# Patient Record
Sex: MALE | URBAN - METROPOLITAN AREA
[De-identification: names, ages, dates, MRNs, and addresses within clinical notes are randomized per-mention and may not be internally consistent; named-entity substitution may affect disease eponyms.]

---

## 2019-01-01 RX ADMIN — Medication 30 MILLIGRAM(S): at 22:30

## 2020-01-01 ENCOUNTER — INPATIENT (INPATIENT)
Facility: HOSPITAL | Age: 45
LOS: 47 days | DRG: 870 | End: 2020-05-18
Attending: INTERNAL MEDICINE | Admitting: HOSPITALIST
Payer: MEDICAID

## 2020-01-01 VITALS
DIASTOLIC BLOOD PRESSURE: 79 MMHG | TEMPERATURE: 102 F | HEART RATE: 129 BPM | WEIGHT: 115.08 LBS | HEIGHT: 62 IN | RESPIRATION RATE: 22 BRPM | OXYGEN SATURATION: 85 % | SYSTOLIC BLOOD PRESSURE: 123 MMHG

## 2020-01-01 VITALS — OXYGEN SATURATION: 73 % | TEMPERATURE: 100 F | HEART RATE: 6 BPM

## 2020-01-01 DIAGNOSIS — R68.89 OTHER GENERAL SYMPTOMS AND SIGNS: ICD-10-CM

## 2020-01-01 DIAGNOSIS — Z90.49 ACQUIRED ABSENCE OF OTHER SPECIFIED PARTS OF DIGESTIVE TRACT: Chronic | ICD-10-CM

## 2020-01-01 DIAGNOSIS — J96.00 ACUTE RESPIRATORY FAILURE, UNSPECIFIED WHETHER WITH HYPOXIA OR HYPERCAPNIA: ICD-10-CM

## 2020-01-01 DIAGNOSIS — Z29.9 ENCOUNTER FOR PROPHYLACTIC MEASURES, UNSPECIFIED: ICD-10-CM

## 2020-01-01 DIAGNOSIS — F43.20 ADJUSTMENT DISORDER, UNSPECIFIED: ICD-10-CM

## 2020-01-01 DIAGNOSIS — Z02.9 ENCOUNTER FOR ADMINISTRATIVE EXAMINATIONS, UNSPECIFIED: ICD-10-CM

## 2020-01-01 DIAGNOSIS — J96.01 ACUTE RESPIRATORY FAILURE WITH HYPOXIA: ICD-10-CM

## 2020-01-01 DIAGNOSIS — Z71.89 OTHER SPECIFIED COUNSELING: ICD-10-CM

## 2020-01-01 DIAGNOSIS — Z51.5 ENCOUNTER FOR PALLIATIVE CARE: ICD-10-CM

## 2020-01-01 DIAGNOSIS — F32.9 MAJOR DEPRESSIVE DISORDER, SINGLE EPISODE, UNSPECIFIED: ICD-10-CM

## 2020-01-01 DIAGNOSIS — A41.9 SEPSIS, UNSPECIFIED ORGANISM: ICD-10-CM

## 2020-01-01 LAB
-  AMPICILLIN/SULBACTAM: SIGNIFICANT CHANGE UP
-  AMPICILLIN/SULBACTAM: SIGNIFICANT CHANGE UP
-  CEFAZOLIN: SIGNIFICANT CHANGE UP
-  CEFAZOLIN: SIGNIFICANT CHANGE UP
-  CLINDAMYCIN: SIGNIFICANT CHANGE UP
-  CLINDAMYCIN: SIGNIFICANT CHANGE UP
-  COAGULASE NEGATIVE STAPHYLOCOCCUS: SIGNIFICANT CHANGE UP
-  ERYTHROMYCIN: SIGNIFICANT CHANGE UP
-  ERYTHROMYCIN: SIGNIFICANT CHANGE UP
-  GENTAMICIN: SIGNIFICANT CHANGE UP
-  GENTAMICIN: SIGNIFICANT CHANGE UP
-  OXACILLIN: SIGNIFICANT CHANGE UP
-  OXACILLIN: SIGNIFICANT CHANGE UP
-  PENICILLIN: SIGNIFICANT CHANGE UP
-  PENICILLIN: SIGNIFICANT CHANGE UP
-  RIFAMPIN: SIGNIFICANT CHANGE UP
-  RIFAMPIN: SIGNIFICANT CHANGE UP
-  TETRACYCLINE: SIGNIFICANT CHANGE UP
-  TETRACYCLINE: SIGNIFICANT CHANGE UP
-  TRIMETHOPRIM/SULFAMETHOXAZOLE: SIGNIFICANT CHANGE UP
-  TRIMETHOPRIM/SULFAMETHOXAZOLE: SIGNIFICANT CHANGE UP
-  VANCOMYCIN: SIGNIFICANT CHANGE UP
-  VANCOMYCIN: SIGNIFICANT CHANGE UP
A1C WITH ESTIMATED AVERAGE GLUCOSE RESULT: 6.5 % — HIGH (ref 4–5.6)
ALBUMIN SERPL ELPH-MCNC: 1.9 G/DL — LOW (ref 3.3–5)
ALBUMIN SERPL ELPH-MCNC: 2 G/DL — LOW (ref 3.3–5)
ALBUMIN SERPL ELPH-MCNC: 2 G/DL — LOW (ref 3.3–5)
ALBUMIN SERPL ELPH-MCNC: 2.1 G/DL — LOW (ref 3.3–5)
ALBUMIN SERPL ELPH-MCNC: 2.2 G/DL — LOW (ref 3.3–5)
ALBUMIN SERPL ELPH-MCNC: 2.3 G/DL — LOW (ref 3.3–5)
ALBUMIN SERPL ELPH-MCNC: 2.4 G/DL — LOW (ref 3.3–5)
ALBUMIN SERPL ELPH-MCNC: 2.5 G/DL — LOW (ref 3.3–5)
ALBUMIN SERPL ELPH-MCNC: 2.6 G/DL — LOW (ref 3.3–5)
ALBUMIN SERPL ELPH-MCNC: 2.7 G/DL — LOW (ref 3.3–5)
ALBUMIN SERPL ELPH-MCNC: 2.8 G/DL — LOW (ref 3.3–5)
ALBUMIN SERPL ELPH-MCNC: 2.8 G/DL — LOW (ref 3.3–5)
ALBUMIN SERPL ELPH-MCNC: 2.9 G/DL — LOW (ref 3.3–5)
ALBUMIN SERPL ELPH-MCNC: 3 G/DL — LOW (ref 3.3–5)
ALBUMIN SERPL ELPH-MCNC: 3.2 G/DL — LOW (ref 3.3–5)
ALBUMIN SERPL ELPH-MCNC: 3.8 G/DL — SIGNIFICANT CHANGE UP (ref 3.3–5)
ALBUMIN SERPL ELPH-MCNC: 4.1 G/DL — SIGNIFICANT CHANGE UP (ref 3.3–5)
ALP SERPL-CCNC: 100 U/L — SIGNIFICANT CHANGE UP (ref 40–120)
ALP SERPL-CCNC: 100 U/L — SIGNIFICANT CHANGE UP (ref 40–120)
ALP SERPL-CCNC: 102 U/L — SIGNIFICANT CHANGE UP (ref 40–120)
ALP SERPL-CCNC: 104 U/L — SIGNIFICANT CHANGE UP (ref 40–120)
ALP SERPL-CCNC: 106 U/L — SIGNIFICANT CHANGE UP (ref 40–120)
ALP SERPL-CCNC: 106 U/L — SIGNIFICANT CHANGE UP (ref 40–120)
ALP SERPL-CCNC: 108 U/L — SIGNIFICANT CHANGE UP (ref 40–120)
ALP SERPL-CCNC: 109 U/L — SIGNIFICANT CHANGE UP (ref 40–120)
ALP SERPL-CCNC: 109 U/L — SIGNIFICANT CHANGE UP (ref 40–120)
ALP SERPL-CCNC: 114 U/L — SIGNIFICANT CHANGE UP (ref 40–120)
ALP SERPL-CCNC: 115 U/L — SIGNIFICANT CHANGE UP (ref 40–120)
ALP SERPL-CCNC: 118 U/L — SIGNIFICANT CHANGE UP (ref 40–120)
ALP SERPL-CCNC: 118 U/L — SIGNIFICANT CHANGE UP (ref 40–120)
ALP SERPL-CCNC: 120 U/L — SIGNIFICANT CHANGE UP (ref 40–120)
ALP SERPL-CCNC: 122 U/L — HIGH (ref 40–120)
ALP SERPL-CCNC: 126 U/L — HIGH (ref 40–120)
ALP SERPL-CCNC: 127 U/L — HIGH (ref 40–120)
ALP SERPL-CCNC: 128 U/L — HIGH (ref 40–120)
ALP SERPL-CCNC: 129 U/L — HIGH (ref 40–120)
ALP SERPL-CCNC: 130 U/L — HIGH (ref 40–120)
ALP SERPL-CCNC: 132 U/L — HIGH (ref 40–120)
ALP SERPL-CCNC: 133 U/L — HIGH (ref 40–120)
ALP SERPL-CCNC: 135 U/L — HIGH (ref 40–120)
ALP SERPL-CCNC: 135 U/L — HIGH (ref 40–120)
ALP SERPL-CCNC: 136 U/L — HIGH (ref 40–120)
ALP SERPL-CCNC: 139 U/L — HIGH (ref 40–120)
ALP SERPL-CCNC: 141 U/L — HIGH (ref 40–120)
ALP SERPL-CCNC: 142 U/L — HIGH (ref 40–120)
ALP SERPL-CCNC: 142 U/L — HIGH (ref 40–120)
ALP SERPL-CCNC: 143 U/L — HIGH (ref 40–120)
ALP SERPL-CCNC: 144 U/L — HIGH (ref 40–120)
ALP SERPL-CCNC: 145 U/L — HIGH (ref 40–120)
ALP SERPL-CCNC: 145 U/L — HIGH (ref 40–120)
ALP SERPL-CCNC: 151 U/L — HIGH (ref 40–120)
ALP SERPL-CCNC: 157 U/L — HIGH (ref 40–120)
ALP SERPL-CCNC: 167 U/L — HIGH (ref 40–120)
ALP SERPL-CCNC: 169 U/L — HIGH (ref 40–120)
ALP SERPL-CCNC: 173 U/L — HIGH (ref 40–120)
ALP SERPL-CCNC: 176 U/L — HIGH (ref 40–120)
ALP SERPL-CCNC: 345 U/L — HIGH (ref 40–120)
ALP SERPL-CCNC: 374 U/L — HIGH (ref 40–120)
ALP SERPL-CCNC: 413 U/L — HIGH (ref 40–120)
ALP SERPL-CCNC: 457 U/L — HIGH (ref 40–120)
ALP SERPL-CCNC: 457 U/L — HIGH (ref 40–120)
ALP SERPL-CCNC: 477 U/L — HIGH (ref 40–120)
ALP SERPL-CCNC: 483 U/L — HIGH (ref 40–120)
ALP SERPL-CCNC: 72 U/L — SIGNIFICANT CHANGE UP (ref 40–120)
ALP SERPL-CCNC: 76 U/L — SIGNIFICANT CHANGE UP (ref 40–120)
ALP SERPL-CCNC: 79 U/L — SIGNIFICANT CHANGE UP (ref 40–120)
ALP SERPL-CCNC: 82 U/L — SIGNIFICANT CHANGE UP (ref 40–120)
ALP SERPL-CCNC: 86 U/L — SIGNIFICANT CHANGE UP (ref 40–120)
ALP SERPL-CCNC: 89 U/L — SIGNIFICANT CHANGE UP (ref 40–120)
ALP SERPL-CCNC: 92 U/L — SIGNIFICANT CHANGE UP (ref 40–120)
ALP SERPL-CCNC: 93 U/L — SIGNIFICANT CHANGE UP (ref 40–120)
ALP SERPL-CCNC: 99 U/L — SIGNIFICANT CHANGE UP (ref 40–120)
ALT FLD-CCNC: 10 U/L — SIGNIFICANT CHANGE UP (ref 10–45)
ALT FLD-CCNC: 10 U/L — SIGNIFICANT CHANGE UP (ref 10–45)
ALT FLD-CCNC: 11 U/L — SIGNIFICANT CHANGE UP (ref 10–45)
ALT FLD-CCNC: 11 U/L — SIGNIFICANT CHANGE UP (ref 10–45)
ALT FLD-CCNC: 12 U/L — SIGNIFICANT CHANGE UP (ref 10–45)
ALT FLD-CCNC: 13 U/L — SIGNIFICANT CHANGE UP (ref 10–45)
ALT FLD-CCNC: 14 U/L — SIGNIFICANT CHANGE UP (ref 10–45)
ALT FLD-CCNC: 16 U/L — SIGNIFICANT CHANGE UP (ref 10–45)
ALT FLD-CCNC: 168 U/L — HIGH (ref 10–45)
ALT FLD-CCNC: 171 U/L — HIGH (ref 10–45)
ALT FLD-CCNC: 18 U/L — SIGNIFICANT CHANGE UP (ref 10–45)
ALT FLD-CCNC: 20 U/L — SIGNIFICANT CHANGE UP (ref 10–45)
ALT FLD-CCNC: 200 U/L — HIGH (ref 10–45)
ALT FLD-CCNC: 21 U/L — SIGNIFICANT CHANGE UP (ref 10–45)
ALT FLD-CCNC: 21 U/L — SIGNIFICANT CHANGE UP (ref 10–45)
ALT FLD-CCNC: 23 U/L — SIGNIFICANT CHANGE UP (ref 10–45)
ALT FLD-CCNC: 24 U/L — SIGNIFICANT CHANGE UP (ref 10–45)
ALT FLD-CCNC: 25 U/L — SIGNIFICANT CHANGE UP (ref 10–45)
ALT FLD-CCNC: 25 U/L — SIGNIFICANT CHANGE UP (ref 10–45)
ALT FLD-CCNC: 26 U/L — SIGNIFICANT CHANGE UP (ref 10–45)
ALT FLD-CCNC: 27 U/L — SIGNIFICANT CHANGE UP (ref 10–45)
ALT FLD-CCNC: 27 U/L — SIGNIFICANT CHANGE UP (ref 10–45)
ALT FLD-CCNC: 28 U/L — SIGNIFICANT CHANGE UP (ref 10–45)
ALT FLD-CCNC: 29 U/L — SIGNIFICANT CHANGE UP (ref 10–45)
ALT FLD-CCNC: 30 U/L — SIGNIFICANT CHANGE UP (ref 10–45)
ALT FLD-CCNC: 31 U/L — SIGNIFICANT CHANGE UP (ref 10–45)
ALT FLD-CCNC: 321 U/L — HIGH (ref 10–45)
ALT FLD-CCNC: 34 U/L — SIGNIFICANT CHANGE UP (ref 10–45)
ALT FLD-CCNC: 34 U/L — SIGNIFICANT CHANGE UP (ref 10–45)
ALT FLD-CCNC: 35 U/L — SIGNIFICANT CHANGE UP (ref 10–45)
ALT FLD-CCNC: 35 U/L — SIGNIFICANT CHANGE UP (ref 10–45)
ALT FLD-CCNC: 37 U/L — SIGNIFICANT CHANGE UP (ref 10–45)
ALT FLD-CCNC: 374 U/L — HIGH (ref 10–45)
ALT FLD-CCNC: 38 U/L — SIGNIFICANT CHANGE UP (ref 10–45)
ALT FLD-CCNC: 39 U/L — SIGNIFICANT CHANGE UP (ref 10–45)
ALT FLD-CCNC: 39 U/L — SIGNIFICANT CHANGE UP (ref 10–45)
ALT FLD-CCNC: 40 U/L — SIGNIFICANT CHANGE UP (ref 10–45)
ALT FLD-CCNC: 40 U/L — SIGNIFICANT CHANGE UP (ref 10–45)
ALT FLD-CCNC: 41 U/L — SIGNIFICANT CHANGE UP (ref 10–45)
ALT FLD-CCNC: 414 U/L — HIGH (ref 10–45)
ALT FLD-CCNC: 42 U/L — SIGNIFICANT CHANGE UP (ref 10–45)
ALT FLD-CCNC: 43 U/L — SIGNIFICANT CHANGE UP (ref 10–45)
ALT FLD-CCNC: 52 U/L — HIGH (ref 10–45)
ALT FLD-CCNC: 58 U/L — HIGH (ref 10–45)
ALT FLD-CCNC: 602 U/L — HIGH (ref 10–45)
ALT FLD-CCNC: 772 U/L — HIGH (ref 10–45)
ALT FLD-CCNC: 8 U/L — LOW (ref 10–45)
ALT FLD-CCNC: 9 U/L — LOW (ref 10–45)
ALT FLD-CCNC: 9 U/L — LOW (ref 10–45)
ANION GAP SERPL CALC-SCNC: 10 MMOL/L — SIGNIFICANT CHANGE UP (ref 5–17)
ANION GAP SERPL CALC-SCNC: 11 MMOL/L — SIGNIFICANT CHANGE UP (ref 5–17)
ANION GAP SERPL CALC-SCNC: 12 MMOL/L — SIGNIFICANT CHANGE UP (ref 5–17)
ANION GAP SERPL CALC-SCNC: 13 MMOL/L — SIGNIFICANT CHANGE UP (ref 5–17)
ANION GAP SERPL CALC-SCNC: 14 MMOL/L — SIGNIFICANT CHANGE UP (ref 5–17)
ANION GAP SERPL CALC-SCNC: 15 MMOL/L — SIGNIFICANT CHANGE UP (ref 5–17)
ANION GAP SERPL CALC-SCNC: 16 MMOL/L — SIGNIFICANT CHANGE UP (ref 5–17)
ANION GAP SERPL CALC-SCNC: 18 MMOL/L — HIGH (ref 5–17)
ANION GAP SERPL CALC-SCNC: 19 MMOL/L — HIGH (ref 5–17)
ANION GAP SERPL CALC-SCNC: 20 MMOL/L — HIGH (ref 5–17)
ANION GAP SERPL CALC-SCNC: 23 MMOL/L — HIGH (ref 5–17)
ANION GAP SERPL CALC-SCNC: 25 MMOL/L — HIGH (ref 5–17)
ANION GAP SERPL CALC-SCNC: 8 MMOL/L — SIGNIFICANT CHANGE UP (ref 5–17)
ANION GAP SERPL CALC-SCNC: 9 MMOL/L — SIGNIFICANT CHANGE UP (ref 5–17)
APPEARANCE UR: ABNORMAL
APPEARANCE UR: CLEAR — SIGNIFICANT CHANGE UP
APTT BLD: 104 SEC — HIGH (ref 27.5–36.3)
APTT BLD: 111.3 SEC — HIGH (ref 27.5–36.3)
APTT BLD: 139.3 SEC — CRITICAL HIGH (ref 27.5–36.3)
APTT BLD: 28.8 SEC — SIGNIFICANT CHANGE UP (ref 27.5–36.3)
APTT BLD: 29 SEC — SIGNIFICANT CHANGE UP (ref 27.5–36.3)
APTT BLD: 30.3 SEC — SIGNIFICANT CHANGE UP (ref 27.5–36.3)
APTT BLD: 31 SEC — SIGNIFICANT CHANGE UP (ref 27.5–36.3)
APTT BLD: 31.5 SEC — SIGNIFICANT CHANGE UP (ref 27.5–36.3)
APTT BLD: 32 SEC — SIGNIFICANT CHANGE UP (ref 27.5–36.3)
APTT BLD: 32.3 SEC — SIGNIFICANT CHANGE UP (ref 27.5–36.3)
APTT BLD: 32.8 SEC — SIGNIFICANT CHANGE UP (ref 27.5–36.3)
APTT BLD: 33.1 SEC — SIGNIFICANT CHANGE UP (ref 27.5–36.3)
APTT BLD: 33.4 SEC — SIGNIFICANT CHANGE UP (ref 27.5–36.3)
APTT BLD: 33.9 SEC — SIGNIFICANT CHANGE UP (ref 27.5–36.3)
APTT BLD: 34.1 SEC — SIGNIFICANT CHANGE UP (ref 27.5–36.3)
APTT BLD: 34.7 SEC — SIGNIFICANT CHANGE UP (ref 27.5–36.3)
APTT BLD: 34.8 SEC — SIGNIFICANT CHANGE UP (ref 27.5–36.3)
APTT BLD: 35.1 SEC — SIGNIFICANT CHANGE UP (ref 27.5–36.3)
APTT BLD: 35.7 SEC — SIGNIFICANT CHANGE UP (ref 27.5–36.3)
APTT BLD: 36.6 SEC — HIGH (ref 27.5–36.3)
APTT BLD: 38.8 SEC — HIGH (ref 27.5–36.3)
APTT BLD: 44.9 SEC — HIGH (ref 27.5–36.3)
APTT BLD: 50.9 SEC — HIGH (ref 27.5–36.3)
APTT BLD: 52.6 SEC — HIGH (ref 27.5–36.3)
APTT BLD: 53.6 SEC — HIGH (ref 27.5–36.3)
APTT BLD: 67.4 SEC — HIGH (ref 27.5–36.3)
APTT BLD: 71.2 SEC — HIGH (ref 27.5–36.3)
APTT BLD: 75.9 SEC — HIGH (ref 27.5–36.3)
APTT BLD: 91.1 SEC — HIGH (ref 27.5–36.3)
AST SERPL-CCNC: 1300 U/L — HIGH (ref 10–40)
AST SERPL-CCNC: 15 U/L — SIGNIFICANT CHANGE UP (ref 10–40)
AST SERPL-CCNC: 16 U/L — SIGNIFICANT CHANGE UP (ref 10–40)
AST SERPL-CCNC: 16 U/L — SIGNIFICANT CHANGE UP (ref 10–40)
AST SERPL-CCNC: 17 U/L — SIGNIFICANT CHANGE UP (ref 10–40)
AST SERPL-CCNC: 1781 U/L — HIGH (ref 10–40)
AST SERPL-CCNC: 18 U/L — SIGNIFICANT CHANGE UP (ref 10–40)
AST SERPL-CCNC: 19 U/L — SIGNIFICANT CHANGE UP (ref 10–40)
AST SERPL-CCNC: 20 U/L — SIGNIFICANT CHANGE UP (ref 10–40)
AST SERPL-CCNC: 20 U/L — SIGNIFICANT CHANGE UP (ref 10–40)
AST SERPL-CCNC: 2072 U/L — HIGH (ref 10–40)
AST SERPL-CCNC: 21 U/L — SIGNIFICANT CHANGE UP (ref 10–40)
AST SERPL-CCNC: 22 U/L — SIGNIFICANT CHANGE UP (ref 10–40)
AST SERPL-CCNC: 23 U/L — SIGNIFICANT CHANGE UP (ref 10–40)
AST SERPL-CCNC: 24 U/L — SIGNIFICANT CHANGE UP (ref 10–40)
AST SERPL-CCNC: 24 U/L — SIGNIFICANT CHANGE UP (ref 10–40)
AST SERPL-CCNC: 25 U/L — SIGNIFICANT CHANGE UP (ref 10–40)
AST SERPL-CCNC: 25 U/L — SIGNIFICANT CHANGE UP (ref 10–40)
AST SERPL-CCNC: 26 U/L — SIGNIFICANT CHANGE UP (ref 10–40)
AST SERPL-CCNC: 26 U/L — SIGNIFICANT CHANGE UP (ref 10–40)
AST SERPL-CCNC: 27 U/L — SIGNIFICANT CHANGE UP (ref 10–40)
AST SERPL-CCNC: 27 U/L — SIGNIFICANT CHANGE UP (ref 10–40)
AST SERPL-CCNC: 28 U/L — SIGNIFICANT CHANGE UP (ref 10–40)
AST SERPL-CCNC: 29 U/L — SIGNIFICANT CHANGE UP (ref 10–40)
AST SERPL-CCNC: 30 U/L — SIGNIFICANT CHANGE UP (ref 10–40)
AST SERPL-CCNC: 30 U/L — SIGNIFICANT CHANGE UP (ref 10–40)
AST SERPL-CCNC: 31 U/L — SIGNIFICANT CHANGE UP (ref 10–40)
AST SERPL-CCNC: 3128 U/L — HIGH (ref 10–40)
AST SERPL-CCNC: 32 U/L — SIGNIFICANT CHANGE UP (ref 10–40)
AST SERPL-CCNC: 32 U/L — SIGNIFICANT CHANGE UP (ref 10–40)
AST SERPL-CCNC: 33 U/L — SIGNIFICANT CHANGE UP (ref 10–40)
AST SERPL-CCNC: 34 U/L — SIGNIFICANT CHANGE UP (ref 10–40)
AST SERPL-CCNC: 34 U/L — SIGNIFICANT CHANGE UP (ref 10–40)
AST SERPL-CCNC: 35 U/L — SIGNIFICANT CHANGE UP (ref 10–40)
AST SERPL-CCNC: 38 U/L — SIGNIFICANT CHANGE UP (ref 10–40)
AST SERPL-CCNC: 40 U/L — SIGNIFICANT CHANGE UP (ref 10–40)
AST SERPL-CCNC: 4461 U/L — HIGH (ref 10–40)
AST SERPL-CCNC: 46 U/L — HIGH (ref 10–40)
AST SERPL-CCNC: 466 U/L — HIGH (ref 10–40)
AST SERPL-CCNC: 6532 U/L — HIGH (ref 10–40)
AST SERPL-CCNC: HIGH U/L (ref 10–40)
BACTERIA # UR AUTO: ABNORMAL
BACTERIA # UR AUTO: NEGATIVE — SIGNIFICANT CHANGE UP
BASE EXCESS BLDA CALC-SCNC: -1.6 MMOL/L — SIGNIFICANT CHANGE UP (ref -2–2)
BASE EXCESS BLDA CALC-SCNC: 0.7 MMOL/L — SIGNIFICANT CHANGE UP (ref -2–2)
BASE EXCESS BLDA CALC-SCNC: 10.6 MMOL/L — HIGH (ref -2–2)
BASE EXCESS BLDA CALC-SCNC: 11.1 MMOL/L — HIGH (ref -2–2)
BASE EXCESS BLDA CALC-SCNC: 2 MMOL/L — SIGNIFICANT CHANGE UP (ref -2–2)
BASE EXCESS BLDA CALC-SCNC: 2.6 MMOL/L — HIGH (ref -2–2)
BASE EXCESS BLDA CALC-SCNC: 3.2 MMOL/L — HIGH (ref -2–2)
BASE EXCESS BLDA CALC-SCNC: 6.8 MMOL/L — HIGH (ref -2–2)
BASE EXCESS BLDA CALC-SCNC: 7.9 MMOL/L — HIGH (ref -2–2)
BASE EXCESS BLDA CALC-SCNC: 8 MMOL/L — HIGH (ref -2–2)
BASE EXCESS BLDA CALC-SCNC: 9.6 MMOL/L — HIGH (ref -2–2)
BASOPHILS # BLD AUTO: 0 K/UL — SIGNIFICANT CHANGE UP (ref 0–0.2)
BASOPHILS # BLD AUTO: 0.02 K/UL — SIGNIFICANT CHANGE UP (ref 0–0.2)
BASOPHILS # BLD AUTO: 0.03 K/UL — SIGNIFICANT CHANGE UP (ref 0–0.2)
BASOPHILS # BLD AUTO: 0.04 K/UL — SIGNIFICANT CHANGE UP (ref 0–0.2)
BASOPHILS # BLD AUTO: 0.05 K/UL — SIGNIFICANT CHANGE UP (ref 0–0.2)
BASOPHILS # BLD AUTO: 0.06 K/UL — SIGNIFICANT CHANGE UP (ref 0–0.2)
BASOPHILS # BLD AUTO: 0.07 K/UL — SIGNIFICANT CHANGE UP (ref 0–0.2)
BASOPHILS # BLD AUTO: 0.09 K/UL — SIGNIFICANT CHANGE UP (ref 0–0.2)
BASOPHILS # BLD AUTO: 0.1 K/UL — SIGNIFICANT CHANGE UP (ref 0–0.2)
BASOPHILS # BLD AUTO: 0.1 K/UL — SIGNIFICANT CHANGE UP (ref 0–0.2)
BASOPHILS # BLD AUTO: 0.11 K/UL — SIGNIFICANT CHANGE UP (ref 0–0.2)
BASOPHILS # BLD AUTO: 0.14 K/UL — SIGNIFICANT CHANGE UP (ref 0–0.2)
BASOPHILS NFR BLD AUTO: 0 % — SIGNIFICANT CHANGE UP (ref 0–2)
BASOPHILS NFR BLD AUTO: 0.1 % — SIGNIFICANT CHANGE UP (ref 0–2)
BASOPHILS NFR BLD AUTO: 0.2 % — SIGNIFICANT CHANGE UP (ref 0–2)
BASOPHILS NFR BLD AUTO: 0.3 % — SIGNIFICANT CHANGE UP (ref 0–2)
BASOPHILS NFR BLD AUTO: 0.4 % — SIGNIFICANT CHANGE UP (ref 0–2)
BASOPHILS NFR BLD AUTO: 0.5 % — SIGNIFICANT CHANGE UP (ref 0–2)
BASOPHILS NFR BLD AUTO: 0.6 % — SIGNIFICANT CHANGE UP (ref 0–2)
BASOPHILS NFR BLD AUTO: 0.7 % — SIGNIFICANT CHANGE UP (ref 0–2)
BASOPHILS NFR BLD AUTO: 0.7 % — SIGNIFICANT CHANGE UP (ref 0–2)
BASOPHILS NFR BLD AUTO: 0.9 % — SIGNIFICANT CHANGE UP (ref 0–2)
BILIRUB SERPL-MCNC: 0.2 MG/DL — SIGNIFICANT CHANGE UP (ref 0.2–1.2)
BILIRUB SERPL-MCNC: 0.3 MG/DL — SIGNIFICANT CHANGE UP (ref 0.2–1.2)
BILIRUB SERPL-MCNC: 0.4 MG/DL — SIGNIFICANT CHANGE UP (ref 0.2–1.2)
BILIRUB SERPL-MCNC: 0.5 MG/DL — SIGNIFICANT CHANGE UP (ref 0.2–1.2)
BILIRUB SERPL-MCNC: 0.6 MG/DL — SIGNIFICANT CHANGE UP (ref 0.2–1.2)
BILIRUB SERPL-MCNC: 0.7 MG/DL — SIGNIFICANT CHANGE UP (ref 0.2–1.2)
BILIRUB SERPL-MCNC: 0.8 MG/DL — SIGNIFICANT CHANGE UP (ref 0.2–1.2)
BILIRUB SERPL-MCNC: 0.9 MG/DL — SIGNIFICANT CHANGE UP (ref 0.2–1.2)
BILIRUB SERPL-MCNC: 0.9 MG/DL — SIGNIFICANT CHANGE UP (ref 0.2–1.2)
BILIRUB SERPL-MCNC: 1.1 MG/DL — SIGNIFICANT CHANGE UP (ref 0.2–1.2)
BILIRUB SERPL-MCNC: 1.2 MG/DL — SIGNIFICANT CHANGE UP (ref 0.2–1.2)
BILIRUB SERPL-MCNC: 1.3 MG/DL — HIGH (ref 0.2–1.2)
BILIRUB SERPL-MCNC: 1.4 MG/DL — HIGH (ref 0.2–1.2)
BILIRUB SERPL-MCNC: 1.5 MG/DL — HIGH (ref 0.2–1.2)
BILIRUB SERPL-MCNC: 1.8 MG/DL — HIGH (ref 0.2–1.2)
BILIRUB UR-MCNC: NEGATIVE — SIGNIFICANT CHANGE UP
BLD GP AB SCN SERPL QL: NEGATIVE — SIGNIFICANT CHANGE UP
BUN SERPL-MCNC: 10 MG/DL — SIGNIFICANT CHANGE UP (ref 7–23)
BUN SERPL-MCNC: 10 MG/DL — SIGNIFICANT CHANGE UP (ref 7–23)
BUN SERPL-MCNC: 11 MG/DL — SIGNIFICANT CHANGE UP (ref 7–23)
BUN SERPL-MCNC: 11 MG/DL — SIGNIFICANT CHANGE UP (ref 7–23)
BUN SERPL-MCNC: 12 MG/DL — SIGNIFICANT CHANGE UP (ref 7–23)
BUN SERPL-MCNC: 13 MG/DL — SIGNIFICANT CHANGE UP (ref 7–23)
BUN SERPL-MCNC: 14 MG/DL — SIGNIFICANT CHANGE UP (ref 7–23)
BUN SERPL-MCNC: 15 MG/DL — SIGNIFICANT CHANGE UP (ref 7–23)
BUN SERPL-MCNC: 16 MG/DL — SIGNIFICANT CHANGE UP (ref 7–23)
BUN SERPL-MCNC: 17 MG/DL — SIGNIFICANT CHANGE UP (ref 7–23)
BUN SERPL-MCNC: 18 MG/DL — SIGNIFICANT CHANGE UP (ref 7–23)
BUN SERPL-MCNC: 19 MG/DL — SIGNIFICANT CHANGE UP (ref 7–23)
BUN SERPL-MCNC: 20 MG/DL — SIGNIFICANT CHANGE UP (ref 7–23)
BUN SERPL-MCNC: 21 MG/DL — SIGNIFICANT CHANGE UP (ref 7–23)
BUN SERPL-MCNC: 24 MG/DL — HIGH (ref 7–23)
BUN SERPL-MCNC: 24 MG/DL — HIGH (ref 7–23)
BUN SERPL-MCNC: 25 MG/DL — HIGH (ref 7–23)
BUN SERPL-MCNC: 26 MG/DL — HIGH (ref 7–23)
BUN SERPL-MCNC: 26 MG/DL — HIGH (ref 7–23)
BUN SERPL-MCNC: 27 MG/DL — HIGH (ref 7–23)
BUN SERPL-MCNC: 27 MG/DL — HIGH (ref 7–23)
BUN SERPL-MCNC: 28 MG/DL — HIGH (ref 7–23)
BUN SERPL-MCNC: 28 MG/DL — HIGH (ref 7–23)
BUN SERPL-MCNC: 31 MG/DL — HIGH (ref 7–23)
BUN SERPL-MCNC: 33 MG/DL — HIGH (ref 7–23)
BUN SERPL-MCNC: 33 MG/DL — HIGH (ref 7–23)
BUN SERPL-MCNC: 4 MG/DL — LOW (ref 7–23)
BUN SERPL-MCNC: 5 MG/DL — LOW (ref 7–23)
BUN SERPL-MCNC: 6 MG/DL — LOW (ref 7–23)
BUN SERPL-MCNC: 7 MG/DL — SIGNIFICANT CHANGE UP (ref 7–23)
BUN SERPL-MCNC: 8 MG/DL — SIGNIFICANT CHANGE UP (ref 7–23)
BUN SERPL-MCNC: 9 MG/DL — SIGNIFICANT CHANGE UP (ref 7–23)
CALCIUM SERPL-MCNC: 6.8 MG/DL — LOW (ref 8.4–10.5)
CALCIUM SERPL-MCNC: 7.1 MG/DL — LOW (ref 8.4–10.5)
CALCIUM SERPL-MCNC: 7.2 MG/DL — LOW (ref 8.4–10.5)
CALCIUM SERPL-MCNC: 7.3 MG/DL — LOW (ref 8.4–10.5)
CALCIUM SERPL-MCNC: 7.7 MG/DL — LOW (ref 8.4–10.5)
CALCIUM SERPL-MCNC: 7.8 MG/DL — LOW (ref 8.4–10.5)
CALCIUM SERPL-MCNC: 7.9 MG/DL — LOW (ref 8.4–10.5)
CALCIUM SERPL-MCNC: 8 MG/DL — LOW (ref 8.4–10.5)
CALCIUM SERPL-MCNC: 8.1 MG/DL — LOW (ref 8.4–10.5)
CALCIUM SERPL-MCNC: 8.2 MG/DL — LOW (ref 8.4–10.5)
CALCIUM SERPL-MCNC: 8.3 MG/DL — LOW (ref 8.4–10.5)
CALCIUM SERPL-MCNC: 8.4 MG/DL — SIGNIFICANT CHANGE UP (ref 8.4–10.5)
CALCIUM SERPL-MCNC: 8.5 MG/DL — SIGNIFICANT CHANGE UP (ref 8.4–10.5)
CALCIUM SERPL-MCNC: 8.6 MG/DL — SIGNIFICANT CHANGE UP (ref 8.4–10.5)
CALCIUM SERPL-MCNC: 8.7 MG/DL — SIGNIFICANT CHANGE UP (ref 8.4–10.5)
CALCIUM SERPL-MCNC: 8.8 MG/DL — SIGNIFICANT CHANGE UP (ref 8.4–10.5)
CALCIUM SERPL-MCNC: 8.9 MG/DL — SIGNIFICANT CHANGE UP (ref 8.4–10.5)
CALCIUM SERPL-MCNC: 9 MG/DL — SIGNIFICANT CHANGE UP (ref 8.4–10.5)
CALCIUM SERPL-MCNC: 9.1 MG/DL — SIGNIFICANT CHANGE UP (ref 8.4–10.5)
CALCIUM SERPL-MCNC: 9.1 MG/DL — SIGNIFICANT CHANGE UP (ref 8.4–10.5)
CALCIUM SERPL-MCNC: 9.2 MG/DL — SIGNIFICANT CHANGE UP (ref 8.4–10.5)
CALCIUM SERPL-MCNC: 9.3 MG/DL — SIGNIFICANT CHANGE UP (ref 8.4–10.5)
CHLORIDE SERPL-SCNC: 100 MMOL/L — SIGNIFICANT CHANGE UP (ref 96–108)
CHLORIDE SERPL-SCNC: 101 MMOL/L — SIGNIFICANT CHANGE UP (ref 96–108)
CHLORIDE SERPL-SCNC: 102 MMOL/L — SIGNIFICANT CHANGE UP (ref 96–108)
CHLORIDE SERPL-SCNC: 103 MMOL/L — SIGNIFICANT CHANGE UP (ref 96–108)
CHLORIDE SERPL-SCNC: 104 MMOL/L — SIGNIFICANT CHANGE UP (ref 96–108)
CHLORIDE SERPL-SCNC: 105 MMOL/L — SIGNIFICANT CHANGE UP (ref 96–108)
CHLORIDE SERPL-SCNC: 105 MMOL/L — SIGNIFICANT CHANGE UP (ref 96–108)
CHLORIDE SERPL-SCNC: 106 MMOL/L — SIGNIFICANT CHANGE UP (ref 96–108)
CHLORIDE SERPL-SCNC: 106 MMOL/L — SIGNIFICANT CHANGE UP (ref 96–108)
CHLORIDE SERPL-SCNC: 107 MMOL/L — SIGNIFICANT CHANGE UP (ref 96–108)
CHLORIDE SERPL-SCNC: 107 MMOL/L — SIGNIFICANT CHANGE UP (ref 96–108)
CHLORIDE SERPL-SCNC: 108 MMOL/L — SIGNIFICANT CHANGE UP (ref 96–108)
CHLORIDE SERPL-SCNC: 109 MMOL/L — HIGH (ref 96–108)
CHLORIDE SERPL-SCNC: 109 MMOL/L — HIGH (ref 96–108)
CHLORIDE SERPL-SCNC: 110 MMOL/L — HIGH (ref 96–108)
CHLORIDE SERPL-SCNC: 112 MMOL/L — HIGH (ref 96–108)
CHLORIDE SERPL-SCNC: 114 MMOL/L — HIGH (ref 96–108)
CHLORIDE SERPL-SCNC: 115 MMOL/L — HIGH (ref 96–108)
CHLORIDE SERPL-SCNC: 116 MMOL/L — HIGH (ref 96–108)
CHLORIDE SERPL-SCNC: 91 MMOL/L — LOW (ref 96–108)
CHLORIDE SERPL-SCNC: 94 MMOL/L — LOW (ref 96–108)
CHLORIDE SERPL-SCNC: 94 MMOL/L — LOW (ref 96–108)
CHLORIDE SERPL-SCNC: 95 MMOL/L — LOW (ref 96–108)
CHLORIDE SERPL-SCNC: 96 MMOL/L — SIGNIFICANT CHANGE UP (ref 96–108)
CHLORIDE SERPL-SCNC: 97 MMOL/L — SIGNIFICANT CHANGE UP (ref 96–108)
CHLORIDE SERPL-SCNC: 98 MMOL/L — SIGNIFICANT CHANGE UP (ref 96–108)
CHLORIDE SERPL-SCNC: 99 MMOL/L — SIGNIFICANT CHANGE UP (ref 96–108)
CK SERPL-CCNC: 112 U/L — SIGNIFICANT CHANGE UP (ref 30–200)
CK SERPL-CCNC: 134 U/L — SIGNIFICANT CHANGE UP (ref 30–200)
CK SERPL-CCNC: 150 U/L — SIGNIFICANT CHANGE UP (ref 30–200)
CK SERPL-CCNC: 272 U/L — HIGH (ref 30–200)
CK SERPL-CCNC: 30 U/L — SIGNIFICANT CHANGE UP (ref 30–200)
CK SERPL-CCNC: 38 U/L — SIGNIFICANT CHANGE UP (ref 30–200)
CK SERPL-CCNC: 406 U/L — HIGH (ref 30–200)
CK SERPL-CCNC: 41 U/L — SIGNIFICANT CHANGE UP (ref 30–200)
CK SERPL-CCNC: 42 U/L — SIGNIFICANT CHANGE UP (ref 30–200)
CK SERPL-CCNC: 53 U/L — SIGNIFICANT CHANGE UP (ref 30–200)
CK SERPL-CCNC: 58 U/L — SIGNIFICANT CHANGE UP (ref 30–200)
CK SERPL-CCNC: 64 U/L — SIGNIFICANT CHANGE UP (ref 30–200)
CK SERPL-CCNC: 67 U/L — SIGNIFICANT CHANGE UP (ref 30–200)
CK SERPL-CCNC: 69 U/L — SIGNIFICANT CHANGE UP (ref 30–200)
CK SERPL-CCNC: 89 U/L — SIGNIFICANT CHANGE UP (ref 30–200)
CK SERPL-CCNC: 97 U/L — SIGNIFICANT CHANGE UP (ref 30–200)
CK SERPL-CCNC: 99 U/L — SIGNIFICANT CHANGE UP (ref 30–200)
CO2 BLDA-SCNC: 27 MMOL/L — SIGNIFICANT CHANGE UP (ref 22–30)
CO2 BLDA-SCNC: 29 MMOL/L — SIGNIFICANT CHANGE UP (ref 22–30)
CO2 BLDA-SCNC: 30 MMOL/L — SIGNIFICANT CHANGE UP (ref 22–30)
CO2 BLDA-SCNC: 30 MMOL/L — SIGNIFICANT CHANGE UP (ref 22–30)
CO2 BLDA-SCNC: 31 MMOL/L — HIGH (ref 22–30)
CO2 BLDA-SCNC: 35 MMOL/L — HIGH (ref 22–30)
CO2 BLDA-SCNC: 37 MMOL/L — HIGH (ref 22–30)
CO2 BLDA-SCNC: 37 MMOL/L — HIGH (ref 22–30)
CO2 BLDA-SCNC: 39 MMOL/L — HIGH (ref 22–30)
CO2 BLDA-SCNC: 40 MMOL/L — HIGH (ref 22–30)
CO2 BLDA-SCNC: 40 MMOL/L — HIGH (ref 22–30)
CO2 SERPL-SCNC: 13 MMOL/L — LOW (ref 22–31)
CO2 SERPL-SCNC: 14 MMOL/L — LOW (ref 22–31)
CO2 SERPL-SCNC: 16 MMOL/L — LOW (ref 22–31)
CO2 SERPL-SCNC: 17 MMOL/L — LOW (ref 22–31)
CO2 SERPL-SCNC: 18 MMOL/L — LOW (ref 22–31)
CO2 SERPL-SCNC: 19 MMOL/L — LOW (ref 22–31)
CO2 SERPL-SCNC: 20 MMOL/L — LOW (ref 22–31)
CO2 SERPL-SCNC: 22 MMOL/L — SIGNIFICANT CHANGE UP (ref 22–31)
CO2 SERPL-SCNC: 23 MMOL/L — SIGNIFICANT CHANGE UP (ref 22–31)
CO2 SERPL-SCNC: 24 MMOL/L — SIGNIFICANT CHANGE UP (ref 22–31)
CO2 SERPL-SCNC: 25 MMOL/L — SIGNIFICANT CHANGE UP (ref 22–31)
CO2 SERPL-SCNC: 26 MMOL/L — SIGNIFICANT CHANGE UP (ref 22–31)
CO2 SERPL-SCNC: 27 MMOL/L — SIGNIFICANT CHANGE UP (ref 22–31)
CO2 SERPL-SCNC: 28 MMOL/L — SIGNIFICANT CHANGE UP (ref 22–31)
CO2 SERPL-SCNC: 29 MMOL/L — SIGNIFICANT CHANGE UP (ref 22–31)
CO2 SERPL-SCNC: 30 MMOL/L — SIGNIFICANT CHANGE UP (ref 22–31)
CO2 SERPL-SCNC: 30 MMOL/L — SIGNIFICANT CHANGE UP (ref 22–31)
CO2 SERPL-SCNC: 31 MMOL/L — SIGNIFICANT CHANGE UP (ref 22–31)
CO2 SERPL-SCNC: 32 MMOL/L — HIGH (ref 22–31)
CO2 SERPL-SCNC: 33 MMOL/L — HIGH (ref 22–31)
CO2 SERPL-SCNC: 35 MMOL/L — HIGH (ref 22–31)
CO2 SERPL-SCNC: 36 MMOL/L — HIGH (ref 22–31)
COLOR SPEC: SIGNIFICANT CHANGE UP
COLOR SPEC: SIGNIFICANT CHANGE UP
COLOR SPEC: YELLOW — SIGNIFICANT CHANGE UP
COMMENT - URINE: SIGNIFICANT CHANGE UP
CREAT SERPL-MCNC: 0.3 MG/DL — LOW (ref 0.5–1.3)
CREAT SERPL-MCNC: 0.31 MG/DL — LOW (ref 0.5–1.3)
CREAT SERPL-MCNC: 0.32 MG/DL — LOW (ref 0.5–1.3)
CREAT SERPL-MCNC: 0.33 MG/DL — LOW (ref 0.5–1.3)
CREAT SERPL-MCNC: 0.37 MG/DL — LOW (ref 0.5–1.3)
CREAT SERPL-MCNC: 0.37 MG/DL — LOW (ref 0.5–1.3)
CREAT SERPL-MCNC: 0.38 MG/DL — LOW (ref 0.5–1.3)
CREAT SERPL-MCNC: 0.39 MG/DL — LOW (ref 0.5–1.3)
CREAT SERPL-MCNC: 0.39 MG/DL — LOW (ref 0.5–1.3)
CREAT SERPL-MCNC: 0.45 MG/DL — LOW (ref 0.5–1.3)
CREAT SERPL-MCNC: 0.47 MG/DL — LOW (ref 0.5–1.3)
CREAT SERPL-MCNC: 0.5 MG/DL — SIGNIFICANT CHANGE UP (ref 0.5–1.3)
CREAT SERPL-MCNC: 0.5 MG/DL — SIGNIFICANT CHANGE UP (ref 0.5–1.3)
CREAT SERPL-MCNC: 0.51 MG/DL — SIGNIFICANT CHANGE UP (ref 0.5–1.3)
CREAT SERPL-MCNC: 0.52 MG/DL — SIGNIFICANT CHANGE UP (ref 0.5–1.3)
CREAT SERPL-MCNC: 0.54 MG/DL — SIGNIFICANT CHANGE UP (ref 0.5–1.3)
CREAT SERPL-MCNC: 0.55 MG/DL — SIGNIFICANT CHANGE UP (ref 0.5–1.3)
CREAT SERPL-MCNC: 0.56 MG/DL — SIGNIFICANT CHANGE UP (ref 0.5–1.3)
CREAT SERPL-MCNC: 0.57 MG/DL — SIGNIFICANT CHANGE UP (ref 0.5–1.3)
CREAT SERPL-MCNC: 0.57 MG/DL — SIGNIFICANT CHANGE UP (ref 0.5–1.3)
CREAT SERPL-MCNC: 0.59 MG/DL — SIGNIFICANT CHANGE UP (ref 0.5–1.3)
CREAT SERPL-MCNC: 0.59 MG/DL — SIGNIFICANT CHANGE UP (ref 0.5–1.3)
CREAT SERPL-MCNC: 0.6 MG/DL — SIGNIFICANT CHANGE UP (ref 0.5–1.3)
CREAT SERPL-MCNC: 0.61 MG/DL — SIGNIFICANT CHANGE UP (ref 0.5–1.3)
CREAT SERPL-MCNC: 0.62 MG/DL — SIGNIFICANT CHANGE UP (ref 0.5–1.3)
CREAT SERPL-MCNC: 0.63 MG/DL — SIGNIFICANT CHANGE UP (ref 0.5–1.3)
CREAT SERPL-MCNC: 0.65 MG/DL — SIGNIFICANT CHANGE UP (ref 0.5–1.3)
CREAT SERPL-MCNC: 0.65 MG/DL — SIGNIFICANT CHANGE UP (ref 0.5–1.3)
CREAT SERPL-MCNC: 0.7 MG/DL — SIGNIFICANT CHANGE UP (ref 0.5–1.3)
CREAT SERPL-MCNC: 0.72 MG/DL — SIGNIFICANT CHANGE UP (ref 0.5–1.3)
CREAT SERPL-MCNC: 0.72 MG/DL — SIGNIFICANT CHANGE UP (ref 0.5–1.3)
CREAT SERPL-MCNC: 0.73 MG/DL — SIGNIFICANT CHANGE UP (ref 0.5–1.3)
CREAT SERPL-MCNC: 0.74 MG/DL — SIGNIFICANT CHANGE UP (ref 0.5–1.3)
CREAT SERPL-MCNC: 0.8 MG/DL — SIGNIFICANT CHANGE UP (ref 0.5–1.3)
CREAT SERPL-MCNC: 0.82 MG/DL — SIGNIFICANT CHANGE UP (ref 0.5–1.3)
CREAT SERPL-MCNC: 0.83 MG/DL — SIGNIFICANT CHANGE UP (ref 0.5–1.3)
CREAT SERPL-MCNC: 0.9 MG/DL — SIGNIFICANT CHANGE UP (ref 0.5–1.3)
CREAT SERPL-MCNC: 0.93 MG/DL — SIGNIFICANT CHANGE UP (ref 0.5–1.3)
CREAT SERPL-MCNC: 0.94 MG/DL — SIGNIFICANT CHANGE UP (ref 0.5–1.3)
CREAT SERPL-MCNC: 0.95 MG/DL — SIGNIFICANT CHANGE UP (ref 0.5–1.3)
CREAT SERPL-MCNC: 0.96 MG/DL — SIGNIFICANT CHANGE UP (ref 0.5–1.3)
CREAT SERPL-MCNC: 1.06 MG/DL — SIGNIFICANT CHANGE UP (ref 0.5–1.3)
CREAT SERPL-MCNC: 1.12 MG/DL — SIGNIFICANT CHANGE UP (ref 0.5–1.3)
CREAT SERPL-MCNC: 1.22 MG/DL — SIGNIFICANT CHANGE UP (ref 0.5–1.3)
CREAT SERPL-MCNC: 1.47 MG/DL — HIGH (ref 0.5–1.3)
CREAT SERPL-MCNC: 1.75 MG/DL — HIGH (ref 0.5–1.3)
CREAT SERPL-MCNC: 1.88 MG/DL — HIGH (ref 0.5–1.3)
CRP SERPL-MCNC: 1.28 MG/DL — HIGH (ref 0–0.4)
CRP SERPL-MCNC: 10.17 MG/DL — HIGH (ref 0–0.4)
CRP SERPL-MCNC: 10.27 MG/DL — HIGH (ref 0–0.4)
CRP SERPL-MCNC: 10.44 UG/ML — SIGNIFICANT CHANGE UP (ref 0–40)
CRP SERPL-MCNC: 10.52 MG/DL — HIGH (ref 0–0.4)
CRP SERPL-MCNC: 12.36 MG/DL — HIGH (ref 0–0.4)
CRP SERPL-MCNC: 12.66 MG/DL — HIGH (ref 0–0.4)
CRP SERPL-MCNC: 12.85 MG/DL — HIGH (ref 0–0.4)
CRP SERPL-MCNC: 12.97 MG/DL — HIGH (ref 0–0.4)
CRP SERPL-MCNC: 13.02 MG/DL — HIGH (ref 0–0.4)
CRP SERPL-MCNC: 13.35 MG/DL — HIGH (ref 0–0.4)
CRP SERPL-MCNC: 13.37 MG/DL — HIGH (ref 0–0.4)
CRP SERPL-MCNC: 15.39 UG/ML — SIGNIFICANT CHANGE UP (ref 0–40)
CRP SERPL-MCNC: 16.66 MG/DL — HIGH (ref 0–0.4)
CRP SERPL-MCNC: 18.74 MG/DL — HIGH (ref 0–0.4)
CRP SERPL-MCNC: 20.94 MG/DL — HIGH (ref 0–0.4)
CRP SERPL-MCNC: 21.44 MG/DL — HIGH (ref 0–0.4)
CRP SERPL-MCNC: 23.99 MG/DL — HIGH (ref 0–0.4)
CRP SERPL-MCNC: 3.05 MG/DL — HIGH (ref 0–0.4)
CRP SERPL-MCNC: 3.35 MG/DL — HIGH (ref 0–0.4)
CRP SERPL-MCNC: 4.46 MG/DL — HIGH (ref 0–0.4)
CRP SERPL-MCNC: 5.08 MG/DL — HIGH (ref 0–0.4)
CRP SERPL-MCNC: 5.5 MG/DL — HIGH (ref 0–0.4)
CRP SERPL-MCNC: 6.19 MG/DL — HIGH (ref 0–0.4)
CRP SERPL-MCNC: 6.39 MG/DL — HIGH (ref 0–0.4)
CRP SERPL-MCNC: 6.66 MG/DL — HIGH (ref 0–0.4)
CRP SERPL-MCNC: 7.57 MG/DL — HIGH (ref 0–0.4)
CRP SERPL-MCNC: 8.51 MG/DL — HIGH (ref 0–0.4)
CRP SERPL-MCNC: 8.65 MG/DL — HIGH (ref 0–0.4)
CRP SERPL-MCNC: 8.99 MG/DL — HIGH (ref 0–0.4)
CRP SERPL-MCNC: 9.12 MG/DL — HIGH (ref 0–0.4)
CRP SERPL-MCNC: 9.24 MG/DL — HIGH (ref 0–0.4)
CULTURE RESULTS: NO GROWTH — SIGNIFICANT CHANGE UP
CULTURE RESULTS: SIGNIFICANT CHANGE UP
D DIMER BLD IA.RAPID-MCNC: 1058 NG/ML DDU — HIGH
D DIMER BLD IA.RAPID-MCNC: 1071 NG/ML DDU — HIGH
D DIMER BLD IA.RAPID-MCNC: 1108 NG/ML DDU — HIGH
D DIMER BLD IA.RAPID-MCNC: 2462 NG/ML DDU — HIGH
D DIMER BLD IA.RAPID-MCNC: 311 NG/ML DDU — HIGH
D DIMER BLD IA.RAPID-MCNC: 376 NG/ML DDU — HIGH
D DIMER BLD IA.RAPID-MCNC: 391 NG/ML DDU — HIGH
D DIMER BLD IA.RAPID-MCNC: 395 NG/ML DDU — HIGH
D DIMER BLD IA.RAPID-MCNC: 402 NG/ML DDU — HIGH
D DIMER BLD IA.RAPID-MCNC: 470 NG/ML DDU — HIGH
D DIMER BLD IA.RAPID-MCNC: 540 NG/ML DDU — HIGH
D DIMER BLD IA.RAPID-MCNC: 581 NG/ML DDU — HIGH
D DIMER BLD IA.RAPID-MCNC: 618 NG/ML DDU — HIGH
D DIMER BLD IA.RAPID-MCNC: 648 NG/ML DDU — HIGH
D DIMER BLD IA.RAPID-MCNC: 653 NG/ML DDU — HIGH
D DIMER BLD IA.RAPID-MCNC: 678 NG/ML DDU — HIGH
D DIMER BLD IA.RAPID-MCNC: 6943 NG/ML DDU — HIGH
D DIMER BLD IA.RAPID-MCNC: 696 NG/ML DDU — HIGH
D DIMER BLD IA.RAPID-MCNC: 717 NG/ML DDU — HIGH
D DIMER BLD IA.RAPID-MCNC: 734 NG/ML DDU — HIGH
D DIMER BLD IA.RAPID-MCNC: 815 NG/ML DDU — HIGH
D DIMER BLD IA.RAPID-MCNC: 844 NG/ML DDU — HIGH
D DIMER BLD IA.RAPID-MCNC: 9083 NG/ML DDU — HIGH
D DIMER BLD IA.RAPID-MCNC: 910 NG/ML DDU — HIGH
D DIMER BLD IA.RAPID-MCNC: 935 NG/ML DDU — HIGH
DIFF PNL FLD: ABNORMAL
DIFF PNL FLD: NEGATIVE — SIGNIFICANT CHANGE UP
EOSINOPHIL # BLD AUTO: 0 K/UL — SIGNIFICANT CHANGE UP (ref 0–0.5)
EOSINOPHIL # BLD AUTO: 0.01 K/UL — SIGNIFICANT CHANGE UP (ref 0–0.5)
EOSINOPHIL # BLD AUTO: 0.04 K/UL — SIGNIFICANT CHANGE UP (ref 0–0.5)
EOSINOPHIL # BLD AUTO: 0.08 K/UL — SIGNIFICANT CHANGE UP (ref 0–0.5)
EOSINOPHIL # BLD AUTO: 0.15 K/UL — SIGNIFICANT CHANGE UP (ref 0–0.5)
EOSINOPHIL # BLD AUTO: 0.17 K/UL — SIGNIFICANT CHANGE UP (ref 0–0.5)
EOSINOPHIL # BLD AUTO: 0.2 K/UL — SIGNIFICANT CHANGE UP (ref 0–0.5)
EOSINOPHIL # BLD AUTO: 0.32 K/UL — SIGNIFICANT CHANGE UP (ref 0–0.5)
EOSINOPHIL # BLD AUTO: 0.33 K/UL — SIGNIFICANT CHANGE UP (ref 0–0.5)
EOSINOPHIL # BLD AUTO: 0.33 K/UL — SIGNIFICANT CHANGE UP (ref 0–0.5)
EOSINOPHIL # BLD AUTO: 0.36 K/UL — SIGNIFICANT CHANGE UP (ref 0–0.5)
EOSINOPHIL # BLD AUTO: 0.38 K/UL — SIGNIFICANT CHANGE UP (ref 0–0.5)
EOSINOPHIL # BLD AUTO: 0.4 K/UL — SIGNIFICANT CHANGE UP (ref 0–0.5)
EOSINOPHIL # BLD AUTO: 0.42 K/UL — SIGNIFICANT CHANGE UP (ref 0–0.5)
EOSINOPHIL # BLD AUTO: 0.43 K/UL — SIGNIFICANT CHANGE UP (ref 0–0.5)
EOSINOPHIL # BLD AUTO: 0.5 K/UL — SIGNIFICANT CHANGE UP (ref 0–0.5)
EOSINOPHIL # BLD AUTO: 0.51 K/UL — HIGH (ref 0–0.5)
EOSINOPHIL # BLD AUTO: 0.52 K/UL — HIGH (ref 0–0.5)
EOSINOPHIL # BLD AUTO: 0.53 K/UL — HIGH (ref 0–0.5)
EOSINOPHIL # BLD AUTO: 0.59 K/UL — HIGH (ref 0–0.5)
EOSINOPHIL # BLD AUTO: 0.61 K/UL — HIGH (ref 0–0.5)
EOSINOPHIL # BLD AUTO: 0.61 K/UL — HIGH (ref 0–0.5)
EOSINOPHIL # BLD AUTO: 0.62 K/UL — HIGH (ref 0–0.5)
EOSINOPHIL # BLD AUTO: 0.72 K/UL — HIGH (ref 0–0.5)
EOSINOPHIL # BLD AUTO: 0.75 K/UL — HIGH (ref 0–0.5)
EOSINOPHIL NFR BLD AUTO: 0 % — SIGNIFICANT CHANGE UP (ref 0–6)
EOSINOPHIL NFR BLD AUTO: 0.1 % — SIGNIFICANT CHANGE UP (ref 0–6)
EOSINOPHIL NFR BLD AUTO: 0.2 % — SIGNIFICANT CHANGE UP (ref 0–6)
EOSINOPHIL NFR BLD AUTO: 0.7 % — SIGNIFICANT CHANGE UP (ref 0–6)
EOSINOPHIL NFR BLD AUTO: 1.1 % — SIGNIFICANT CHANGE UP (ref 0–6)
EOSINOPHIL NFR BLD AUTO: 1.2 % — SIGNIFICANT CHANGE UP (ref 0–6)
EOSINOPHIL NFR BLD AUTO: 1.2 % — SIGNIFICANT CHANGE UP (ref 0–6)
EOSINOPHIL NFR BLD AUTO: 1.7 % — SIGNIFICANT CHANGE UP (ref 0–6)
EOSINOPHIL NFR BLD AUTO: 2.3 % — SIGNIFICANT CHANGE UP (ref 0–6)
EOSINOPHIL NFR BLD AUTO: 2.4 % — SIGNIFICANT CHANGE UP (ref 0–6)
EOSINOPHIL NFR BLD AUTO: 2.5 % — SIGNIFICANT CHANGE UP (ref 0–6)
EOSINOPHIL NFR BLD AUTO: 2.7 % — SIGNIFICANT CHANGE UP (ref 0–6)
EOSINOPHIL NFR BLD AUTO: 3 % — SIGNIFICANT CHANGE UP (ref 0–6)
EOSINOPHIL NFR BLD AUTO: 3.4 % — SIGNIFICANT CHANGE UP (ref 0–6)
EOSINOPHIL NFR BLD AUTO: 3.4 % — SIGNIFICANT CHANGE UP (ref 0–6)
EOSINOPHIL NFR BLD AUTO: 3.5 % — SIGNIFICANT CHANGE UP (ref 0–6)
EOSINOPHIL NFR BLD AUTO: 3.5 % — SIGNIFICANT CHANGE UP (ref 0–6)
EOSINOPHIL NFR BLD AUTO: 3.6 % — SIGNIFICANT CHANGE UP (ref 0–6)
EOSINOPHIL NFR BLD AUTO: 3.7 % — SIGNIFICANT CHANGE UP (ref 0–6)
EOSINOPHIL NFR BLD AUTO: 4.2 % — SIGNIFICANT CHANGE UP (ref 0–6)
EOSINOPHIL NFR BLD AUTO: 4.4 % — SIGNIFICANT CHANGE UP (ref 0–6)
EOSINOPHIL NFR BLD AUTO: 4.6 % — SIGNIFICANT CHANGE UP (ref 0–6)
EOSINOPHIL NFR BLD AUTO: 4.7 % — SIGNIFICANT CHANGE UP (ref 0–6)
EPI CELLS # UR: 0 /HPF — SIGNIFICANT CHANGE UP
EPI CELLS # UR: 1 /HPF — SIGNIFICANT CHANGE UP
EPI CELLS # UR: 1 /HPF — SIGNIFICANT CHANGE UP
EPI CELLS # UR: 2 — SIGNIFICANT CHANGE UP
EPI CELLS # UR: 2 — SIGNIFICANT CHANGE UP
EPI CELLS # UR: 5 — SIGNIFICANT CHANGE UP
ERYTHROCYTE [SEDIMENTATION RATE] IN BLOOD: 45 MM/HR — HIGH (ref 0–15)
ESTIMATED AVERAGE GLUCOSE: 140 MG/DL — HIGH (ref 68–114)
FERRITIN SERPL-MCNC: 1075 NG/ML — HIGH (ref 30–400)
FERRITIN SERPL-MCNC: 1115 NG/ML — HIGH (ref 30–400)
FERRITIN SERPL-MCNC: 1124 NG/ML — HIGH (ref 30–400)
FERRITIN SERPL-MCNC: 1129 NG/ML — HIGH (ref 30–400)
FERRITIN SERPL-MCNC: 1137 NG/ML — HIGH (ref 30–400)
FERRITIN SERPL-MCNC: 1190 NG/ML — HIGH (ref 30–400)
FERRITIN SERPL-MCNC: 1199 NG/ML — HIGH (ref 30–400)
FERRITIN SERPL-MCNC: 1287 NG/ML — HIGH (ref 30–400)
FERRITIN SERPL-MCNC: 1408 NG/ML — HIGH (ref 30–400)
FERRITIN SERPL-MCNC: 1481 NG/ML — HIGH (ref 30–400)
FERRITIN SERPL-MCNC: 1588 NG/ML — HIGH (ref 30–400)
FERRITIN SERPL-MCNC: 562 NG/ML — HIGH (ref 30–400)
FERRITIN SERPL-MCNC: 761 NG/ML — HIGH (ref 30–400)
FERRITIN SERPL-MCNC: 770 NG/ML — HIGH (ref 30–400)
FERRITIN SERPL-MCNC: 770 NG/ML — HIGH (ref 30–400)
FERRITIN SERPL-MCNC: 827 NG/ML — HIGH (ref 30–400)
FERRITIN SERPL-MCNC: 846 NG/ML — HIGH (ref 30–400)
FERRITIN SERPL-MCNC: 846 NG/ML — HIGH (ref 30–400)
FERRITIN SERPL-MCNC: 847 NG/ML — HIGH (ref 30–400)
FERRITIN SERPL-MCNC: 860 NG/ML — HIGH (ref 30–400)
FERRITIN SERPL-MCNC: 881 NG/ML — HIGH (ref 30–400)
FERRITIN SERPL-MCNC: 883 NG/ML — HIGH (ref 30–400)
FERRITIN SERPL-MCNC: 888 NG/ML — HIGH (ref 30–400)
FERRITIN SERPL-MCNC: 901 NG/ML — HIGH (ref 30–400)
FERRITIN SERPL-MCNC: 913 NG/ML — HIGH (ref 30–400)
FERRITIN SERPL-MCNC: 964 NG/ML — HIGH (ref 30–400)
FERRITIN SERPL-MCNC: 973 NG/ML — HIGH (ref 30–400)
FERRITIN SERPL-MCNC: 981 NG/ML — HIGH (ref 30–400)
FERRITIN SERPL-MCNC: HIGH NG/ML (ref 30–400)
FIBRINOGEN AG PPP IA-MCNC: 880 MG/DL — HIGH
FUNGITELL: <31 PG/ML — SIGNIFICANT CHANGE UP
GAMMA INTERFERON BACKGROUND BLD IA-ACNC: 0.01 IU/ML — SIGNIFICANT CHANGE UP
GAS PNL BLDA: SIGNIFICANT CHANGE UP
GAS PNL BLDV: SIGNIFICANT CHANGE UP
GLUCOSE BLDC GLUCOMTR-MCNC: 194 MG/DL — HIGH (ref 70–99)
GLUCOSE SERPL-MCNC: 100 MG/DL — HIGH (ref 70–99)
GLUCOSE SERPL-MCNC: 100 MG/DL — HIGH (ref 70–99)
GLUCOSE SERPL-MCNC: 104 MG/DL — HIGH (ref 70–99)
GLUCOSE SERPL-MCNC: 106 MG/DL — HIGH (ref 70–99)
GLUCOSE SERPL-MCNC: 108 MG/DL — HIGH (ref 70–99)
GLUCOSE SERPL-MCNC: 114 MG/DL — HIGH (ref 70–99)
GLUCOSE SERPL-MCNC: 115 MG/DL — HIGH (ref 70–99)
GLUCOSE SERPL-MCNC: 118 MG/DL — HIGH (ref 70–99)
GLUCOSE SERPL-MCNC: 119 MG/DL — HIGH (ref 70–99)
GLUCOSE SERPL-MCNC: 120 MG/DL — HIGH (ref 70–99)
GLUCOSE SERPL-MCNC: 121 MG/DL — HIGH (ref 70–99)
GLUCOSE SERPL-MCNC: 121 MG/DL — HIGH (ref 70–99)
GLUCOSE SERPL-MCNC: 122 MG/DL — HIGH (ref 70–99)
GLUCOSE SERPL-MCNC: 125 MG/DL — HIGH (ref 70–99)
GLUCOSE SERPL-MCNC: 125 MG/DL — HIGH (ref 70–99)
GLUCOSE SERPL-MCNC: 128 MG/DL — HIGH (ref 70–99)
GLUCOSE SERPL-MCNC: 129 MG/DL — HIGH (ref 70–99)
GLUCOSE SERPL-MCNC: 130 MG/DL — HIGH (ref 70–99)
GLUCOSE SERPL-MCNC: 131 MG/DL — HIGH (ref 70–99)
GLUCOSE SERPL-MCNC: 132 MG/DL — HIGH (ref 70–99)
GLUCOSE SERPL-MCNC: 132 MG/DL — HIGH (ref 70–99)
GLUCOSE SERPL-MCNC: 137 MG/DL — HIGH (ref 70–99)
GLUCOSE SERPL-MCNC: 140 MG/DL — HIGH (ref 70–99)
GLUCOSE SERPL-MCNC: 144 MG/DL — HIGH (ref 70–99)
GLUCOSE SERPL-MCNC: 144 MG/DL — HIGH (ref 70–99)
GLUCOSE SERPL-MCNC: 146 MG/DL — HIGH (ref 70–99)
GLUCOSE SERPL-MCNC: 147 MG/DL — HIGH (ref 70–99)
GLUCOSE SERPL-MCNC: 148 MG/DL — HIGH (ref 70–99)
GLUCOSE SERPL-MCNC: 148 MG/DL — HIGH (ref 70–99)
GLUCOSE SERPL-MCNC: 149 MG/DL — HIGH (ref 70–99)
GLUCOSE SERPL-MCNC: 150 MG/DL — HIGH (ref 70–99)
GLUCOSE SERPL-MCNC: 153 MG/DL — HIGH (ref 70–99)
GLUCOSE SERPL-MCNC: 153 MG/DL — HIGH (ref 70–99)
GLUCOSE SERPL-MCNC: 155 MG/DL — HIGH (ref 70–99)
GLUCOSE SERPL-MCNC: 159 MG/DL — HIGH (ref 70–99)
GLUCOSE SERPL-MCNC: 159 MG/DL — HIGH (ref 70–99)
GLUCOSE SERPL-MCNC: 160 MG/DL — HIGH (ref 70–99)
GLUCOSE SERPL-MCNC: 161 MG/DL — HIGH (ref 70–99)
GLUCOSE SERPL-MCNC: 163 MG/DL — HIGH (ref 70–99)
GLUCOSE SERPL-MCNC: 164 MG/DL — HIGH (ref 70–99)
GLUCOSE SERPL-MCNC: 166 MG/DL — HIGH (ref 70–99)
GLUCOSE SERPL-MCNC: 173 MG/DL — HIGH (ref 70–99)
GLUCOSE SERPL-MCNC: 175 MG/DL — HIGH (ref 70–99)
GLUCOSE SERPL-MCNC: 178 MG/DL — HIGH (ref 70–99)
GLUCOSE SERPL-MCNC: 179 MG/DL — HIGH (ref 70–99)
GLUCOSE SERPL-MCNC: 183 MG/DL — HIGH (ref 70–99)
GLUCOSE SERPL-MCNC: 190 MG/DL — HIGH (ref 70–99)
GLUCOSE SERPL-MCNC: 194 MG/DL — HIGH (ref 70–99)
GLUCOSE SERPL-MCNC: 197 MG/DL — HIGH (ref 70–99)
GLUCOSE SERPL-MCNC: 208 MG/DL — HIGH (ref 70–99)
GLUCOSE SERPL-MCNC: 225 MG/DL — HIGH (ref 70–99)
GLUCOSE SERPL-MCNC: 56 MG/DL — LOW (ref 70–99)
GLUCOSE SERPL-MCNC: 72 MG/DL — SIGNIFICANT CHANGE UP (ref 70–99)
GLUCOSE SERPL-MCNC: 89 MG/DL — SIGNIFICANT CHANGE UP (ref 70–99)
GLUCOSE SERPL-MCNC: 89 MG/DL — SIGNIFICANT CHANGE UP (ref 70–99)
GLUCOSE SERPL-MCNC: 92 MG/DL — SIGNIFICANT CHANGE UP (ref 70–99)
GLUCOSE SERPL-MCNC: 93 MG/DL — SIGNIFICANT CHANGE UP (ref 70–99)
GLUCOSE SERPL-MCNC: 94 MG/DL — SIGNIFICANT CHANGE UP (ref 70–99)
GLUCOSE SERPL-MCNC: 98 MG/DL — SIGNIFICANT CHANGE UP (ref 70–99)
GLUCOSE UR QL: NEGATIVE — SIGNIFICANT CHANGE UP
GRAM STN FLD: SIGNIFICANT CHANGE UP
GRAN CASTS # UR COMP ASSIST: 3 /LPF — SIGNIFICANT CHANGE UP
HCO3 BLDA-SCNC: 26 MMOL/L — SIGNIFICANT CHANGE UP (ref 21–29)
HCO3 BLDA-SCNC: 27 MMOL/L — SIGNIFICANT CHANGE UP (ref 21–29)
HCO3 BLDA-SCNC: 29 MMOL/L — SIGNIFICANT CHANGE UP (ref 21–29)
HCO3 BLDA-SCNC: 34 MMOL/L — HIGH (ref 21–29)
HCO3 BLDA-SCNC: 35 MMOL/L — HIGH (ref 21–29)
HCO3 BLDA-SCNC: 35 MMOL/L — HIGH (ref 21–29)
HCO3 BLDA-SCNC: 37 MMOL/L — HIGH (ref 21–29)
HCO3 BLDA-SCNC: 38 MMOL/L — HIGH (ref 21–29)
HCO3 BLDA-SCNC: 38 MMOL/L — HIGH (ref 21–29)
HCT VFR BLD CALC: 22.9 % — LOW (ref 39–50)
HCT VFR BLD CALC: 23.3 % — LOW (ref 39–50)
HCT VFR BLD CALC: 23.7 % — LOW (ref 39–50)
HCT VFR BLD CALC: 23.9 % — LOW (ref 39–50)
HCT VFR BLD CALC: 24.1 % — LOW (ref 39–50)
HCT VFR BLD CALC: 24.6 % — LOW (ref 39–50)
HCT VFR BLD CALC: 25.2 % — LOW (ref 39–50)
HCT VFR BLD CALC: 25.3 % — LOW (ref 39–50)
HCT VFR BLD CALC: 25.6 % — LOW (ref 39–50)
HCT VFR BLD CALC: 26.9 % — LOW (ref 39–50)
HCT VFR BLD CALC: 27 % — LOW (ref 39–50)
HCT VFR BLD CALC: 27 % — LOW (ref 39–50)
HCT VFR BLD CALC: 27.3 % — LOW (ref 39–50)
HCT VFR BLD CALC: 27.5 % — LOW (ref 39–50)
HCT VFR BLD CALC: 27.5 % — LOW (ref 39–50)
HCT VFR BLD CALC: 27.7 % — LOW (ref 39–50)
HCT VFR BLD CALC: 27.7 % — LOW (ref 39–50)
HCT VFR BLD CALC: 28.1 % — LOW (ref 39–50)
HCT VFR BLD CALC: 28.6 % — LOW (ref 39–50)
HCT VFR BLD CALC: 29.6 % — LOW (ref 39–50)
HCT VFR BLD CALC: 29.8 % — LOW (ref 39–50)
HCT VFR BLD CALC: 30.6 % — LOW (ref 39–50)
HCT VFR BLD CALC: 31.9 % — LOW (ref 39–50)
HCT VFR BLD CALC: 32.3 % — LOW (ref 39–50)
HCT VFR BLD CALC: 36.6 % — LOW (ref 39–50)
HCT VFR BLD CALC: 36.9 % — LOW (ref 39–50)
HCT VFR BLD CALC: 37 % — LOW (ref 39–50)
HCT VFR BLD CALC: 37.2 % — LOW (ref 39–50)
HCT VFR BLD CALC: 37.3 % — LOW (ref 39–50)
HCT VFR BLD CALC: 38.1 % — LOW (ref 39–50)
HCT VFR BLD CALC: 38.5 % — LOW (ref 39–50)
HCT VFR BLD CALC: 38.6 % — LOW (ref 39–50)
HCT VFR BLD CALC: 38.7 % — LOW (ref 39–50)
HCT VFR BLD CALC: 38.9 % — LOW (ref 39–50)
HCT VFR BLD CALC: 40.5 % — SIGNIFICANT CHANGE UP (ref 39–50)
HCT VFR BLD CALC: 40.8 % — SIGNIFICANT CHANGE UP (ref 39–50)
HCT VFR BLD CALC: 41.5 % — SIGNIFICANT CHANGE UP (ref 39–50)
HCT VFR BLD CALC: 42 % — SIGNIFICANT CHANGE UP (ref 39–50)
HCT VFR BLD CALC: 42.1 % — SIGNIFICANT CHANGE UP (ref 39–50)
HCT VFR BLD CALC: 42.3 % — SIGNIFICANT CHANGE UP (ref 39–50)
HCT VFR BLD CALC: 42.5 % — SIGNIFICANT CHANGE UP (ref 39–50)
HCT VFR BLD CALC: 43.2 % — SIGNIFICANT CHANGE UP (ref 39–50)
HCT VFR BLD CALC: 43.4 % — SIGNIFICANT CHANGE UP (ref 39–50)
HCT VFR BLD CALC: 43.4 % — SIGNIFICANT CHANGE UP (ref 39–50)
HCT VFR BLD CALC: 44.5 % — SIGNIFICANT CHANGE UP (ref 39–50)
HCT VFR BLD CALC: 44.6 % — SIGNIFICANT CHANGE UP (ref 39–50)
HCT VFR BLD CALC: 44.9 % — SIGNIFICANT CHANGE UP (ref 39–50)
HCT VFR BLD CALC: 45.1 % — SIGNIFICANT CHANGE UP (ref 39–50)
HCT VFR BLD CALC: 45.1 % — SIGNIFICANT CHANGE UP (ref 39–50)
HCT VFR BLD CALC: 45.3 % — SIGNIFICANT CHANGE UP (ref 39–50)
HCT VFR BLD CALC: 45.7 % — SIGNIFICANT CHANGE UP (ref 39–50)
HCT VFR BLD CALC: 45.8 % — SIGNIFICANT CHANGE UP (ref 39–50)
HCT VFR BLD CALC: 46.3 % — SIGNIFICANT CHANGE UP (ref 39–50)
HCT VFR BLD CALC: 46.5 % — SIGNIFICANT CHANGE UP (ref 39–50)
HCT VFR BLD CALC: 47.4 % — SIGNIFICANT CHANGE UP (ref 39–50)
HCT VFR BLD CALC: 48.7 % — SIGNIFICANT CHANGE UP (ref 39–50)
HGB BLD-MCNC: 10.2 G/DL — LOW (ref 13–17)
HGB BLD-MCNC: 10.3 G/DL — LOW (ref 13–17)
HGB BLD-MCNC: 10.9 G/DL — LOW (ref 13–17)
HGB BLD-MCNC: 10.9 G/DL — LOW (ref 13–17)
HGB BLD-MCNC: 11.1 G/DL — LOW (ref 13–17)
HGB BLD-MCNC: 11.5 G/DL — LOW (ref 13–17)
HGB BLD-MCNC: 11.7 G/DL — LOW (ref 13–17)
HGB BLD-MCNC: 11.8 G/DL — LOW (ref 13–17)
HGB BLD-MCNC: 11.9 G/DL — LOW (ref 13–17)
HGB BLD-MCNC: 12.1 G/DL — LOW (ref 13–17)
HGB BLD-MCNC: 12.5 G/DL — LOW (ref 13–17)
HGB BLD-MCNC: 12.6 G/DL — LOW (ref 13–17)
HGB BLD-MCNC: 12.8 G/DL — LOW (ref 13–17)
HGB BLD-MCNC: 12.8 G/DL — LOW (ref 13–17)
HGB BLD-MCNC: 13 G/DL — SIGNIFICANT CHANGE UP (ref 13–17)
HGB BLD-MCNC: 13.3 G/DL — SIGNIFICANT CHANGE UP (ref 13–17)
HGB BLD-MCNC: 13.3 G/DL — SIGNIFICANT CHANGE UP (ref 13–17)
HGB BLD-MCNC: 13.5 G/DL — SIGNIFICANT CHANGE UP (ref 13–17)
HGB BLD-MCNC: 13.7 G/DL — SIGNIFICANT CHANGE UP (ref 13–17)
HGB BLD-MCNC: 14.1 G/DL — SIGNIFICANT CHANGE UP (ref 13–17)
HGB BLD-MCNC: 14.1 G/DL — SIGNIFICANT CHANGE UP (ref 13–17)
HGB BLD-MCNC: 14.2 G/DL — SIGNIFICANT CHANGE UP (ref 13–17)
HGB BLD-MCNC: 14.4 G/DL — SIGNIFICANT CHANGE UP (ref 13–17)
HGB BLD-MCNC: 14.6 G/DL — SIGNIFICANT CHANGE UP (ref 13–17)
HGB BLD-MCNC: 14.9 G/DL — SIGNIFICANT CHANGE UP (ref 13–17)
HGB BLD-MCNC: 14.9 G/DL — SIGNIFICANT CHANGE UP (ref 13–17)
HGB BLD-MCNC: 15.3 G/DL — SIGNIFICANT CHANGE UP (ref 13–17)
HGB BLD-MCNC: 15.3 G/DL — SIGNIFICANT CHANGE UP (ref 13–17)
HGB BLD-MCNC: 6.8 G/DL — CRITICAL LOW (ref 13–17)
HGB BLD-MCNC: 6.9 G/DL — CRITICAL LOW (ref 13–17)
HGB BLD-MCNC: 7.3 G/DL — LOW (ref 13–17)
HGB BLD-MCNC: 7.3 G/DL — LOW (ref 13–17)
HGB BLD-MCNC: 7.4 G/DL — LOW (ref 13–17)
HGB BLD-MCNC: 7.4 G/DL — LOW (ref 13–17)
HGB BLD-MCNC: 7.5 G/DL — LOW (ref 13–17)
HGB BLD-MCNC: 7.6 G/DL — LOW (ref 13–17)
HGB BLD-MCNC: 7.6 G/DL — LOW (ref 13–17)
HGB BLD-MCNC: 7.7 G/DL — LOW (ref 13–17)
HGB BLD-MCNC: 7.7 G/DL — LOW (ref 13–17)
HGB BLD-MCNC: 8 G/DL — LOW (ref 13–17)
HGB BLD-MCNC: 8.3 G/DL — LOW (ref 13–17)
HGB BLD-MCNC: 8.4 G/DL — LOW (ref 13–17)
HGB BLD-MCNC: 8.4 G/DL — LOW (ref 13–17)
HGB BLD-MCNC: 8.5 G/DL — LOW (ref 13–17)
HGB BLD-MCNC: 8.8 G/DL — LOW (ref 13–17)
HGB BLD-MCNC: 9 G/DL — LOW (ref 13–17)
HGB BLD-MCNC: 9.3 G/DL — LOW (ref 13–17)
HGB BLD-MCNC: 9.7 G/DL — LOW (ref 13–17)
HOROWITZ INDEX BLDA+IHG-RTO: 40 — SIGNIFICANT CHANGE UP
HOROWITZ INDEX BLDA+IHG-RTO: 50 — SIGNIFICANT CHANGE UP
HOROWITZ INDEX BLDA+IHG-RTO: 60 — SIGNIFICANT CHANGE UP
HOROWITZ INDEX BLDA+IHG-RTO: 60 — SIGNIFICANT CHANGE UP
HOROWITZ INDEX BLDA+IHG-RTO: 70 — SIGNIFICANT CHANGE UP
HYALINE CASTS # UR AUTO: 0 /LPF — SIGNIFICANT CHANGE UP (ref 0–2)
HYALINE CASTS # UR AUTO: 1 /LPF — SIGNIFICANT CHANGE UP (ref 0–2)
HYALINE CASTS # UR AUTO: 17 /LPF — HIGH (ref 0–7)
HYALINE CASTS # UR AUTO: 2 /LPF — SIGNIFICANT CHANGE UP (ref 0–2)
HYALINE CASTS # UR AUTO: 4 /LPF — SIGNIFICANT CHANGE UP (ref 0–7)
HYALINE CASTS # UR AUTO: 5 /LPF — HIGH (ref 0–7)
IMM GRANULOCYTES NFR BLD AUTO: 0.4 % — SIGNIFICANT CHANGE UP (ref 0–1.5)
IMM GRANULOCYTES NFR BLD AUTO: 0.4 % — SIGNIFICANT CHANGE UP (ref 0–1.5)
IMM GRANULOCYTES NFR BLD AUTO: 0.8 % — SIGNIFICANT CHANGE UP (ref 0–1.5)
IMM GRANULOCYTES NFR BLD AUTO: 0.8 % — SIGNIFICANT CHANGE UP (ref 0–1.5)
IMM GRANULOCYTES NFR BLD AUTO: 0.9 % — SIGNIFICANT CHANGE UP (ref 0–1.5)
IMM GRANULOCYTES NFR BLD AUTO: 1.1 % — SIGNIFICANT CHANGE UP (ref 0–1.5)
IMM GRANULOCYTES NFR BLD AUTO: 1.5 % — SIGNIFICANT CHANGE UP (ref 0–1.5)
IMM GRANULOCYTES NFR BLD AUTO: 1.6 % — HIGH (ref 0–1.5)
IMM GRANULOCYTES NFR BLD AUTO: 1.8 % — HIGH (ref 0–1.5)
IMM GRANULOCYTES NFR BLD AUTO: 3 % — HIGH (ref 0–1.5)
IMM GRANULOCYTES NFR BLD AUTO: 3.2 % — HIGH (ref 0–1.5)
IMM GRANULOCYTES NFR BLD AUTO: 4 % — HIGH (ref 0–1.5)
IMM GRANULOCYTES NFR BLD AUTO: 4.4 % — HIGH (ref 0–1.5)
IMM GRANULOCYTES NFR BLD AUTO: 4.7 % — HIGH (ref 0–1.5)
IMM GRANULOCYTES NFR BLD AUTO: 4.9 % — HIGH (ref 0–1.5)
IMM GRANULOCYTES NFR BLD AUTO: 5 % — HIGH (ref 0–1.5)
IMM GRANULOCYTES NFR BLD AUTO: 5.4 % — HIGH (ref 0–1.5)
IMM GRANULOCYTES NFR BLD AUTO: 6 % — HIGH (ref 0–1.5)
IMM GRANULOCYTES NFR BLD AUTO: 6.1 % — HIGH (ref 0–1.5)
IMM GRANULOCYTES NFR BLD AUTO: 6.3 % — HIGH (ref 0–1.5)
IMM GRANULOCYTES NFR BLD AUTO: 6.3 % — HIGH (ref 0–1.5)
IMM GRANULOCYTES NFR BLD AUTO: 6.5 % — HIGH (ref 0–1.5)
IMM GRANULOCYTES NFR BLD AUTO: 6.9 % — HIGH (ref 0–1.5)
IMM GRANULOCYTES NFR BLD AUTO: 8.1 % — HIGH (ref 0–1.5)
IMM GRANULOCYTES NFR BLD AUTO: 9.9 % — HIGH (ref 0–1.5)
INR BLD: 0.97 RATIO — SIGNIFICANT CHANGE UP (ref 0.88–1.16)
INR BLD: 0.98 RATIO — SIGNIFICANT CHANGE UP (ref 0.88–1.16)
INR BLD: 0.98 RATIO — SIGNIFICANT CHANGE UP (ref 0.88–1.16)
INR BLD: 1.01 RATIO — SIGNIFICANT CHANGE UP (ref 0.88–1.16)
INR BLD: 1.01 RATIO — SIGNIFICANT CHANGE UP (ref 0.88–1.16)
INR BLD: 1.03 RATIO — SIGNIFICANT CHANGE UP (ref 0.88–1.16)
INR BLD: 1.05 RATIO — SIGNIFICANT CHANGE UP (ref 0.88–1.16)
INR BLD: 1.06 RATIO — SIGNIFICANT CHANGE UP (ref 0.88–1.16)
INR BLD: 1.08 RATIO — SIGNIFICANT CHANGE UP (ref 0.88–1.16)
INR BLD: 1.09 RATIO — SIGNIFICANT CHANGE UP (ref 0.88–1.16)
INR BLD: 1.1 RATIO — SIGNIFICANT CHANGE UP (ref 0.88–1.16)
INR BLD: 1.14 RATIO — SIGNIFICANT CHANGE UP (ref 0.88–1.16)
INR BLD: 1.29 RATIO — HIGH (ref 0.88–1.16)
INR BLD: 1.41 RATIO — HIGH (ref 0.88–1.16)
INR BLD: 2.48 RATIO — HIGH (ref 0.88–1.16)
INR BLD: 3.13 RATIO — HIGH (ref 0.88–1.16)
INR BLD: 3.98 RATIO — HIGH (ref 0.88–1.16)
KETONES UR-MCNC: ABNORMAL
KETONES UR-MCNC: NEGATIVE — SIGNIFICANT CHANGE UP
LACTATE SERPL-SCNC: 0.8 MMOL/L — SIGNIFICANT CHANGE UP (ref 0.7–2)
LACTATE SERPL-SCNC: 0.9 MMOL/L — SIGNIFICANT CHANGE UP (ref 0.7–2)
LACTATE SERPL-SCNC: 1 MMOL/L — SIGNIFICANT CHANGE UP (ref 0.7–2)
LACTATE SERPL-SCNC: 1.4 MMOL/L — SIGNIFICANT CHANGE UP (ref 0.7–2)
LACTATE SERPL-SCNC: 1.6 MMOL/L — SIGNIFICANT CHANGE UP (ref 0.7–2)
LACTATE SERPL-SCNC: 1.7 MMOL/L — SIGNIFICANT CHANGE UP (ref 0.7–2)
LDH SERPL L TO P-CCNC: 244 U/L — HIGH (ref 50–242)
LDH SERPL L TO P-CCNC: 294 U/L — HIGH (ref 50–242)
LEGIONELLA AG UR QL: NEGATIVE — SIGNIFICANT CHANGE UP
LEUKOCYTE ESTERASE UR-ACNC: ABNORMAL
LEUKOCYTE ESTERASE UR-ACNC: NEGATIVE — SIGNIFICANT CHANGE UP
LYMPHOCYTES # BLD AUTO: 0.47 K/UL — LOW (ref 1–3.3)
LYMPHOCYTES # BLD AUTO: 0.66 K/UL — LOW (ref 1–3.3)
LYMPHOCYTES # BLD AUTO: 0.99 K/UL — LOW (ref 1–3.3)
LYMPHOCYTES # BLD AUTO: 1.01 K/UL — SIGNIFICANT CHANGE UP (ref 1–3.3)
LYMPHOCYTES # BLD AUTO: 1.01 K/UL — SIGNIFICANT CHANGE UP (ref 1–3.3)
LYMPHOCYTES # BLD AUTO: 1.14 K/UL — SIGNIFICANT CHANGE UP (ref 1–3.3)
LYMPHOCYTES # BLD AUTO: 1.16 K/UL — SIGNIFICANT CHANGE UP (ref 1–3.3)
LYMPHOCYTES # BLD AUTO: 1.27 K/UL — SIGNIFICANT CHANGE UP (ref 1–3.3)
LYMPHOCYTES # BLD AUTO: 1.41 K/UL — SIGNIFICANT CHANGE UP (ref 1–3.3)
LYMPHOCYTES # BLD AUTO: 1.48 K/UL — SIGNIFICANT CHANGE UP (ref 1–3.3)
LYMPHOCYTES # BLD AUTO: 1.49 K/UL — SIGNIFICANT CHANGE UP (ref 1–3.3)
LYMPHOCYTES # BLD AUTO: 1.51 K/UL — SIGNIFICANT CHANGE UP (ref 1–3.3)
LYMPHOCYTES # BLD AUTO: 1.57 K/UL — SIGNIFICANT CHANGE UP (ref 1–3.3)
LYMPHOCYTES # BLD AUTO: 1.62 K/UL — SIGNIFICANT CHANGE UP (ref 1–3.3)
LYMPHOCYTES # BLD AUTO: 1.68 K/UL — SIGNIFICANT CHANGE UP (ref 1–3.3)
LYMPHOCYTES # BLD AUTO: 1.69 K/UL — SIGNIFICANT CHANGE UP (ref 1–3.3)
LYMPHOCYTES # BLD AUTO: 1.72 K/UL — SIGNIFICANT CHANGE UP (ref 1–3.3)
LYMPHOCYTES # BLD AUTO: 1.74 K/UL — SIGNIFICANT CHANGE UP (ref 1–3.3)
LYMPHOCYTES # BLD AUTO: 1.81 K/UL — SIGNIFICANT CHANGE UP (ref 1–3.3)
LYMPHOCYTES # BLD AUTO: 1.93 K/UL — SIGNIFICANT CHANGE UP (ref 1–3.3)
LYMPHOCYTES # BLD AUTO: 1.97 K/UL — SIGNIFICANT CHANGE UP (ref 1–3.3)
LYMPHOCYTES # BLD AUTO: 10.1 % — LOW (ref 13–44)
LYMPHOCYTES # BLD AUTO: 10.3 % — LOW (ref 13–44)
LYMPHOCYTES # BLD AUTO: 11 % — LOW (ref 13–44)
LYMPHOCYTES # BLD AUTO: 12.4 % — LOW (ref 13–44)
LYMPHOCYTES # BLD AUTO: 12.8 % — LOW (ref 13–44)
LYMPHOCYTES # BLD AUTO: 12.8 % — LOW (ref 13–44)
LYMPHOCYTES # BLD AUTO: 13.2 % — SIGNIFICANT CHANGE UP (ref 13–44)
LYMPHOCYTES # BLD AUTO: 13.6 % — SIGNIFICANT CHANGE UP (ref 13–44)
LYMPHOCYTES # BLD AUTO: 13.8 % — SIGNIFICANT CHANGE UP (ref 13–44)
LYMPHOCYTES # BLD AUTO: 13.9 % — SIGNIFICANT CHANGE UP (ref 13–44)
LYMPHOCYTES # BLD AUTO: 14.2 % — SIGNIFICANT CHANGE UP (ref 13–44)
LYMPHOCYTES # BLD AUTO: 14.9 % — SIGNIFICANT CHANGE UP (ref 13–44)
LYMPHOCYTES # BLD AUTO: 15 % — SIGNIFICANT CHANGE UP (ref 13–44)
LYMPHOCYTES # BLD AUTO: 15.5 % — SIGNIFICANT CHANGE UP (ref 13–44)
LYMPHOCYTES # BLD AUTO: 15.7 % — SIGNIFICANT CHANGE UP (ref 13–44)
LYMPHOCYTES # BLD AUTO: 17.7 % — SIGNIFICANT CHANGE UP (ref 13–44)
LYMPHOCYTES # BLD AUTO: 17.7 % — SIGNIFICANT CHANGE UP (ref 13–44)
LYMPHOCYTES # BLD AUTO: 18.3 % — SIGNIFICANT CHANGE UP (ref 13–44)
LYMPHOCYTES # BLD AUTO: 2.13 K/UL — SIGNIFICANT CHANGE UP (ref 1–3.3)
LYMPHOCYTES # BLD AUTO: 2.21 K/UL — SIGNIFICANT CHANGE UP (ref 1–3.3)
LYMPHOCYTES # BLD AUTO: 2.34 K/UL — SIGNIFICANT CHANGE UP (ref 1–3.3)
LYMPHOCYTES # BLD AUTO: 2.37 K/UL — SIGNIFICANT CHANGE UP (ref 1–3.3)
LYMPHOCYTES # BLD AUTO: 2.6 % — LOW (ref 13–44)
LYMPHOCYTES # BLD AUTO: 2.7 K/UL — SIGNIFICANT CHANGE UP (ref 1–3.3)
LYMPHOCYTES # BLD AUTO: 2.84 K/UL — SIGNIFICANT CHANGE UP (ref 1–3.3)
LYMPHOCYTES # BLD AUTO: 2.87 K/UL — SIGNIFICANT CHANGE UP (ref 1–3.3)
LYMPHOCYTES # BLD AUTO: 3.06 K/UL — SIGNIFICANT CHANGE UP (ref 1–3.3)
LYMPHOCYTES # BLD AUTO: 3.2 % — LOW (ref 13–44)
LYMPHOCYTES # BLD AUTO: 5.3 % — LOW (ref 13–44)
LYMPHOCYTES # BLD AUTO: 5.7 % — LOW (ref 13–44)
LYMPHOCYTES # BLD AUTO: 6.1 % — LOW (ref 13–44)
LYMPHOCYTES # BLD AUTO: 6.6 % — LOW (ref 13–44)
LYMPHOCYTES # BLD AUTO: 7 % — LOW (ref 13–44)
LYMPHOCYTES # BLD AUTO: 7.9 % — LOW (ref 13–44)
LYMPHOCYTES # BLD AUTO: 8.9 % — LOW (ref 13–44)
LYMPHOCYTES # BLD AUTO: 9.2 % — LOW (ref 13–44)
LYMPHOCYTES # BLD AUTO: 9.6 % — LOW (ref 13–44)
M TB IFN-G BLD-IMP: ABNORMAL
M TB IFN-G CD4+ BCKGRND COR BLD-ACNC: 0 IU/ML — SIGNIFICANT CHANGE UP
M TB IFN-G CD4+CD8+ BCKGRND COR BLD-ACNC: 0 IU/ML — SIGNIFICANT CHANGE UP
MAGNESIUM SERPL-MCNC: 1.6 MG/DL — SIGNIFICANT CHANGE UP (ref 1.6–2.6)
MAGNESIUM SERPL-MCNC: 1.7 MG/DL — SIGNIFICANT CHANGE UP (ref 1.6–2.6)
MAGNESIUM SERPL-MCNC: 1.8 MG/DL — SIGNIFICANT CHANGE UP (ref 1.6–2.6)
MAGNESIUM SERPL-MCNC: 1.9 MG/DL — SIGNIFICANT CHANGE UP (ref 1.6–2.6)
MAGNESIUM SERPL-MCNC: 2 MG/DL — SIGNIFICANT CHANGE UP (ref 1.6–2.6)
MAGNESIUM SERPL-MCNC: 2.1 MG/DL — SIGNIFICANT CHANGE UP (ref 1.6–2.6)
MAGNESIUM SERPL-MCNC: 2.2 MG/DL — SIGNIFICANT CHANGE UP (ref 1.6–2.6)
MAGNESIUM SERPL-MCNC: 2.3 MG/DL — SIGNIFICANT CHANGE UP (ref 1.6–2.6)
MAGNESIUM SERPL-MCNC: 2.4 MG/DL — SIGNIFICANT CHANGE UP (ref 1.6–2.6)
MAGNESIUM SERPL-MCNC: 2.4 MG/DL — SIGNIFICANT CHANGE UP (ref 1.6–2.6)
MAGNESIUM SERPL-MCNC: 2.5 MG/DL — SIGNIFICANT CHANGE UP (ref 1.6–2.6)
MAGNESIUM SERPL-MCNC: 2.5 MG/DL — SIGNIFICANT CHANGE UP (ref 1.6–2.6)
MAGNESIUM SERPL-MCNC: 2.6 MG/DL — SIGNIFICANT CHANGE UP (ref 1.6–2.6)
MAGNESIUM SERPL-MCNC: 2.7 MG/DL — HIGH (ref 1.6–2.6)
MAGNESIUM SERPL-MCNC: 2.8 MG/DL — HIGH (ref 1.6–2.6)
MAGNESIUM SERPL-MCNC: 3 MG/DL — HIGH (ref 1.6–2.6)
MAGNESIUM SERPL-MCNC: 3.7 MG/DL — HIGH (ref 1.6–2.6)
MANUAL SMEAR VERIFICATION: SIGNIFICANT CHANGE UP
MANUAL SMEAR VERIFICATION: SIGNIFICANT CHANGE UP
MCHC RBC-ENTMCNC: 24.7 PG — LOW (ref 27–34)
MCHC RBC-ENTMCNC: 24.8 PG — LOW (ref 27–34)
MCHC RBC-ENTMCNC: 24.9 PG — LOW (ref 27–34)
MCHC RBC-ENTMCNC: 25 PG — LOW (ref 27–34)
MCHC RBC-ENTMCNC: 25.1 PG — LOW (ref 27–34)
MCHC RBC-ENTMCNC: 25.1 PG — LOW (ref 27–34)
MCHC RBC-ENTMCNC: 25.2 PG — LOW (ref 27–34)
MCHC RBC-ENTMCNC: 25.3 PG — LOW (ref 27–34)
MCHC RBC-ENTMCNC: 25.4 PG — LOW (ref 27–34)
MCHC RBC-ENTMCNC: 25.5 PG — LOW (ref 27–34)
MCHC RBC-ENTMCNC: 25.6 PG — LOW (ref 27–34)
MCHC RBC-ENTMCNC: 25.7 PG — LOW (ref 27–34)
MCHC RBC-ENTMCNC: 25.7 PG — LOW (ref 27–34)
MCHC RBC-ENTMCNC: 25.8 PG — LOW (ref 27–34)
MCHC RBC-ENTMCNC: 25.8 PG — LOW (ref 27–34)
MCHC RBC-ENTMCNC: 25.9 PG — LOW (ref 27–34)
MCHC RBC-ENTMCNC: 26.1 PG — LOW (ref 27–34)
MCHC RBC-ENTMCNC: 26.2 PG — LOW (ref 27–34)
MCHC RBC-ENTMCNC: 26.3 PG — LOW (ref 27–34)
MCHC RBC-ENTMCNC: 26.5 PG — LOW (ref 27–34)
MCHC RBC-ENTMCNC: 26.5 PG — LOW (ref 27–34)
MCHC RBC-ENTMCNC: 26.6 PG — LOW (ref 27–34)
MCHC RBC-ENTMCNC: 26.8 PG — LOW (ref 27–34)
MCHC RBC-ENTMCNC: 26.8 PG — LOW (ref 27–34)
MCHC RBC-ENTMCNC: 27.1 PG — SIGNIFICANT CHANGE UP (ref 27–34)
MCHC RBC-ENTMCNC: 27.6 GM/DL — LOW (ref 32–36)
MCHC RBC-ENTMCNC: 28 GM/DL — LOW (ref 32–36)
MCHC RBC-ENTMCNC: 29.2 GM/DL — LOW (ref 32–36)
MCHC RBC-ENTMCNC: 29.4 GM/DL — LOW (ref 32–36)
MCHC RBC-ENTMCNC: 29.5 GM/DL — LOW (ref 32–36)
MCHC RBC-ENTMCNC: 29.6 GM/DL — LOW (ref 32–36)
MCHC RBC-ENTMCNC: 29.6 GM/DL — LOW (ref 32–36)
MCHC RBC-ENTMCNC: 29.7 GM/DL — LOW (ref 32–36)
MCHC RBC-ENTMCNC: 29.7 GM/DL — LOW (ref 32–36)
MCHC RBC-ENTMCNC: 29.8 GM/DL — LOW (ref 32–36)
MCHC RBC-ENTMCNC: 29.9 GM/DL — LOW (ref 32–36)
MCHC RBC-ENTMCNC: 29.9 GM/DL — LOW (ref 32–36)
MCHC RBC-ENTMCNC: 30 GM/DL — LOW (ref 32–36)
MCHC RBC-ENTMCNC: 30 GM/DL — LOW (ref 32–36)
MCHC RBC-ENTMCNC: 30.1 GM/DL — LOW (ref 32–36)
MCHC RBC-ENTMCNC: 30.1 GM/DL — LOW (ref 32–36)
MCHC RBC-ENTMCNC: 30.3 GM/DL — LOW (ref 32–36)
MCHC RBC-ENTMCNC: 30.4 GM/DL — LOW (ref 32–36)
MCHC RBC-ENTMCNC: 30.4 GM/DL — LOW (ref 32–36)
MCHC RBC-ENTMCNC: 30.6 GM/DL — LOW (ref 32–36)
MCHC RBC-ENTMCNC: 30.6 GM/DL — LOW (ref 32–36)
MCHC RBC-ENTMCNC: 30.7 GM/DL — LOW (ref 32–36)
MCHC RBC-ENTMCNC: 30.8 GM/DL — LOW (ref 32–36)
MCHC RBC-ENTMCNC: 30.8 GM/DL — LOW (ref 32–36)
MCHC RBC-ENTMCNC: 30.9 GM/DL — LOW (ref 32–36)
MCHC RBC-ENTMCNC: 31 GM/DL — LOW (ref 32–36)
MCHC RBC-ENTMCNC: 31.1 GM/DL — LOW (ref 32–36)
MCHC RBC-ENTMCNC: 31.1 GM/DL — LOW (ref 32–36)
MCHC RBC-ENTMCNC: 31.2 GM/DL — LOW (ref 32–36)
MCHC RBC-ENTMCNC: 31.3 GM/DL — LOW (ref 32–36)
MCHC RBC-ENTMCNC: 31.4 GM/DL — LOW (ref 32–36)
MCHC RBC-ENTMCNC: 31.5 GM/DL — LOW (ref 32–36)
MCHC RBC-ENTMCNC: 31.6 GM/DL — LOW (ref 32–36)
MCHC RBC-ENTMCNC: 31.6 GM/DL — LOW (ref 32–36)
MCHC RBC-ENTMCNC: 31.7 GM/DL — LOW (ref 32–36)
MCHC RBC-ENTMCNC: 31.8 GM/DL — LOW (ref 32–36)
MCHC RBC-ENTMCNC: 32 GM/DL — SIGNIFICANT CHANGE UP (ref 32–36)
MCHC RBC-ENTMCNC: 32.3 GM/DL — SIGNIFICANT CHANGE UP (ref 32–36)
MCHC RBC-ENTMCNC: 32.7 GM/DL — SIGNIFICANT CHANGE UP (ref 32–36)
MCHC RBC-ENTMCNC: 32.9 GM/DL — SIGNIFICANT CHANGE UP (ref 32–36)
MCHC RBC-ENTMCNC: 33 GM/DL — SIGNIFICANT CHANGE UP (ref 32–36)
MCV RBC AUTO: 79.1 FL — LOW (ref 80–100)
MCV RBC AUTO: 79.2 FL — LOW (ref 80–100)
MCV RBC AUTO: 79.6 FL — LOW (ref 80–100)
MCV RBC AUTO: 79.9 FL — LOW (ref 80–100)
MCV RBC AUTO: 80.1 FL — SIGNIFICANT CHANGE UP (ref 80–100)
MCV RBC AUTO: 80.1 FL — SIGNIFICANT CHANGE UP (ref 80–100)
MCV RBC AUTO: 80.2 FL — SIGNIFICANT CHANGE UP (ref 80–100)
MCV RBC AUTO: 80.5 FL — SIGNIFICANT CHANGE UP (ref 80–100)
MCV RBC AUTO: 80.5 FL — SIGNIFICANT CHANGE UP (ref 80–100)
MCV RBC AUTO: 80.8 FL — SIGNIFICANT CHANGE UP (ref 80–100)
MCV RBC AUTO: 80.8 FL — SIGNIFICANT CHANGE UP (ref 80–100)
MCV RBC AUTO: 80.9 FL — SIGNIFICANT CHANGE UP (ref 80–100)
MCV RBC AUTO: 80.9 FL — SIGNIFICANT CHANGE UP (ref 80–100)
MCV RBC AUTO: 81 FL — SIGNIFICANT CHANGE UP (ref 80–100)
MCV RBC AUTO: 81 FL — SIGNIFICANT CHANGE UP (ref 80–100)
MCV RBC AUTO: 81.3 FL — SIGNIFICANT CHANGE UP (ref 80–100)
MCV RBC AUTO: 81.4 FL — SIGNIFICANT CHANGE UP (ref 80–100)
MCV RBC AUTO: 81.6 FL — SIGNIFICANT CHANGE UP (ref 80–100)
MCV RBC AUTO: 81.7 FL — SIGNIFICANT CHANGE UP (ref 80–100)
MCV RBC AUTO: 81.8 FL — SIGNIFICANT CHANGE UP (ref 80–100)
MCV RBC AUTO: 81.9 FL — SIGNIFICANT CHANGE UP (ref 80–100)
MCV RBC AUTO: 82.1 FL — SIGNIFICANT CHANGE UP (ref 80–100)
MCV RBC AUTO: 82.3 FL — SIGNIFICANT CHANGE UP (ref 80–100)
MCV RBC AUTO: 82.4 FL — SIGNIFICANT CHANGE UP (ref 80–100)
MCV RBC AUTO: 82.5 FL — SIGNIFICANT CHANGE UP (ref 80–100)
MCV RBC AUTO: 82.6 FL — SIGNIFICANT CHANGE UP (ref 80–100)
MCV RBC AUTO: 82.8 FL — SIGNIFICANT CHANGE UP (ref 80–100)
MCV RBC AUTO: 82.9 FL — SIGNIFICANT CHANGE UP (ref 80–100)
MCV RBC AUTO: 82.9 FL — SIGNIFICANT CHANGE UP (ref 80–100)
MCV RBC AUTO: 83 FL — SIGNIFICANT CHANGE UP (ref 80–100)
MCV RBC AUTO: 83.5 FL — SIGNIFICANT CHANGE UP (ref 80–100)
MCV RBC AUTO: 84.4 FL — SIGNIFICANT CHANGE UP (ref 80–100)
MCV RBC AUTO: 84.5 FL — SIGNIFICANT CHANGE UP (ref 80–100)
MCV RBC AUTO: 84.6 FL — SIGNIFICANT CHANGE UP (ref 80–100)
MCV RBC AUTO: 84.6 FL — SIGNIFICANT CHANGE UP (ref 80–100)
MCV RBC AUTO: 84.8 FL — SIGNIFICANT CHANGE UP (ref 80–100)
MCV RBC AUTO: 85.2 FL — SIGNIFICANT CHANGE UP (ref 80–100)
MCV RBC AUTO: 85.2 FL — SIGNIFICANT CHANGE UP (ref 80–100)
MCV RBC AUTO: 85.8 FL — SIGNIFICANT CHANGE UP (ref 80–100)
MCV RBC AUTO: 86 FL — SIGNIFICANT CHANGE UP (ref 80–100)
MCV RBC AUTO: 86.1 FL — SIGNIFICANT CHANGE UP (ref 80–100)
MCV RBC AUTO: 86.2 FL — SIGNIFICANT CHANGE UP (ref 80–100)
MCV RBC AUTO: 86.6 FL — SIGNIFICANT CHANGE UP (ref 80–100)
MCV RBC AUTO: 86.7 FL — SIGNIFICANT CHANGE UP (ref 80–100)
MCV RBC AUTO: 88.4 FL — SIGNIFICANT CHANGE UP (ref 80–100)
MCV RBC AUTO: 88.5 FL — SIGNIFICANT CHANGE UP (ref 80–100)
MCV RBC AUTO: 89.2 FL — SIGNIFICANT CHANGE UP (ref 80–100)
MCV RBC AUTO: 89.4 FL — SIGNIFICANT CHANGE UP (ref 80–100)
MCV RBC AUTO: 89.9 FL — SIGNIFICANT CHANGE UP (ref 80–100)
MCV RBC AUTO: 90.4 FL — SIGNIFICANT CHANGE UP (ref 80–100)
MCV RBC AUTO: 90.7 FL — SIGNIFICANT CHANGE UP (ref 80–100)
MCV RBC AUTO: 92.6 FL — SIGNIFICANT CHANGE UP (ref 80–100)
METHOD TYPE: SIGNIFICANT CHANGE UP
MONOCYTES # BLD AUTO: 0.31 K/UL — SIGNIFICANT CHANGE UP (ref 0–0.9)
MONOCYTES # BLD AUTO: 1.01 K/UL — HIGH (ref 0–0.9)
MONOCYTES # BLD AUTO: 1.17 K/UL — HIGH (ref 0–0.9)
MONOCYTES # BLD AUTO: 1.21 K/UL — HIGH (ref 0–0.9)
MONOCYTES # BLD AUTO: 1.24 K/UL — HIGH (ref 0–0.9)
MONOCYTES # BLD AUTO: 1.32 K/UL — HIGH (ref 0–0.9)
MONOCYTES # BLD AUTO: 1.34 K/UL — HIGH (ref 0–0.9)
MONOCYTES # BLD AUTO: 1.36 K/UL — HIGH (ref 0–0.9)
MONOCYTES # BLD AUTO: 1.39 K/UL — HIGH (ref 0–0.9)
MONOCYTES # BLD AUTO: 1.39 K/UL — HIGH (ref 0–0.9)
MONOCYTES # BLD AUTO: 1.4 K/UL — HIGH (ref 0–0.9)
MONOCYTES # BLD AUTO: 1.41 K/UL — HIGH (ref 0–0.9)
MONOCYTES # BLD AUTO: 1.41 K/UL — HIGH (ref 0–0.9)
MONOCYTES # BLD AUTO: 1.44 K/UL — HIGH (ref 0–0.9)
MONOCYTES # BLD AUTO: 1.48 K/UL — HIGH (ref 0–0.9)
MONOCYTES # BLD AUTO: 1.59 K/UL — HIGH (ref 0–0.9)
MONOCYTES # BLD AUTO: 1.62 K/UL — HIGH (ref 0–0.9)
MONOCYTES # BLD AUTO: 1.65 K/UL — HIGH (ref 0–0.9)
MONOCYTES # BLD AUTO: 1.67 K/UL — HIGH (ref 0–0.9)
MONOCYTES # BLD AUTO: 1.69 K/UL — HIGH (ref 0–0.9)
MONOCYTES # BLD AUTO: 1.69 K/UL — HIGH (ref 0–0.9)
MONOCYTES # BLD AUTO: 1.7 K/UL — HIGH (ref 0–0.9)
MONOCYTES # BLD AUTO: 1.71 K/UL — HIGH (ref 0–0.9)
MONOCYTES # BLD AUTO: 1.73 K/UL — HIGH (ref 0–0.9)
MONOCYTES # BLD AUTO: 1.75 K/UL — HIGH (ref 0–0.9)
MONOCYTES # BLD AUTO: 1.87 K/UL — HIGH (ref 0–0.9)
MONOCYTES # BLD AUTO: 1.93 K/UL — HIGH (ref 0–0.9)
MONOCYTES # BLD AUTO: 2.13 K/UL — HIGH (ref 0–0.9)
MONOCYTES # BLD AUTO: 2.2 K/UL — HIGH (ref 0–0.9)
MONOCYTES NFR BLD AUTO: 1.7 % — LOW (ref 2–14)
MONOCYTES NFR BLD AUTO: 10.1 % — SIGNIFICANT CHANGE UP (ref 2–14)
MONOCYTES NFR BLD AUTO: 10.4 % — SIGNIFICANT CHANGE UP (ref 2–14)
MONOCYTES NFR BLD AUTO: 10.5 % — SIGNIFICANT CHANGE UP (ref 2–14)
MONOCYTES NFR BLD AUTO: 10.8 % — SIGNIFICANT CHANGE UP (ref 2–14)
MONOCYTES NFR BLD AUTO: 11.1 % — SIGNIFICANT CHANGE UP (ref 2–14)
MONOCYTES NFR BLD AUTO: 11.4 % — SIGNIFICANT CHANGE UP (ref 2–14)
MONOCYTES NFR BLD AUTO: 12.8 % — SIGNIFICANT CHANGE UP (ref 2–14)
MONOCYTES NFR BLD AUTO: 12.9 % — SIGNIFICANT CHANGE UP (ref 2–14)
MONOCYTES NFR BLD AUTO: 13.2 % — SIGNIFICANT CHANGE UP (ref 2–14)
MONOCYTES NFR BLD AUTO: 13.3 % — SIGNIFICANT CHANGE UP (ref 2–14)
MONOCYTES NFR BLD AUTO: 13.8 % — SIGNIFICANT CHANGE UP (ref 2–14)
MONOCYTES NFR BLD AUTO: 14.3 % — HIGH (ref 2–14)
MONOCYTES NFR BLD AUTO: 14.8 % — HIGH (ref 2–14)
MONOCYTES NFR BLD AUTO: 4.6 % — SIGNIFICANT CHANGE UP (ref 2–14)
MONOCYTES NFR BLD AUTO: 7 % — SIGNIFICANT CHANGE UP (ref 2–14)
MONOCYTES NFR BLD AUTO: 7.5 % — SIGNIFICANT CHANGE UP (ref 2–14)
MONOCYTES NFR BLD AUTO: 7.9 % — SIGNIFICANT CHANGE UP (ref 2–14)
MONOCYTES NFR BLD AUTO: 8.1 % — SIGNIFICANT CHANGE UP (ref 2–14)
MONOCYTES NFR BLD AUTO: 8.4 % — SIGNIFICANT CHANGE UP (ref 2–14)
MONOCYTES NFR BLD AUTO: 8.7 % — SIGNIFICANT CHANGE UP (ref 2–14)
MONOCYTES NFR BLD AUTO: 8.7 % — SIGNIFICANT CHANGE UP (ref 2–14)
MONOCYTES NFR BLD AUTO: 8.8 % — SIGNIFICANT CHANGE UP (ref 2–14)
MONOCYTES NFR BLD AUTO: 8.9 % — SIGNIFICANT CHANGE UP (ref 2–14)
MONOCYTES NFR BLD AUTO: 9.1 % — SIGNIFICANT CHANGE UP (ref 2–14)
MONOCYTES NFR BLD AUTO: 9.5 % — SIGNIFICANT CHANGE UP (ref 2–14)
MONOCYTES NFR BLD AUTO: 9.6 % — SIGNIFICANT CHANGE UP (ref 2–14)
MRSA PCR RESULT.: SIGNIFICANT CHANGE UP
NEUTROPHILS # BLD AUTO: 10.11 K/UL — HIGH (ref 1.8–7.4)
NEUTROPHILS # BLD AUTO: 11.16 K/UL — HIGH (ref 1.8–7.4)
NEUTROPHILS # BLD AUTO: 11.5 K/UL — HIGH (ref 1.8–7.4)
NEUTROPHILS # BLD AUTO: 11.61 K/UL — HIGH (ref 1.8–7.4)
NEUTROPHILS # BLD AUTO: 12.58 K/UL — HIGH (ref 1.8–7.4)
NEUTROPHILS # BLD AUTO: 12.64 K/UL — HIGH (ref 1.8–7.4)
NEUTROPHILS # BLD AUTO: 13.19 K/UL — HIGH (ref 1.8–7.4)
NEUTROPHILS # BLD AUTO: 13.82 K/UL — HIGH (ref 1.8–7.4)
NEUTROPHILS # BLD AUTO: 15.11 K/UL — HIGH (ref 1.8–7.4)
NEUTROPHILS # BLD AUTO: 15.63 K/UL — HIGH (ref 1.8–7.4)
NEUTROPHILS # BLD AUTO: 16.31 K/UL — HIGH (ref 1.8–7.4)
NEUTROPHILS # BLD AUTO: 17.47 K/UL — HIGH (ref 1.8–7.4)
NEUTROPHILS # BLD AUTO: 22.93 K/UL — HIGH (ref 1.8–7.4)
NEUTROPHILS # BLD AUTO: 24.2 K/UL — HIGH (ref 1.8–7.4)
NEUTROPHILS # BLD AUTO: 28.66 K/UL — HIGH (ref 1.8–7.4)
NEUTROPHILS # BLD AUTO: 5.48 K/UL — SIGNIFICANT CHANGE UP (ref 1.8–7.4)
NEUTROPHILS # BLD AUTO: 6.36 K/UL — SIGNIFICANT CHANGE UP (ref 1.8–7.4)
NEUTROPHILS # BLD AUTO: 7.67 K/UL — HIGH (ref 1.8–7.4)
NEUTROPHILS # BLD AUTO: 7.73 K/UL — HIGH (ref 1.8–7.4)
NEUTROPHILS # BLD AUTO: 8.04 K/UL — HIGH (ref 1.8–7.4)
NEUTROPHILS # BLD AUTO: 8.25 K/UL — HIGH (ref 1.8–7.4)
NEUTROPHILS # BLD AUTO: 8.31 K/UL — HIGH (ref 1.8–7.4)
NEUTROPHILS # BLD AUTO: 8.31 K/UL — HIGH (ref 1.8–7.4)
NEUTROPHILS # BLD AUTO: 8.8 K/UL — HIGH (ref 1.8–7.4)
NEUTROPHILS # BLD AUTO: 9.03 K/UL — HIGH (ref 1.8–7.4)
NEUTROPHILS # BLD AUTO: 9.68 K/UL — HIGH (ref 1.8–7.4)
NEUTROPHILS # BLD AUTO: 9.71 K/UL — HIGH (ref 1.8–7.4)
NEUTROPHILS # BLD AUTO: 9.89 K/UL — HIGH (ref 1.8–7.4)
NEUTROPHILS # BLD AUTO: 9.94 K/UL — HIGH (ref 1.8–7.4)
NEUTROPHILS NFR BLD AUTO: 55.8 % — SIGNIFICANT CHANGE UP (ref 43–77)
NEUTROPHILS NFR BLD AUTO: 57.3 % — SIGNIFICANT CHANGE UP (ref 43–77)
NEUTROPHILS NFR BLD AUTO: 60.7 % — SIGNIFICANT CHANGE UP (ref 43–77)
NEUTROPHILS NFR BLD AUTO: 61.4 % — SIGNIFICANT CHANGE UP (ref 43–77)
NEUTROPHILS NFR BLD AUTO: 62.8 % — SIGNIFICANT CHANGE UP (ref 43–77)
NEUTROPHILS NFR BLD AUTO: 64 % — SIGNIFICANT CHANGE UP (ref 43–77)
NEUTROPHILS NFR BLD AUTO: 66.6 % — SIGNIFICANT CHANGE UP (ref 43–77)
NEUTROPHILS NFR BLD AUTO: 66.8 % — SIGNIFICANT CHANGE UP (ref 43–77)
NEUTROPHILS NFR BLD AUTO: 67.1 % — SIGNIFICANT CHANGE UP (ref 43–77)
NEUTROPHILS NFR BLD AUTO: 67.6 % — SIGNIFICANT CHANGE UP (ref 43–77)
NEUTROPHILS NFR BLD AUTO: 67.6 % — SIGNIFICANT CHANGE UP (ref 43–77)
NEUTROPHILS NFR BLD AUTO: 68.1 % — SIGNIFICANT CHANGE UP (ref 43–77)
NEUTROPHILS NFR BLD AUTO: 68.5 % — SIGNIFICANT CHANGE UP (ref 43–77)
NEUTROPHILS NFR BLD AUTO: 69.1 % — SIGNIFICANT CHANGE UP (ref 43–77)
NEUTROPHILS NFR BLD AUTO: 73.1 % — SIGNIFICANT CHANGE UP (ref 43–77)
NEUTROPHILS NFR BLD AUTO: 73.4 % — SIGNIFICANT CHANGE UP (ref 43–77)
NEUTROPHILS NFR BLD AUTO: 73.6 % — SIGNIFICANT CHANGE UP (ref 43–77)
NEUTROPHILS NFR BLD AUTO: 73.7 % — SIGNIFICANT CHANGE UP (ref 43–77)
NEUTROPHILS NFR BLD AUTO: 75.2 % — SIGNIFICANT CHANGE UP (ref 43–77)
NEUTROPHILS NFR BLD AUTO: 77.2 % — HIGH (ref 43–77)
NEUTROPHILS NFR BLD AUTO: 77.7 % — HIGH (ref 43–77)
NEUTROPHILS NFR BLD AUTO: 78.9 % — HIGH (ref 43–77)
NEUTROPHILS NFR BLD AUTO: 80 % — HIGH (ref 43–77)
NEUTROPHILS NFR BLD AUTO: 81.6 % — HIGH (ref 43–77)
NEUTROPHILS NFR BLD AUTO: 82.2 % — HIGH (ref 43–77)
NEUTROPHILS NFR BLD AUTO: 82.8 % — HIGH (ref 43–77)
NEUTROPHILS NFR BLD AUTO: 84.5 % — HIGH (ref 43–77)
NEUTROPHILS NFR BLD AUTO: 90.5 % — HIGH (ref 43–77)
NEUTROPHILS NFR BLD AUTO: 95.7 % — HIGH (ref 43–77)
NEUTS BAND # BLD: 4.4 % — SIGNIFICANT CHANGE UP (ref 0–8)
NITRITE UR-MCNC: NEGATIVE — SIGNIFICANT CHANGE UP
NRBC # BLD: 0 /100 WBCS — SIGNIFICANT CHANGE UP (ref 0–0)
NRBC # BLD: 1 /100 WBCS — HIGH (ref 0–0)
NRBC # BLD: 11 /100 WBCS — HIGH (ref 0–0)
NRBC # BLD: 14 /100 WBCS — HIGH (ref 0–0)
NRBC # BLD: 18 /100 WBCS — HIGH (ref 0–0)
NRBC # BLD: 29 /100 WBCS — HIGH (ref 0–0)
NRBC # BLD: 36 /100 WBCS — HIGH (ref 0–0)
NRBC # BLD: 8 /100 WBCS — HIGH (ref 0–0)
NT-PROBNP SERPL-SCNC: 10 PG/ML — SIGNIFICANT CHANGE UP (ref 0–300)
ORGANISM # SPEC MICROSCOPIC CNT: SIGNIFICANT CHANGE UP
PCO2 BLDA: 37 MMHG — SIGNIFICANT CHANGE UP (ref 32–46)
PCO2 BLDA: 55 MMHG — HIGH (ref 32–46)
PCO2 BLDA: 56 MMHG — HIGH (ref 32–46)
PCO2 BLDA: 60 MMHG — HIGH (ref 32–46)
PCO2 BLDA: 62 MMHG — HIGH (ref 32–46)
PCO2 BLDA: 63 MMHG — HIGH (ref 32–46)
PCO2 BLDA: 65 MMHG — HIGH (ref 32–46)
PCO2 BLDA: 70 MMHG — CRITICAL HIGH (ref 32–46)
PCO2 BLDA: 73 MMHG — CRITICAL HIGH (ref 32–46)
PH BLDA: 7.22 — LOW (ref 7.35–7.45)
PH BLDA: 7.28 — LOW (ref 7.35–7.45)
PH BLDA: 7.33 — LOW (ref 7.35–7.45)
PH BLDA: 7.33 — LOW (ref 7.35–7.45)
PH BLDA: 7.34 — LOW (ref 7.35–7.45)
PH BLDA: 7.35 — SIGNIFICANT CHANGE UP (ref 7.35–7.45)
PH BLDA: 7.36 — SIGNIFICANT CHANGE UP (ref 7.35–7.45)
PH BLDA: 7.36 — SIGNIFICANT CHANGE UP (ref 7.35–7.45)
PH BLDA: 7.38 — SIGNIFICANT CHANGE UP (ref 7.35–7.45)
PH BLDA: 7.4 — SIGNIFICANT CHANGE UP (ref 7.35–7.45)
PH BLDA: 7.47 — HIGH (ref 7.35–7.45)
PH UR: 6 — SIGNIFICANT CHANGE UP (ref 5–8)
PH UR: 6.5 — SIGNIFICANT CHANGE UP (ref 5–8)
PH UR: 6.5 — SIGNIFICANT CHANGE UP (ref 5–8)
PH UR: 7 — SIGNIFICANT CHANGE UP (ref 5–8)
PH UR: 8.5 — HIGH (ref 5–8)
PHENOBARB SERPL-MCNC: 12.3 UG/ML — LOW (ref 15–40)
PHENOBARB SERPL-MCNC: 24.3 UG/ML — SIGNIFICANT CHANGE UP (ref 15–40)
PHENOBARB SERPL-MCNC: 26.3 UG/ML — SIGNIFICANT CHANGE UP (ref 15–40)
PHOSPHATE SERPL-MCNC: 1.8 MG/DL — LOW (ref 2.5–4.5)
PHOSPHATE SERPL-MCNC: 2.1 MG/DL — LOW (ref 2.5–4.5)
PHOSPHATE SERPL-MCNC: 2.2 MG/DL — LOW (ref 2.5–4.5)
PHOSPHATE SERPL-MCNC: 2.3 MG/DL — LOW (ref 2.5–4.5)
PHOSPHATE SERPL-MCNC: 2.3 MG/DL — LOW (ref 2.5–4.5)
PHOSPHATE SERPL-MCNC: 2.4 MG/DL — LOW (ref 2.5–4.5)
PHOSPHATE SERPL-MCNC: 2.5 MG/DL — SIGNIFICANT CHANGE UP (ref 2.5–4.5)
PHOSPHATE SERPL-MCNC: 2.8 MG/DL — SIGNIFICANT CHANGE UP (ref 2.5–4.5)
PHOSPHATE SERPL-MCNC: 2.8 MG/DL — SIGNIFICANT CHANGE UP (ref 2.5–4.5)
PHOSPHATE SERPL-MCNC: 2.9 MG/DL — SIGNIFICANT CHANGE UP (ref 2.5–4.5)
PHOSPHATE SERPL-MCNC: 3 MG/DL — SIGNIFICANT CHANGE UP (ref 2.5–4.5)
PHOSPHATE SERPL-MCNC: 3 MG/DL — SIGNIFICANT CHANGE UP (ref 2.5–4.5)
PHOSPHATE SERPL-MCNC: 3.1 MG/DL — SIGNIFICANT CHANGE UP (ref 2.5–4.5)
PHOSPHATE SERPL-MCNC: 3.2 MG/DL — SIGNIFICANT CHANGE UP (ref 2.5–4.5)
PHOSPHATE SERPL-MCNC: 3.4 MG/DL — SIGNIFICANT CHANGE UP (ref 2.5–4.5)
PHOSPHATE SERPL-MCNC: 3.5 MG/DL — SIGNIFICANT CHANGE UP (ref 2.5–4.5)
PHOSPHATE SERPL-MCNC: 3.5 MG/DL — SIGNIFICANT CHANGE UP (ref 2.5–4.5)
PHOSPHATE SERPL-MCNC: 3.7 MG/DL — SIGNIFICANT CHANGE UP (ref 2.5–4.5)
PHOSPHATE SERPL-MCNC: 4 MG/DL — SIGNIFICANT CHANGE UP (ref 2.5–4.5)
PHOSPHATE SERPL-MCNC: 4.4 MG/DL — SIGNIFICANT CHANGE UP (ref 2.5–4.5)
PHOSPHATE SERPL-MCNC: 4.4 MG/DL — SIGNIFICANT CHANGE UP (ref 2.5–4.5)
PHOSPHATE SERPL-MCNC: 4.5 MG/DL — SIGNIFICANT CHANGE UP (ref 2.5–4.5)
PHOSPHATE SERPL-MCNC: 4.7 MG/DL — HIGH (ref 2.5–4.5)
PHOSPHATE SERPL-MCNC: 4.8 MG/DL — HIGH (ref 2.5–4.5)
PHOSPHATE SERPL-MCNC: 5.1 MG/DL — HIGH (ref 2.5–4.5)
PHOSPHATE SERPL-MCNC: 5.4 MG/DL — HIGH (ref 2.5–4.5)
PHOSPHATE SERPL-MCNC: 6.5 MG/DL — HIGH (ref 2.5–4.5)
PHOSPHATE SERPL-MCNC: 6.6 MG/DL — HIGH (ref 2.5–4.5)
PHOSPHATE SERPL-MCNC: 7.4 MG/DL — HIGH (ref 2.5–4.5)
PHOSPHATE SERPL-MCNC: 8.1 MG/DL — HIGH (ref 2.5–4.5)
PLAT MORPH BLD: NORMAL — SIGNIFICANT CHANGE UP
PLAT MORPH BLD: NORMAL — SIGNIFICANT CHANGE UP
PLATELET # BLD AUTO: 223 K/UL — SIGNIFICANT CHANGE UP (ref 150–400)
PLATELET # BLD AUTO: 242 K/UL — SIGNIFICANT CHANGE UP (ref 150–400)
PLATELET # BLD AUTO: 258 K/UL — SIGNIFICANT CHANGE UP (ref 150–400)
PLATELET # BLD AUTO: 265 K/UL — SIGNIFICANT CHANGE UP (ref 150–400)
PLATELET # BLD AUTO: 268 K/UL — SIGNIFICANT CHANGE UP (ref 150–400)
PLATELET # BLD AUTO: 278 K/UL — SIGNIFICANT CHANGE UP (ref 150–400)
PLATELET # BLD AUTO: 290 K/UL — SIGNIFICANT CHANGE UP (ref 150–400)
PLATELET # BLD AUTO: 299 K/UL — SIGNIFICANT CHANGE UP (ref 150–400)
PLATELET # BLD AUTO: 313 K/UL — SIGNIFICANT CHANGE UP (ref 150–400)
PLATELET # BLD AUTO: 316 K/UL — SIGNIFICANT CHANGE UP (ref 150–400)
PLATELET # BLD AUTO: 336 K/UL — SIGNIFICANT CHANGE UP (ref 150–400)
PLATELET # BLD AUTO: 341 K/UL — SIGNIFICANT CHANGE UP (ref 150–400)
PLATELET # BLD AUTO: 358 K/UL — SIGNIFICANT CHANGE UP (ref 150–400)
PLATELET # BLD AUTO: 361 K/UL — SIGNIFICANT CHANGE UP (ref 150–400)
PLATELET # BLD AUTO: 374 K/UL — SIGNIFICANT CHANGE UP (ref 150–400)
PLATELET # BLD AUTO: 379 K/UL — SIGNIFICANT CHANGE UP (ref 150–400)
PLATELET # BLD AUTO: 394 K/UL — SIGNIFICANT CHANGE UP (ref 150–400)
PLATELET # BLD AUTO: 410 K/UL — HIGH (ref 150–400)
PLATELET # BLD AUTO: 416 K/UL — HIGH (ref 150–400)
PLATELET # BLD AUTO: 423 K/UL — HIGH (ref 150–400)
PLATELET # BLD AUTO: 437 K/UL — HIGH (ref 150–400)
PLATELET # BLD AUTO: 446 K/UL — HIGH (ref 150–400)
PLATELET # BLD AUTO: 450 K/UL — HIGH (ref 150–400)
PLATELET # BLD AUTO: 457 K/UL — HIGH (ref 150–400)
PLATELET # BLD AUTO: 466 K/UL — HIGH (ref 150–400)
PLATELET # BLD AUTO: 471 K/UL — HIGH (ref 150–400)
PLATELET # BLD AUTO: 483 K/UL — HIGH (ref 150–400)
PLATELET # BLD AUTO: 488 K/UL — HIGH (ref 150–400)
PLATELET # BLD AUTO: 493 K/UL — HIGH (ref 150–400)
PLATELET # BLD AUTO: 501 K/UL — HIGH (ref 150–400)
PLATELET # BLD AUTO: 507 K/UL — HIGH (ref 150–400)
PLATELET # BLD AUTO: 510 K/UL — HIGH (ref 150–400)
PLATELET # BLD AUTO: 511 K/UL — HIGH (ref 150–400)
PLATELET # BLD AUTO: 527 K/UL — HIGH (ref 150–400)
PLATELET # BLD AUTO: 528 K/UL — HIGH (ref 150–400)
PLATELET # BLD AUTO: 530 K/UL — HIGH (ref 150–400)
PLATELET # BLD AUTO: 532 K/UL — HIGH (ref 150–400)
PLATELET # BLD AUTO: 534 K/UL — HIGH (ref 150–400)
PLATELET # BLD AUTO: 542 K/UL — HIGH (ref 150–400)
PLATELET # BLD AUTO: 544 K/UL — HIGH (ref 150–400)
PLATELET # BLD AUTO: 544 K/UL — HIGH (ref 150–400)
PLATELET # BLD AUTO: 562 K/UL — HIGH (ref 150–400)
PLATELET # BLD AUTO: 570 K/UL — HIGH (ref 150–400)
PLATELET # BLD AUTO: 576 K/UL — HIGH (ref 150–400)
PLATELET # BLD AUTO: 590 K/UL — HIGH (ref 150–400)
PLATELET # BLD AUTO: 590 K/UL — HIGH (ref 150–400)
PLATELET # BLD AUTO: 592 K/UL — HIGH (ref 150–400)
PLATELET # BLD AUTO: 594 K/UL — HIGH (ref 150–400)
PLATELET # BLD AUTO: 613 K/UL — HIGH (ref 150–400)
PLATELET # BLD AUTO: 623 K/UL — HIGH (ref 150–400)
PLATELET # BLD AUTO: 639 K/UL — HIGH (ref 150–400)
PLATELET # BLD AUTO: 651 K/UL — HIGH (ref 150–400)
PLATELET # BLD AUTO: 666 K/UL — HIGH (ref 150–400)
PLATELET # BLD AUTO: 671 K/UL — HIGH (ref 150–400)
PLATELET # BLD AUTO: 689 K/UL — HIGH (ref 150–400)
PLATELET # BLD AUTO: 698 K/UL — HIGH (ref 150–400)
PO2 BLDA: 61 MMHG — LOW (ref 74–108)
PO2 BLDA: 63 MMHG — LOW (ref 74–108)
PO2 BLDA: 63 MMHG — LOW (ref 74–108)
PO2 BLDA: 64 MMHG — LOW (ref 74–108)
PO2 BLDA: 70 MMHG — LOW (ref 74–108)
PO2 BLDA: 70 MMHG — LOW (ref 74–108)
PO2 BLDA: 75 MMHG — SIGNIFICANT CHANGE UP (ref 74–108)
PO2 BLDA: 76 MMHG — SIGNIFICANT CHANGE UP (ref 74–108)
PO2 BLDA: 78 MMHG — SIGNIFICANT CHANGE UP (ref 74–108)
PO2 BLDA: 80 MMHG — SIGNIFICANT CHANGE UP (ref 74–108)
PO2 BLDA: 93 MMHG — SIGNIFICANT CHANGE UP (ref 74–108)
POTASSIUM SERPL-MCNC: 3 MMOL/L — LOW (ref 3.5–5.3)
POTASSIUM SERPL-MCNC: 3.1 MMOL/L — LOW (ref 3.5–5.3)
POTASSIUM SERPL-MCNC: 3.4 MMOL/L — LOW (ref 3.5–5.3)
POTASSIUM SERPL-MCNC: 3.5 MMOL/L — SIGNIFICANT CHANGE UP (ref 3.5–5.3)
POTASSIUM SERPL-MCNC: 3.7 MMOL/L — SIGNIFICANT CHANGE UP (ref 3.5–5.3)
POTASSIUM SERPL-MCNC: 3.8 MMOL/L — SIGNIFICANT CHANGE UP (ref 3.5–5.3)
POTASSIUM SERPL-MCNC: 3.9 MMOL/L — SIGNIFICANT CHANGE UP (ref 3.5–5.3)
POTASSIUM SERPL-MCNC: 4 MMOL/L — SIGNIFICANT CHANGE UP (ref 3.5–5.3)
POTASSIUM SERPL-MCNC: 4 MMOL/L — SIGNIFICANT CHANGE UP (ref 3.5–5.3)
POTASSIUM SERPL-MCNC: 4.1 MMOL/L — SIGNIFICANT CHANGE UP (ref 3.5–5.3)
POTASSIUM SERPL-MCNC: 4.1 MMOL/L — SIGNIFICANT CHANGE UP (ref 3.5–5.3)
POTASSIUM SERPL-MCNC: 4.2 MMOL/L — SIGNIFICANT CHANGE UP (ref 3.5–5.3)
POTASSIUM SERPL-MCNC: 4.3 MMOL/L — SIGNIFICANT CHANGE UP (ref 3.5–5.3)
POTASSIUM SERPL-MCNC: 4.3 MMOL/L — SIGNIFICANT CHANGE UP (ref 3.5–5.3)
POTASSIUM SERPL-MCNC: 4.4 MMOL/L — SIGNIFICANT CHANGE UP (ref 3.5–5.3)
POTASSIUM SERPL-MCNC: 4.4 MMOL/L — SIGNIFICANT CHANGE UP (ref 3.5–5.3)
POTASSIUM SERPL-MCNC: 4.5 MMOL/L — SIGNIFICANT CHANGE UP (ref 3.5–5.3)
POTASSIUM SERPL-MCNC: 4.6 MMOL/L — SIGNIFICANT CHANGE UP (ref 3.5–5.3)
POTASSIUM SERPL-MCNC: 4.7 MMOL/L — SIGNIFICANT CHANGE UP (ref 3.5–5.3)
POTASSIUM SERPL-MCNC: 5 MMOL/L — SIGNIFICANT CHANGE UP (ref 3.5–5.3)
POTASSIUM SERPL-MCNC: 5.3 MMOL/L — SIGNIFICANT CHANGE UP (ref 3.5–5.3)
POTASSIUM SERPL-MCNC: 5.4 MMOL/L — HIGH (ref 3.5–5.3)
POTASSIUM SERPL-MCNC: 5.6 MMOL/L — HIGH (ref 3.5–5.3)
POTASSIUM SERPL-MCNC: 6 MMOL/L — HIGH (ref 3.5–5.3)
POTASSIUM SERPL-MCNC: 6.6 MMOL/L — CRITICAL HIGH (ref 3.5–5.3)
POTASSIUM SERPL-SCNC: 3 MMOL/L — LOW (ref 3.5–5.3)
POTASSIUM SERPL-SCNC: 3.1 MMOL/L — LOW (ref 3.5–5.3)
POTASSIUM SERPL-SCNC: 3.4 MMOL/L — LOW (ref 3.5–5.3)
POTASSIUM SERPL-SCNC: 3.5 MMOL/L — SIGNIFICANT CHANGE UP (ref 3.5–5.3)
POTASSIUM SERPL-SCNC: 3.7 MMOL/L — SIGNIFICANT CHANGE UP (ref 3.5–5.3)
POTASSIUM SERPL-SCNC: 3.8 MMOL/L — SIGNIFICANT CHANGE UP (ref 3.5–5.3)
POTASSIUM SERPL-SCNC: 3.9 MMOL/L — SIGNIFICANT CHANGE UP (ref 3.5–5.3)
POTASSIUM SERPL-SCNC: 4 MMOL/L — SIGNIFICANT CHANGE UP (ref 3.5–5.3)
POTASSIUM SERPL-SCNC: 4 MMOL/L — SIGNIFICANT CHANGE UP (ref 3.5–5.3)
POTASSIUM SERPL-SCNC: 4.1 MMOL/L — SIGNIFICANT CHANGE UP (ref 3.5–5.3)
POTASSIUM SERPL-SCNC: 4.1 MMOL/L — SIGNIFICANT CHANGE UP (ref 3.5–5.3)
POTASSIUM SERPL-SCNC: 4.2 MMOL/L — SIGNIFICANT CHANGE UP (ref 3.5–5.3)
POTASSIUM SERPL-SCNC: 4.3 MMOL/L — SIGNIFICANT CHANGE UP (ref 3.5–5.3)
POTASSIUM SERPL-SCNC: 4.3 MMOL/L — SIGNIFICANT CHANGE UP (ref 3.5–5.3)
POTASSIUM SERPL-SCNC: 4.4 MMOL/L — SIGNIFICANT CHANGE UP (ref 3.5–5.3)
POTASSIUM SERPL-SCNC: 4.4 MMOL/L — SIGNIFICANT CHANGE UP (ref 3.5–5.3)
POTASSIUM SERPL-SCNC: 4.5 MMOL/L — SIGNIFICANT CHANGE UP (ref 3.5–5.3)
POTASSIUM SERPL-SCNC: 4.6 MMOL/L — SIGNIFICANT CHANGE UP (ref 3.5–5.3)
POTASSIUM SERPL-SCNC: 4.7 MMOL/L — SIGNIFICANT CHANGE UP (ref 3.5–5.3)
POTASSIUM SERPL-SCNC: 5 MMOL/L — SIGNIFICANT CHANGE UP (ref 3.5–5.3)
POTASSIUM SERPL-SCNC: 5.3 MMOL/L — SIGNIFICANT CHANGE UP (ref 3.5–5.3)
POTASSIUM SERPL-SCNC: 5.4 MMOL/L — HIGH (ref 3.5–5.3)
POTASSIUM SERPL-SCNC: 5.6 MMOL/L — HIGH (ref 3.5–5.3)
POTASSIUM SERPL-SCNC: 6 MMOL/L — HIGH (ref 3.5–5.3)
POTASSIUM SERPL-SCNC: 6.6 MMOL/L — CRITICAL HIGH (ref 3.5–5.3)
PROCALCITONIN SERPL-MCNC: 0.07 NG/ML — SIGNIFICANT CHANGE UP (ref 0.02–0.1)
PROCALCITONIN SERPL-MCNC: 0.1 NG/ML — SIGNIFICANT CHANGE UP (ref 0.02–0.1)
PROCALCITONIN SERPL-MCNC: 0.11 NG/ML — HIGH (ref 0.02–0.1)
PROCALCITONIN SERPL-MCNC: 0.13 NG/ML — HIGH (ref 0.02–0.1)
PROCALCITONIN SERPL-MCNC: 0.15 NG/ML — HIGH (ref 0.02–0.1)
PROCALCITONIN SERPL-MCNC: 0.17 NG/ML — HIGH (ref 0.02–0.1)
PROCALCITONIN SERPL-MCNC: 0.22 NG/ML — HIGH (ref 0.02–0.1)
PROCALCITONIN SERPL-MCNC: 0.25 NG/ML — HIGH (ref 0.02–0.1)
PROCALCITONIN SERPL-MCNC: 0.26 NG/ML — HIGH (ref 0.02–0.1)
PROCALCITONIN SERPL-MCNC: 0.29 NG/ML — HIGH (ref 0.02–0.1)
PROCALCITONIN SERPL-MCNC: 0.31 NG/ML — HIGH (ref 0.02–0.1)
PROCALCITONIN SERPL-MCNC: 0.31 NG/ML — HIGH (ref 0.02–0.1)
PROCALCITONIN SERPL-MCNC: 0.35 NG/ML — HIGH (ref 0.02–0.1)
PROCALCITONIN SERPL-MCNC: 0.71 NG/ML — HIGH (ref 0.02–0.1)
PROCALCITONIN SERPL-MCNC: 0.78 NG/ML — HIGH (ref 0.02–0.1)
PROCALCITONIN SERPL-MCNC: 0.94 NG/ML — HIGH (ref 0.02–0.1)
PROCALCITONIN SERPL-MCNC: 1 NG/ML — HIGH (ref 0.02–0.1)
PROCALCITONIN SERPL-MCNC: 1.07 NG/ML — HIGH (ref 0.02–0.1)
PROCALCITONIN SERPL-MCNC: 1.23 NG/ML — HIGH (ref 0.02–0.1)
PROCALCITONIN SERPL-MCNC: 1.23 NG/ML — HIGH (ref 0.02–0.1)
PROCALCITONIN SERPL-MCNC: 1.4 NG/ML — HIGH (ref 0.02–0.1)
PROCALCITONIN SERPL-MCNC: 2.62 NG/ML — HIGH (ref 0.02–0.1)
PROCALCITONIN SERPL-MCNC: 3.04 NG/ML — HIGH (ref 0.02–0.1)
PROCALCITONIN SERPL-MCNC: 4.18 NG/ML — HIGH (ref 0.02–0.1)
PROT SERPL-MCNC: 5 G/DL — LOW (ref 6–8.3)
PROT SERPL-MCNC: 5.5 G/DL — LOW (ref 6–8.3)
PROT SERPL-MCNC: 5.6 G/DL — LOW (ref 6–8.3)
PROT SERPL-MCNC: 5.8 G/DL — LOW (ref 6–8.3)
PROT SERPL-MCNC: 5.8 G/DL — LOW (ref 6–8.3)
PROT SERPL-MCNC: 5.9 G/DL — LOW (ref 6–8.3)
PROT SERPL-MCNC: 5.9 G/DL — LOW (ref 6–8.3)
PROT SERPL-MCNC: 6 G/DL — SIGNIFICANT CHANGE UP (ref 6–8.3)
PROT SERPL-MCNC: 6 G/DL — SIGNIFICANT CHANGE UP (ref 6–8.3)
PROT SERPL-MCNC: 6.1 G/DL — SIGNIFICANT CHANGE UP (ref 6–8.3)
PROT SERPL-MCNC: 6.2 G/DL — SIGNIFICANT CHANGE UP (ref 6–8.3)
PROT SERPL-MCNC: 6.3 G/DL — SIGNIFICANT CHANGE UP (ref 6–8.3)
PROT SERPL-MCNC: 6.4 G/DL — SIGNIFICANT CHANGE UP (ref 6–8.3)
PROT SERPL-MCNC: 6.5 G/DL — SIGNIFICANT CHANGE UP (ref 6–8.3)
PROT SERPL-MCNC: 6.5 G/DL — SIGNIFICANT CHANGE UP (ref 6–8.3)
PROT SERPL-MCNC: 6.6 G/DL — SIGNIFICANT CHANGE UP (ref 6–8.3)
PROT SERPL-MCNC: 6.7 G/DL — SIGNIFICANT CHANGE UP (ref 6–8.3)
PROT SERPL-MCNC: 6.8 G/DL — SIGNIFICANT CHANGE UP (ref 6–8.3)
PROT SERPL-MCNC: 6.9 G/DL — SIGNIFICANT CHANGE UP (ref 6–8.3)
PROT SERPL-MCNC: 7 G/DL — SIGNIFICANT CHANGE UP (ref 6–8.3)
PROT SERPL-MCNC: 7.1 G/DL — SIGNIFICANT CHANGE UP (ref 6–8.3)
PROT SERPL-MCNC: 7.1 G/DL — SIGNIFICANT CHANGE UP (ref 6–8.3)
PROT SERPL-MCNC: 7.2 G/DL — SIGNIFICANT CHANGE UP (ref 6–8.3)
PROT SERPL-MCNC: 7.2 G/DL — SIGNIFICANT CHANGE UP (ref 6–8.3)
PROT SERPL-MCNC: 7.3 G/DL — SIGNIFICANT CHANGE UP (ref 6–8.3)
PROT SERPL-MCNC: 7.4 G/DL — SIGNIFICANT CHANGE UP (ref 6–8.3)
PROT SERPL-MCNC: 7.4 G/DL — SIGNIFICANT CHANGE UP (ref 6–8.3)
PROT SERPL-MCNC: 7.7 G/DL — SIGNIFICANT CHANGE UP (ref 6–8.3)
PROT SERPL-MCNC: 7.8 G/DL — SIGNIFICANT CHANGE UP (ref 6–8.3)
PROT SERPL-MCNC: 7.9 G/DL — SIGNIFICANT CHANGE UP (ref 6–8.3)
PROT SERPL-MCNC: 8.4 G/DL — HIGH (ref 6–8.3)
PROT UR-MCNC: 100 — SIGNIFICANT CHANGE UP
PROT UR-MCNC: ABNORMAL
PROTHROM AB SERPL-ACNC: 11.1 SEC — SIGNIFICANT CHANGE UP (ref 10–12.9)
PROTHROM AB SERPL-ACNC: 11.1 SEC — SIGNIFICANT CHANGE UP (ref 10–12.9)
PROTHROM AB SERPL-ACNC: 11.2 SEC — SIGNIFICANT CHANGE UP (ref 10–12.9)
PROTHROM AB SERPL-ACNC: 11.2 SEC — SIGNIFICANT CHANGE UP (ref 10–12.9)
PROTHROM AB SERPL-ACNC: 11.3 SEC — SIGNIFICANT CHANGE UP (ref 10–12.9)
PROTHROM AB SERPL-ACNC: 11.5 SEC — SIGNIFICANT CHANGE UP (ref 10–12.9)
PROTHROM AB SERPL-ACNC: 11.6 SEC — SIGNIFICANT CHANGE UP (ref 10–12.9)
PROTHROM AB SERPL-ACNC: 11.8 SEC — SIGNIFICANT CHANGE UP (ref 10–12.9)
PROTHROM AB SERPL-ACNC: 12.1 SEC — SIGNIFICANT CHANGE UP (ref 10–12.9)
PROTHROM AB SERPL-ACNC: 12.1 SEC — SIGNIFICANT CHANGE UP (ref 10–12.9)
PROTHROM AB SERPL-ACNC: 12.4 SEC — SIGNIFICANT CHANGE UP (ref 10–12.9)
PROTHROM AB SERPL-ACNC: 12.5 SEC — SIGNIFICANT CHANGE UP (ref 10–12.9)
PROTHROM AB SERPL-ACNC: 12.6 SEC — SIGNIFICANT CHANGE UP (ref 10–12.9)
PROTHROM AB SERPL-ACNC: 13.2 SEC — HIGH (ref 10–12.9)
PROTHROM AB SERPL-ACNC: 15 SEC — HIGH (ref 10–12.9)
PROTHROM AB SERPL-ACNC: 16.4 SEC — HIGH (ref 10–12.9)
PROTHROM AB SERPL-ACNC: 29.3 SEC — HIGH (ref 10–12.9)
PROTHROM AB SERPL-ACNC: 37 SEC — HIGH (ref 10–12.9)
PROTHROM AB SERPL-ACNC: 47.8 SEC — HIGH (ref 10–12.9)
QUANT TB PLUS MITOGEN MINUS NIL: 0.24 IU/ML — SIGNIFICANT CHANGE UP
RBC # BLD: 2.57 M/UL — LOW (ref 4.2–5.8)
RBC # BLD: 2.59 M/UL — LOW (ref 4.2–5.8)
RBC # BLD: 2.8 M/UL — LOW (ref 4.2–5.8)
RBC # BLD: 2.82 M/UL — LOW (ref 4.2–5.8)
RBC # BLD: 2.84 M/UL — LOW (ref 4.2–5.8)
RBC # BLD: 2.87 M/UL — LOW (ref 4.2–5.8)
RBC # BLD: 2.88 M/UL — LOW (ref 4.2–5.8)
RBC # BLD: 2.92 M/UL — LOW (ref 4.2–5.8)
RBC # BLD: 2.97 M/UL — LOW (ref 4.2–5.8)
RBC # BLD: 3.04 M/UL — LOW (ref 4.2–5.8)
RBC # BLD: 3.06 M/UL — LOW (ref 4.2–5.8)
RBC # BLD: 3.06 M/UL — LOW (ref 4.2–5.8)
RBC # BLD: 3.09 M/UL — LOW (ref 4.2–5.8)
RBC # BLD: 3.26 M/UL — LOW (ref 4.2–5.8)
RBC # BLD: 3.29 M/UL — LOW (ref 4.2–5.8)
RBC # BLD: 3.32 M/UL — LOW (ref 4.2–5.8)
RBC # BLD: 3.34 M/UL — LOW (ref 4.2–5.8)
RBC # BLD: 3.36 M/UL — LOW (ref 4.2–5.8)
RBC # BLD: 3.5 M/UL — LOW (ref 4.2–5.8)
RBC # BLD: 3.64 M/UL — LOW (ref 4.2–5.8)
RBC # BLD: 3.65 M/UL — LOW (ref 4.2–5.8)
RBC # BLD: 3.8 M/UL — LOW (ref 4.2–5.8)
RBC # BLD: 4.01 M/UL — LOW (ref 4.2–5.8)
RBC # BLD: 4.01 M/UL — LOW (ref 4.2–5.8)
RBC # BLD: 4.25 M/UL — SIGNIFICANT CHANGE UP (ref 4.2–5.8)
RBC # BLD: 4.3 M/UL — SIGNIFICANT CHANGE UP (ref 4.2–5.8)
RBC # BLD: 4.3 M/UL — SIGNIFICANT CHANGE UP (ref 4.2–5.8)
RBC # BLD: 4.38 M/UL — SIGNIFICANT CHANGE UP (ref 4.2–5.8)
RBC # BLD: 4.41 M/UL — SIGNIFICANT CHANGE UP (ref 4.2–5.8)
RBC # BLD: 4.45 M/UL — SIGNIFICANT CHANGE UP (ref 4.2–5.8)
RBC # BLD: 4.59 M/UL — SIGNIFICANT CHANGE UP (ref 4.2–5.8)
RBC # BLD: 4.64 M/UL — SIGNIFICANT CHANGE UP (ref 4.2–5.8)
RBC # BLD: 4.69 M/UL — SIGNIFICANT CHANGE UP (ref 4.2–5.8)
RBC # BLD: 4.78 M/UL — SIGNIFICANT CHANGE UP (ref 4.2–5.8)
RBC # BLD: 4.89 M/UL — SIGNIFICANT CHANGE UP (ref 4.2–5.8)
RBC # BLD: 4.96 M/UL — SIGNIFICANT CHANGE UP (ref 4.2–5.8)
RBC # BLD: 4.97 M/UL — SIGNIFICANT CHANGE UP (ref 4.2–5.8)
RBC # BLD: 5 M/UL — SIGNIFICANT CHANGE UP (ref 4.2–5.8)
RBC # BLD: 5.01 M/UL — SIGNIFICANT CHANGE UP (ref 4.2–5.8)
RBC # BLD: 5.01 M/UL — SIGNIFICANT CHANGE UP (ref 4.2–5.8)
RBC # BLD: 5.04 M/UL — SIGNIFICANT CHANGE UP (ref 4.2–5.8)
RBC # BLD: 5.13 M/UL — SIGNIFICANT CHANGE UP (ref 4.2–5.8)
RBC # BLD: 5.22 M/UL — SIGNIFICANT CHANGE UP (ref 4.2–5.8)
RBC # BLD: 5.33 M/UL — SIGNIFICANT CHANGE UP (ref 4.2–5.8)
RBC # BLD: 5.42 M/UL — SIGNIFICANT CHANGE UP (ref 4.2–5.8)
RBC # BLD: 5.56 M/UL — SIGNIFICANT CHANGE UP (ref 4.2–5.8)
RBC # BLD: 5.58 M/UL — SIGNIFICANT CHANGE UP (ref 4.2–5.8)
RBC # BLD: 5.59 M/UL — SIGNIFICANT CHANGE UP (ref 4.2–5.8)
RBC # BLD: 5.62 M/UL — SIGNIFICANT CHANGE UP (ref 4.2–5.8)
RBC # BLD: 5.64 M/UL — SIGNIFICANT CHANGE UP (ref 4.2–5.8)
RBC # BLD: 5.65 M/UL — SIGNIFICANT CHANGE UP (ref 4.2–5.8)
RBC # BLD: 5.67 M/UL — SIGNIFICANT CHANGE UP (ref 4.2–5.8)
RBC # BLD: 5.72 M/UL — SIGNIFICANT CHANGE UP (ref 4.2–5.8)
RBC # BLD: 5.85 M/UL — HIGH (ref 4.2–5.8)
RBC # BLD: 5.87 M/UL — HIGH (ref 4.2–5.8)
RBC # BLD: 6.02 M/UL — HIGH (ref 4.2–5.8)
RBC # FLD: 12.6 % — SIGNIFICANT CHANGE UP (ref 10.3–14.5)
RBC # FLD: 12.9 % — SIGNIFICANT CHANGE UP (ref 10.3–14.5)
RBC # FLD: 13 % — SIGNIFICANT CHANGE UP (ref 10.3–14.5)
RBC # FLD: 13.1 % — SIGNIFICANT CHANGE UP (ref 10.3–14.5)
RBC # FLD: 13.2 % — SIGNIFICANT CHANGE UP (ref 10.3–14.5)
RBC # FLD: 13.4 % — SIGNIFICANT CHANGE UP (ref 10.3–14.5)
RBC # FLD: 13.5 % — SIGNIFICANT CHANGE UP (ref 10.3–14.5)
RBC # FLD: 13.6 % — SIGNIFICANT CHANGE UP (ref 10.3–14.5)
RBC # FLD: 13.7 % — SIGNIFICANT CHANGE UP (ref 10.3–14.5)
RBC # FLD: 13.7 % — SIGNIFICANT CHANGE UP (ref 10.3–14.5)
RBC # FLD: 13.8 % — SIGNIFICANT CHANGE UP (ref 10.3–14.5)
RBC # FLD: 13.9 % — SIGNIFICANT CHANGE UP (ref 10.3–14.5)
RBC # FLD: 14 % — SIGNIFICANT CHANGE UP (ref 10.3–14.5)
RBC # FLD: 14.1 % — SIGNIFICANT CHANGE UP (ref 10.3–14.5)
RBC # FLD: 14.2 % — SIGNIFICANT CHANGE UP (ref 10.3–14.5)
RBC # FLD: 14.2 % — SIGNIFICANT CHANGE UP (ref 10.3–14.5)
RBC # FLD: 14.3 % — SIGNIFICANT CHANGE UP (ref 10.3–14.5)
RBC # FLD: 14.3 % — SIGNIFICANT CHANGE UP (ref 10.3–14.5)
RBC # FLD: 14.4 % — SIGNIFICANT CHANGE UP (ref 10.3–14.5)
RBC # FLD: 14.5 % — SIGNIFICANT CHANGE UP (ref 10.3–14.5)
RBC # FLD: 14.5 % — SIGNIFICANT CHANGE UP (ref 10.3–14.5)
RBC # FLD: 14.6 % — HIGH (ref 10.3–14.5)
RBC # FLD: 14.6 % — HIGH (ref 10.3–14.5)
RBC # FLD: 14.7 % — HIGH (ref 10.3–14.5)
RBC # FLD: 14.8 % — HIGH (ref 10.3–14.5)
RBC # FLD: 14.8 % — HIGH (ref 10.3–14.5)
RBC # FLD: 15 % — HIGH (ref 10.3–14.5)
RBC # FLD: 15.1 % — HIGH (ref 10.3–14.5)
RBC # FLD: 15.6 % — HIGH (ref 10.3–14.5)
RBC # FLD: 15.7 % — HIGH (ref 10.3–14.5)
RBC # FLD: 15.8 % — HIGH (ref 10.3–14.5)
RBC # FLD: 16.1 % — HIGH (ref 10.3–14.5)
RBC # FLD: 17.5 % — HIGH (ref 10.3–14.5)
RBC # FLD: 17.7 % — HIGH (ref 10.3–14.5)
RBC # FLD: 17.7 % — HIGH (ref 10.3–14.5)
RBC # FLD: 18 % — HIGH (ref 10.3–14.5)
RBC # FLD: 18.5 % — HIGH (ref 10.3–14.5)
RBC # FLD: 18.5 % — HIGH (ref 10.3–14.5)
RBC # FLD: 18.7 % — HIGH (ref 10.3–14.5)
RBC # FLD: 18.8 % — HIGH (ref 10.3–14.5)
RBC # FLD: 19.5 % — HIGH (ref 10.3–14.5)
RBC BLD AUTO: NORMAL — SIGNIFICANT CHANGE UP
RBC BLD AUTO: NORMAL — SIGNIFICANT CHANGE UP
RBC CASTS # UR COMP ASSIST: 1 /HPF — SIGNIFICANT CHANGE UP (ref 0–4)
RBC CASTS # UR COMP ASSIST: 2 /HPF — SIGNIFICANT CHANGE UP (ref 0–4)
RBC CASTS # UR COMP ASSIST: 2 /HPF — SIGNIFICANT CHANGE UP (ref 0–4)
RBC CASTS # UR COMP ASSIST: 3 /HPF — SIGNIFICANT CHANGE UP (ref 0–4)
RBC CASTS # UR COMP ASSIST: 3 /HPF — SIGNIFICANT CHANGE UP (ref 0–4)
RBC CASTS # UR COMP ASSIST: 5 /HPF — HIGH (ref 0–4)
RH IG SCN BLD-IMP: POSITIVE — SIGNIFICANT CHANGE UP
S AUREUS DNA NOSE QL NAA+PROBE: SIGNIFICANT CHANGE UP
SAO2 % BLDA: 90 % — LOW (ref 92–96)
SAO2 % BLDA: 91 % — LOW (ref 92–96)
SAO2 % BLDA: 93 % — SIGNIFICANT CHANGE UP (ref 92–96)
SAO2 % BLDA: 93 % — SIGNIFICANT CHANGE UP (ref 92–96)
SAO2 % BLDA: 95 % — SIGNIFICANT CHANGE UP (ref 92–96)
SAO2 % BLDA: 96 % — SIGNIFICANT CHANGE UP (ref 92–96)
SAO2 % BLDA: 96 % — SIGNIFICANT CHANGE UP (ref 92–96)
SARS-COV-2 RNA SPEC QL NAA+PROBE: DETECTED
SARS-COV-2 RNA SPEC QL NAA+PROBE: SIGNIFICANT CHANGE UP
SODIUM SERPL-SCNC: 133 MMOL/L — LOW (ref 135–145)
SODIUM SERPL-SCNC: 134 MMOL/L — LOW (ref 135–145)
SODIUM SERPL-SCNC: 134 MMOL/L — LOW (ref 135–145)
SODIUM SERPL-SCNC: 135 MMOL/L — SIGNIFICANT CHANGE UP (ref 135–145)
SODIUM SERPL-SCNC: 136 MMOL/L — SIGNIFICANT CHANGE UP (ref 135–145)
SODIUM SERPL-SCNC: 137 MMOL/L — SIGNIFICANT CHANGE UP (ref 135–145)
SODIUM SERPL-SCNC: 138 MMOL/L — SIGNIFICANT CHANGE UP (ref 135–145)
SODIUM SERPL-SCNC: 139 MMOL/L — SIGNIFICANT CHANGE UP (ref 135–145)
SODIUM SERPL-SCNC: 140 MMOL/L — SIGNIFICANT CHANGE UP (ref 135–145)
SODIUM SERPL-SCNC: 141 MMOL/L — SIGNIFICANT CHANGE UP (ref 135–145)
SODIUM SERPL-SCNC: 142 MMOL/L — SIGNIFICANT CHANGE UP (ref 135–145)
SODIUM SERPL-SCNC: 143 MMOL/L — SIGNIFICANT CHANGE UP (ref 135–145)
SODIUM SERPL-SCNC: 144 MMOL/L — SIGNIFICANT CHANGE UP (ref 135–145)
SODIUM SERPL-SCNC: 145 MMOL/L — SIGNIFICANT CHANGE UP (ref 135–145)
SODIUM SERPL-SCNC: 147 MMOL/L — HIGH (ref 135–145)
SODIUM SERPL-SCNC: 147 MMOL/L — HIGH (ref 135–145)
SODIUM SERPL-SCNC: 148 MMOL/L — HIGH (ref 135–145)
SODIUM SERPL-SCNC: 150 MMOL/L — HIGH (ref 135–145)
SODIUM SERPL-SCNC: 150 MMOL/L — HIGH (ref 135–145)
SP GR SPEC: 1.01 — SIGNIFICANT CHANGE UP (ref 1.01–1.02)
SP GR SPEC: 1.02 — SIGNIFICANT CHANGE UP (ref 1.01–1.02)
SP GR SPEC: 1.03 — HIGH (ref 1.01–1.02)
SP GR SPEC: 1.03 — HIGH (ref 1.01–1.02)
SPECIMEN SOURCE: SIGNIFICANT CHANGE UP
TRIGL SERPL-MCNC: 174 MG/DL — HIGH (ref 10–149)
TRIGL SERPL-MCNC: 214 MG/DL — HIGH (ref 10–149)
TRIGL SERPL-MCNC: 226 MG/DL — HIGH (ref 10–149)
TRIGL SERPL-MCNC: 274 MG/DL — HIGH (ref 10–149)
TRIGL SERPL-MCNC: 286 MG/DL — HIGH (ref 10–149)
TRIGL SERPL-MCNC: 287 MG/DL — HIGH (ref 10–149)
TRIGL SERPL-MCNC: 325 MG/DL — HIGH (ref 10–149)
TRIGL SERPL-MCNC: 329 MG/DL — HIGH (ref 10–149)
TRIGL SERPL-MCNC: 355 MG/DL — HIGH (ref 10–149)
TRIGL SERPL-MCNC: 385 MG/DL — HIGH (ref 10–149)
TRIGL SERPL-MCNC: 434 MG/DL — HIGH (ref 10–149)
TRIGL SERPL-MCNC: 445 MG/DL — HIGH (ref 10–149)
TRIGL SERPL-MCNC: 460 MG/DL — HIGH (ref 10–149)
TRIGL SERPL-MCNC: 467 MG/DL — HIGH (ref 10–149)
TRIGL SERPL-MCNC: 470 MG/DL — HIGH (ref 10–149)
TRIGL SERPL-MCNC: 475 MG/DL — HIGH (ref 10–149)
TRIGL SERPL-MCNC: 584 MG/DL — HIGH (ref 10–149)
TROPONIN T, HIGH SENSITIVITY RESULT: 11 NG/L — SIGNIFICANT CHANGE UP (ref 0–51)
TROPONIN T, HIGH SENSITIVITY RESULT: 12 NG/L — SIGNIFICANT CHANGE UP (ref 0–51)
TROPONIN T, HIGH SENSITIVITY RESULT: 14 NG/L — SIGNIFICANT CHANGE UP (ref 0–51)
TROPONIN T, HIGH SENSITIVITY RESULT: 14 NG/L — SIGNIFICANT CHANGE UP (ref 0–51)
TROPONIN T, HIGH SENSITIVITY RESULT: 19 NG/L — SIGNIFICANT CHANGE UP (ref 0–51)
TROPONIN T, HIGH SENSITIVITY RESULT: 21 NG/L — SIGNIFICANT CHANGE UP (ref 0–51)
TROPONIN T, HIGH SENSITIVITY RESULT: 23 NG/L — SIGNIFICANT CHANGE UP (ref 0–51)
TROPONIN T, HIGH SENSITIVITY RESULT: 25 NG/L — SIGNIFICANT CHANGE UP (ref 0–51)
TROPONIN T, HIGH SENSITIVITY RESULT: 25 NG/L — SIGNIFICANT CHANGE UP (ref 0–51)
TROPONIN T, HIGH SENSITIVITY RESULT: 7 NG/L — SIGNIFICANT CHANGE UP (ref 0–51)
TROPONIN T, HIGH SENSITIVITY RESULT: 8 NG/L — SIGNIFICANT CHANGE UP (ref 0–51)
TROPONIN T, HIGH SENSITIVITY RESULT: 91 NG/L — HIGH (ref 0–51)
TROPONIN T, HIGH SENSITIVITY RESULT: <6 NG/L — SIGNIFICANT CHANGE UP (ref 0–51)
UROBILINOGEN FLD QL: NEGATIVE — SIGNIFICANT CHANGE UP
VANCOMYCIN FLD-MCNC: 17 UG/ML — SIGNIFICANT CHANGE UP
VANCOMYCIN FLD-MCNC: 19.1 UG/ML — SIGNIFICANT CHANGE UP
VANCOMYCIN TROUGH SERPL-MCNC: 13.3 UG/ML — SIGNIFICANT CHANGE UP (ref 10–20)
VANCOMYCIN TROUGH SERPL-MCNC: 14.2 UG/ML — SIGNIFICANT CHANGE UP (ref 10–20)
VANCOMYCIN TROUGH SERPL-MCNC: 16.8 UG/ML — SIGNIFICANT CHANGE UP (ref 10–20)
VANCOMYCIN TROUGH SERPL-MCNC: 21.3 UG/ML — HIGH (ref 10–20)
VANCOMYCIN TROUGH SERPL-MCNC: 21.5 UG/ML — HIGH (ref 10–20)
VANCOMYCIN TROUGH SERPL-MCNC: 7.9 UG/ML — LOW (ref 10–20)
VANCOMYCIN TROUGH SERPL-MCNC: 8.8 UG/ML — LOW (ref 10–20)
VANCOMYCIN TROUGH SERPL-MCNC: 8.9 UG/ML — LOW (ref 10–20)
VARIANT LYMPHS # BLD: 1.7 % — SIGNIFICANT CHANGE UP (ref 0–6)
VARIANT LYMPHS # BLD: 1.8 % — SIGNIFICANT CHANGE UP (ref 0–6)
WBC # BLD: 10.12 K/UL — SIGNIFICANT CHANGE UP (ref 3.8–10.5)
WBC # BLD: 10.9 K/UL — HIGH (ref 3.8–10.5)
WBC # BLD: 10.96 K/UL — HIGH (ref 3.8–10.5)
WBC # BLD: 11.24 K/UL — HIGH (ref 3.8–10.5)
WBC # BLD: 11.56 K/UL — HIGH (ref 3.8–10.5)
WBC # BLD: 12.02 K/UL — HIGH (ref 3.8–10.5)
WBC # BLD: 12.21 K/UL — HIGH (ref 3.8–10.5)
WBC # BLD: 12.75 K/UL — HIGH (ref 3.8–10.5)
WBC # BLD: 12.81 K/UL — HIGH (ref 3.8–10.5)
WBC # BLD: 12.99 K/UL — HIGH (ref 3.8–10.5)
WBC # BLD: 13.18 K/UL — HIGH (ref 3.8–10.5)
WBC # BLD: 13.48 K/UL — HIGH (ref 3.8–10.5)
WBC # BLD: 13.6 K/UL — HIGH (ref 3.8–10.5)
WBC # BLD: 13.76 K/UL — HIGH (ref 3.8–10.5)
WBC # BLD: 13.81 K/UL — HIGH (ref 3.8–10.5)
WBC # BLD: 14.45 K/UL — HIGH (ref 3.8–10.5)
WBC # BLD: 14.7 K/UL — HIGH (ref 3.8–10.5)
WBC # BLD: 14.75 K/UL — HIGH (ref 3.8–10.5)
WBC # BLD: 14.78 K/UL — HIGH (ref 3.8–10.5)
WBC # BLD: 14.89 K/UL — HIGH (ref 3.8–10.5)
WBC # BLD: 14.97 K/UL — HIGH (ref 3.8–10.5)
WBC # BLD: 15.09 K/UL — HIGH (ref 3.8–10.5)
WBC # BLD: 15.16 K/UL — HIGH (ref 3.8–10.5)
WBC # BLD: 15.72 K/UL — HIGH (ref 3.8–10.5)
WBC # BLD: 16.01 K/UL — HIGH (ref 3.8–10.5)
WBC # BLD: 16.47 K/UL — HIGH (ref 3.8–10.5)
WBC # BLD: 16.67 K/UL — HIGH (ref 3.8–10.5)
WBC # BLD: 16.79 K/UL — HIGH (ref 3.8–10.5)
WBC # BLD: 17.18 K/UL — HIGH (ref 3.8–10.5)
WBC # BLD: 18.25 K/UL — HIGH (ref 3.8–10.5)
WBC # BLD: 18.46 K/UL — HIGH (ref 3.8–10.5)
WBC # BLD: 19.53 K/UL — HIGH (ref 3.8–10.5)
WBC # BLD: 19.8 K/UL — HIGH (ref 3.8–10.5)
WBC # BLD: 20.67 K/UL — HIGH (ref 3.8–10.5)
WBC # BLD: 20.71 K/UL — HIGH (ref 3.8–10.5)
WBC # BLD: 21.54 K/UL — HIGH (ref 3.8–10.5)
WBC # BLD: 21.69 K/UL — HIGH (ref 3.8–10.5)
WBC # BLD: 21.86 K/UL — HIGH (ref 3.8–10.5)
WBC # BLD: 22.4 K/UL — HIGH (ref 3.8–10.5)
WBC # BLD: 23.25 K/UL — HIGH (ref 3.8–10.5)
WBC # BLD: 24.28 K/UL — HIGH (ref 3.8–10.5)
WBC # BLD: 26.66 K/UL — HIGH (ref 3.8–10.5)
WBC # BLD: 29.43 K/UL — HIGH (ref 3.8–10.5)
WBC # BLD: 30.52 K/UL — HIGH (ref 3.8–10.5)
WBC # BLD: 31.63 K/UL — HIGH (ref 3.8–10.5)
WBC # BLD: 31.75 K/UL — HIGH (ref 3.8–10.5)
WBC # BLD: 32.73 K/UL — HIGH (ref 3.8–10.5)
WBC # BLD: 37.53 K/UL — HIGH (ref 3.8–10.5)
WBC # BLD: 38.4 K/UL — HIGH (ref 3.8–10.5)
WBC # BLD: 42.82 K/UL — CRITICAL HIGH (ref 3.8–10.5)
WBC # BLD: 43.27 K/UL — CRITICAL HIGH (ref 3.8–10.5)
WBC # BLD: 43.97 K/UL — CRITICAL HIGH (ref 3.8–10.5)
WBC # BLD: 45.74 K/UL — CRITICAL HIGH (ref 3.8–10.5)
WBC # BLD: 50.87 K/UL — CRITICAL HIGH (ref 3.8–10.5)
WBC # BLD: 9.83 K/UL — SIGNIFICANT CHANGE UP (ref 3.8–10.5)
WBC # BLD: 9.93 K/UL — SIGNIFICANT CHANGE UP (ref 3.8–10.5)
WBC # FLD AUTO: 10.12 K/UL — SIGNIFICANT CHANGE UP (ref 3.8–10.5)
WBC # FLD AUTO: 10.9 K/UL — HIGH (ref 3.8–10.5)
WBC # FLD AUTO: 10.96 K/UL — HIGH (ref 3.8–10.5)
WBC # FLD AUTO: 11.24 K/UL — HIGH (ref 3.8–10.5)
WBC # FLD AUTO: 11.56 K/UL — HIGH (ref 3.8–10.5)
WBC # FLD AUTO: 12.02 K/UL — HIGH (ref 3.8–10.5)
WBC # FLD AUTO: 12.21 K/UL — HIGH (ref 3.8–10.5)
WBC # FLD AUTO: 12.75 K/UL — HIGH (ref 3.8–10.5)
WBC # FLD AUTO: 12.81 K/UL — HIGH (ref 3.8–10.5)
WBC # FLD AUTO: 12.99 K/UL — HIGH (ref 3.8–10.5)
WBC # FLD AUTO: 13.18 K/UL — HIGH (ref 3.8–10.5)
WBC # FLD AUTO: 13.48 K/UL — HIGH (ref 3.8–10.5)
WBC # FLD AUTO: 13.6 K/UL — HIGH (ref 3.8–10.5)
WBC # FLD AUTO: 13.76 K/UL — HIGH (ref 3.8–10.5)
WBC # FLD AUTO: 13.81 K/UL — HIGH (ref 3.8–10.5)
WBC # FLD AUTO: 14.45 K/UL — HIGH (ref 3.8–10.5)
WBC # FLD AUTO: 14.7 K/UL — HIGH (ref 3.8–10.5)
WBC # FLD AUTO: 14.75 K/UL — HIGH (ref 3.8–10.5)
WBC # FLD AUTO: 14.78 K/UL — HIGH (ref 3.8–10.5)
WBC # FLD AUTO: 14.89 K/UL — HIGH (ref 3.8–10.5)
WBC # FLD AUTO: 14.97 K/UL — HIGH (ref 3.8–10.5)
WBC # FLD AUTO: 15.09 K/UL — HIGH (ref 3.8–10.5)
WBC # FLD AUTO: 15.16 K/UL — HIGH (ref 3.8–10.5)
WBC # FLD AUTO: 15.72 K/UL — HIGH (ref 3.8–10.5)
WBC # FLD AUTO: 16.01 K/UL — HIGH (ref 3.8–10.5)
WBC # FLD AUTO: 16.47 K/UL — HIGH (ref 3.8–10.5)
WBC # FLD AUTO: 16.67 K/UL — HIGH (ref 3.8–10.5)
WBC # FLD AUTO: 16.79 K/UL — HIGH (ref 3.8–10.5)
WBC # FLD AUTO: 17.18 K/UL — HIGH (ref 3.8–10.5)
WBC # FLD AUTO: 18.25 K/UL — HIGH (ref 3.8–10.5)
WBC # FLD AUTO: 18.46 K/UL — HIGH (ref 3.8–10.5)
WBC # FLD AUTO: 19.53 K/UL — HIGH (ref 3.8–10.5)
WBC # FLD AUTO: 19.8 K/UL — HIGH (ref 3.8–10.5)
WBC # FLD AUTO: 20.67 K/UL — HIGH (ref 3.8–10.5)
WBC # FLD AUTO: 20.71 K/UL — HIGH (ref 3.8–10.5)
WBC # FLD AUTO: 21.54 K/UL — HIGH (ref 3.8–10.5)
WBC # FLD AUTO: 21.69 K/UL — HIGH (ref 3.8–10.5)
WBC # FLD AUTO: 21.86 K/UL — HIGH (ref 3.8–10.5)
WBC # FLD AUTO: 22.4 K/UL — HIGH (ref 3.8–10.5)
WBC # FLD AUTO: 23.25 K/UL — HIGH (ref 3.8–10.5)
WBC # FLD AUTO: 24.28 K/UL — HIGH (ref 3.8–10.5)
WBC # FLD AUTO: 26.66 K/UL — HIGH (ref 3.8–10.5)
WBC # FLD AUTO: 29.43 K/UL — HIGH (ref 3.8–10.5)
WBC # FLD AUTO: 30.52 K/UL — HIGH (ref 3.8–10.5)
WBC # FLD AUTO: 31.63 K/UL — HIGH (ref 3.8–10.5)
WBC # FLD AUTO: 31.75 K/UL — HIGH (ref 3.8–10.5)
WBC # FLD AUTO: 32.73 K/UL — HIGH (ref 3.8–10.5)
WBC # FLD AUTO: 37.53 K/UL — HIGH (ref 3.8–10.5)
WBC # FLD AUTO: 38.4 K/UL — HIGH (ref 3.8–10.5)
WBC # FLD AUTO: 42.82 K/UL — CRITICAL HIGH (ref 3.8–10.5)
WBC # FLD AUTO: 43.27 K/UL — CRITICAL HIGH (ref 3.8–10.5)
WBC # FLD AUTO: 43.97 K/UL — CRITICAL HIGH (ref 3.8–10.5)
WBC # FLD AUTO: 45.74 K/UL — CRITICAL HIGH (ref 3.8–10.5)
WBC # FLD AUTO: 50.87 K/UL — CRITICAL HIGH (ref 3.8–10.5)
WBC # FLD AUTO: 9.83 K/UL — SIGNIFICANT CHANGE UP (ref 3.8–10.5)
WBC # FLD AUTO: 9.93 K/UL — SIGNIFICANT CHANGE UP (ref 3.8–10.5)
WBC UR QL: 1 /HPF — SIGNIFICANT CHANGE UP (ref 0–5)
WBC UR QL: 2 /HPF — SIGNIFICANT CHANGE UP (ref 0–5)
WBC UR QL: 338 /HPF — HIGH (ref 0–5)
WBC UR QL: 4 /HPF — SIGNIFICANT CHANGE UP (ref 0–5)
WBC UR QL: 5 /HPF — SIGNIFICANT CHANGE UP (ref 0–5)
WBC UR QL: >50

## 2020-01-01 PROCEDURE — 99292 CRITICAL CARE ADDL 30 MIN: CPT

## 2020-01-01 PROCEDURE — 71045 X-RAY EXAM CHEST 1 VIEW: CPT | Mod: 26

## 2020-01-01 PROCEDURE — 99223 1ST HOSP IP/OBS HIGH 75: CPT

## 2020-01-01 PROCEDURE — 99232 SBSQ HOSP IP/OBS MODERATE 35: CPT

## 2020-01-01 PROCEDURE — 99497 ADVNCD CARE PLAN 30 MIN: CPT

## 2020-01-01 PROCEDURE — 99291 CRITICAL CARE FIRST HOUR: CPT

## 2020-01-01 PROCEDURE — 82565 ASSAY OF CREATININE: CPT

## 2020-01-01 PROCEDURE — 99233 SBSQ HOSP IP/OBS HIGH 50: CPT

## 2020-01-01 PROCEDURE — 87040 BLOOD CULTURE FOR BACTERIA: CPT

## 2020-01-01 PROCEDURE — 99292 CRITICAL CARE ADDL 30 MIN: CPT | Mod: 25

## 2020-01-01 PROCEDURE — 85385 FIBRINOGEN ANTIGEN: CPT

## 2020-01-01 PROCEDURE — 99291 CRITICAL CARE FIRST HOUR: CPT | Mod: 25

## 2020-01-01 PROCEDURE — 80184 ASSAY OF PHENOBARBITAL: CPT

## 2020-01-01 PROCEDURE — 85610 PROTHROMBIN TIME: CPT

## 2020-01-01 PROCEDURE — 93010 ELECTROCARDIOGRAM REPORT: CPT

## 2020-01-01 PROCEDURE — 83605 ASSAY OF LACTIC ACID: CPT

## 2020-01-01 PROCEDURE — 71045 X-RAY EXAM CHEST 1 VIEW: CPT | Mod: 26,77

## 2020-01-01 PROCEDURE — 82550 ASSAY OF CK (CPK): CPT

## 2020-01-01 PROCEDURE — 80202 ASSAY OF VANCOMYCIN: CPT

## 2020-01-01 PROCEDURE — 74018 RADEX ABDOMEN 1 VIEW: CPT | Mod: 26

## 2020-01-01 PROCEDURE — 96374 THER/PROPH/DIAG INJ IV PUSH: CPT

## 2020-01-01 PROCEDURE — 82330 ASSAY OF CALCIUM: CPT

## 2020-01-01 PROCEDURE — 82947 ASSAY GLUCOSE BLOOD QUANT: CPT

## 2020-01-01 PROCEDURE — 87070 CULTURE OTHR SPECIMN AEROBIC: CPT

## 2020-01-01 PROCEDURE — 94003 VENT MGMT INPAT SUBQ DAY: CPT

## 2020-01-01 PROCEDURE — 84132 ASSAY OF SERUM POTASSIUM: CPT

## 2020-01-01 PROCEDURE — 83880 ASSAY OF NATRIURETIC PEPTIDE: CPT

## 2020-01-01 PROCEDURE — 84478 ASSAY OF TRIGLYCERIDES: CPT

## 2020-01-01 PROCEDURE — 36600 WITHDRAWAL OF ARTERIAL BLOOD: CPT

## 2020-01-01 PROCEDURE — 86480 TB TEST CELL IMMUN MEASURE: CPT

## 2020-01-01 PROCEDURE — 85027 COMPLETE CBC AUTOMATED: CPT

## 2020-01-01 PROCEDURE — 83735 ASSAY OF MAGNESIUM: CPT

## 2020-01-01 PROCEDURE — 99233 SBSQ HOSP IP/OBS HIGH 50: CPT | Mod: GC

## 2020-01-01 PROCEDURE — 87640 STAPH A DNA AMP PROBE: CPT

## 2020-01-01 PROCEDURE — 81001 URINALYSIS AUTO W/SCOPE: CPT

## 2020-01-01 PROCEDURE — 99222 1ST HOSP IP/OBS MODERATE 55: CPT

## 2020-01-01 PROCEDURE — 94002 VENT MGMT INPAT INIT DAY: CPT

## 2020-01-01 PROCEDURE — 74018 RADEX ABDOMEN 1 VIEW: CPT

## 2020-01-01 PROCEDURE — 85379 FIBRIN DEGRADATION QUANT: CPT

## 2020-01-01 PROCEDURE — 94640 AIRWAY INHALATION TREATMENT: CPT

## 2020-01-01 PROCEDURE — 99285 EMERGENCY DEPT VISIT HI MDM: CPT

## 2020-01-01 PROCEDURE — 87635 SARS-COV-2 COVID-19 AMP PRB: CPT

## 2020-01-01 PROCEDURE — 93010 ELECTROCARDIOGRAM REPORT: CPT | Mod: 76

## 2020-01-01 PROCEDURE — 85730 THROMBOPLASTIN TIME PARTIAL: CPT

## 2020-01-01 PROCEDURE — 86140 C-REACTIVE PROTEIN: CPT

## 2020-01-01 PROCEDURE — 71045 X-RAY EXAM CHEST 1 VIEW: CPT

## 2020-01-01 PROCEDURE — 84295 ASSAY OF SERUM SODIUM: CPT

## 2020-01-01 PROCEDURE — 80048 BASIC METABOLIC PNL TOTAL CA: CPT

## 2020-01-01 PROCEDURE — 86923 COMPATIBILITY TEST ELECTRIC: CPT

## 2020-01-01 PROCEDURE — 93306 TTE W/DOPPLER COMPLETE: CPT | Mod: 26

## 2020-01-01 PROCEDURE — 87150 DNA/RNA AMPLIFIED PROBE: CPT

## 2020-01-01 PROCEDURE — 36620 INSERTION CATHETER ARTERY: CPT

## 2020-01-01 PROCEDURE — 83615 LACTATE (LD) (LDH) ENZYME: CPT

## 2020-01-01 PROCEDURE — 93306 TTE W/DOPPLER COMPLETE: CPT

## 2020-01-01 PROCEDURE — 82962 GLUCOSE BLOOD TEST: CPT

## 2020-01-01 PROCEDURE — 84100 ASSAY OF PHOSPHORUS: CPT

## 2020-01-01 PROCEDURE — 80053 COMPREHEN METABOLIC PANEL: CPT

## 2020-01-01 PROCEDURE — 36556 INSERT NON-TUNNEL CV CATH: CPT

## 2020-01-01 PROCEDURE — 87449 NOS EACH ORGANISM AG IA: CPT

## 2020-01-01 PROCEDURE — 87086 URINE CULTURE/COLONY COUNT: CPT

## 2020-01-01 PROCEDURE — 87186 SC STD MICRODIL/AGAR DIL: CPT

## 2020-01-01 PROCEDURE — 85014 HEMATOCRIT: CPT

## 2020-01-01 PROCEDURE — 99232 SBSQ HOSP IP/OBS MODERATE 35: CPT | Mod: GC

## 2020-01-01 PROCEDURE — 99285 EMERGENCY DEPT VISIT HI MDM: CPT | Mod: 25

## 2020-01-01 PROCEDURE — 85652 RBC SED RATE AUTOMATED: CPT

## 2020-01-01 PROCEDURE — 36430 TRANSFUSION BLD/BLD COMPNT: CPT

## 2020-01-01 PROCEDURE — 86900 BLOOD TYPING SEROLOGIC ABO: CPT

## 2020-01-01 PROCEDURE — 82728 ASSAY OF FERRITIN: CPT

## 2020-01-01 PROCEDURE — 99254 IP/OBS CNSLTJ NEW/EST MOD 60: CPT

## 2020-01-01 PROCEDURE — 86850 RBC ANTIBODY SCREEN: CPT

## 2020-01-01 PROCEDURE — 84484 ASSAY OF TROPONIN QUANT: CPT

## 2020-01-01 PROCEDURE — 84145 PROCALCITONIN (PCT): CPT

## 2020-01-01 PROCEDURE — 82553 CREATINE MB FRACTION: CPT

## 2020-01-01 PROCEDURE — 82803 BLOOD GASES ANY COMBINATION: CPT

## 2020-01-01 PROCEDURE — P9016: CPT

## 2020-01-01 PROCEDURE — 82435 ASSAY OF BLOOD CHLORIDE: CPT

## 2020-01-01 PROCEDURE — 83036 HEMOGLOBIN GLYCOSYLATED A1C: CPT

## 2020-01-01 PROCEDURE — 87641 MR-STAPH DNA AMP PROBE: CPT

## 2020-01-01 PROCEDURE — 93005 ELECTROCARDIOGRAM TRACING: CPT

## 2020-01-01 PROCEDURE — 86901 BLOOD TYPING SEROLOGIC RH(D): CPT

## 2020-01-01 RX ORDER — SODIUM ZIRCONIUM CYCLOSILICATE 10 G/10G
10 POWDER, FOR SUSPENSION ORAL ONCE
Refills: 0 | Status: COMPLETED | OUTPATIENT
Start: 2020-01-01 | End: 2020-01-01

## 2020-01-01 RX ORDER — POTASSIUM CHLORIDE 20 MEQ
40 PACKET (EA) ORAL ONCE
Refills: 0 | Status: COMPLETED | OUTPATIENT
Start: 2020-01-01 | End: 2020-01-01

## 2020-01-01 RX ORDER — DIAZEPAM 5 MG
2 TABLET ORAL EVERY 8 HOURS
Refills: 0 | Status: DISCONTINUED | OUTPATIENT
Start: 2020-01-01 | End: 2020-01-01

## 2020-01-01 RX ORDER — VANCOMYCIN HCL 1 G
1000 VIAL (EA) INTRAVENOUS ONCE
Refills: 0 | Status: COMPLETED | OUTPATIENT
Start: 2020-01-01 | End: 2020-01-01

## 2020-01-01 RX ORDER — MAGNESIUM OXIDE 400 MG ORAL TABLET 241.3 MG
400 TABLET ORAL DAILY
Refills: 0 | Status: DISCONTINUED | OUTPATIENT
Start: 2020-01-01 | End: 2020-01-01

## 2020-01-01 RX ORDER — CEFTRIAXONE 500 MG/1
1000 INJECTION, POWDER, FOR SOLUTION INTRAMUSCULAR; INTRAVENOUS EVERY 24 HOURS
Refills: 0 | Status: DISCONTINUED | OUTPATIENT
Start: 2020-01-01 | End: 2020-01-01

## 2020-01-01 RX ORDER — HYDROXYCHLOROQUINE SULFATE 200 MG
400 TABLET ORAL EVERY 12 HOURS
Refills: 0 | Status: COMPLETED | OUTPATIENT
Start: 2020-01-01 | End: 2020-01-01

## 2020-01-01 RX ORDER — CHLORHEXIDINE GLUCONATE 213 G/1000ML
15 SOLUTION TOPICAL EVERY 12 HOURS
Refills: 0 | Status: DISCONTINUED | OUTPATIENT
Start: 2020-01-01 | End: 2020-01-01

## 2020-01-01 RX ORDER — VANCOMYCIN HCL 1 G
1500 VIAL (EA) INTRAVENOUS EVERY 12 HOURS
Refills: 0 | Status: DISCONTINUED | OUTPATIENT
Start: 2020-01-01 | End: 2020-01-01

## 2020-01-01 RX ORDER — FENTANYL CITRATE 50 UG/ML
5.01 INJECTION INTRAVENOUS
Qty: 5000 | Refills: 0 | Status: DISCONTINUED | OUTPATIENT
Start: 2020-01-01 | End: 2020-01-01

## 2020-01-01 RX ORDER — FLUCONAZOLE 150 MG/1
200 TABLET ORAL EVERY 24 HOURS
Refills: 0 | Status: DISCONTINUED | OUTPATIENT
Start: 2020-01-01 | End: 2020-01-01

## 2020-01-01 RX ORDER — ROCURONIUM BROMIDE 10 MG/ML
8 VIAL (ML) INTRAVENOUS
Qty: 500 | Refills: 0 | Status: DISCONTINUED | OUTPATIENT
Start: 2020-01-01 | End: 2020-01-01

## 2020-01-01 RX ORDER — FUROSEMIDE 40 MG
40 TABLET ORAL ONCE
Refills: 0 | Status: COMPLETED | OUTPATIENT
Start: 2020-01-01 | End: 2020-01-01

## 2020-01-01 RX ORDER — ACETAMINOPHEN 500 MG
1000 TABLET ORAL ONCE
Refills: 0 | Status: COMPLETED | OUTPATIENT
Start: 2020-01-01 | End: 2020-01-01

## 2020-01-01 RX ORDER — HYDROCORTISONE 20 MG
100 TABLET ORAL ONCE
Refills: 0 | Status: COMPLETED | OUTPATIENT
Start: 2020-01-01 | End: 2020-01-01

## 2020-01-01 RX ORDER — FENTANYL CITRATE 50 UG/ML
100 INJECTION INTRAVENOUS ONCE
Refills: 0 | Status: DISCONTINUED | OUTPATIENT
Start: 2020-01-01 | End: 2020-01-01

## 2020-01-01 RX ORDER — CHLORHEXIDINE GLUCONATE 213 G/1000ML
1 SOLUTION TOPICAL
Refills: 0 | Status: DISCONTINUED | OUTPATIENT
Start: 2020-01-01 | End: 2020-01-01

## 2020-01-01 RX ORDER — MIDAZOLAM HYDROCHLORIDE 1 MG/ML
0.02 INJECTION, SOLUTION INTRAMUSCULAR; INTRAVENOUS
Qty: 100 | Refills: 0 | Status: DISCONTINUED | OUTPATIENT
Start: 2020-01-01 | End: 2020-01-01

## 2020-01-01 RX ORDER — PROPOFOL 10 MG/ML
25 INJECTION, EMULSION INTRAVENOUS
Qty: 1000 | Refills: 0 | Status: DISCONTINUED | OUTPATIENT
Start: 2020-01-01 | End: 2020-01-01

## 2020-01-01 RX ORDER — CEFAZOLIN SODIUM 1 G
2000 VIAL (EA) INJECTION EVERY 8 HOURS
Refills: 0 | Status: DISCONTINUED | OUTPATIENT
Start: 2020-01-01 | End: 2020-01-01

## 2020-01-01 RX ORDER — MIDAZOLAM HYDROCHLORIDE 1 MG/ML
4 INJECTION, SOLUTION INTRAMUSCULAR; INTRAVENOUS ONCE
Refills: 0 | Status: DISCONTINUED | OUTPATIENT
Start: 2020-01-01 | End: 2020-01-01

## 2020-01-01 RX ORDER — MAGNESIUM SULFATE 500 MG/ML
1 VIAL (ML) INJECTION ONCE
Refills: 0 | Status: COMPLETED | OUTPATIENT
Start: 2020-01-01 | End: 2020-01-01

## 2020-01-01 RX ORDER — POTASSIUM CHLORIDE 20 MEQ
20 PACKET (EA) ORAL ONCE
Refills: 0 | Status: COMPLETED | OUTPATIENT
Start: 2020-01-01 | End: 2020-01-01

## 2020-01-01 RX ORDER — PANTOPRAZOLE SODIUM 20 MG/1
40 TABLET, DELAYED RELEASE ORAL
Refills: 0 | Status: DISCONTINUED | OUTPATIENT
Start: 2020-01-01 | End: 2020-01-01

## 2020-01-01 RX ORDER — POTASSIUM CHLORIDE 20 MEQ
40 PACKET (EA) ORAL
Refills: 0 | Status: COMPLETED | OUTPATIENT
Start: 2020-01-01 | End: 2020-01-01

## 2020-01-01 RX ORDER — MEROPENEM 1 G/30ML
1000 INJECTION INTRAVENOUS ONCE
Refills: 0 | Status: COMPLETED | OUTPATIENT
Start: 2020-01-01 | End: 2020-01-01

## 2020-01-01 RX ORDER — PHENOBARBITAL 60 MG
50 TABLET ORAL EVERY 6 HOURS
Refills: 0 | Status: DISCONTINUED | OUTPATIENT
Start: 2020-01-01 | End: 2020-01-01

## 2020-01-01 RX ORDER — DIAZEPAM 5 MG
5 TABLET ORAL ONCE
Refills: 0 | Status: DISCONTINUED | OUTPATIENT
Start: 2020-01-01 | End: 2020-01-01

## 2020-01-01 RX ORDER — FENTANYL CITRATE 50 UG/ML
100 INJECTION INTRAVENOUS EVERY 4 HOURS
Refills: 0 | Status: DISCONTINUED | OUTPATIENT
Start: 2020-01-01 | End: 2020-01-01

## 2020-01-01 RX ORDER — MIDAZOLAM HYDROCHLORIDE 1 MG/ML
2 INJECTION, SOLUTION INTRAMUSCULAR; INTRAVENOUS ONCE
Refills: 0 | Status: DISCONTINUED | OUTPATIENT
Start: 2020-01-01 | End: 2020-01-01

## 2020-01-01 RX ORDER — SODIUM,POTASSIUM PHOSPHATES 278-250MG
1 POWDER IN PACKET (EA) ORAL
Refills: 0 | Status: COMPLETED | OUTPATIENT
Start: 2020-01-01 | End: 2020-01-01

## 2020-01-01 RX ORDER — CALCIUM GLUCONATE 100 MG/ML
1 VIAL (ML) INTRAVENOUS ONCE
Refills: 0 | Status: DISCONTINUED | OUTPATIENT
Start: 2020-01-01 | End: 2020-01-01

## 2020-01-01 RX ORDER — FENTANYL CITRATE 50 UG/ML
5 INJECTION INTRAVENOUS
Qty: 5000 | Refills: 0 | Status: DISCONTINUED | OUTPATIENT
Start: 2020-01-01 | End: 2020-01-01

## 2020-01-01 RX ORDER — OLANZAPINE 15 MG/1
5 TABLET, FILM COATED ORAL ONCE
Refills: 0 | Status: COMPLETED | OUTPATIENT
Start: 2020-01-01 | End: 2020-01-01

## 2020-01-01 RX ORDER — CALCIUM GLUCONATE 100 MG/ML
1 VIAL (ML) INTRAVENOUS ONCE
Refills: 0 | Status: COMPLETED | OUTPATIENT
Start: 2020-01-01 | End: 2020-01-01

## 2020-01-01 RX ORDER — FAMOTIDINE 10 MG/ML
20 INJECTION INTRAVENOUS
Refills: 0 | Status: DISCONTINUED | OUTPATIENT
Start: 2020-01-01 | End: 2020-01-01

## 2020-01-01 RX ORDER — PHENYLEPHRINE HYDROCHLORIDE 10 MG/ML
0.1 INJECTION INTRAVENOUS
Qty: 160 | Refills: 0 | Status: DISCONTINUED | OUTPATIENT
Start: 2020-01-01 | End: 2020-01-01

## 2020-01-01 RX ORDER — ROCURONIUM BROMIDE 10 MG/ML
50 VIAL (ML) INTRAVENOUS ONCE
Refills: 0 | Status: COMPLETED | OUTPATIENT
Start: 2020-01-01 | End: 2020-01-01

## 2020-01-01 RX ORDER — CALCIUM GLUCONATE 100 MG/ML
2 VIAL (ML) INTRAVENOUS ONCE
Refills: 0 | Status: COMPLETED | OUTPATIENT
Start: 2020-01-01 | End: 2020-01-01

## 2020-01-01 RX ORDER — DIAZEPAM 5 MG
10 TABLET ORAL EVERY 8 HOURS
Refills: 0 | Status: DISCONTINUED | OUTPATIENT
Start: 2020-01-01 | End: 2020-01-01

## 2020-01-01 RX ORDER — MIDAZOLAM HYDROCHLORIDE 1 MG/ML
0.15 INJECTION, SOLUTION INTRAMUSCULAR; INTRAVENOUS
Qty: 100 | Refills: 0 | Status: DISCONTINUED | OUTPATIENT
Start: 2020-01-01 | End: 2020-01-01

## 2020-01-01 RX ORDER — CEFAZOLIN SODIUM 1 G
1000 VIAL (EA) INJECTION EVERY 8 HOURS
Refills: 0 | Status: DISCONTINUED | OUTPATIENT
Start: 2020-01-01 | End: 2020-01-01

## 2020-01-01 RX ORDER — QUETIAPINE FUMARATE 200 MG/1
50 TABLET, FILM COATED ORAL ONCE
Refills: 0 | Status: COMPLETED | OUTPATIENT
Start: 2020-01-01 | End: 2020-01-01

## 2020-01-01 RX ORDER — PROPOFOL 10 MG/ML
75 INJECTION, EMULSION INTRAVENOUS
Qty: 1000 | Refills: 0 | Status: DISCONTINUED | OUTPATIENT
Start: 2020-01-01 | End: 2020-01-01

## 2020-01-01 RX ORDER — MEROPENEM 1 G/30ML
1000 INJECTION INTRAVENOUS EVERY 12 HOURS
Refills: 0 | Status: DISCONTINUED | OUTPATIENT
Start: 2020-01-01 | End: 2020-01-01

## 2020-01-01 RX ORDER — LACTULOSE 10 G/15ML
10 SOLUTION ORAL EVERY 8 HOURS
Refills: 0 | Status: DISCONTINUED | OUTPATIENT
Start: 2020-01-01 | End: 2020-01-01

## 2020-01-01 RX ORDER — MAGNESIUM SULFATE 500 MG/ML
2 VIAL (ML) INJECTION ONCE
Refills: 0 | Status: COMPLETED | OUTPATIENT
Start: 2020-01-01 | End: 2020-01-01

## 2020-01-01 RX ORDER — MIDAZOLAM HYDROCHLORIDE 1 MG/ML
6 INJECTION, SOLUTION INTRAMUSCULAR; INTRAVENOUS ONCE
Refills: 0 | Status: DISCONTINUED | OUTPATIENT
Start: 2020-01-01 | End: 2020-01-01

## 2020-01-01 RX ORDER — FUROSEMIDE 40 MG
20 TABLET ORAL EVERY 8 HOURS
Refills: 0 | Status: DISCONTINUED | OUTPATIENT
Start: 2020-01-01 | End: 2020-01-01

## 2020-01-01 RX ORDER — SODIUM CHLORIDE 9 MG/ML
250 INJECTION INTRAMUSCULAR; INTRAVENOUS; SUBCUTANEOUS ONCE
Refills: 0 | Status: COMPLETED | OUTPATIENT
Start: 2020-01-01 | End: 2020-01-01

## 2020-01-01 RX ORDER — ALTEPLASE 100 MG
2 KIT INTRAVENOUS ONCE
Refills: 0 | Status: COMPLETED | OUTPATIENT
Start: 2020-01-01 | End: 2020-01-01

## 2020-01-01 RX ORDER — DIAZEPAM 5 MG
5 TABLET ORAL EVERY 8 HOURS
Refills: 0 | Status: DISCONTINUED | OUTPATIENT
Start: 2020-01-01 | End: 2020-01-01

## 2020-01-01 RX ORDER — METOCLOPRAMIDE HCL 10 MG
5 TABLET ORAL EVERY 8 HOURS
Refills: 0 | Status: COMPLETED | OUTPATIENT
Start: 2020-01-01 | End: 2020-01-01

## 2020-01-01 RX ORDER — CEFEPIME 1 G/1
2000 INJECTION, POWDER, FOR SOLUTION INTRAMUSCULAR; INTRAVENOUS EVERY 8 HOURS
Refills: 0 | Status: DISCONTINUED | OUTPATIENT
Start: 2020-01-01 | End: 2020-01-01

## 2020-01-01 RX ORDER — ACETAMINOPHEN 500 MG
0 TABLET ORAL
Qty: 0 | Refills: 0 | DISCHARGE

## 2020-01-01 RX ORDER — AZITHROMYCIN 500 MG/1
500 TABLET, FILM COATED ORAL ONCE
Refills: 0 | Status: COMPLETED | OUTPATIENT
Start: 2020-01-01 | End: 2020-01-01

## 2020-01-01 RX ORDER — ROBINUL 0.2 MG/ML
0.2 INJECTION INTRAMUSCULAR; INTRAVENOUS ONCE
Refills: 0 | Status: COMPLETED | OUTPATIENT
Start: 2020-01-01 | End: 2020-01-01

## 2020-01-01 RX ORDER — SODIUM CHLORIDE 9 MG/ML
500 INJECTION INTRAMUSCULAR; INTRAVENOUS; SUBCUTANEOUS ONCE
Refills: 0 | Status: COMPLETED | OUTPATIENT
Start: 2020-01-01 | End: 2020-01-01

## 2020-01-01 RX ORDER — POTASSIUM CHLORIDE 20 MEQ
20 PACKET (EA) ORAL
Refills: 0 | Status: COMPLETED | OUTPATIENT
Start: 2020-01-01 | End: 2020-01-01

## 2020-01-01 RX ORDER — SODIUM BICARBONATE 1 MEQ/ML
50 SYRINGE (ML) INTRAVENOUS ONCE
Refills: 0 | Status: COMPLETED | OUTPATIENT
Start: 2020-01-01 | End: 2020-01-01

## 2020-01-01 RX ORDER — ENOXAPARIN SODIUM 100 MG/ML
40 INJECTION SUBCUTANEOUS
Refills: 0 | Status: DISCONTINUED | OUTPATIENT
Start: 2020-01-01 | End: 2020-01-01

## 2020-01-01 RX ORDER — SODIUM CHLORIDE 9 MG/ML
3 INJECTION INTRAMUSCULAR; INTRAVENOUS; SUBCUTANEOUS EVERY 6 HOURS
Refills: 0 | Status: DISCONTINUED | OUTPATIENT
Start: 2020-01-01 | End: 2020-01-01

## 2020-01-01 RX ORDER — DEXTROSE 50 % IN WATER 50 %
50 SYRINGE (ML) INTRAVENOUS ONCE
Refills: 0 | Status: COMPLETED | OUTPATIENT
Start: 2020-01-01 | End: 2020-01-01

## 2020-01-01 RX ORDER — FAMOTIDINE 10 MG/ML
20 INJECTION INTRAVENOUS EVERY 12 HOURS
Refills: 0 | Status: DISCONTINUED | OUTPATIENT
Start: 2020-01-01 | End: 2020-01-01

## 2020-01-01 RX ORDER — ACETAZOLAMIDE 250 MG/1
250 TABLET ORAL ONCE
Refills: 0 | Status: COMPLETED | OUTPATIENT
Start: 2020-01-01 | End: 2020-01-01

## 2020-01-01 RX ORDER — MULTIVIT WITH MIN/MFOLATE/K2 340-15/3 G
1 POWDER (GRAM) ORAL ONCE
Refills: 0 | Status: COMPLETED | OUTPATIENT
Start: 2020-01-01 | End: 2020-01-01

## 2020-01-01 RX ORDER — ENOXAPARIN SODIUM 100 MG/ML
40 INJECTION SUBCUTANEOUS DAILY
Refills: 0 | Status: DISCONTINUED | OUTPATIENT
Start: 2020-01-01 | End: 2020-01-01

## 2020-01-01 RX ORDER — PHENOBARBITAL 60 MG
650 TABLET ORAL ONCE
Refills: 0 | Status: DISCONTINUED | OUTPATIENT
Start: 2020-01-01 | End: 2020-01-01

## 2020-01-01 RX ORDER — NOREPINEPHRINE BITARTRATE/D5W 8 MG/250ML
0.05 PLASTIC BAG, INJECTION (ML) INTRAVENOUS
Qty: 16 | Refills: 0 | Status: DISCONTINUED | OUTPATIENT
Start: 2020-01-01 | End: 2020-01-01

## 2020-01-01 RX ORDER — PHENOBARBITAL 60 MG
65 TABLET ORAL
Refills: 0 | Status: DISCONTINUED | OUTPATIENT
Start: 2020-01-01 | End: 2020-01-01

## 2020-01-01 RX ORDER — ENOXAPARIN SODIUM 100 MG/ML
80 INJECTION SUBCUTANEOUS
Refills: 0 | Status: DISCONTINUED | OUTPATIENT
Start: 2020-01-01 | End: 2020-01-01

## 2020-01-01 RX ORDER — DEXMEDETOMIDINE HYDROCHLORIDE IN 0.9% SODIUM CHLORIDE 4 UG/ML
0.3 INJECTION INTRAVENOUS
Qty: 200 | Refills: 0 | Status: DISCONTINUED | OUTPATIENT
Start: 2020-01-01 | End: 2020-01-01

## 2020-01-01 RX ORDER — DEXTROSE 50 % IN WATER 50 %
25 SYRINGE (ML) INTRAVENOUS ONCE
Refills: 0 | Status: COMPLETED | OUTPATIENT
Start: 2020-01-01 | End: 2020-01-01

## 2020-01-01 RX ORDER — HYDROMORPHONE HYDROCHLORIDE 2 MG/ML
2 INJECTION INTRAMUSCULAR; INTRAVENOUS; SUBCUTANEOUS ONCE
Refills: 0 | Status: DISCONTINUED | OUTPATIENT
Start: 2020-01-01 | End: 2020-01-01

## 2020-01-01 RX ORDER — FLUCONAZOLE 150 MG/1
200 TABLET ORAL ONCE
Refills: 0 | Status: DISCONTINUED | OUTPATIENT
Start: 2020-01-01 | End: 2020-01-01

## 2020-01-01 RX ORDER — QUETIAPINE FUMARATE 200 MG/1
50 TABLET, FILM COATED ORAL ONCE
Refills: 0 | Status: DISCONTINUED | OUTPATIENT
Start: 2020-01-01 | End: 2020-01-01

## 2020-01-01 RX ORDER — VANCOMYCIN HCL 1 G
1250 VIAL (EA) INTRAVENOUS ONCE
Refills: 0 | Status: COMPLETED | OUTPATIENT
Start: 2020-01-01 | End: 2020-01-01

## 2020-01-01 RX ORDER — HYDROMORPHONE HYDROCHLORIDE 2 MG/ML
1 INJECTION INTRAMUSCULAR; INTRAVENOUS; SUBCUTANEOUS ONCE
Refills: 0 | Status: DISCONTINUED | OUTPATIENT
Start: 2020-01-01 | End: 2020-01-01

## 2020-01-01 RX ORDER — SODIUM CHLORIDE 9 MG/ML
1000 INJECTION, SOLUTION INTRAVENOUS
Refills: 0 | Status: DISCONTINUED | OUTPATIENT
Start: 2020-01-01 | End: 2020-01-01

## 2020-01-01 RX ORDER — AZITHROMYCIN 500 MG/1
500 TABLET, FILM COATED ORAL DAILY
Refills: 0 | Status: DISCONTINUED | OUTPATIENT
Start: 2020-01-01 | End: 2020-01-01

## 2020-01-01 RX ORDER — FLUCONAZOLE 150 MG/1
400 TABLET ORAL EVERY 24 HOURS
Refills: 0 | Status: DISCONTINUED | OUTPATIENT
Start: 2020-01-01 | End: 2020-01-01

## 2020-01-01 RX ORDER — VASOPRESSIN 20 [USP'U]/ML
0.02 INJECTION INTRAVENOUS
Qty: 50 | Refills: 0 | Status: DISCONTINUED | OUTPATIENT
Start: 2020-01-01 | End: 2020-01-01

## 2020-01-01 RX ORDER — QUETIAPINE FUMARATE 200 MG/1
12.5 TABLET, FILM COATED ORAL
Refills: 0 | Status: DISCONTINUED | OUTPATIENT
Start: 2020-01-01 | End: 2020-01-01

## 2020-01-01 RX ORDER — VANCOMYCIN HCL 1 G
1250 VIAL (EA) INTRAVENOUS EVERY 12 HOURS
Refills: 0 | Status: DISCONTINUED | OUTPATIENT
Start: 2020-01-01 | End: 2020-01-01

## 2020-01-01 RX ORDER — VANCOMYCIN HCL 1 G
750 VIAL (EA) INTRAVENOUS
Refills: 0 | Status: DISCONTINUED | OUTPATIENT
Start: 2020-01-01 | End: 2020-01-01

## 2020-01-01 RX ORDER — FLUCONAZOLE 150 MG/1
400 TABLET ORAL ONCE
Refills: 0 | Status: COMPLETED | OUTPATIENT
Start: 2020-01-01 | End: 2020-01-01

## 2020-01-01 RX ORDER — FUROSEMIDE 40 MG
20 TABLET ORAL ONCE
Refills: 0 | Status: COMPLETED | OUTPATIENT
Start: 2020-01-01 | End: 2020-01-01

## 2020-01-01 RX ORDER — QUETIAPINE FUMARATE 200 MG/1
25 TABLET, FILM COATED ORAL EVERY 8 HOURS
Refills: 0 | Status: DISCONTINUED | OUTPATIENT
Start: 2020-01-01 | End: 2020-01-01

## 2020-01-01 RX ORDER — ROCURONIUM BROMIDE 10 MG/ML
80 VIAL (ML) INTRAVENOUS ONCE
Refills: 0 | Status: DISCONTINUED | OUTPATIENT
Start: 2020-01-01 | End: 2020-01-01

## 2020-01-01 RX ORDER — ACETAZOLAMIDE 250 MG/1
250 TABLET ORAL ONCE
Refills: 0 | Status: DISCONTINUED | OUTPATIENT
Start: 2020-01-01 | End: 2020-01-01

## 2020-01-01 RX ORDER — CEFEPIME 1 G/1
INJECTION, POWDER, FOR SOLUTION INTRAMUSCULAR; INTRAVENOUS
Refills: 0 | Status: DISCONTINUED | OUTPATIENT
Start: 2020-01-01 | End: 2020-01-01

## 2020-01-01 RX ORDER — ANAKINRA 100MG/0.67
100 SYRINGE (ML) SUBCUTANEOUS EVERY 12 HOURS
Refills: 0 | Status: COMPLETED | OUTPATIENT
Start: 2020-01-01 | End: 2020-01-01

## 2020-01-01 RX ORDER — POTASSIUM PHOSPHATE, MONOBASIC POTASSIUM PHOSPHATE, DIBASIC 236; 224 MG/ML; MG/ML
30 INJECTION, SOLUTION INTRAVENOUS ONCE
Refills: 0 | Status: COMPLETED | OUTPATIENT
Start: 2020-01-01 | End: 2020-01-01

## 2020-01-01 RX ORDER — VANCOMYCIN HCL 1 G
VIAL (EA) INTRAVENOUS
Refills: 0 | Status: DISCONTINUED | OUTPATIENT
Start: 2020-01-01 | End: 2020-01-01

## 2020-01-01 RX ORDER — FENTANYL CITRATE 50 UG/ML
1 INJECTION INTRAVENOUS
Refills: 0 | Status: DISCONTINUED | OUTPATIENT
Start: 2020-01-01 | End: 2020-01-01

## 2020-01-01 RX ORDER — INSULIN LISPRO 100/ML
VIAL (ML) SUBCUTANEOUS EVERY 6 HOURS
Refills: 0 | Status: DISCONTINUED | OUTPATIENT
Start: 2020-01-01 | End: 2020-01-01

## 2020-01-01 RX ORDER — VANCOMYCIN HCL 1 G
1000 VIAL (EA) INTRAVENOUS EVERY 12 HOURS
Refills: 0 | Status: DISCONTINUED | OUTPATIENT
Start: 2020-01-01 | End: 2020-01-01

## 2020-01-01 RX ORDER — DEXTROSE 10 % IN WATER 10 %
1000 INTRAVENOUS SOLUTION INTRAVENOUS
Refills: 0 | Status: DISCONTINUED | OUTPATIENT
Start: 2020-01-01 | End: 2020-01-01

## 2020-01-01 RX ORDER — POLYETHYLENE GLYCOL 3350 17 G/17G
17 POWDER, FOR SOLUTION ORAL EVERY 12 HOURS
Refills: 0 | Status: DISCONTINUED | OUTPATIENT
Start: 2020-01-01 | End: 2020-01-01

## 2020-01-01 RX ORDER — CEFTRIAXONE 500 MG/1
1000 INJECTION, POWDER, FOR SOLUTION INTRAMUSCULAR; INTRAVENOUS ONCE
Refills: 0 | Status: COMPLETED | OUTPATIENT
Start: 2020-01-01 | End: 2020-01-01

## 2020-01-01 RX ORDER — POTASSIUM PHOSPHATE, MONOBASIC POTASSIUM PHOSPHATE, DIBASIC 236; 224 MG/ML; MG/ML
15 INJECTION, SOLUTION INTRAVENOUS ONCE
Refills: 0 | Status: COMPLETED | OUTPATIENT
Start: 2020-01-01 | End: 2020-01-01

## 2020-01-01 RX ORDER — POTASSIUM CHLORIDE 20 MEQ
10 PACKET (EA) ORAL
Refills: 0 | Status: COMPLETED | OUTPATIENT
Start: 2020-01-01 | End: 2020-01-01

## 2020-01-01 RX ORDER — ANAKINRA 100MG/0.67
100 SYRINGE (ML) SUBCUTANEOUS EVERY 6 HOURS
Refills: 0 | Status: COMPLETED | OUTPATIENT
Start: 2020-01-01 | End: 2020-01-01

## 2020-01-01 RX ORDER — CEFEPIME 1 G/1
2000 INJECTION, POWDER, FOR SOLUTION INTRAMUSCULAR; INTRAVENOUS ONCE
Refills: 0 | Status: COMPLETED | OUTPATIENT
Start: 2020-01-01 | End: 2020-01-01

## 2020-01-01 RX ORDER — ACETAMINOPHEN 500 MG
650 TABLET ORAL EVERY 6 HOURS
Refills: 0 | Status: DISCONTINUED | OUTPATIENT
Start: 2020-01-01 | End: 2020-01-01

## 2020-01-01 RX ORDER — ONDANSETRON 8 MG/1
4 TABLET, FILM COATED ORAL EVERY 8 HOURS
Refills: 0 | Status: DISCONTINUED | OUTPATIENT
Start: 2020-01-01 | End: 2020-01-01

## 2020-01-01 RX ORDER — PIPERACILLIN AND TAZOBACTAM 4; .5 G/20ML; G/20ML
3.38 INJECTION, POWDER, LYOPHILIZED, FOR SOLUTION INTRAVENOUS EVERY 8 HOURS
Refills: 0 | Status: DISCONTINUED | OUTPATIENT
Start: 2020-01-01 | End: 2020-01-01

## 2020-01-01 RX ORDER — ALBUTEROL 90 UG/1
2 AEROSOL, METERED ORAL EVERY 6 HOURS
Refills: 0 | Status: DISCONTINUED | OUTPATIENT
Start: 2020-01-01 | End: 2020-01-01

## 2020-01-01 RX ORDER — POLYETHYLENE GLYCOL 3350 17 G/17G
17 POWDER, FOR SOLUTION ORAL
Refills: 0 | Status: DISCONTINUED | OUTPATIENT
Start: 2020-01-01 | End: 2020-01-01

## 2020-01-01 RX ORDER — POTASSIUM CHLORIDE 20 MEQ
40 PACKET (EA) ORAL EVERY 4 HOURS
Refills: 0 | Status: COMPLETED | OUTPATIENT
Start: 2020-01-01 | End: 2020-01-01

## 2020-01-01 RX ORDER — SENNA PLUS 8.6 MG/1
10 TABLET ORAL AT BEDTIME
Refills: 0 | Status: DISCONTINUED | OUTPATIENT
Start: 2020-01-01 | End: 2020-01-01

## 2020-01-01 RX ORDER — PHENOBARBITAL 60 MG
50 TABLET ORAL ONCE
Refills: 0 | Status: DISCONTINUED | OUTPATIENT
Start: 2020-01-01 | End: 2020-01-01

## 2020-01-01 RX ORDER — ROCURONIUM BROMIDE 10 MG/ML
78 VIAL (ML) INTRAVENOUS ONCE
Refills: 0 | Status: COMPLETED | OUTPATIENT
Start: 2020-01-01 | End: 2020-01-01

## 2020-01-01 RX ORDER — MAGNESIUM SULFATE 500 MG/ML
2 VIAL (ML) INJECTION ONCE
Refills: 0 | Status: DISCONTINUED | OUTPATIENT
Start: 2020-01-01 | End: 2020-01-01

## 2020-01-01 RX ORDER — FLUCONAZOLE 150 MG/1
200 TABLET ORAL ONCE
Refills: 0 | Status: COMPLETED | OUTPATIENT
Start: 2020-01-01 | End: 2020-01-01

## 2020-01-01 RX ORDER — CLONAZEPAM 1 MG
0.5 TABLET ORAL EVERY 12 HOURS
Refills: 0 | Status: DISCONTINUED | OUTPATIENT
Start: 2020-01-01 | End: 2020-01-01

## 2020-01-01 RX ORDER — COLCHICINE 0.6 MG
0.6 TABLET ORAL
Refills: 0 | Status: DISCONTINUED | OUTPATIENT
Start: 2020-01-01 | End: 2020-01-01

## 2020-01-01 RX ORDER — CLONAZEPAM 1 MG
0.5 TABLET ORAL EVERY 8 HOURS
Refills: 0 | Status: DISCONTINUED | OUTPATIENT
Start: 2020-01-01 | End: 2020-01-01

## 2020-01-01 RX ORDER — FENTANYL CITRATE 50 UG/ML
0.5 INJECTION INTRAVENOUS
Qty: 5000 | Refills: 0 | Status: DISCONTINUED | OUTPATIENT
Start: 2020-01-01 | End: 2020-01-01

## 2020-01-01 RX ORDER — ROCURONIUM BROMIDE 10 MG/ML
80 VIAL (ML) INTRAVENOUS ONCE
Refills: 0 | Status: COMPLETED | OUTPATIENT
Start: 2020-01-01 | End: 2020-01-01

## 2020-01-01 RX ORDER — NOREPINEPHRINE BITARTRATE/D5W 8 MG/250ML
0.05 PLASTIC BAG, INJECTION (ML) INTRAVENOUS
Qty: 32 | Refills: 0 | Status: DISCONTINUED | OUTPATIENT
Start: 2020-01-01 | End: 2020-01-01

## 2020-01-01 RX ORDER — FUROSEMIDE 40 MG
40 TABLET ORAL ONCE
Refills: 0 | Status: DISCONTINUED | OUTPATIENT
Start: 2020-01-01 | End: 2020-01-01

## 2020-01-01 RX ORDER — ENOXAPARIN SODIUM 100 MG/ML
60 INJECTION SUBCUTANEOUS EVERY 12 HOURS
Refills: 0 | Status: DISCONTINUED | OUTPATIENT
Start: 2020-01-01 | End: 2020-01-01

## 2020-01-01 RX ORDER — NOREPINEPHRINE BITARTRATE/D5W 8 MG/250ML
0.23 PLASTIC BAG, INJECTION (ML) INTRAVENOUS
Qty: 32 | Refills: 0 | Status: DISCONTINUED | OUTPATIENT
Start: 2020-01-01 | End: 2020-01-01

## 2020-01-01 RX ORDER — HYDROXYCHLOROQUINE SULFATE 200 MG
TABLET ORAL
Refills: 0 | Status: COMPLETED | OUTPATIENT
Start: 2020-01-01 | End: 2020-01-01

## 2020-01-01 RX ORDER — SODIUM CHLORIDE 9 MG/ML
2 INJECTION INTRAMUSCULAR; INTRAVENOUS; SUBCUTANEOUS EVERY 6 HOURS
Refills: 0 | Status: DISCONTINUED | OUTPATIENT
Start: 2020-01-01 | End: 2020-01-01

## 2020-01-01 RX ORDER — PHENYLEPHRINE HYDROCHLORIDE 10 MG/ML
0.5 INJECTION INTRAVENOUS
Qty: 160 | Refills: 0 | Status: DISCONTINUED | OUTPATIENT
Start: 2020-01-01 | End: 2020-01-01

## 2020-01-01 RX ORDER — FLUCONAZOLE 150 MG/1
400 TABLET ORAL DAILY
Refills: 0 | Status: DISCONTINUED | OUTPATIENT
Start: 2020-01-01 | End: 2020-01-01

## 2020-01-01 RX ORDER — ANAKINRA 100MG/0.67
100 SYRINGE (ML) SUBCUTANEOUS DAILY
Refills: 0 | Status: COMPLETED | OUTPATIENT
Start: 2020-01-01 | End: 2020-01-01

## 2020-01-01 RX ORDER — POTASSIUM CHLORIDE 20 MEQ
10 PACKET (EA) ORAL
Refills: 0 | Status: DISCONTINUED | OUTPATIENT
Start: 2020-01-01 | End: 2020-01-01

## 2020-01-01 RX ORDER — ENOXAPARIN SODIUM 100 MG/ML
50 INJECTION SUBCUTANEOUS EVERY 12 HOURS
Refills: 0 | Status: DISCONTINUED | OUTPATIENT
Start: 2020-01-01 | End: 2020-01-01

## 2020-01-01 RX ORDER — SENNA PLUS 8.6 MG/1
10 TABLET ORAL DAILY
Refills: 0 | Status: DISCONTINUED | OUTPATIENT
Start: 2020-01-01 | End: 2020-01-01

## 2020-01-01 RX ORDER — MEROPENEM 1 G/30ML
INJECTION INTRAVENOUS
Refills: 0 | Status: DISCONTINUED | OUTPATIENT
Start: 2020-01-01 | End: 2020-01-01

## 2020-01-01 RX ORDER — DEXMEDETOMIDINE HYDROCHLORIDE IN 0.9% SODIUM CHLORIDE 4 UG/ML
0.26 INJECTION INTRAVENOUS
Qty: 200 | Refills: 0 | Status: DISCONTINUED | OUTPATIENT
Start: 2020-01-01 | End: 2020-01-01

## 2020-01-01 RX ORDER — FENTANYL CITRATE 50 UG/ML
0.5 INJECTION INTRAVENOUS
Qty: 2500 | Refills: 0 | Status: DISCONTINUED | OUTPATIENT
Start: 2020-01-01 | End: 2020-01-01

## 2020-01-01 RX ORDER — PROPOFOL 10 MG/ML
10 INJECTION, EMULSION INTRAVENOUS
Qty: 1000 | Refills: 0 | Status: DISCONTINUED | OUTPATIENT
Start: 2020-01-01 | End: 2020-01-01

## 2020-01-01 RX ORDER — PIPERACILLIN AND TAZOBACTAM 4; .5 G/20ML; G/20ML
3.38 INJECTION, POWDER, LYOPHILIZED, FOR SOLUTION INTRAVENOUS ONCE
Refills: 0 | Status: DISCONTINUED | OUTPATIENT
Start: 2020-01-01 | End: 2020-01-01

## 2020-01-01 RX ORDER — SODIUM BICARBONATE 1 MEQ/ML
0.19 SYRINGE (ML) INTRAVENOUS
Qty: 150 | Refills: 0 | Status: DISCONTINUED | OUTPATIENT
Start: 2020-01-01 | End: 2020-01-01

## 2020-01-01 RX ORDER — ASCORBIC ACID 60 MG
500 TABLET,CHEWABLE ORAL THREE TIMES A DAY
Refills: 0 | Status: DISCONTINUED | OUTPATIENT
Start: 2020-01-01 | End: 2020-01-01

## 2020-01-01 RX ORDER — PHENOBARBITAL 60 MG
80 TABLET ORAL EVERY 12 HOURS
Refills: 0 | Status: DISCONTINUED | OUTPATIENT
Start: 2020-01-01 | End: 2020-01-01

## 2020-01-01 RX ORDER — MEROPENEM 1 G/30ML
1000 INJECTION INTRAVENOUS EVERY 8 HOURS
Refills: 0 | Status: DISCONTINUED | OUTPATIENT
Start: 2020-01-01 | End: 2020-01-01

## 2020-01-01 RX ORDER — INSULIN HUMAN 100 [IU]/ML
10 INJECTION, SOLUTION SUBCUTANEOUS ONCE
Refills: 0 | Status: COMPLETED | OUTPATIENT
Start: 2020-01-01 | End: 2020-01-01

## 2020-01-01 RX ORDER — HUMAN INSULIN 100 [IU]/ML
3 INJECTION, SUSPENSION SUBCUTANEOUS EVERY 6 HOURS
Refills: 0 | Status: DISCONTINUED | OUTPATIENT
Start: 2020-01-01 | End: 2020-01-01

## 2020-01-01 RX ORDER — FENTANYL CITRATE 50 UG/ML
50 INJECTION INTRAVENOUS ONCE
Refills: 0 | Status: DISCONTINUED | OUTPATIENT
Start: 2020-01-01 | End: 2020-01-01

## 2020-01-01 RX ORDER — HEPARIN SODIUM 5000 [USP'U]/ML
1400 INJECTION INTRAVENOUS; SUBCUTANEOUS
Qty: 25000 | Refills: 0 | Status: DISCONTINUED | OUTPATIENT
Start: 2020-01-01 | End: 2020-01-01

## 2020-01-01 RX ORDER — PIPERACILLIN AND TAZOBACTAM 4; .5 G/20ML; G/20ML
3.38 INJECTION, POWDER, LYOPHILIZED, FOR SOLUTION INTRAVENOUS EVERY 6 HOURS
Refills: 0 | Status: DISCONTINUED | OUTPATIENT
Start: 2020-01-01 | End: 2020-01-01

## 2020-01-01 RX ORDER — METHADONE HYDROCHLORIDE 40 MG/1
10 TABLET ORAL EVERY 6 HOURS
Refills: 0 | Status: DISCONTINUED | OUTPATIENT
Start: 2020-01-01 | End: 2020-01-01

## 2020-01-01 RX ORDER — ZINC SULFATE TAB 220 MG (50 MG ZINC EQUIVALENT) 220 (50 ZN) MG
220 TAB ORAL DAILY
Refills: 0 | Status: DISCONTINUED | OUTPATIENT
Start: 2020-01-01 | End: 2020-01-01

## 2020-01-01 RX ORDER — CLONAZEPAM 1 MG
1 TABLET ORAL EVERY 6 HOURS
Refills: 0 | Status: DISCONTINUED | OUTPATIENT
Start: 2020-01-01 | End: 2020-01-01

## 2020-01-01 RX ORDER — DEXMEDETOMIDINE HYDROCHLORIDE IN 0.9% SODIUM CHLORIDE 4 UG/ML
1 INJECTION INTRAVENOUS
Qty: 200 | Refills: 0 | Status: DISCONTINUED | OUTPATIENT
Start: 2020-01-01 | End: 2020-01-01

## 2020-01-01 RX ORDER — FUROSEMIDE 40 MG
40 TABLET ORAL
Refills: 0 | Status: DISCONTINUED | OUTPATIENT
Start: 2020-01-01 | End: 2020-01-01

## 2020-01-01 RX ORDER — HYDROXYCHLOROQUINE SULFATE 200 MG
200 TABLET ORAL EVERY 12 HOURS
Refills: 0 | Status: COMPLETED | OUTPATIENT
Start: 2020-01-01 | End: 2020-01-01

## 2020-01-01 RX ORDER — METOCLOPRAMIDE HCL 10 MG
10 TABLET ORAL ONCE
Refills: 0 | Status: COMPLETED | OUTPATIENT
Start: 2020-01-01 | End: 2020-01-01

## 2020-01-01 RX ORDER — QUETIAPINE FUMARATE 200 MG/1
50 TABLET, FILM COATED ORAL EVERY 8 HOURS
Refills: 0 | Status: DISCONTINUED | OUTPATIENT
Start: 2020-01-01 | End: 2020-01-01

## 2020-01-01 RX ORDER — PIPERACILLIN AND TAZOBACTAM 4; .5 G/20ML; G/20ML
3.38 INJECTION, POWDER, LYOPHILIZED, FOR SOLUTION INTRAVENOUS ONCE
Refills: 0 | Status: COMPLETED | OUTPATIENT
Start: 2020-01-01 | End: 2020-01-01

## 2020-01-01 RX ORDER — CLONAZEPAM 1 MG
0.5 TABLET ORAL EVERY 6 HOURS
Refills: 0 | Status: DISCONTINUED | OUTPATIENT
Start: 2020-01-01 | End: 2020-01-01

## 2020-01-01 RX ORDER — QUETIAPINE FUMARATE 200 MG/1
25 TABLET, FILM COATED ORAL AT BEDTIME
Refills: 0 | Status: DISCONTINUED | OUTPATIENT
Start: 2020-01-01 | End: 2020-01-01

## 2020-01-01 RX ORDER — PHENYLEPHRINE HYDROCHLORIDE 10 MG/ML
0.1 INJECTION INTRAVENOUS
Qty: 40 | Refills: 0 | Status: DISCONTINUED | OUTPATIENT
Start: 2020-01-01 | End: 2020-01-01

## 2020-01-01 RX ORDER — COLCHICINE 0.6 MG
0.6 TABLET ORAL THREE TIMES A DAY
Refills: 0 | Status: DISCONTINUED | OUTPATIENT
Start: 2020-01-01 | End: 2020-01-01

## 2020-01-01 RX ORDER — FENTANYL CITRATE 50 UG/ML
2 INJECTION INTRAVENOUS
Qty: 5000 | Refills: 0 | Status: DISCONTINUED | OUTPATIENT
Start: 2020-01-01 | End: 2020-01-01

## 2020-01-01 RX ORDER — SENNA PLUS 8.6 MG/1
10 TABLET ORAL ONCE
Refills: 0 | Status: DISCONTINUED | OUTPATIENT
Start: 2020-01-01 | End: 2020-01-01

## 2020-01-01 RX ORDER — SODIUM CHLORIDE 9 MG/ML
10 INJECTION INTRAMUSCULAR; INTRAVENOUS; SUBCUTANEOUS
Refills: 0 | Status: DISCONTINUED | OUTPATIENT
Start: 2020-01-01 | End: 2020-01-01

## 2020-01-01 RX ORDER — FUROSEMIDE 40 MG
80 TABLET ORAL ONCE
Refills: 0 | Status: COMPLETED | OUTPATIENT
Start: 2020-01-01 | End: 2020-01-01

## 2020-01-01 RX ORDER — POTASSIUM CHLORIDE 20 MEQ
20 PACKET (EA) ORAL ONCE
Refills: 0 | Status: DISCONTINUED | OUTPATIENT
Start: 2020-01-01 | End: 2020-01-01

## 2020-01-01 RX ORDER — PROPOFOL 10 MG/ML
10 INJECTION, EMULSION INTRAVENOUS
Qty: 500 | Refills: 0 | Status: DISCONTINUED | OUTPATIENT
Start: 2020-01-01 | End: 2020-01-01

## 2020-01-01 RX ORDER — COLCHICINE 0.6 MG
0.6 TABLET ORAL THREE TIMES A DAY
Refills: 0 | Status: COMPLETED | OUTPATIENT
Start: 2020-01-01 | End: 2020-01-01

## 2020-01-01 RX ORDER — FLUCONAZOLE 150 MG/1
TABLET ORAL
Refills: 0 | Status: DISCONTINUED | OUTPATIENT
Start: 2020-01-01 | End: 2020-01-01

## 2020-01-01 RX ORDER — FAMOTIDINE 10 MG/ML
20 INJECTION INTRAVENOUS DAILY
Refills: 0 | Status: DISCONTINUED | OUTPATIENT
Start: 2020-01-01 | End: 2020-01-01

## 2020-01-01 RX ORDER — ACETAMINOPHEN 500 MG
975 TABLET ORAL ONCE
Refills: 0 | Status: COMPLETED | OUTPATIENT
Start: 2020-01-01 | End: 2020-01-01

## 2020-01-01 RX ADMIN — FAMOTIDINE 20 MILLIGRAM(S): 10 INJECTION INTRAVENOUS at 04:30

## 2020-01-01 RX ADMIN — FAMOTIDINE 20 MILLIGRAM(S): 10 INJECTION INTRAVENOUS at 18:28

## 2020-01-01 RX ADMIN — Medication 1 APPLICATION(S): at 04:14

## 2020-01-01 RX ADMIN — CHLORHEXIDINE GLUCONATE 15 MILLILITER(S): 213 SOLUTION TOPICAL at 17:19

## 2020-01-01 RX ADMIN — Medication 5 MILLIGRAM(S): at 04:34

## 2020-01-01 RX ADMIN — ENOXAPARIN SODIUM 80 MILLIGRAM(S): 100 INJECTION SUBCUTANEOUS at 04:34

## 2020-01-01 RX ADMIN — Medication 1 MILLIGRAM(S): at 00:00

## 2020-01-01 RX ADMIN — CHLORHEXIDINE GLUCONATE 15 MILLILITER(S): 213 SOLUTION TOPICAL at 05:30

## 2020-01-01 RX ADMIN — Medication 100 MILLIGRAM(S): at 11:19

## 2020-01-01 RX ADMIN — Medication 100 MILLIGRAM(S): at 20:12

## 2020-01-01 RX ADMIN — Medication 0.5 MILLIGRAM(S): at 22:21

## 2020-01-01 RX ADMIN — CHLORHEXIDINE GLUCONATE 15 MILLILITER(S): 213 SOLUTION TOPICAL at 16:00

## 2020-01-01 RX ADMIN — FAMOTIDINE 20 MILLIGRAM(S): 10 INJECTION INTRAVENOUS at 16:10

## 2020-01-01 RX ADMIN — Medication 100 MILLIEQUIVALENT(S): at 15:25

## 2020-01-01 RX ADMIN — MIDAZOLAM HYDROCHLORIDE 4 MILLIGRAM(S): 1 INJECTION, SOLUTION INTRAMUSCULAR; INTRAVENOUS at 13:42

## 2020-01-01 RX ADMIN — Medication 100 MILLIGRAM(S): at 22:31

## 2020-01-01 RX ADMIN — Medication 0.5 MILLIGRAM(S): at 04:39

## 2020-01-01 RX ADMIN — FAMOTIDINE 20 MILLIGRAM(S): 10 INJECTION INTRAVENOUS at 04:01

## 2020-01-01 RX ADMIN — LACTULOSE 10 GRAM(S): 10 SOLUTION ORAL at 04:16

## 2020-01-01 RX ADMIN — SODIUM CHLORIDE 500 MILLILITER(S): 9 INJECTION INTRAMUSCULAR; INTRAVENOUS; SUBCUTANEOUS at 01:36

## 2020-01-01 RX ADMIN — CHLORHEXIDINE GLUCONATE 1 APPLICATION(S): 213 SOLUTION TOPICAL at 04:02

## 2020-01-01 RX ADMIN — Medication 1 MILLIGRAM(S): at 06:57

## 2020-01-01 RX ADMIN — PHENYLEPHRINE HYDROCHLORIDE 1.46 MICROGRAM(S)/KG/MIN: 10 INJECTION INTRAVENOUS at 12:03

## 2020-01-01 RX ADMIN — PROPOFOL 35.1 MICROGRAM(S)/KG/MIN: 10 INJECTION, EMULSION INTRAVENOUS at 10:20

## 2020-01-01 RX ADMIN — Medication 0.5 MILLIGRAM(S): at 04:13

## 2020-01-01 RX ADMIN — Medication 10 MILLIGRAM(S): at 22:05

## 2020-01-01 RX ADMIN — POLYETHYLENE GLYCOL 3350 17 GRAM(S): 17 POWDER, FOR SOLUTION ORAL at 04:01

## 2020-01-01 RX ADMIN — CHLORHEXIDINE GLUCONATE 1 APPLICATION(S): 213 SOLUTION TOPICAL at 04:04

## 2020-01-01 RX ADMIN — POLYETHYLENE GLYCOL 3350 17 GRAM(S): 17 POWDER, FOR SOLUTION ORAL at 04:16

## 2020-01-01 RX ADMIN — Medication 100 MILLIGRAM(S): at 12:10

## 2020-01-01 RX ADMIN — DEXMEDETOMIDINE HYDROCHLORIDE IN 0.9% SODIUM CHLORIDE 19.5 MICROGRAM(S)/KG/HR: 4 INJECTION INTRAVENOUS at 04:12

## 2020-01-01 RX ADMIN — Medication 1 APPLICATION(S): at 05:31

## 2020-01-01 RX ADMIN — CHLORHEXIDINE GLUCONATE 15 MILLILITER(S): 213 SOLUTION TOPICAL at 17:33

## 2020-01-01 RX ADMIN — VASOPRESSIN 1.2 UNIT(S)/MIN: 20 INJECTION INTRAVENOUS at 22:39

## 2020-01-01 RX ADMIN — Medication 40 MILLIEQUIVALENT(S): at 21:03

## 2020-01-01 RX ADMIN — SODIUM CHLORIDE 2 GRAM(S): 9 INJECTION INTRAMUSCULAR; INTRAVENOUS; SUBCUTANEOUS at 04:39

## 2020-01-01 RX ADMIN — Medication 40 MILLIGRAM(S): at 14:00

## 2020-01-01 RX ADMIN — QUETIAPINE FUMARATE 25 MILLIGRAM(S): 200 TABLET, FILM COATED ORAL at 23:00

## 2020-01-01 RX ADMIN — Medication 30 MILLIGRAM(S): at 18:11

## 2020-01-01 RX ADMIN — Medication 100 MILLIGRAM(S): at 12:40

## 2020-01-01 RX ADMIN — Medication 0.6 MILLIGRAM(S): at 06:42

## 2020-01-01 RX ADMIN — CHLORHEXIDINE GLUCONATE 1 APPLICATION(S): 213 SOLUTION TOPICAL at 04:10

## 2020-01-01 RX ADMIN — METHADONE HYDROCHLORIDE 10 MILLIGRAM(S): 40 TABLET ORAL at 17:03

## 2020-01-01 RX ADMIN — PHENYLEPHRINE HYDROCHLORIDE 1.46 MICROGRAM(S)/KG/MIN: 10 INJECTION INTRAVENOUS at 10:30

## 2020-01-01 RX ADMIN — FENTANYL CITRATE 19.5 MICROGRAM(S)/KG/HR: 50 INJECTION INTRAVENOUS at 09:11

## 2020-01-01 RX ADMIN — POLYETHYLENE GLYCOL 3350 17 GRAM(S): 17 POWDER, FOR SOLUTION ORAL at 17:03

## 2020-01-01 RX ADMIN — ENOXAPARIN SODIUM 40 MILLIGRAM(S): 100 INJECTION SUBCUTANEOUS at 04:36

## 2020-01-01 RX ADMIN — POTASSIUM PHOSPHATE, MONOBASIC POTASSIUM PHOSPHATE, DIBASIC 62.5 MILLIMOLE(S): 236; 224 INJECTION, SOLUTION INTRAVENOUS at 06:54

## 2020-01-01 RX ADMIN — Medication 50 GRAM(S): at 04:44

## 2020-01-01 RX ADMIN — LACTULOSE 10 GRAM(S): 10 SOLUTION ORAL at 23:36

## 2020-01-01 RX ADMIN — Medication 402.48 MILLIGRAM(S): at 17:48

## 2020-01-01 RX ADMIN — PHENYLEPHRINE HYDROCHLORIDE 1.46 MICROGRAM(S)/KG/MIN: 10 INJECTION INTRAVENOUS at 21:05

## 2020-01-01 RX ADMIN — METHADONE HYDROCHLORIDE 10 MILLIGRAM(S): 40 TABLET ORAL at 10:05

## 2020-01-01 RX ADMIN — METHADONE HYDROCHLORIDE 10 MILLIGRAM(S): 40 TABLET ORAL at 04:13

## 2020-01-01 RX ADMIN — Medication 100 MILLIGRAM(S): at 20:31

## 2020-01-01 RX ADMIN — FENTANYL CITRATE 100 MICROGRAM(S): 50 INJECTION INTRAVENOUS at 06:54

## 2020-01-01 RX ADMIN — Medication 250 MILLIGRAM(S): at 12:11

## 2020-01-01 RX ADMIN — CHLORHEXIDINE GLUCONATE 15 MILLILITER(S): 213 SOLUTION TOPICAL at 17:39

## 2020-01-01 RX ADMIN — FAMOTIDINE 20 MILLIGRAM(S): 10 INJECTION INTRAVENOUS at 04:23

## 2020-01-01 RX ADMIN — CHLORHEXIDINE GLUCONATE 15 MILLILITER(S): 213 SOLUTION TOPICAL at 16:10

## 2020-01-01 RX ADMIN — Medication 100 MILLIGRAM(S): at 11:05

## 2020-01-01 RX ADMIN — Medication 40 MILLIGRAM(S): at 04:37

## 2020-01-01 RX ADMIN — Medication 650 MILLIGRAM(S): at 23:25

## 2020-01-01 RX ADMIN — Medication 65 MILLIGRAM(S): at 13:54

## 2020-01-01 RX ADMIN — SODIUM CHLORIDE 3 MILLILITER(S): 9 INJECTION INTRAMUSCULAR; INTRAVENOUS; SUBCUTANEOUS at 18:35

## 2020-01-01 RX ADMIN — Medication 1 APPLICATION(S): at 04:37

## 2020-01-01 RX ADMIN — Medication 200 MILLIGRAM(S): at 22:26

## 2020-01-01 RX ADMIN — ALBUTEROL 2 PUFF(S): 90 AEROSOL, METERED ORAL at 01:43

## 2020-01-01 RX ADMIN — Medication 200 GRAM(S): at 00:12

## 2020-01-01 RX ADMIN — Medication 100 MILLIGRAM(S): at 12:50

## 2020-01-01 RX ADMIN — MIDAZOLAM HYDROCHLORIDE 4 MILLIGRAM(S): 1 INJECTION, SOLUTION INTRAMUSCULAR; INTRAVENOUS at 03:09

## 2020-01-01 RX ADMIN — PROPOFOL 35.1 MICROGRAM(S)/KG/MIN: 10 INJECTION, EMULSION INTRAVENOUS at 02:08

## 2020-01-01 RX ADMIN — PHENYLEPHRINE HYDROCHLORIDE 1.46 MICROGRAM(S)/KG/MIN: 10 INJECTION INTRAVENOUS at 22:41

## 2020-01-01 RX ADMIN — Medication 250 MILLIGRAM(S): at 13:01

## 2020-01-01 RX ADMIN — Medication 62.5 MILLIMOLE(S): at 09:54

## 2020-01-01 RX ADMIN — Medication 402.48 MILLIGRAM(S): at 04:12

## 2020-01-01 RX ADMIN — Medication 80 MILLIGRAM(S): at 18:15

## 2020-01-01 RX ADMIN — LACTULOSE 10 GRAM(S): 10 SOLUTION ORAL at 21:00

## 2020-01-01 RX ADMIN — QUETIAPINE FUMARATE 12.5 MILLIGRAM(S): 200 TABLET, FILM COATED ORAL at 04:29

## 2020-01-01 RX ADMIN — FAMOTIDINE 20 MILLIGRAM(S): 10 INJECTION INTRAVENOUS at 17:39

## 2020-01-01 RX ADMIN — CHLORHEXIDINE GLUCONATE 1 APPLICATION(S): 213 SOLUTION TOPICAL at 07:35

## 2020-01-01 RX ADMIN — MAGNESIUM OXIDE 400 MG ORAL TABLET 400 MILLIGRAM(S): 241.3 TABLET ORAL at 11:22

## 2020-01-01 RX ADMIN — Medication 650 MILLIGRAM(S): at 19:15

## 2020-01-01 RX ADMIN — Medication 5 MILLIGRAM(S): at 13:17

## 2020-01-01 RX ADMIN — Medication 250 MILLIGRAM(S): at 17:03

## 2020-01-01 RX ADMIN — PROPOFOL 35.1 MICROGRAM(S)/KG/MIN: 10 INJECTION, EMULSION INTRAVENOUS at 21:05

## 2020-01-01 RX ADMIN — CHLORHEXIDINE GLUCONATE 1 APPLICATION(S): 213 SOLUTION TOPICAL at 05:17

## 2020-01-01 RX ADMIN — POLYETHYLENE GLYCOL 3350 17 GRAM(S): 17 POWDER, FOR SOLUTION ORAL at 16:58

## 2020-01-01 RX ADMIN — SODIUM CHLORIDE 1000 MILLILITER(S): 9 INJECTION INTRAMUSCULAR; INTRAVENOUS; SUBCUTANEOUS at 21:08

## 2020-01-01 RX ADMIN — Medication 10 MILLIGRAM(S): at 14:21

## 2020-01-01 RX ADMIN — ENOXAPARIN SODIUM 40 MILLIGRAM(S): 100 INJECTION SUBCUTANEOUS at 16:20

## 2020-01-01 RX ADMIN — SODIUM CHLORIDE 3 MILLILITER(S): 9 INJECTION INTRAMUSCULAR; INTRAVENOUS; SUBCUTANEOUS at 18:45

## 2020-01-01 RX ADMIN — Medication 3.65 MICROGRAM(S)/KG/MIN: at 01:45

## 2020-01-01 RX ADMIN — Medication 200 MILLIGRAM(S): at 13:59

## 2020-01-01 RX ADMIN — Medication 7.48 MICROGRAM(S)/KG/MIN: at 09:59

## 2020-01-01 RX ADMIN — Medication 3.65 MICROGRAM(S)/KG/MIN: at 08:09

## 2020-01-01 RX ADMIN — ENOXAPARIN SODIUM 80 MILLIGRAM(S): 100 INJECTION SUBCUTANEOUS at 07:35

## 2020-01-01 RX ADMIN — CHLORHEXIDINE GLUCONATE 1 APPLICATION(S): 213 SOLUTION TOPICAL at 04:36

## 2020-01-01 RX ADMIN — Medication 250 MILLIGRAM(S): at 23:41

## 2020-01-01 RX ADMIN — Medication 100 GRAM(S): at 04:06

## 2020-01-01 RX ADMIN — MIDAZOLAM HYDROCHLORIDE 4 MILLIGRAM(S): 1 INJECTION, SOLUTION INTRAMUSCULAR; INTRAVENOUS at 05:50

## 2020-01-01 RX ADMIN — CHLORHEXIDINE GLUCONATE 15 MILLILITER(S): 213 SOLUTION TOPICAL at 17:07

## 2020-01-01 RX ADMIN — Medication 50 MILLILITER(S): at 08:34

## 2020-01-01 RX ADMIN — PANTOPRAZOLE SODIUM 40 MILLIGRAM(S): 20 TABLET, DELAYED RELEASE ORAL at 05:16

## 2020-01-01 RX ADMIN — PANTOPRAZOLE SODIUM 40 MILLIGRAM(S): 20 TABLET, DELAYED RELEASE ORAL at 04:01

## 2020-01-01 RX ADMIN — Medication 650 MILLIGRAM(S): at 00:21

## 2020-01-01 RX ADMIN — CHLORHEXIDINE GLUCONATE 15 MILLILITER(S): 213 SOLUTION TOPICAL at 04:09

## 2020-01-01 RX ADMIN — SODIUM CHLORIDE 1000 MILLILITER(S): 9 INJECTION INTRAMUSCULAR; INTRAVENOUS; SUBCUTANEOUS at 05:52

## 2020-01-01 RX ADMIN — Medication 2: at 17:25

## 2020-01-01 RX ADMIN — CHLORHEXIDINE GLUCONATE 15 MILLILITER(S): 213 SOLUTION TOPICAL at 16:23

## 2020-01-01 RX ADMIN — ENOXAPARIN SODIUM 80 MILLIGRAM(S): 100 INJECTION SUBCUTANEOUS at 04:02

## 2020-01-01 RX ADMIN — CHLORHEXIDINE GLUCONATE 15 MILLILITER(S): 213 SOLUTION TOPICAL at 04:03

## 2020-01-01 RX ADMIN — POLYETHYLENE GLYCOL 3350 17 GRAM(S): 17 POWDER, FOR SOLUTION ORAL at 21:51

## 2020-01-01 RX ADMIN — SENNA PLUS 10 MILLILITER(S): 8.6 TABLET ORAL at 20:54

## 2020-01-01 RX ADMIN — MIDAZOLAM HYDROCHLORIDE 2 MILLIGRAM(S): 1 INJECTION, SOLUTION INTRAMUSCULAR; INTRAVENOUS at 01:40

## 2020-01-01 RX ADMIN — Medication 40 MILLIEQUIVALENT(S): at 06:22

## 2020-01-01 RX ADMIN — Medication 100 MILLIEQUIVALENT(S): at 18:48

## 2020-01-01 RX ADMIN — FAMOTIDINE 20 MILLIGRAM(S): 10 INJECTION INTRAVENOUS at 04:07

## 2020-01-01 RX ADMIN — Medication 200 GRAM(S): at 05:16

## 2020-01-01 RX ADMIN — Medication 100 GRAM(S): at 02:15

## 2020-01-01 RX ADMIN — CHLORHEXIDINE GLUCONATE 15 MILLILITER(S): 213 SOLUTION TOPICAL at 04:24

## 2020-01-01 RX ADMIN — CHLORHEXIDINE GLUCONATE 15 MILLILITER(S): 213 SOLUTION TOPICAL at 17:43

## 2020-01-01 RX ADMIN — CHLORHEXIDINE GLUCONATE 15 MILLILITER(S): 213 SOLUTION TOPICAL at 04:12

## 2020-01-01 RX ADMIN — Medication 25 MILLILITER(S): at 09:50

## 2020-01-01 RX ADMIN — ENOXAPARIN SODIUM 80 MILLIGRAM(S): 100 INJECTION SUBCUTANEOUS at 17:28

## 2020-01-01 RX ADMIN — FENTANYL CITRATE 1 PATCH: 50 INJECTION INTRAVENOUS at 11:28

## 2020-01-01 RX ADMIN — Medication 1 APPLICATION(S): at 05:18

## 2020-01-01 RX ADMIN — ALBUTEROL 2 PUFF(S): 90 AEROSOL, METERED ORAL at 18:41

## 2020-01-01 RX ADMIN — CHLORHEXIDINE GLUCONATE 15 MILLILITER(S): 213 SOLUTION TOPICAL at 04:01

## 2020-01-01 RX ADMIN — Medication 250 MILLIGRAM(S): at 04:08

## 2020-01-01 RX ADMIN — Medication 100 MILLIGRAM(S): at 20:28

## 2020-01-01 RX ADMIN — Medication 402.48 MILLIGRAM(S): at 04:41

## 2020-01-01 RX ADMIN — Medication 500 MILLIGRAM(S): at 06:41

## 2020-01-01 RX ADMIN — FAMOTIDINE 20 MILLIGRAM(S): 10 INJECTION INTRAVENOUS at 17:07

## 2020-01-01 RX ADMIN — FAMOTIDINE 20 MILLIGRAM(S): 10 INJECTION INTRAVENOUS at 17:11

## 2020-01-01 RX ADMIN — METHADONE HYDROCHLORIDE 10 MILLIGRAM(S): 40 TABLET ORAL at 18:28

## 2020-01-01 RX ADMIN — FENTANYL CITRATE 1 PATCH: 50 INJECTION INTRAVENOUS at 11:29

## 2020-01-01 RX ADMIN — FAMOTIDINE 20 MILLIGRAM(S): 10 INJECTION INTRAVENOUS at 16:48

## 2020-01-01 RX ADMIN — Medication 0.5 MILLIGRAM(S): at 12:50

## 2020-01-01 RX ADMIN — FAMOTIDINE 20 MILLIGRAM(S): 10 INJECTION INTRAVENOUS at 04:02

## 2020-01-01 RX ADMIN — MIDAZOLAM HYDROCHLORIDE 11.7 MG/KG/HR: 1 INJECTION, SOLUTION INTRAMUSCULAR; INTRAVENOUS at 09:00

## 2020-01-01 RX ADMIN — CHLORHEXIDINE GLUCONATE 15 MILLILITER(S): 213 SOLUTION TOPICAL at 18:06

## 2020-01-01 RX ADMIN — FAMOTIDINE 20 MILLIGRAM(S): 10 INJECTION INTRAVENOUS at 16:39

## 2020-01-01 RX ADMIN — METHADONE HYDROCHLORIDE 10 MILLIGRAM(S): 40 TABLET ORAL at 16:32

## 2020-01-01 RX ADMIN — Medication 250 MILLIGRAM(S): at 22:13

## 2020-01-01 RX ADMIN — DEXMEDETOMIDINE HYDROCHLORIDE IN 0.9% SODIUM CHLORIDE 5 MICROGRAM(S)/KG/HR: 4 INJECTION INTRAVENOUS at 08:38

## 2020-01-01 RX ADMIN — Medication 1 APPLICATION(S): at 16:21

## 2020-01-01 RX ADMIN — Medication 1 TABLET(S): at 10:20

## 2020-01-01 RX ADMIN — Medication 100 MILLIGRAM(S): at 21:03

## 2020-01-01 RX ADMIN — ENOXAPARIN SODIUM 40 MILLIGRAM(S): 100 INJECTION SUBCUTANEOUS at 06:42

## 2020-01-01 RX ADMIN — PHENYLEPHRINE HYDROCHLORIDE 7.3 MICROGRAM(S)/KG/MIN: 10 INJECTION INTRAVENOUS at 07:00

## 2020-01-01 RX ADMIN — CHLORHEXIDINE GLUCONATE 15 MILLILITER(S): 213 SOLUTION TOPICAL at 04:49

## 2020-01-01 RX ADMIN — Medication 403.08 MILLIGRAM(S): at 11:33

## 2020-01-01 RX ADMIN — Medication 40 MILLIEQUIVALENT(S): at 21:31

## 2020-01-01 RX ADMIN — Medication 2 MILLIGRAM(S): at 21:24

## 2020-01-01 RX ADMIN — CHLORHEXIDINE GLUCONATE 15 MILLILITER(S): 213 SOLUTION TOPICAL at 16:57

## 2020-01-01 RX ADMIN — CEFEPIME 100 MILLIGRAM(S): 1 INJECTION, POWDER, FOR SOLUTION INTRAMUSCULAR; INTRAVENOUS at 21:06

## 2020-01-01 RX ADMIN — CEFEPIME 100 MILLIGRAM(S): 1 INJECTION, POWDER, FOR SOLUTION INTRAMUSCULAR; INTRAVENOUS at 12:06

## 2020-01-01 RX ADMIN — Medication 1 APPLICATION(S): at 17:00

## 2020-01-01 RX ADMIN — Medication 1 TABLET(S): at 15:25

## 2020-01-01 RX ADMIN — Medication 100 MILLIGRAM(S): at 11:00

## 2020-01-01 RX ADMIN — Medication 5 MILLIGRAM(S): at 02:15

## 2020-01-01 RX ADMIN — DEXMEDETOMIDINE HYDROCHLORIDE IN 0.9% SODIUM CHLORIDE 19.5 MICROGRAM(S)/KG/HR: 4 INJECTION INTRAVENOUS at 01:05

## 2020-01-01 RX ADMIN — Medication 100 MILLIGRAM(S): at 06:15

## 2020-01-01 RX ADMIN — PROPOFOL 35.1 MICROGRAM(S)/KG/MIN: 10 INJECTION, EMULSION INTRAVENOUS at 07:36

## 2020-01-01 RX ADMIN — MAGNESIUM OXIDE 400 MG ORAL TABLET 400 MILLIGRAM(S): 241.3 TABLET ORAL at 13:18

## 2020-01-01 RX ADMIN — ENOXAPARIN SODIUM 80 MILLIGRAM(S): 100 INJECTION SUBCUTANEOUS at 04:14

## 2020-01-01 RX ADMIN — DEXMEDETOMIDINE HYDROCHLORIDE IN 0.9% SODIUM CHLORIDE 5 MICROGRAM(S)/KG/HR: 4 INJECTION INTRAVENOUS at 18:00

## 2020-01-01 RX ADMIN — Medication 40 MILLIGRAM(S): at 06:16

## 2020-01-01 RX ADMIN — ENOXAPARIN SODIUM 80 MILLIGRAM(S): 100 INJECTION SUBCUTANEOUS at 16:23

## 2020-01-01 RX ADMIN — Medication 400 MILLIGRAM(S): at 01:40

## 2020-01-01 RX ADMIN — Medication 1 APPLICATION(S): at 16:32

## 2020-01-01 RX ADMIN — Medication 650 MILLIGRAM(S): at 23:21

## 2020-01-01 RX ADMIN — Medication 40 MILLIEQUIVALENT(S): at 14:54

## 2020-01-01 RX ADMIN — Medication 30 MILLIGRAM(S): at 06:48

## 2020-01-01 RX ADMIN — Medication 402.48 MILLIGRAM(S): at 17:43

## 2020-01-01 RX ADMIN — FAMOTIDINE 20 MILLIGRAM(S): 10 INJECTION INTRAVENOUS at 04:14

## 2020-01-01 RX ADMIN — CHLORHEXIDINE GLUCONATE 1 APPLICATION(S): 213 SOLUTION TOPICAL at 05:06

## 2020-01-01 RX ADMIN — Medication 100 MILLIGRAM(S): at 04:05

## 2020-01-01 RX ADMIN — MEROPENEM 100 MILLIGRAM(S): 1 INJECTION INTRAVENOUS at 20:29

## 2020-01-01 RX ADMIN — Medication 200 GRAM(S): at 00:50

## 2020-01-01 RX ADMIN — Medication 1 ENEMA: at 19:51

## 2020-01-01 RX ADMIN — Medication 1 APPLICATION(S): at 04:24

## 2020-01-01 RX ADMIN — Medication 100 MILLIGRAM(S): at 04:09

## 2020-01-01 RX ADMIN — FAMOTIDINE 20 MILLIGRAM(S): 10 INJECTION INTRAVENOUS at 04:13

## 2020-01-01 RX ADMIN — SODIUM CHLORIDE 2 GRAM(S): 9 INJECTION INTRAMUSCULAR; INTRAVENOUS; SUBCUTANEOUS at 23:10

## 2020-01-01 RX ADMIN — FENTANYL CITRATE 1.95 MICROGRAM(S)/KG/HR: 50 INJECTION INTRAVENOUS at 07:00

## 2020-01-01 RX ADMIN — FAMOTIDINE 20 MILLIGRAM(S): 10 INJECTION INTRAVENOUS at 04:05

## 2020-01-01 RX ADMIN — FAMOTIDINE 20 MILLIGRAM(S): 10 INJECTION INTRAVENOUS at 18:11

## 2020-01-01 RX ADMIN — Medication 500 MILLIGRAM(S): at 12:24

## 2020-01-01 RX ADMIN — Medication 40 MILLIGRAM(S): at 17:33

## 2020-01-01 RX ADMIN — CHLORHEXIDINE GLUCONATE 15 MILLILITER(S): 213 SOLUTION TOPICAL at 04:13

## 2020-01-01 RX ADMIN — ALBUTEROL 2 PUFF(S): 90 AEROSOL, METERED ORAL at 12:51

## 2020-01-01 RX ADMIN — SODIUM CHLORIDE 1000 MILLILITER(S): 9 INJECTION INTRAMUSCULAR; INTRAVENOUS; SUBCUTANEOUS at 20:00

## 2020-01-01 RX ADMIN — Medication 166.67 MILLIGRAM(S): at 14:01

## 2020-01-01 RX ADMIN — Medication 100 MILLIGRAM(S): at 20:34

## 2020-01-01 RX ADMIN — FAMOTIDINE 20 MILLIGRAM(S): 10 INJECTION INTRAVENOUS at 16:36

## 2020-01-01 RX ADMIN — FAMOTIDINE 20 MILLIGRAM(S): 10 INJECTION INTRAVENOUS at 11:19

## 2020-01-01 RX ADMIN — Medication 40 MILLIGRAM(S): at 13:12

## 2020-01-01 RX ADMIN — MEROPENEM 100 MILLIGRAM(S): 1 INJECTION INTRAVENOUS at 13:18

## 2020-01-01 RX ADMIN — Medication 3.65 MICROGRAM(S)/KG/MIN: at 22:41

## 2020-01-01 RX ADMIN — Medication 1 APPLICATION(S): at 04:15

## 2020-01-01 RX ADMIN — ALBUTEROL 2 PUFF(S): 90 AEROSOL, METERED ORAL at 23:54

## 2020-01-01 RX ADMIN — MIDAZOLAM HYDROCHLORIDE 4 MILLIGRAM(S): 1 INJECTION, SOLUTION INTRAMUSCULAR; INTRAVENOUS at 00:01

## 2020-01-01 RX ADMIN — Medication 100 MILLIGRAM(S): at 10:53

## 2020-01-01 RX ADMIN — FLUCONAZOLE 100 MILLIGRAM(S): 150 TABLET ORAL at 18:31

## 2020-01-01 RX ADMIN — Medication 20 MILLIEQUIVALENT(S): at 04:09

## 2020-01-01 RX ADMIN — Medication 200 GRAM(S): at 07:05

## 2020-01-01 RX ADMIN — Medication 50 MILLIEQUIVALENT(S): at 13:54

## 2020-01-01 RX ADMIN — CHLORHEXIDINE GLUCONATE 1 APPLICATION(S): 213 SOLUTION TOPICAL at 04:40

## 2020-01-01 RX ADMIN — QUETIAPINE FUMARATE 12.5 MILLIGRAM(S): 200 TABLET, FILM COATED ORAL at 04:06

## 2020-01-01 RX ADMIN — MEROPENEM 100 MILLIGRAM(S): 1 INJECTION INTRAVENOUS at 12:11

## 2020-01-01 RX ADMIN — Medication 20 MILLIGRAM(S): at 01:02

## 2020-01-01 RX ADMIN — SODIUM CHLORIDE 3 MILLILITER(S): 9 INJECTION INTRAMUSCULAR; INTRAVENOUS; SUBCUTANEOUS at 23:21

## 2020-01-01 RX ADMIN — Medication 10 MILLIGRAM(S): at 05:36

## 2020-01-01 RX ADMIN — ENOXAPARIN SODIUM 80 MILLIGRAM(S): 100 INJECTION SUBCUTANEOUS at 04:30

## 2020-01-01 RX ADMIN — CEFTRIAXONE 100 MILLIGRAM(S): 500 INJECTION, POWDER, FOR SOLUTION INTRAMUSCULAR; INTRAVENOUS at 16:18

## 2020-01-01 RX ADMIN — Medication 5 MILLIGRAM(S): at 21:37

## 2020-01-01 RX ADMIN — Medication 2 MILLIGRAM(S): at 04:42

## 2020-01-01 RX ADMIN — Medication 50 MILLIEQUIVALENT(S): at 22:05

## 2020-01-01 RX ADMIN — Medication 402.48 MILLIGRAM(S): at 16:33

## 2020-01-01 RX ADMIN — Medication 100 MILLIGRAM(S): at 20:18

## 2020-01-01 RX ADMIN — Medication 10 MILLIGRAM(S): at 11:56

## 2020-01-01 RX ADMIN — Medication 400 MILLIGRAM(S): at 21:54

## 2020-01-01 RX ADMIN — FAMOTIDINE 20 MILLIGRAM(S): 10 INJECTION INTRAVENOUS at 17:06

## 2020-01-01 RX ADMIN — Medication 1 APPLICATION(S): at 04:09

## 2020-01-01 RX ADMIN — CHLORHEXIDINE GLUCONATE 15 MILLILITER(S): 213 SOLUTION TOPICAL at 16:28

## 2020-01-01 RX ADMIN — SODIUM CHLORIDE 2 GRAM(S): 9 INJECTION INTRAMUSCULAR; INTRAVENOUS; SUBCUTANEOUS at 04:07

## 2020-01-01 RX ADMIN — MIDAZOLAM HYDROCHLORIDE 6 MILLIGRAM(S): 1 INJECTION, SOLUTION INTRAMUSCULAR; INTRAVENOUS at 05:15

## 2020-01-01 RX ADMIN — CHLORHEXIDINE GLUCONATE 1 APPLICATION(S): 213 SOLUTION TOPICAL at 04:14

## 2020-01-01 RX ADMIN — CHLORHEXIDINE GLUCONATE 1 APPLICATION(S): 213 SOLUTION TOPICAL at 04:15

## 2020-01-01 RX ADMIN — CHLORHEXIDINE GLUCONATE 15 MILLILITER(S): 213 SOLUTION TOPICAL at 16:47

## 2020-01-01 RX ADMIN — MAGNESIUM OXIDE 400 MG ORAL TABLET 400 MILLIGRAM(S): 241.3 TABLET ORAL at 12:24

## 2020-01-01 RX ADMIN — METHADONE HYDROCHLORIDE 10 MILLIGRAM(S): 40 TABLET ORAL at 16:23

## 2020-01-01 RX ADMIN — Medication 650 MILLIGRAM(S): at 01:33

## 2020-01-01 RX ADMIN — Medication 10 MILLIGRAM(S): at 20:49

## 2020-01-01 RX ADMIN — ENOXAPARIN SODIUM 80 MILLIGRAM(S): 100 INJECTION SUBCUTANEOUS at 04:01

## 2020-01-01 RX ADMIN — MEROPENEM 100 MILLIGRAM(S): 1 INJECTION INTRAVENOUS at 11:39

## 2020-01-01 RX ADMIN — MIDAZOLAM HYDROCHLORIDE 1.56 MG/KG/HR: 1 INJECTION, SOLUTION INTRAMUSCULAR; INTRAVENOUS at 10:33

## 2020-01-01 RX ADMIN — Medication 1 APPLICATION(S): at 16:10

## 2020-01-01 RX ADMIN — ENOXAPARIN SODIUM 40 MILLIGRAM(S): 100 INJECTION SUBCUTANEOUS at 11:06

## 2020-01-01 RX ADMIN — ENOXAPARIN SODIUM 40 MILLIGRAM(S): 100 INJECTION SUBCUTANEOUS at 11:58

## 2020-01-01 RX ADMIN — Medication 650 MILLIGRAM(S): at 17:34

## 2020-01-01 RX ADMIN — Medication 40 MILLIGRAM(S): at 08:30

## 2020-01-01 RX ADMIN — ALTEPLASE 2 MILLIGRAM(S): KIT at 18:22

## 2020-01-01 RX ADMIN — Medication 50 MILLIGRAM(S): at 14:01

## 2020-01-01 RX ADMIN — PIPERACILLIN AND TAZOBACTAM 200 GRAM(S): 4; .5 INJECTION, POWDER, LYOPHILIZED, FOR SOLUTION INTRAVENOUS at 22:13

## 2020-01-01 RX ADMIN — Medication 200 MILLIGRAM(S): at 21:09

## 2020-01-01 RX ADMIN — LACTULOSE 10 GRAM(S): 10 SOLUTION ORAL at 04:23

## 2020-01-01 RX ADMIN — ENOXAPARIN SODIUM 80 MILLIGRAM(S): 100 INJECTION SUBCUTANEOUS at 05:30

## 2020-01-01 RX ADMIN — ZINC SULFATE TAB 220 MG (50 MG ZINC EQUIVALENT) 220 MILLIGRAM(S): 220 (50 ZN) TAB at 12:24

## 2020-01-01 RX ADMIN — Medication 40 MILLIGRAM(S): at 23:30

## 2020-01-01 RX ADMIN — Medication 3.65 MICROGRAM(S)/KG/MIN: at 07:37

## 2020-01-01 RX ADMIN — ENOXAPARIN SODIUM 40 MILLIGRAM(S): 100 INJECTION SUBCUTANEOUS at 17:07

## 2020-01-01 RX ADMIN — FAMOTIDINE 20 MILLIGRAM(S): 10 INJECTION INTRAVENOUS at 16:58

## 2020-01-01 RX ADMIN — Medication 1 MILLIGRAM(S): at 11:00

## 2020-01-01 RX ADMIN — PHENYLEPHRINE HYDROCHLORIDE 1.46 MICROGRAM(S)/KG/MIN: 10 INJECTION INTRAVENOUS at 09:58

## 2020-01-01 RX ADMIN — FAMOTIDINE 20 MILLIGRAM(S): 10 INJECTION INTRAVENOUS at 17:03

## 2020-01-01 RX ADMIN — Medication 402.48 MILLIGRAM(S): at 18:01

## 2020-01-01 RX ADMIN — ENOXAPARIN SODIUM 40 MILLIGRAM(S): 100 INJECTION SUBCUTANEOUS at 10:53

## 2020-01-01 RX ADMIN — CHLORHEXIDINE GLUCONATE 15 MILLILITER(S): 213 SOLUTION TOPICAL at 18:31

## 2020-01-01 RX ADMIN — Medication 402.48 MILLIGRAM(S): at 18:28

## 2020-01-01 RX ADMIN — MAGNESIUM OXIDE 400 MG ORAL TABLET 400 MILLIGRAM(S): 241.3 TABLET ORAL at 10:20

## 2020-01-01 RX ADMIN — Medication 10 MILLIGRAM(S): at 21:20

## 2020-01-01 RX ADMIN — Medication 1 MILLIGRAM(S): at 10:05

## 2020-01-01 RX ADMIN — ENOXAPARIN SODIUM 80 MILLIGRAM(S): 100 INJECTION SUBCUTANEOUS at 17:33

## 2020-01-01 RX ADMIN — Medication 1 APPLICATION(S): at 17:44

## 2020-01-01 RX ADMIN — Medication 1 APPLICATION(S): at 17:33

## 2020-01-01 RX ADMIN — Medication 20 MILLIEQUIVALENT(S): at 15:25

## 2020-01-01 RX ADMIN — Medication 250 MILLIGRAM(S): at 00:40

## 2020-01-01 RX ADMIN — ENOXAPARIN SODIUM 80 MILLIGRAM(S): 100 INJECTION SUBCUTANEOUS at 17:10

## 2020-01-01 RX ADMIN — FENTANYL CITRATE 1.95 MICROGRAM(S)/KG/HR: 50 INJECTION INTRAVENOUS at 21:35

## 2020-01-01 RX ADMIN — CHLORHEXIDINE GLUCONATE 15 MILLILITER(S): 213 SOLUTION TOPICAL at 03:16

## 2020-01-01 RX ADMIN — FAMOTIDINE 20 MILLIGRAM(S): 10 INJECTION INTRAVENOUS at 17:09

## 2020-01-01 RX ADMIN — OLANZAPINE 5 MILLIGRAM(S): 15 TABLET, FILM COATED ORAL at 20:27

## 2020-01-01 RX ADMIN — Medication 40 MILLIGRAM(S): at 21:31

## 2020-01-01 RX ADMIN — CHLORHEXIDINE GLUCONATE 15 MILLILITER(S): 213 SOLUTION TOPICAL at 18:14

## 2020-01-01 RX ADMIN — Medication 65 MILLIGRAM(S): at 03:15

## 2020-01-01 RX ADMIN — ENOXAPARIN SODIUM 80 MILLIGRAM(S): 100 INJECTION SUBCUTANEOUS at 17:39

## 2020-01-01 RX ADMIN — Medication 1 APPLICATION(S): at 04:08

## 2020-01-01 RX ADMIN — Medication 10 MILLIGRAM(S): at 00:26

## 2020-01-01 RX ADMIN — Medication 65 MILLIGRAM(S): at 18:54

## 2020-01-01 RX ADMIN — Medication 500 MILLIGRAM(S): at 04:36

## 2020-01-01 RX ADMIN — MAGNESIUM OXIDE 400 MG ORAL TABLET 400 MILLIGRAM(S): 241.3 TABLET ORAL at 11:19

## 2020-01-01 RX ADMIN — CHLORHEXIDINE GLUCONATE 1 APPLICATION(S): 213 SOLUTION TOPICAL at 03:16

## 2020-01-01 RX ADMIN — CHLORHEXIDINE GLUCONATE 1 APPLICATION(S): 213 SOLUTION TOPICAL at 04:01

## 2020-01-01 RX ADMIN — MIDAZOLAM HYDROCHLORIDE 1.56 MG/KG/HR: 1 INJECTION, SOLUTION INTRAMUSCULAR; INTRAVENOUS at 11:16

## 2020-01-01 RX ADMIN — Medication 10 MILLIGRAM(S): at 13:24

## 2020-01-01 RX ADMIN — PANTOPRAZOLE SODIUM 40 MILLIGRAM(S): 20 TABLET, DELAYED RELEASE ORAL at 16:00

## 2020-01-01 RX ADMIN — Medication 1 APPLICATION(S): at 04:03

## 2020-01-01 RX ADMIN — FAMOTIDINE 20 MILLIGRAM(S): 10 INJECTION INTRAVENOUS at 16:28

## 2020-01-01 RX ADMIN — Medication 1 APPLICATION(S): at 02:58

## 2020-01-01 RX ADMIN — ENOXAPARIN SODIUM 40 MILLIGRAM(S): 100 INJECTION SUBCUTANEOUS at 08:06

## 2020-01-01 RX ADMIN — Medication 0.5 MILLIGRAM(S): at 18:28

## 2020-01-01 RX ADMIN — SODIUM CHLORIDE 2 GRAM(S): 9 INJECTION INTRAMUSCULAR; INTRAVENOUS; SUBCUTANEOUS at 12:24

## 2020-01-01 RX ADMIN — Medication 65 MILLIGRAM(S): at 23:46

## 2020-01-01 RX ADMIN — FAMOTIDINE 20 MILLIGRAM(S): 10 INJECTION INTRAVENOUS at 17:19

## 2020-01-01 RX ADMIN — SODIUM CHLORIDE 2 GRAM(S): 9 INJECTION INTRAMUSCULAR; INTRAVENOUS; SUBCUTANEOUS at 04:02

## 2020-01-01 RX ADMIN — CHLORHEXIDINE GLUCONATE 15 MILLILITER(S): 213 SOLUTION TOPICAL at 05:06

## 2020-01-01 RX ADMIN — Medication 20 MILLIGRAM(S): at 01:50

## 2020-01-01 RX ADMIN — FENTANYL CITRATE 100 MICROGRAM(S): 50 INJECTION INTRAVENOUS at 23:16

## 2020-01-01 RX ADMIN — Medication 1 APPLICATION(S): at 04:36

## 2020-01-01 RX ADMIN — Medication 1 APPLICATION(S): at 03:16

## 2020-01-01 RX ADMIN — Medication 0.5 MILLIGRAM(S): at 04:10

## 2020-01-01 RX ADMIN — Medication 1 APPLICATION(S): at 04:01

## 2020-01-01 RX ADMIN — FENTANYL CITRATE 19.5 MICROGRAM(S)/KG/HR: 50 INJECTION INTRAVENOUS at 04:39

## 2020-01-01 RX ADMIN — Medication 50 MILLIGRAM(S): at 00:00

## 2020-01-01 RX ADMIN — FAMOTIDINE 20 MILLIGRAM(S): 10 INJECTION INTRAVENOUS at 16:24

## 2020-01-01 RX ADMIN — ALBUTEROL 2 PUFF(S): 90 AEROSOL, METERED ORAL at 06:26

## 2020-01-01 RX ADMIN — PHENYLEPHRINE HYDROCHLORIDE 1.46 MICROGRAM(S)/KG/MIN: 10 INJECTION INTRAVENOUS at 18:00

## 2020-01-01 RX ADMIN — QUETIAPINE FUMARATE 25 MILLIGRAM(S): 200 TABLET, FILM COATED ORAL at 22:05

## 2020-01-01 RX ADMIN — Medication 65 MILLIGRAM(S): at 01:00

## 2020-01-01 RX ADMIN — SODIUM CHLORIDE 1000 MILLILITER(S): 9 INJECTION INTRAMUSCULAR; INTRAVENOUS; SUBCUTANEOUS at 01:30

## 2020-01-01 RX ADMIN — FAMOTIDINE 20 MILLIGRAM(S): 10 INJECTION INTRAVENOUS at 12:12

## 2020-01-01 RX ADMIN — POLYETHYLENE GLYCOL 3350 17 GRAM(S): 17 POWDER, FOR SOLUTION ORAL at 04:19

## 2020-01-01 RX ADMIN — Medication 100 MILLIGRAM(S): at 12:27

## 2020-01-01 RX ADMIN — Medication 500 MILLIGRAM(S): at 11:19

## 2020-01-01 RX ADMIN — FLUCONAZOLE 100 MILLIGRAM(S): 150 TABLET ORAL at 17:08

## 2020-01-01 RX ADMIN — Medication 20 MILLIEQUIVALENT(S): at 04:44

## 2020-01-01 RX ADMIN — MIDAZOLAM HYDROCHLORIDE 2 MILLIGRAM(S): 1 INJECTION, SOLUTION INTRAMUSCULAR; INTRAVENOUS at 18:39

## 2020-01-01 RX ADMIN — Medication 65 MILLIGRAM(S): at 07:51

## 2020-01-01 RX ADMIN — FENTANYL CITRATE 1.95 MICROGRAM(S)/KG/HR: 50 INJECTION INTRAVENOUS at 23:43

## 2020-01-01 RX ADMIN — FAMOTIDINE 20 MILLIGRAM(S): 10 INJECTION INTRAVENOUS at 11:39

## 2020-01-01 RX ADMIN — CHLORHEXIDINE GLUCONATE 15 MILLILITER(S): 213 SOLUTION TOPICAL at 17:10

## 2020-01-01 RX ADMIN — Medication 650 MILLIGRAM(S): at 21:53

## 2020-01-01 RX ADMIN — Medication 1 APPLICATION(S): at 17:40

## 2020-01-01 RX ADMIN — FENTANYL CITRATE 1.95 MICROGRAM(S)/KG/HR: 50 INJECTION INTRAVENOUS at 18:00

## 2020-01-01 RX ADMIN — CHLORHEXIDINE GLUCONATE 15 MILLILITER(S): 213 SOLUTION TOPICAL at 04:39

## 2020-01-01 RX ADMIN — METHADONE HYDROCHLORIDE 10 MILLIGRAM(S): 40 TABLET ORAL at 22:21

## 2020-01-01 RX ADMIN — Medication 100 MILLIGRAM(S): at 10:45

## 2020-01-01 RX ADMIN — Medication 500 MILLIGRAM(S): at 13:24

## 2020-01-01 RX ADMIN — Medication 250 MILLIGRAM(S): at 16:05

## 2020-01-01 RX ADMIN — ENOXAPARIN SODIUM 40 MILLIGRAM(S): 100 INJECTION SUBCUTANEOUS at 18:14

## 2020-01-01 RX ADMIN — Medication 1 APPLICATION(S): at 16:01

## 2020-01-01 RX ADMIN — ENOXAPARIN SODIUM 80 MILLIGRAM(S): 100 INJECTION SUBCUTANEOUS at 16:31

## 2020-01-01 RX ADMIN — CHLORHEXIDINE GLUCONATE 15 MILLILITER(S): 213 SOLUTION TOPICAL at 16:01

## 2020-01-01 RX ADMIN — DEXMEDETOMIDINE HYDROCHLORIDE IN 0.9% SODIUM CHLORIDE 19.5 MICROGRAM(S)/KG/HR: 4 INJECTION INTRAVENOUS at 09:10

## 2020-01-01 RX ADMIN — PHENYLEPHRINE HYDROCHLORIDE 7.3 MICROGRAM(S)/KG/MIN: 10 INJECTION INTRAVENOUS at 20:55

## 2020-01-01 RX ADMIN — Medication 300 MILLIGRAM(S): at 04:42

## 2020-01-01 RX ADMIN — SODIUM CHLORIDE 3 MILLILITER(S): 9 INJECTION INTRAMUSCULAR; INTRAVENOUS; SUBCUTANEOUS at 23:52

## 2020-01-01 RX ADMIN — CEFTRIAXONE 100 MILLIGRAM(S): 500 INJECTION, POWDER, FOR SOLUTION INTRAMUSCULAR; INTRAVENOUS at 15:30

## 2020-01-01 RX ADMIN — METHADONE HYDROCHLORIDE 10 MILLIGRAM(S): 40 TABLET ORAL at 04:09

## 2020-01-01 RX ADMIN — Medication 10 MILLIGRAM(S): at 14:11

## 2020-01-01 RX ADMIN — CHLORHEXIDINE GLUCONATE 1 APPLICATION(S): 213 SOLUTION TOPICAL at 02:58

## 2020-01-01 RX ADMIN — Medication 100 MILLIGRAM(S): at 13:05

## 2020-01-01 RX ADMIN — ENOXAPARIN SODIUM 40 MILLIGRAM(S): 100 INJECTION SUBCUTANEOUS at 16:10

## 2020-01-01 RX ADMIN — Medication 200 MILLIGRAM(S): at 10:45

## 2020-01-01 RX ADMIN — ENOXAPARIN SODIUM 80 MILLIGRAM(S): 100 INJECTION SUBCUTANEOUS at 17:04

## 2020-01-01 RX ADMIN — PHENYLEPHRINE HYDROCHLORIDE 1.46 MICROGRAM(S)/KG/MIN: 10 INJECTION INTRAVENOUS at 17:37

## 2020-01-01 RX ADMIN — CHLORHEXIDINE GLUCONATE 15 MILLILITER(S): 213 SOLUTION TOPICAL at 04:02

## 2020-01-01 RX ADMIN — FAMOTIDINE 20 MILLIGRAM(S): 10 INJECTION INTRAVENOUS at 04:09

## 2020-01-01 RX ADMIN — Medication 250 MILLIGRAM(S): at 04:28

## 2020-01-01 RX ADMIN — Medication 7.48 MICROGRAM(S)/KG/MIN: at 22:00

## 2020-01-01 RX ADMIN — FAMOTIDINE 20 MILLIGRAM(S): 10 INJECTION INTRAVENOUS at 07:35

## 2020-01-01 RX ADMIN — Medication 402.48 MILLIGRAM(S): at 17:18

## 2020-01-01 RX ADMIN — FAMOTIDINE 20 MILLIGRAM(S): 10 INJECTION INTRAVENOUS at 11:51

## 2020-01-01 RX ADMIN — PROPOFOL 35.1 MICROGRAM(S)/KG/MIN: 10 INJECTION, EMULSION INTRAVENOUS at 07:00

## 2020-01-01 RX ADMIN — POTASSIUM PHOSPHATE, MONOBASIC POTASSIUM PHOSPHATE, DIBASIC 83.33 MILLIMOLE(S): 236; 224 INJECTION, SOLUTION INTRAVENOUS at 05:57

## 2020-01-01 RX ADMIN — Medication 40 MILLIEQUIVALENT(S): at 08:53

## 2020-01-01 RX ADMIN — METHADONE HYDROCHLORIDE 10 MILLIGRAM(S): 40 TABLET ORAL at 22:36

## 2020-01-01 RX ADMIN — ROBINUL 0.2 MILLIGRAM(S): 0.2 INJECTION INTRAMUSCULAR; INTRAVENOUS at 04:42

## 2020-01-01 RX ADMIN — Medication 100 MILLIGRAM(S): at 04:36

## 2020-01-01 RX ADMIN — PROPOFOL 35.1 MICROGRAM(S)/KG/MIN: 10 INJECTION, EMULSION INTRAVENOUS at 20:12

## 2020-01-01 RX ADMIN — PHENYLEPHRINE HYDROCHLORIDE 1.46 MICROGRAM(S)/KG/MIN: 10 INJECTION INTRAVENOUS at 08:39

## 2020-01-01 RX ADMIN — Medication 402.48 MILLIGRAM(S): at 04:06

## 2020-01-01 RX ADMIN — ENOXAPARIN SODIUM 40 MILLIGRAM(S): 100 INJECTION SUBCUTANEOUS at 16:00

## 2020-01-01 RX ADMIN — Medication 1 APPLICATION(S): at 16:24

## 2020-01-01 RX ADMIN — ENOXAPARIN SODIUM 80 MILLIGRAM(S): 100 INJECTION SUBCUTANEOUS at 04:09

## 2020-01-01 RX ADMIN — FENTANYL CITRATE 1.95 MICROGRAM(S)/KG/HR: 50 INJECTION INTRAVENOUS at 10:28

## 2020-01-01 RX ADMIN — Medication 100 MILLIGRAM(S): at 20:49

## 2020-01-01 RX ADMIN — Medication 100 GRAM(S): at 22:36

## 2020-01-01 RX ADMIN — MAGNESIUM OXIDE 400 MG ORAL TABLET 400 MILLIGRAM(S): 241.3 TABLET ORAL at 12:31

## 2020-01-01 RX ADMIN — CEFEPIME 100 MILLIGRAM(S): 1 INJECTION, POWDER, FOR SOLUTION INTRAMUSCULAR; INTRAVENOUS at 23:35

## 2020-01-01 RX ADMIN — DEXMEDETOMIDINE HYDROCHLORIDE IN 0.9% SODIUM CHLORIDE 5 MICROGRAM(S)/KG/HR: 4 INJECTION INTRAVENOUS at 07:35

## 2020-01-01 RX ADMIN — Medication 100 MILLIGRAM(S): at 22:04

## 2020-01-01 RX ADMIN — Medication 100 GRAM(S): at 13:11

## 2020-01-01 RX ADMIN — CHLORHEXIDINE GLUCONATE 15 MILLILITER(S): 213 SOLUTION TOPICAL at 16:59

## 2020-01-01 RX ADMIN — ENOXAPARIN SODIUM 80 MILLIGRAM(S): 100 INJECTION SUBCUTANEOUS at 16:36

## 2020-01-01 RX ADMIN — METHADONE HYDROCHLORIDE 10 MILLIGRAM(S): 40 TABLET ORAL at 04:08

## 2020-01-01 RX ADMIN — Medication 200 MILLIGRAM(S): at 22:04

## 2020-01-01 RX ADMIN — Medication 1 APPLICATION(S): at 16:25

## 2020-01-01 RX ADMIN — Medication 250 MILLIGRAM(S): at 04:19

## 2020-01-01 RX ADMIN — HYDROMORPHONE HYDROCHLORIDE 2 MILLIGRAM(S): 2 INJECTION INTRAMUSCULAR; INTRAVENOUS; SUBCUTANEOUS at 08:10

## 2020-01-01 RX ADMIN — CHLORHEXIDINE GLUCONATE 1 APPLICATION(S): 213 SOLUTION TOPICAL at 04:25

## 2020-01-01 RX ADMIN — Medication 250 MILLIGRAM(S): at 16:24

## 2020-01-01 RX ADMIN — CEFEPIME 100 MILLIGRAM(S): 1 INJECTION, POWDER, FOR SOLUTION INTRAMUSCULAR; INTRAVENOUS at 21:00

## 2020-01-01 RX ADMIN — Medication 40 MILLIEQUIVALENT(S): at 12:07

## 2020-01-01 RX ADMIN — Medication 20 MILLIGRAM(S): at 09:37

## 2020-01-01 RX ADMIN — SODIUM CHLORIDE 3 MILLILITER(S): 9 INJECTION INTRAMUSCULAR; INTRAVENOUS; SUBCUTANEOUS at 04:13

## 2020-01-01 RX ADMIN — ENOXAPARIN SODIUM 80 MILLIGRAM(S): 100 INJECTION SUBCUTANEOUS at 04:42

## 2020-01-01 RX ADMIN — QUETIAPINE FUMARATE 25 MILLIGRAM(S): 200 TABLET, FILM COATED ORAL at 04:38

## 2020-01-01 RX ADMIN — Medication 40 MILLIGRAM(S): at 05:47

## 2020-01-01 RX ADMIN — Medication 0.5 MILLIGRAM(S): at 20:35

## 2020-01-01 RX ADMIN — MAGNESIUM OXIDE 400 MG ORAL TABLET 400 MILLIGRAM(S): 241.3 TABLET ORAL at 12:21

## 2020-01-01 RX ADMIN — Medication 100 MILLIGRAM(S): at 11:06

## 2020-01-01 RX ADMIN — HUMAN INSULIN 3 UNIT(S): 100 INJECTION, SUSPENSION SUBCUTANEOUS at 04:30

## 2020-01-01 RX ADMIN — Medication 500 MILLIGRAM(S): at 22:04

## 2020-01-01 RX ADMIN — ENOXAPARIN SODIUM 40 MILLIGRAM(S): 100 INJECTION SUBCUTANEOUS at 17:19

## 2020-01-01 RX ADMIN — POLYETHYLENE GLYCOL 3350 17 GRAM(S): 17 POWDER, FOR SOLUTION ORAL at 16:49

## 2020-01-01 RX ADMIN — Medication 2: at 11:36

## 2020-01-01 RX ADMIN — Medication 4: at 00:57

## 2020-01-01 RX ADMIN — METHADONE HYDROCHLORIDE 10 MILLIGRAM(S): 40 TABLET ORAL at 11:02

## 2020-01-01 RX ADMIN — Medication 400 MILLIGRAM(S): at 15:30

## 2020-01-01 RX ADMIN — FENTANYL CITRATE 1.95 MICROGRAM(S)/KG/HR: 50 INJECTION INTRAVENOUS at 02:29

## 2020-01-01 RX ADMIN — Medication 50 GRAM(S): at 05:36

## 2020-01-01 RX ADMIN — PROPOFOL 11.7 MICROGRAM(S)/KG/MIN: 10 INJECTION, EMULSION INTRAVENOUS at 08:20

## 2020-01-01 RX ADMIN — CHLORHEXIDINE GLUCONATE 1 APPLICATION(S): 213 SOLUTION TOPICAL at 05:13

## 2020-01-01 RX ADMIN — Medication 100 MILLIGRAM(S): at 13:07

## 2020-01-01 RX ADMIN — CEFEPIME 100 MILLIGRAM(S): 1 INJECTION, POWDER, FOR SOLUTION INTRAMUSCULAR; INTRAVENOUS at 04:03

## 2020-01-01 RX ADMIN — MIDAZOLAM HYDROCHLORIDE 4 MILLIGRAM(S): 1 INJECTION, SOLUTION INTRAMUSCULAR; INTRAVENOUS at 13:29

## 2020-01-01 RX ADMIN — CHLORHEXIDINE GLUCONATE 15 MILLILITER(S): 213 SOLUTION TOPICAL at 04:16

## 2020-01-01 RX ADMIN — Medication 1 APPLICATION(S): at 04:52

## 2020-01-01 RX ADMIN — Medication 402.48 MILLIGRAM(S): at 05:29

## 2020-01-01 RX ADMIN — ENOXAPARIN SODIUM 80 MILLIGRAM(S): 100 INJECTION SUBCUTANEOUS at 04:23

## 2020-01-01 RX ADMIN — Medication 40 MILLIEQUIVALENT(S): at 07:15

## 2020-01-01 RX ADMIN — MIDAZOLAM HYDROCHLORIDE 1.56 MG/KG/HR: 1 INJECTION, SOLUTION INTRAMUSCULAR; INTRAVENOUS at 07:00

## 2020-01-01 RX ADMIN — Medication 200 GRAM(S): at 07:18

## 2020-01-01 RX ADMIN — Medication 100 GRAM(S): at 04:17

## 2020-01-01 RX ADMIN — CHLORHEXIDINE GLUCONATE 15 MILLILITER(S): 213 SOLUTION TOPICAL at 07:35

## 2020-01-01 RX ADMIN — FENTANYL CITRATE 1 PATCH: 50 INJECTION INTRAVENOUS at 11:56

## 2020-01-01 RX ADMIN — Medication 1 APPLICATION(S): at 04:49

## 2020-01-01 RX ADMIN — FENTANYL CITRATE 1 PATCH: 50 INJECTION INTRAVENOUS at 07:00

## 2020-01-01 RX ADMIN — ENOXAPARIN SODIUM 40 MILLIGRAM(S): 100 INJECTION SUBCUTANEOUS at 16:59

## 2020-01-01 RX ADMIN — Medication 400 MILLIGRAM(S): at 22:00

## 2020-01-01 RX ADMIN — Medication 100 MILLIGRAM(S): at 04:01

## 2020-01-01 RX ADMIN — Medication 50 MILLIEQUIVALENT(S): at 19:57

## 2020-01-01 RX ADMIN — Medication 1 TABLET(S): at 08:30

## 2020-01-01 RX ADMIN — CEFEPIME 100 MILLIGRAM(S): 1 INJECTION, POWDER, FOR SOLUTION INTRAMUSCULAR; INTRAVENOUS at 12:20

## 2020-01-01 RX ADMIN — PHENYLEPHRINE HYDROCHLORIDE 1.46 MICROGRAM(S)/KG/MIN: 10 INJECTION INTRAVENOUS at 19:30

## 2020-01-01 RX ADMIN — Medication 200 MILLIGRAM(S): at 11:07

## 2020-01-01 RX ADMIN — Medication 30 MILLIGRAM(S): at 16:59

## 2020-01-01 RX ADMIN — Medication 10 MILLIGRAM(S): at 14:32

## 2020-01-01 RX ADMIN — Medication 100 MILLIEQUIVALENT(S): at 13:10

## 2020-01-01 RX ADMIN — CHLORHEXIDINE GLUCONATE 1 APPLICATION(S): 213 SOLUTION TOPICAL at 04:00

## 2020-01-01 RX ADMIN — PROPOFOL 35.1 MICROGRAM(S)/KG/MIN: 10 INJECTION, EMULSION INTRAVENOUS at 18:00

## 2020-01-01 RX ADMIN — Medication 1 MILLIGRAM(S): at 16:32

## 2020-01-01 RX ADMIN — CHLORHEXIDINE GLUCONATE 1 APPLICATION(S): 213 SOLUTION TOPICAL at 04:23

## 2020-01-01 RX ADMIN — FENTANYL CITRATE 1 PATCH: 50 INJECTION INTRAVENOUS at 10:44

## 2020-01-01 RX ADMIN — METHADONE HYDROCHLORIDE 10 MILLIGRAM(S): 40 TABLET ORAL at 04:07

## 2020-01-01 RX ADMIN — SENNA PLUS 10 MILLILITER(S): 8.6 TABLET ORAL at 12:31

## 2020-01-01 RX ADMIN — SODIUM CHLORIDE 2 GRAM(S): 9 INJECTION INTRAMUSCULAR; INTRAVENOUS; SUBCUTANEOUS at 11:02

## 2020-01-01 RX ADMIN — Medication 100 MILLIGRAM(S): at 05:47

## 2020-01-01 RX ADMIN — SODIUM CHLORIDE 2 GRAM(S): 9 INJECTION INTRAMUSCULAR; INTRAVENOUS; SUBCUTANEOUS at 16:30

## 2020-01-01 RX ADMIN — Medication 50 MILLIEQUIVALENT(S): at 00:30

## 2020-01-01 RX ADMIN — ENOXAPARIN SODIUM 40 MILLIGRAM(S): 100 INJECTION SUBCUTANEOUS at 16:21

## 2020-01-01 RX ADMIN — AZITHROMYCIN 500 MILLIGRAM(S): 500 TABLET, FILM COATED ORAL at 16:21

## 2020-01-01 RX ADMIN — POLYETHYLENE GLYCOL 3350 17 GRAM(S): 17 POWDER, FOR SOLUTION ORAL at 18:15

## 2020-01-01 RX ADMIN — Medication 40 MILLIGRAM(S): at 22:12

## 2020-01-01 RX ADMIN — Medication 400 MILLIGRAM(S): at 13:00

## 2020-01-01 RX ADMIN — Medication 1 APPLICATION(S): at 04:10

## 2020-01-01 RX ADMIN — Medication 1 APPLICATION(S): at 17:18

## 2020-01-01 RX ADMIN — Medication 100 MILLIGRAM(S): at 17:18

## 2020-01-01 RX ADMIN — Medication 100 MILLIGRAM(S): at 18:25

## 2020-01-01 RX ADMIN — FENTANYL CITRATE 100 MICROGRAM(S): 50 INJECTION INTRAVENOUS at 12:59

## 2020-01-01 RX ADMIN — Medication 650 MILLIGRAM(S): at 23:51

## 2020-01-01 RX ADMIN — HYDROMORPHONE HYDROCHLORIDE 1 MILLIGRAM(S): 2 INJECTION INTRAMUSCULAR; INTRAVENOUS; SUBCUTANEOUS at 02:30

## 2020-01-01 RX ADMIN — Medication 10 MILLIGRAM(S): at 21:31

## 2020-01-01 RX ADMIN — Medication 500 MILLIGRAM(S): at 04:17

## 2020-01-01 RX ADMIN — ALBUTEROL 2 PUFF(S): 90 AEROSOL, METERED ORAL at 17:00

## 2020-01-01 RX ADMIN — FENTANYL CITRATE 1.95 MICROGRAM(S)/KG/HR: 50 INJECTION INTRAVENOUS at 07:35

## 2020-01-01 RX ADMIN — Medication 500 MILLIGRAM(S): at 21:08

## 2020-01-01 RX ADMIN — FENTANYL CITRATE 100 MICROGRAM(S): 50 INJECTION INTRAVENOUS at 17:45

## 2020-01-01 RX ADMIN — Medication 1 BOTTLE: at 21:51

## 2020-01-01 RX ADMIN — DEXMEDETOMIDINE HYDROCHLORIDE IN 0.9% SODIUM CHLORIDE 19.5 MICROGRAM(S)/KG/HR: 4 INJECTION INTRAVENOUS at 08:09

## 2020-01-01 RX ADMIN — Medication 402.48 MILLIGRAM(S): at 16:24

## 2020-01-01 RX ADMIN — QUETIAPINE FUMARATE 50 MILLIGRAM(S): 200 TABLET, FILM COATED ORAL at 12:41

## 2020-01-01 RX ADMIN — CHLORHEXIDINE GLUCONATE 15 MILLILITER(S): 213 SOLUTION TOPICAL at 02:57

## 2020-01-01 RX ADMIN — FENTANYL CITRATE 1.95 MICROGRAM(S)/KG/HR: 50 INJECTION INTRAVENOUS at 09:00

## 2020-01-01 RX ADMIN — METHADONE HYDROCHLORIDE 10 MILLIGRAM(S): 40 TABLET ORAL at 23:10

## 2020-01-01 RX ADMIN — FENTANYL CITRATE 100 MICROGRAM(S): 50 INJECTION INTRAVENOUS at 19:27

## 2020-01-01 RX ADMIN — MEROPENEM 100 MILLIGRAM(S): 1 INJECTION INTRAVENOUS at 11:52

## 2020-01-01 RX ADMIN — CHLORHEXIDINE GLUCONATE 1 APPLICATION(S): 213 SOLUTION TOPICAL at 05:29

## 2020-01-01 RX ADMIN — Medication 0.6 MILLIGRAM(S): at 05:09

## 2020-01-01 RX ADMIN — Medication 30 MILLIGRAM(S): at 05:20

## 2020-01-01 RX ADMIN — Medication 100 MILLIGRAM(S): at 13:17

## 2020-01-01 RX ADMIN — PHENYLEPHRINE HYDROCHLORIDE 1.46 MICROGRAM(S)/KG/MIN: 10 INJECTION INTRAVENOUS at 18:39

## 2020-01-01 RX ADMIN — Medication 1 APPLICATION(S): at 16:23

## 2020-01-01 RX ADMIN — METHADONE HYDROCHLORIDE 10 MILLIGRAM(S): 40 TABLET ORAL at 12:40

## 2020-01-01 RX ADMIN — Medication 30 MILLILITER(S): at 16:59

## 2020-01-01 RX ADMIN — CHLORHEXIDINE GLUCONATE 1 APPLICATION(S): 213 SOLUTION TOPICAL at 04:07

## 2020-01-01 RX ADMIN — CHLORHEXIDINE GLUCONATE 15 MILLILITER(S): 213 SOLUTION TOPICAL at 04:52

## 2020-01-01 RX ADMIN — Medication 3.65 MICROGRAM(S)/KG/MIN: at 16:32

## 2020-01-01 RX ADMIN — Medication 20 MILLIGRAM(S): at 16:59

## 2020-01-01 RX ADMIN — Medication 650 MILLIGRAM(S): at 23:56

## 2020-01-01 RX ADMIN — Medication 2: at 04:18

## 2020-01-01 RX ADMIN — Medication 5 MILLIGRAM(S): at 06:37

## 2020-01-01 RX ADMIN — Medication 60 MILLIGRAM(S): at 18:39

## 2020-01-01 RX ADMIN — Medication 50 MILLILITER(S): at 02:16

## 2020-01-01 RX ADMIN — FENTANYL CITRATE 19.5 MICROGRAM(S)/KG/HR: 50 INJECTION INTRAVENOUS at 04:06

## 2020-01-01 RX ADMIN — Medication 210 MILLIGRAM(S): at 10:30

## 2020-01-01 RX ADMIN — SODIUM CHLORIDE 3 MILLILITER(S): 9 INJECTION INTRAMUSCULAR; INTRAVENOUS; SUBCUTANEOUS at 05:52

## 2020-01-01 RX ADMIN — Medication 1 MILLIGRAM(S): at 22:36

## 2020-01-01 RX ADMIN — SODIUM CHLORIDE 2 GRAM(S): 9 INJECTION INTRAMUSCULAR; INTRAVENOUS; SUBCUTANEOUS at 22:38

## 2020-01-01 RX ADMIN — Medication 1 APPLICATION(S): at 17:04

## 2020-01-01 RX ADMIN — Medication 7.48 MICROGRAM(S)/KG/MIN: at 07:00

## 2020-01-01 RX ADMIN — FAMOTIDINE 20 MILLIGRAM(S): 10 INJECTION INTRAVENOUS at 17:27

## 2020-01-01 RX ADMIN — Medication 1 APPLICATION(S): at 16:56

## 2020-01-01 RX ADMIN — Medication 1 MILLIGRAM(S): at 04:08

## 2020-01-01 RX ADMIN — FAMOTIDINE 20 MILLIGRAM(S): 10 INJECTION INTRAVENOUS at 04:00

## 2020-01-01 RX ADMIN — Medication 0.5 MILLIGRAM(S): at 20:12

## 2020-01-01 RX ADMIN — Medication 1 APPLICATION(S): at 04:30

## 2020-01-01 RX ADMIN — MIDAZOLAM HYDROCHLORIDE 2 MILLIGRAM(S): 1 INJECTION, SOLUTION INTRAMUSCULAR; INTRAVENOUS at 15:35

## 2020-01-01 RX ADMIN — METHADONE HYDROCHLORIDE 10 MILLIGRAM(S): 40 TABLET ORAL at 12:24

## 2020-01-01 RX ADMIN — Medication 7.48 MICROGRAM(S)/KG/MIN: at 02:21

## 2020-01-01 RX ADMIN — CHLORHEXIDINE GLUCONATE 15 MILLILITER(S): 213 SOLUTION TOPICAL at 16:21

## 2020-01-01 RX ADMIN — ENOXAPARIN SODIUM 40 MILLIGRAM(S): 100 INJECTION SUBCUTANEOUS at 16:39

## 2020-01-01 RX ADMIN — Medication 100 MILLIGRAM(S): at 02:29

## 2020-01-01 RX ADMIN — LACTULOSE 10 GRAM(S): 10 SOLUTION ORAL at 04:03

## 2020-01-01 RX ADMIN — SENNA PLUS 10 MILLILITER(S): 8.6 TABLET ORAL at 21:51

## 2020-01-01 RX ADMIN — Medication 3.65 MICROGRAM(S)/KG/MIN: at 19:40

## 2020-01-01 RX ADMIN — FENTANYL CITRATE 1.95 MICROGRAM(S)/KG/HR: 50 INJECTION INTRAVENOUS at 09:43

## 2020-01-01 RX ADMIN — CEFEPIME 100 MILLIGRAM(S): 1 INJECTION, POWDER, FOR SOLUTION INTRAMUSCULAR; INTRAVENOUS at 21:30

## 2020-01-01 RX ADMIN — Medication 40 MILLIEQUIVALENT(S): at 05:36

## 2020-01-01 RX ADMIN — Medication 50 MILLIGRAM(S): at 04:37

## 2020-01-01 RX ADMIN — Medication 1 MILLIGRAM(S): at 16:48

## 2020-01-01 RX ADMIN — Medication 403.08 MILLIGRAM(S): at 17:40

## 2020-01-01 RX ADMIN — Medication 0.5 MILLIGRAM(S): at 04:07

## 2020-01-01 RX ADMIN — FENTANYL CITRATE 100 MICROGRAM(S): 50 INJECTION INTRAVENOUS at 02:30

## 2020-01-01 RX ADMIN — Medication 250 MILLIGRAM(S): at 05:23

## 2020-01-01 RX ADMIN — DEXMEDETOMIDINE HYDROCHLORIDE IN 0.9% SODIUM CHLORIDE 19.5 MICROGRAM(S)/KG/HR: 4 INJECTION INTRAVENOUS at 05:59

## 2020-01-01 RX ADMIN — PROPOFOL 35.1 MICROGRAM(S)/KG/MIN: 10 INJECTION, EMULSION INTRAVENOUS at 09:00

## 2020-01-01 RX ADMIN — Medication 0.5 MILLIGRAM(S): at 04:01

## 2020-01-01 RX ADMIN — CEFEPIME 100 MILLIGRAM(S): 1 INJECTION, POWDER, FOR SOLUTION INTRAMUSCULAR; INTRAVENOUS at 10:19

## 2020-01-01 RX ADMIN — ENOXAPARIN SODIUM 80 MILLIGRAM(S): 100 INJECTION SUBCUTANEOUS at 18:38

## 2020-01-01 RX ADMIN — Medication 65 MILLIGRAM(S): at 23:40

## 2020-01-01 RX ADMIN — CEFEPIME 100 MILLIGRAM(S): 1 INJECTION, POWDER, FOR SOLUTION INTRAMUSCULAR; INTRAVENOUS at 04:14

## 2020-01-01 RX ADMIN — Medication 10 MILLIGRAM(S): at 20:40

## 2020-01-01 RX ADMIN — SENNA PLUS 10 MILLILITER(S): 8.6 TABLET ORAL at 11:51

## 2020-01-01 RX ADMIN — CHLORHEXIDINE GLUCONATE 1 APPLICATION(S): 213 SOLUTION TOPICAL at 04:08

## 2020-01-01 RX ADMIN — Medication 50 MILLIEQUIVALENT(S): at 00:50

## 2020-01-01 RX ADMIN — Medication 5 MILLIGRAM(S): at 14:12

## 2020-01-01 RX ADMIN — Medication 1 TABLET(S): at 21:13

## 2020-01-01 RX ADMIN — POLYETHYLENE GLYCOL 3350 17 GRAM(S): 17 POWDER, FOR SOLUTION ORAL at 04:02

## 2020-01-01 RX ADMIN — CHLORHEXIDINE GLUCONATE 15 MILLILITER(S): 213 SOLUTION TOPICAL at 04:10

## 2020-01-01 RX ADMIN — Medication 50 MILLIEQUIVALENT(S): at 10:07

## 2020-01-01 RX ADMIN — Medication 2: at 17:09

## 2020-01-01 RX ADMIN — Medication 300 MILLIGRAM(S): at 16:00

## 2020-01-01 RX ADMIN — CEFEPIME 100 MILLIGRAM(S): 1 INJECTION, POWDER, FOR SOLUTION INTRAMUSCULAR; INTRAVENOUS at 04:00

## 2020-01-01 RX ADMIN — CHLORHEXIDINE GLUCONATE 15 MILLILITER(S): 213 SOLUTION TOPICAL at 05:13

## 2020-01-01 RX ADMIN — FAMOTIDINE 20 MILLIGRAM(S): 10 INJECTION INTRAVENOUS at 04:37

## 2020-01-01 RX ADMIN — FAMOTIDINE 20 MILLIGRAM(S): 10 INJECTION INTRAVENOUS at 06:42

## 2020-01-01 RX ADMIN — Medication 400 MILLIGRAM(S): at 21:43

## 2020-01-01 RX ADMIN — PHENYLEPHRINE HYDROCHLORIDE 1.46 MICROGRAM(S)/KG/MIN: 10 INJECTION INTRAVENOUS at 02:12

## 2020-01-01 RX ADMIN — POLYETHYLENE GLYCOL 3350 17 GRAM(S): 17 POWDER, FOR SOLUTION ORAL at 04:11

## 2020-01-01 RX ADMIN — Medication 500 MILLIGRAM(S): at 20:50

## 2020-01-01 RX ADMIN — METHADONE HYDROCHLORIDE 10 MILLIGRAM(S): 40 TABLET ORAL at 11:58

## 2020-01-01 RX ADMIN — FAMOTIDINE 20 MILLIGRAM(S): 10 INJECTION INTRAVENOUS at 04:42

## 2020-01-01 RX ADMIN — ENOXAPARIN SODIUM 80 MILLIGRAM(S): 100 INJECTION SUBCUTANEOUS at 16:01

## 2020-01-01 RX ADMIN — CHLORHEXIDINE GLUCONATE 15 MILLILITER(S): 213 SOLUTION TOPICAL at 04:36

## 2020-01-01 RX ADMIN — Medication 65 MILLIGRAM(S): at 03:00

## 2020-01-01 RX ADMIN — FAMOTIDINE 20 MILLIGRAM(S): 10 INJECTION INTRAVENOUS at 16:21

## 2020-01-01 RX ADMIN — FLUCONAZOLE 100 MILLIGRAM(S): 150 TABLET ORAL at 13:00

## 2020-01-01 RX ADMIN — CHLORHEXIDINE GLUCONATE 15 MILLILITER(S): 213 SOLUTION TOPICAL at 04:23

## 2020-01-01 RX ADMIN — SODIUM CHLORIDE 3 MILLILITER(S): 9 INJECTION INTRAMUSCULAR; INTRAVENOUS; SUBCUTANEOUS at 06:34

## 2020-01-01 RX ADMIN — CHLORHEXIDINE GLUCONATE 15 MILLILITER(S): 213 SOLUTION TOPICAL at 16:32

## 2020-01-01 RX ADMIN — PHENYLEPHRINE HYDROCHLORIDE 7.3 MICROGRAM(S)/KG/MIN: 10 INJECTION INTRAVENOUS at 04:01

## 2020-01-01 RX ADMIN — Medication 0.5 MILLIGRAM(S): at 17:18

## 2020-01-01 RX ADMIN — FAMOTIDINE 20 MILLIGRAM(S): 10 INJECTION INTRAVENOUS at 16:57

## 2020-01-01 RX ADMIN — CEFEPIME 100 MILLIGRAM(S): 1 INJECTION, POWDER, FOR SOLUTION INTRAMUSCULAR; INTRAVENOUS at 12:31

## 2020-01-01 RX ADMIN — FENTANYL CITRATE 1.95 MICROGRAM(S)/KG/HR: 50 INJECTION INTRAVENOUS at 08:48

## 2020-01-01 RX ADMIN — Medication 50 MILLIEQUIVALENT(S): at 20:24

## 2020-01-01 RX ADMIN — CHLORHEXIDINE GLUCONATE 1 APPLICATION(S): 213 SOLUTION TOPICAL at 04:16

## 2020-01-01 RX ADMIN — FAMOTIDINE 20 MILLIGRAM(S): 10 INJECTION INTRAVENOUS at 18:38

## 2020-01-01 RX ADMIN — Medication 10 MILLIGRAM(S): at 04:05

## 2020-01-01 RX ADMIN — Medication 400 MILLIGRAM(S): at 12:24

## 2020-01-01 RX ADMIN — ENOXAPARIN SODIUM 40 MILLIGRAM(S): 100 INJECTION SUBCUTANEOUS at 18:11

## 2020-01-01 RX ADMIN — CHLORHEXIDINE GLUCONATE 15 MILLILITER(S): 213 SOLUTION TOPICAL at 17:06

## 2020-01-01 RX ADMIN — VASOPRESSIN 1.2 UNIT(S)/MIN: 20 INJECTION INTRAVENOUS at 15:00

## 2020-01-01 RX ADMIN — FAMOTIDINE 20 MILLIGRAM(S): 10 INJECTION INTRAVENOUS at 16:23

## 2020-01-01 RX ADMIN — Medication 1 APPLICATION(S): at 07:35

## 2020-01-01 RX ADMIN — FENTANYL CITRATE 100 MICROGRAM(S): 50 INJECTION INTRAVENOUS at 00:56

## 2020-01-01 RX ADMIN — Medication 80 MILLIGRAM(S): at 23:30

## 2020-01-01 RX ADMIN — DEXMEDETOMIDINE HYDROCHLORIDE IN 0.9% SODIUM CHLORIDE 19.5 MICROGRAM(S)/KG/HR: 4 INJECTION INTRAVENOUS at 23:59

## 2020-01-01 RX ADMIN — CHLORHEXIDINE GLUCONATE 1 APPLICATION(S): 213 SOLUTION TOPICAL at 04:31

## 2020-01-01 RX ADMIN — SODIUM CHLORIDE 1000 MILLILITER(S): 9 INJECTION INTRAMUSCULAR; INTRAVENOUS; SUBCUTANEOUS at 15:00

## 2020-01-01 RX ADMIN — QUETIAPINE FUMARATE 25 MILLIGRAM(S): 200 TABLET, FILM COATED ORAL at 13:18

## 2020-01-01 RX ADMIN — FENTANYL CITRATE 19.5 MICROGRAM(S)/KG/HR: 50 INJECTION INTRAVENOUS at 08:09

## 2020-01-01 RX ADMIN — Medication 1 APPLICATION(S): at 18:14

## 2020-01-01 RX ADMIN — PROPOFOL 35.1 MICROGRAM(S)/KG/MIN: 10 INJECTION, EMULSION INTRAVENOUS at 09:41

## 2020-01-01 RX ADMIN — ZINC SULFATE TAB 220 MG (50 MG ZINC EQUIVALENT) 220 MILLIGRAM(S): 220 (50 ZN) TAB at 10:39

## 2020-01-01 RX ADMIN — Medication 1 APPLICATION(S): at 04:02

## 2020-01-01 RX ADMIN — Medication 30 MILLIGRAM(S): at 18:13

## 2020-01-01 RX ADMIN — Medication 1 APPLICATION(S): at 17:11

## 2020-01-01 RX ADMIN — CHLORHEXIDINE GLUCONATE 15 MILLILITER(S): 213 SOLUTION TOPICAL at 18:29

## 2020-01-01 RX ADMIN — CHLORHEXIDINE GLUCONATE 15 MILLILITER(S): 213 SOLUTION TOPICAL at 04:06

## 2020-01-01 RX ADMIN — FAMOTIDINE 20 MILLIGRAM(S): 10 INJECTION INTRAVENOUS at 04:36

## 2020-01-01 RX ADMIN — Medication 650 MILLIGRAM(S): at 05:30

## 2020-01-01 RX ADMIN — Medication 1 APPLICATION(S): at 05:06

## 2020-01-01 RX ADMIN — Medication 5 MILLIGRAM(S): at 05:06

## 2020-01-01 RX ADMIN — ALBUTEROL 2 PUFF(S): 90 AEROSOL, METERED ORAL at 12:10

## 2020-01-01 RX ADMIN — FENTANYL CITRATE 100 MICROGRAM(S): 50 INJECTION INTRAVENOUS at 23:28

## 2020-01-01 RX ADMIN — ENOXAPARIN SODIUM 40 MILLIGRAM(S): 100 INJECTION SUBCUTANEOUS at 10:45

## 2020-01-01 RX ADMIN — FAMOTIDINE 20 MILLIGRAM(S): 10 INJECTION INTRAVENOUS at 16:31

## 2020-01-01 RX ADMIN — ALBUTEROL 2 PUFF(S): 90 AEROSOL, METERED ORAL at 06:36

## 2020-01-01 RX ADMIN — FLUCONAZOLE 100 MILLIGRAM(S): 150 TABLET ORAL at 13:20

## 2020-01-01 RX ADMIN — HUMAN INSULIN 3 UNIT(S): 100 INJECTION, SUSPENSION SUBCUTANEOUS at 23:36

## 2020-01-01 RX ADMIN — PROPOFOL 11.7 MICROGRAM(S)/KG/MIN: 10 INJECTION, EMULSION INTRAVENOUS at 09:59

## 2020-01-01 RX ADMIN — Medication 650 MILLIGRAM(S): at 06:07

## 2020-01-01 RX ADMIN — Medication 100 MILLILITER(S): at 19:39

## 2020-01-01 RX ADMIN — Medication 650 MILLIGRAM(S): at 00:33

## 2020-01-01 RX ADMIN — Medication 25 MILLILITER(S): at 21:08

## 2020-01-01 RX ADMIN — FLUCONAZOLE 100 MILLIGRAM(S): 150 TABLET ORAL at 13:31

## 2020-01-01 RX ADMIN — POLYETHYLENE GLYCOL 3350 17 GRAM(S): 17 POWDER, FOR SOLUTION ORAL at 04:36

## 2020-01-01 RX ADMIN — Medication 2: at 18:07

## 2020-01-01 RX ADMIN — SENNA PLUS 10 MILLILITER(S): 8.6 TABLET ORAL at 11:22

## 2020-01-01 RX ADMIN — MEROPENEM 100 MILLIGRAM(S): 1 INJECTION INTRAVENOUS at 23:36

## 2020-01-01 RX ADMIN — HYDROMORPHONE HYDROCHLORIDE 2 MILLIGRAM(S): 2 INJECTION INTRAMUSCULAR; INTRAVENOUS; SUBCUTANEOUS at 07:45

## 2020-01-01 RX ADMIN — QUETIAPINE FUMARATE 25 MILLIGRAM(S): 200 TABLET, FILM COATED ORAL at 20:41

## 2020-01-01 RX ADMIN — ENOXAPARIN SODIUM 80 MILLIGRAM(S): 100 INJECTION SUBCUTANEOUS at 04:05

## 2020-01-01 RX ADMIN — ENOXAPARIN SODIUM 80 MILLIGRAM(S): 100 INJECTION SUBCUTANEOUS at 16:57

## 2020-01-01 RX ADMIN — FLUCONAZOLE 100 MILLIGRAM(S): 150 TABLET ORAL at 22:35

## 2020-01-01 RX ADMIN — MIDAZOLAM HYDROCHLORIDE 2 MILLIGRAM(S): 1 INJECTION, SOLUTION INTRAMUSCULAR; INTRAVENOUS at 22:47

## 2020-01-01 RX ADMIN — Medication 65 MILLIGRAM(S): at 16:22

## 2020-01-01 RX ADMIN — Medication 40 MILLIGRAM(S): at 16:00

## 2020-01-01 RX ADMIN — Medication 5 MILLIGRAM(S): at 20:26

## 2020-01-01 RX ADMIN — POLYETHYLENE GLYCOL 3350 17 GRAM(S): 17 POWDER, FOR SOLUTION ORAL at 16:23

## 2020-01-01 RX ADMIN — Medication 650 MILLIGRAM(S): at 04:37

## 2020-01-01 RX ADMIN — CEFEPIME 100 MILLIGRAM(S): 1 INJECTION, POWDER, FOR SOLUTION INTRAMUSCULAR; INTRAVENOUS at 12:12

## 2020-01-01 RX ADMIN — METHADONE HYDROCHLORIDE 10 MILLIGRAM(S): 40 TABLET ORAL at 17:07

## 2020-01-01 RX ADMIN — Medication 50 MILLIEQUIVALENT(S): at 08:33

## 2020-01-01 RX ADMIN — FENTANYL CITRATE 1 PATCH: 50 INJECTION INTRAVENOUS at 08:01

## 2020-01-01 RX ADMIN — Medication 40 MILLIEQUIVALENT(S): at 04:06

## 2020-01-01 RX ADMIN — SODIUM CHLORIDE 1000 MILLILITER(S): 9 INJECTION INTRAMUSCULAR; INTRAVENOUS; SUBCUTANEOUS at 15:08

## 2020-01-01 RX ADMIN — FENTANYL CITRATE 1 PATCH: 50 INJECTION INTRAVENOUS at 11:27

## 2020-01-01 RX ADMIN — MAGNESIUM OXIDE 400 MG ORAL TABLET 400 MILLIGRAM(S): 241.3 TABLET ORAL at 12:02

## 2020-01-01 RX ADMIN — Medication 650 MILLIGRAM(S): at 13:30

## 2020-01-01 RX ADMIN — POLYETHYLENE GLYCOL 3350 17 GRAM(S): 17 POWDER, FOR SOLUTION ORAL at 17:19

## 2020-01-01 RX ADMIN — HUMAN INSULIN 3 UNIT(S): 100 INJECTION, SUSPENSION SUBCUTANEOUS at 04:18

## 2020-01-01 RX ADMIN — SODIUM CHLORIDE 2 GRAM(S): 9 INJECTION INTRAMUSCULAR; INTRAVENOUS; SUBCUTANEOUS at 23:00

## 2020-01-01 RX ADMIN — FENTANYL CITRATE 19.5 MICROGRAM(S)/KG/HR: 50 INJECTION INTRAVENOUS at 08:43

## 2020-01-01 RX ADMIN — PHENYLEPHRINE HYDROCHLORIDE 1.46 MICROGRAM(S)/KG/MIN: 10 INJECTION INTRAVENOUS at 20:13

## 2020-01-01 RX ADMIN — FENTANYL CITRATE 1 PATCH: 50 INJECTION INTRAVENOUS at 19:36

## 2020-01-01 RX ADMIN — MIDAZOLAM HYDROCHLORIDE 1.56 MG/KG/HR: 1 INJECTION, SOLUTION INTRAMUSCULAR; INTRAVENOUS at 08:09

## 2020-01-01 RX ADMIN — MEROPENEM 100 MILLIGRAM(S): 1 INJECTION INTRAVENOUS at 21:39

## 2020-01-01 RX ADMIN — AZITHROMYCIN 500 MILLIGRAM(S): 500 TABLET, FILM COATED ORAL at 15:00

## 2020-01-01 RX ADMIN — MAGNESIUM OXIDE 400 MG ORAL TABLET 400 MILLIGRAM(S): 241.3 TABLET ORAL at 12:06

## 2020-01-01 RX ADMIN — MEROPENEM 100 MILLIGRAM(S): 1 INJECTION INTRAVENOUS at 21:07

## 2020-01-01 RX ADMIN — Medication 3.65 MICROGRAM(S)/KG/MIN: at 18:00

## 2020-01-01 RX ADMIN — Medication 200 MILLIGRAM(S): at 21:01

## 2020-01-01 RX ADMIN — FENTANYL CITRATE 1.95 MICROGRAM(S)/KG/HR: 50 INJECTION INTRAVENOUS at 08:38

## 2020-01-01 RX ADMIN — Medication 62.5 MILLIMOLE(S): at 23:16

## 2020-01-01 RX ADMIN — FENTANYL CITRATE 100 MICROGRAM(S): 50 INJECTION INTRAVENOUS at 17:15

## 2020-01-01 RX ADMIN — Medication 402.48 MILLIGRAM(S): at 05:58

## 2020-01-01 RX ADMIN — CHLORHEXIDINE GLUCONATE 1 APPLICATION(S): 213 SOLUTION TOPICAL at 04:24

## 2020-01-01 RX ADMIN — Medication 0.6 MILLIGRAM(S): at 17:08

## 2020-01-01 RX ADMIN — Medication 100 GRAM(S): at 20:49

## 2020-01-01 RX ADMIN — PHENYLEPHRINE HYDROCHLORIDE 1.46 MICROGRAM(S)/KG/MIN: 10 INJECTION INTRAVENOUS at 08:45

## 2020-01-01 RX ADMIN — Medication 5 MILLIGRAM(S): at 13:28

## 2020-01-01 RX ADMIN — SODIUM CHLORIDE 2 GRAM(S): 9 INJECTION INTRAMUSCULAR; INTRAVENOUS; SUBCUTANEOUS at 10:06

## 2020-01-01 RX ADMIN — Medication 1 BOTTLE: at 19:18

## 2020-01-01 RX ADMIN — POLYETHYLENE GLYCOL 3350 17 GRAM(S): 17 POWDER, FOR SOLUTION ORAL at 04:05

## 2020-01-01 RX ADMIN — CHLORHEXIDINE GLUCONATE 15 MILLILITER(S): 213 SOLUTION TOPICAL at 18:28

## 2020-01-01 RX ADMIN — POLYETHYLENE GLYCOL 3350 17 GRAM(S): 17 POWDER, FOR SOLUTION ORAL at 17:07

## 2020-01-01 RX ADMIN — PHENYLEPHRINE HYDROCHLORIDE 1.46 MICROGRAM(S)/KG/MIN: 10 INJECTION INTRAVENOUS at 10:28

## 2020-01-01 RX ADMIN — Medication 50 MILLIEQUIVALENT(S): at 12:15

## 2020-01-01 RX ADMIN — Medication 100 MILLIGRAM(S): at 23:00

## 2020-01-01 RX ADMIN — LACTULOSE 10 GRAM(S): 10 SOLUTION ORAL at 12:12

## 2020-01-01 RX ADMIN — METHADONE HYDROCHLORIDE 10 MILLIGRAM(S): 40 TABLET ORAL at 04:39

## 2020-01-01 RX ADMIN — SODIUM CHLORIDE 2 GRAM(S): 9 INJECTION INTRAMUSCULAR; INTRAVENOUS; SUBCUTANEOUS at 11:00

## 2020-01-01 RX ADMIN — Medication 65 MILLIGRAM(S): at 05:00

## 2020-01-01 RX ADMIN — MEROPENEM 100 MILLIGRAM(S): 1 INJECTION INTRAVENOUS at 04:07

## 2020-01-01 RX ADMIN — POLYETHYLENE GLYCOL 3350 17 GRAM(S): 17 POWDER, FOR SOLUTION ORAL at 04:38

## 2020-01-01 RX ADMIN — PHENYLEPHRINE HYDROCHLORIDE 1.46 MICROGRAM(S)/KG/MIN: 10 INJECTION INTRAVENOUS at 07:36

## 2020-01-01 RX ADMIN — Medication 402.48 MILLIGRAM(S): at 05:22

## 2020-01-01 RX ADMIN — ENOXAPARIN SODIUM 40 MILLIGRAM(S): 100 INJECTION SUBCUTANEOUS at 05:09

## 2020-01-01 RX ADMIN — Medication 1 MILLIGRAM(S): at 17:07

## 2020-01-01 RX ADMIN — SODIUM CHLORIDE 3 MILLILITER(S): 9 INJECTION INTRAMUSCULAR; INTRAVENOUS; SUBCUTANEOUS at 12:42

## 2020-01-01 RX ADMIN — ENOXAPARIN SODIUM 40 MILLIGRAM(S): 100 INJECTION SUBCUTANEOUS at 11:20

## 2020-01-01 RX ADMIN — Medication 100 MILLIEQUIVALENT(S): at 08:44

## 2020-01-01 RX ADMIN — ALTEPLASE 2 MILLIGRAM(S): KIT at 04:10

## 2020-01-01 RX ADMIN — MIDAZOLAM HYDROCHLORIDE 1.56 MG/KG/HR: 1 INJECTION, SOLUTION INTRAMUSCULAR; INTRAVENOUS at 07:29

## 2020-01-01 RX ADMIN — MEROPENEM 100 MILLIGRAM(S): 1 INJECTION INTRAVENOUS at 21:24

## 2020-01-01 RX ADMIN — FAMOTIDINE 20 MILLIGRAM(S): 10 INJECTION INTRAVENOUS at 16:01

## 2020-01-01 RX ADMIN — CHLORHEXIDINE GLUCONATE 1 APPLICATION(S): 213 SOLUTION TOPICAL at 04:12

## 2020-01-01 RX ADMIN — Medication 30 MILLIGRAM(S): at 04:43

## 2020-01-01 RX ADMIN — Medication 20 MILLIEQUIVALENT(S): at 20:49

## 2020-01-01 RX ADMIN — Medication 200 GRAM(S): at 04:03

## 2020-01-01 RX ADMIN — CEFEPIME 100 MILLIGRAM(S): 1 INJECTION, POWDER, FOR SOLUTION INTRAMUSCULAR; INTRAVENOUS at 04:16

## 2020-01-01 RX ADMIN — Medication 10 MILLIGRAM(S): at 07:35

## 2020-01-01 RX ADMIN — Medication 0.6 MILLIGRAM(S): at 16:39

## 2020-01-01 RX ADMIN — Medication 100 MILLIGRAM(S): at 13:57

## 2020-01-01 RX ADMIN — Medication 1 APPLICATION(S): at 17:02

## 2020-01-01 RX ADMIN — Medication 100 MILLIGRAM(S): at 04:00

## 2020-01-01 RX ADMIN — METHADONE HYDROCHLORIDE 10 MILLIGRAM(S): 40 TABLET ORAL at 00:45

## 2020-01-01 RX ADMIN — Medication 50 MILLIGRAM(S): at 11:02

## 2020-01-01 RX ADMIN — Medication 400 MILLIGRAM(S): at 14:00

## 2020-01-01 RX ADMIN — ENOXAPARIN SODIUM 80 MILLIGRAM(S): 100 INJECTION SUBCUTANEOUS at 04:16

## 2020-01-01 RX ADMIN — Medication 40 MILLIGRAM(S): at 10:28

## 2020-01-01 RX ADMIN — FLUCONAZOLE 100 MILLIGRAM(S): 150 TABLET ORAL at 11:19

## 2020-01-01 RX ADMIN — Medication 100 MILLIGRAM(S): at 22:26

## 2020-01-01 RX ADMIN — ENOXAPARIN SODIUM 40 MILLIGRAM(S): 100 INJECTION SUBCUTANEOUS at 17:09

## 2020-01-01 RX ADMIN — ENOXAPARIN SODIUM 40 MILLIGRAM(S): 100 INJECTION SUBCUTANEOUS at 13:05

## 2020-01-01 RX ADMIN — CHLORHEXIDINE GLUCONATE 1 APPLICATION(S): 213 SOLUTION TOPICAL at 04:11

## 2020-01-01 RX ADMIN — FAMOTIDINE 20 MILLIGRAM(S): 10 INJECTION INTRAVENOUS at 17:43

## 2020-01-01 RX ADMIN — Medication 65 MILLIGRAM(S): at 11:36

## 2020-01-01 RX ADMIN — Medication 650 MILLIGRAM(S): at 17:00

## 2020-01-01 RX ADMIN — PROPOFOL 35.1 MICROGRAM(S)/KG/MIN: 10 INJECTION, EMULSION INTRAVENOUS at 08:48

## 2020-01-01 RX ADMIN — ALBUTEROL 2 PUFF(S): 90 AEROSOL, METERED ORAL at 01:11

## 2020-01-01 RX ADMIN — Medication 1 APPLICATION(S): at 18:28

## 2020-01-01 RX ADMIN — Medication 65 MILLIGRAM(S): at 22:25

## 2020-01-01 RX ADMIN — ZINC SULFATE TAB 220 MG (50 MG ZINC EQUIVALENT) 220 MILLIGRAM(S): 220 (50 ZN) TAB at 13:18

## 2020-01-01 RX ADMIN — Medication 100 MILLIGRAM(S): at 20:21

## 2020-01-01 RX ADMIN — Medication 400 MILLIGRAM(S): at 01:30

## 2020-01-01 RX ADMIN — DEXMEDETOMIDINE HYDROCHLORIDE IN 0.9% SODIUM CHLORIDE 5 MICROGRAM(S)/KG/HR: 4 INJECTION INTRAVENOUS at 20:50

## 2020-01-01 RX ADMIN — Medication 100 MILLIEQUIVALENT(S): at 10:30

## 2020-01-01 RX ADMIN — ENOXAPARIN SODIUM 80 MILLIGRAM(S): 100 INJECTION SUBCUTANEOUS at 04:17

## 2020-01-01 RX ADMIN — SODIUM CHLORIDE 2 GRAM(S): 9 INJECTION INTRAMUSCULAR; INTRAVENOUS; SUBCUTANEOUS at 04:38

## 2020-01-01 RX ADMIN — Medication 100 MILLIGRAM(S): at 12:02

## 2020-01-01 RX ADMIN — Medication 65 MILLIGRAM(S): at 09:44

## 2020-01-01 RX ADMIN — Medication 0.5 MILLIGRAM(S): at 11:58

## 2020-01-01 RX ADMIN — FENTANYL CITRATE 1 PATCH: 50 INJECTION INTRAVENOUS at 10:22

## 2020-01-01 RX ADMIN — SODIUM CHLORIDE 3 MILLILITER(S): 9 INJECTION INTRAMUSCULAR; INTRAVENOUS; SUBCUTANEOUS at 12:11

## 2020-01-01 RX ADMIN — Medication 100 MILLIGRAM(S): at 17:32

## 2020-01-01 RX ADMIN — PROPOFOL 35.1 MICROGRAM(S)/KG/MIN: 10 INJECTION, EMULSION INTRAVENOUS at 02:21

## 2020-01-01 RX ADMIN — CEFEPIME 100 MILLIGRAM(S): 1 INJECTION, POWDER, FOR SOLUTION INTRAMUSCULAR; INTRAVENOUS at 11:51

## 2020-01-01 RX ADMIN — ENOXAPARIN SODIUM 80 MILLIGRAM(S): 100 INJECTION SUBCUTANEOUS at 04:08

## 2020-01-01 RX ADMIN — Medication 40 MILLIGRAM(S): at 10:25

## 2020-01-01 RX ADMIN — Medication 5 MILLIGRAM(S): at 12:12

## 2020-01-01 RX ADMIN — INSULIN HUMAN 10 UNIT(S): 100 INJECTION, SOLUTION SUBCUTANEOUS at 08:31

## 2020-01-01 RX ADMIN — INSULIN HUMAN 10 UNIT(S): 100 INJECTION, SOLUTION SUBCUTANEOUS at 02:16

## 2020-01-01 RX ADMIN — CHLORHEXIDINE GLUCONATE 15 MILLILITER(S): 213 SOLUTION TOPICAL at 04:17

## 2020-01-01 RX ADMIN — MIDAZOLAM HYDROCHLORIDE 1.56 MG/KG/HR: 1 INJECTION, SOLUTION INTRAMUSCULAR; INTRAVENOUS at 09:12

## 2020-01-01 RX ADMIN — ENOXAPARIN SODIUM 40 MILLIGRAM(S): 100 INJECTION SUBCUTANEOUS at 16:23

## 2020-01-01 RX ADMIN — Medication 100 MILLIEQUIVALENT(S): at 08:20

## 2020-01-01 RX ADMIN — Medication 500 MILLIGRAM(S): at 22:22

## 2020-01-01 RX ADMIN — Medication 1 APPLICATION(S): at 16:49

## 2020-01-01 RX ADMIN — Medication 50 MILLIGRAM(S): at 17:08

## 2020-01-01 RX ADMIN — SODIUM CHLORIDE 2 GRAM(S): 9 INJECTION INTRAMUSCULAR; INTRAVENOUS; SUBCUTANEOUS at 22:03

## 2020-01-01 RX ADMIN — Medication 100 MILLIEQUIVALENT(S): at 09:07

## 2020-01-01 RX ADMIN — Medication 65 MILLIGRAM(S): at 05:51

## 2020-01-01 RX ADMIN — DEXMEDETOMIDINE HYDROCHLORIDE IN 0.9% SODIUM CHLORIDE 19.5 MICROGRAM(S)/KG/HR: 4 INJECTION INTRAVENOUS at 20:21

## 2020-01-01 RX ADMIN — CHLORHEXIDINE GLUCONATE 15 MILLILITER(S): 213 SOLUTION TOPICAL at 04:32

## 2020-01-01 RX ADMIN — ALBUTEROL 2 PUFF(S): 90 AEROSOL, METERED ORAL at 23:25

## 2020-01-01 RX ADMIN — FAMOTIDINE 20 MILLIGRAM(S): 10 INJECTION INTRAVENOUS at 16:00

## 2020-01-01 RX ADMIN — Medication 40 MILLIGRAM(S): at 10:19

## 2020-01-01 RX ADMIN — Medication 20 MILLIEQUIVALENT(S): at 16:02

## 2020-01-01 RX ADMIN — Medication 62.5 MILLIMOLE(S): at 08:52

## 2020-01-01 RX ADMIN — FAMOTIDINE 20 MILLIGRAM(S): 10 INJECTION INTRAVENOUS at 05:30

## 2020-01-01 RX ADMIN — ENOXAPARIN SODIUM 40 MILLIGRAM(S): 100 INJECTION SUBCUTANEOUS at 18:28

## 2020-01-01 RX ADMIN — CHLORHEXIDINE GLUCONATE 15 MILLILITER(S): 213 SOLUTION TOPICAL at 04:00

## 2020-01-01 RX ADMIN — Medication 0.6 MILLIGRAM(S): at 13:17

## 2020-01-01 RX ADMIN — PROPOFOL 35.1 MICROGRAM(S)/KG/MIN: 10 INJECTION, EMULSION INTRAVENOUS at 20:50

## 2020-01-01 RX ADMIN — POLYETHYLENE GLYCOL 3350 17 GRAM(S): 17 POWDER, FOR SOLUTION ORAL at 17:09

## 2020-01-01 RX ADMIN — Medication 100 MILLIGRAM(S): at 05:17

## 2020-01-01 RX ADMIN — Medication 100 GRAM(S): at 04:44

## 2020-01-01 RX ADMIN — CHLORHEXIDINE GLUCONATE 15 MILLILITER(S): 213 SOLUTION TOPICAL at 04:14

## 2020-01-01 RX ADMIN — MAGNESIUM OXIDE 400 MG ORAL TABLET 400 MILLIGRAM(S): 241.3 TABLET ORAL at 11:51

## 2020-01-01 RX ADMIN — ENOXAPARIN SODIUM 40 MILLIGRAM(S): 100 INJECTION SUBCUTANEOUS at 11:27

## 2020-01-01 RX ADMIN — Medication 650 MILLIGRAM(S): at 02:29

## 2020-01-01 RX ADMIN — ZINC SULFATE TAB 220 MG (50 MG ZINC EQUIVALENT) 220 MILLIGRAM(S): 220 (50 ZN) TAB at 11:19

## 2020-01-01 RX ADMIN — MIDAZOLAM HYDROCHLORIDE 1.56 MG/KG/HR: 1 INJECTION, SOLUTION INTRAMUSCULAR; INTRAVENOUS at 05:59

## 2020-01-01 RX ADMIN — CHLORHEXIDINE GLUCONATE 15 MILLILITER(S): 213 SOLUTION TOPICAL at 16:31

## 2020-01-01 RX ADMIN — Medication 50 MILLIEQUIVALENT(S): at 23:16

## 2020-01-01 RX ADMIN — Medication 50 MILLIGRAM(S): at 08:07

## 2020-01-01 RX ADMIN — MEROPENEM 100 MILLIGRAM(S): 1 INJECTION INTRAVENOUS at 13:58

## 2020-01-01 RX ADMIN — POLYETHYLENE GLYCOL 3350 17 GRAM(S): 17 POWDER, FOR SOLUTION ORAL at 18:28

## 2020-01-01 RX ADMIN — Medication 400 MILLIGRAM(S): at 18:07

## 2020-01-01 RX ADMIN — Medication 5 MILLIGRAM(S): at 21:07

## 2020-01-01 RX ADMIN — Medication 100 MILLIEQUIVALENT(S): at 13:45

## 2020-01-01 RX ADMIN — ENOXAPARIN SODIUM 40 MILLIGRAM(S): 100 INJECTION SUBCUTANEOUS at 16:31

## 2020-01-01 RX ADMIN — MIDAZOLAM HYDROCHLORIDE 1.56 MG/KG/HR: 1 INJECTION, SOLUTION INTRAMUSCULAR; INTRAVENOUS at 18:45

## 2020-01-01 RX ADMIN — Medication 40 MILLIGRAM(S): at 05:35

## 2020-01-01 RX ADMIN — Medication 975 MILLIGRAM(S): at 14:36

## 2020-01-01 RX ADMIN — QUETIAPINE FUMARATE 25 MILLIGRAM(S): 200 TABLET, FILM COATED ORAL at 12:50

## 2020-01-01 RX ADMIN — ALBUTEROL 2 PUFF(S): 90 AEROSOL, METERED ORAL at 05:55

## 2020-01-01 RX ADMIN — CEFEPIME 100 MILLIGRAM(S): 1 INJECTION, POWDER, FOR SOLUTION INTRAMUSCULAR; INTRAVENOUS at 04:22

## 2020-01-01 RX ADMIN — Medication 25 MILLILITER(S): at 11:27

## 2020-01-01 RX ADMIN — POLYETHYLENE GLYCOL 3350 17 GRAM(S): 17 POWDER, FOR SOLUTION ORAL at 04:23

## 2020-01-01 RX ADMIN — Medication 10 MILLIGRAM(S): at 04:01

## 2020-01-01 RX ADMIN — METHADONE HYDROCHLORIDE 10 MILLIGRAM(S): 40 TABLET ORAL at 17:18

## 2020-01-01 RX ADMIN — Medication 400 MILLIGRAM(S): at 19:56

## 2020-01-01 RX ADMIN — FAMOTIDINE 20 MILLIGRAM(S): 10 INJECTION INTRAVENOUS at 16:20

## 2020-01-01 RX ADMIN — Medication 20 MILLIEQUIVALENT(S): at 18:17

## 2020-01-01 RX ADMIN — CHLORHEXIDINE GLUCONATE 15 MILLILITER(S): 213 SOLUTION TOPICAL at 05:17

## 2020-01-01 RX ADMIN — Medication 60 MILLIGRAM(S): at 04:18

## 2020-01-01 RX ADMIN — Medication 100 MILLIGRAM(S): at 21:23

## 2020-01-01 RX ADMIN — Medication 100 GRAM(S): at 06:21

## 2020-01-01 RX ADMIN — Medication 65 MILLIGRAM(S): at 12:51

## 2020-01-01 RX ADMIN — Medication 50 MILLILITER(S): at 22:40

## 2020-01-01 RX ADMIN — Medication 100 MILLIGRAM(S): at 10:08

## 2020-01-01 RX ADMIN — HUMAN INSULIN 3 UNIT(S): 100 INJECTION, SUSPENSION SUBCUTANEOUS at 11:51

## 2020-01-01 RX ADMIN — Medication 40 MILLIEQUIVALENT(S): at 13:16

## 2020-01-01 RX ADMIN — Medication 40 MILLIGRAM(S): at 16:48

## 2020-01-01 RX ADMIN — MIDAZOLAM HYDROCHLORIDE 1.56 MG/KG/HR: 1 INJECTION, SOLUTION INTRAMUSCULAR; INTRAVENOUS at 08:48

## 2020-01-01 RX ADMIN — CHLORHEXIDINE GLUCONATE 15 MILLILITER(S): 213 SOLUTION TOPICAL at 16:56

## 2020-01-01 RX ADMIN — Medication 1 APPLICATION(S): at 04:17

## 2020-01-01 RX ADMIN — Medication 100 MILLIGRAM(S): at 04:13

## 2020-01-01 RX ADMIN — Medication 1 APPLICATION(S): at 18:00

## 2020-01-01 RX ADMIN — Medication 50 MILLIEQUIVALENT(S): at 01:04

## 2020-01-01 RX ADMIN — Medication 100 MILLIGRAM(S): at 04:39

## 2020-01-01 RX ADMIN — Medication 250 MILLIGRAM(S): at 12:15

## 2020-01-01 RX ADMIN — ENOXAPARIN SODIUM 80 MILLIGRAM(S): 100 INJECTION SUBCUTANEOUS at 04:12

## 2020-01-01 RX ADMIN — MIDAZOLAM HYDROCHLORIDE 4 MILLIGRAM(S): 1 INJECTION, SOLUTION INTRAMUSCULAR; INTRAVENOUS at 01:36

## 2020-01-01 RX ADMIN — Medication 300 MILLIGRAM(S): at 04:01

## 2020-01-01 RX ADMIN — MIDAZOLAM HYDROCHLORIDE 1.56 MG/KG/HR: 1 INJECTION, SOLUTION INTRAMUSCULAR; INTRAVENOUS at 09:51

## 2020-01-01 RX ADMIN — Medication 100 MILLIGRAM(S): at 20:17

## 2020-01-01 RX ADMIN — FAMOTIDINE 20 MILLIGRAM(S): 10 INJECTION INTRAVENOUS at 04:39

## 2020-01-01 RX ADMIN — MIDAZOLAM HYDROCHLORIDE 1.56 MG/KG/HR: 1 INJECTION, SOLUTION INTRAMUSCULAR; INTRAVENOUS at 02:29

## 2020-01-01 RX ADMIN — ENOXAPARIN SODIUM 80 MILLIGRAM(S): 100 INJECTION SUBCUTANEOUS at 17:06

## 2020-01-01 RX ADMIN — Medication 1 APPLICATION(S): at 18:15

## 2020-01-01 RX ADMIN — Medication 40 MILLIGRAM(S): at 05:18

## 2020-01-01 RX ADMIN — Medication 40 MILLIEQUIVALENT(S): at 10:52

## 2020-01-01 RX ADMIN — METHADONE HYDROCHLORIDE 10 MILLIGRAM(S): 40 TABLET ORAL at 22:17

## 2020-01-01 RX ADMIN — FAMOTIDINE 20 MILLIGRAM(S): 10 INJECTION INTRAVENOUS at 18:14

## 2020-01-01 RX ADMIN — HUMAN INSULIN 3 UNIT(S): 100 INJECTION, SUSPENSION SUBCUTANEOUS at 00:57

## 2020-01-01 RX ADMIN — ENOXAPARIN SODIUM 80 MILLIGRAM(S): 100 INJECTION SUBCUTANEOUS at 16:00

## 2020-01-01 RX ADMIN — SODIUM CHLORIDE 3 MILLILITER(S): 9 INJECTION INTRAMUSCULAR; INTRAVENOUS; SUBCUTANEOUS at 01:44

## 2020-01-01 RX ADMIN — Medication 400 MILLIGRAM(S): at 11:59

## 2020-01-01 RX ADMIN — Medication 1 APPLICATION(S): at 17:27

## 2020-01-01 RX ADMIN — SODIUM CHLORIDE 2 GRAM(S): 9 INJECTION INTRAMUSCULAR; INTRAVENOUS; SUBCUTANEOUS at 18:34

## 2020-01-01 RX ADMIN — MIDAZOLAM HYDROCHLORIDE 1.56 MG/KG/HR: 1 INJECTION, SOLUTION INTRAMUSCULAR; INTRAVENOUS at 21:31

## 2020-01-01 RX ADMIN — MEROPENEM 100 MILLIGRAM(S): 1 INJECTION INTRAVENOUS at 09:50

## 2020-01-01 RX ADMIN — FLUCONAZOLE 100 MILLIGRAM(S): 150 TABLET ORAL at 12:12

## 2020-01-01 RX ADMIN — FENTANYL CITRATE 1 PATCH: 50 INJECTION INTRAVENOUS at 07:30

## 2020-01-01 RX ADMIN — Medication 100 MILLIGRAM(S): at 12:24

## 2020-01-01 RX ADMIN — ENOXAPARIN SODIUM 80 MILLIGRAM(S): 100 INJECTION SUBCUTANEOUS at 16:28

## 2020-01-01 RX ADMIN — Medication 250 MILLIGRAM(S): at 17:38

## 2020-01-01 RX ADMIN — CHLORHEXIDINE GLUCONATE 1 APPLICATION(S): 213 SOLUTION TOPICAL at 04:17

## 2020-01-01 RX ADMIN — Medication 50 MILLIEQUIVALENT(S): at 00:53

## 2020-01-01 RX ADMIN — FAMOTIDINE 20 MILLIGRAM(S): 10 INJECTION INTRAVENOUS at 16:32

## 2020-01-01 RX ADMIN — Medication 200 GRAM(S): at 21:07

## 2020-01-01 RX ADMIN — Medication 402.48 MILLIGRAM(S): at 04:13

## 2020-01-01 RX ADMIN — POLYETHYLENE GLYCOL 3350 17 GRAM(S): 17 POWDER, FOR SOLUTION ORAL at 16:20

## 2020-01-01 RX ADMIN — Medication 250 MILLIGRAM(S): at 16:58

## 2020-01-01 RX ADMIN — CHLORHEXIDINE GLUCONATE 15 MILLILITER(S): 213 SOLUTION TOPICAL at 17:02

## 2020-01-01 RX ADMIN — Medication 40 MILLIGRAM(S): at 10:01

## 2020-01-01 RX ADMIN — MEROPENEM 100 MILLIGRAM(S): 1 INJECTION INTRAVENOUS at 05:51

## 2020-01-01 RX ADMIN — MIDAZOLAM HYDROCHLORIDE 1.56 MG/KG/HR: 1 INJECTION, SOLUTION INTRAMUSCULAR; INTRAVENOUS at 02:55

## 2020-01-01 RX ADMIN — MEROPENEM 100 MILLIGRAM(S): 1 INJECTION INTRAVENOUS at 11:00

## 2020-01-01 RX ADMIN — ACETAZOLAMIDE 250 MILLIGRAM(S): 250 TABLET ORAL at 14:20

## 2020-01-01 RX ADMIN — ENOXAPARIN SODIUM 40 MILLIGRAM(S): 100 INJECTION SUBCUTANEOUS at 10:09

## 2020-01-01 RX ADMIN — ALBUTEROL 2 PUFF(S): 90 AEROSOL, METERED ORAL at 18:35

## 2020-01-01 RX ADMIN — Medication 200 GRAM(S): at 06:32

## 2020-01-01 RX ADMIN — Medication 10 MILLIGRAM(S): at 04:10

## 2020-01-01 RX ADMIN — MIDAZOLAM HYDROCHLORIDE 2 MILLIGRAM(S): 1 INJECTION, SOLUTION INTRAMUSCULAR; INTRAVENOUS at 07:06

## 2020-01-01 RX ADMIN — Medication 3.65 MICROGRAM(S)/KG/MIN: at 05:59

## 2020-01-01 RX ADMIN — MAGNESIUM OXIDE 400 MG ORAL TABLET 400 MILLIGRAM(S): 241.3 TABLET ORAL at 10:39

## 2020-01-01 RX ADMIN — Medication 100 MILLIGRAM(S): at 22:51

## 2020-01-01 RX ADMIN — Medication 300 MILLIGRAM(S): at 05:17

## 2020-01-01 RX ADMIN — Medication 78 MILLIGRAM(S): at 16:26

## 2020-01-01 RX ADMIN — Medication 250 MILLIGRAM(S): at 22:40

## 2020-01-01 RX ADMIN — FAMOTIDINE 20 MILLIGRAM(S): 10 INJECTION INTRAVENOUS at 04:16

## 2020-01-01 RX ADMIN — Medication 1 APPLICATION(S): at 07:14

## 2020-01-01 RX ADMIN — QUETIAPINE FUMARATE 50 MILLIGRAM(S): 200 TABLET, FILM COATED ORAL at 22:02

## 2020-01-01 RX ADMIN — PROPOFOL 35.1 MICROGRAM(S)/KG/MIN: 10 INJECTION, EMULSION INTRAVENOUS at 08:38

## 2020-01-01 RX ADMIN — Medication 1 APPLICATION(S): at 17:07

## 2020-01-01 RX ADMIN — Medication 0.6 MILLIGRAM(S): at 08:05

## 2020-01-01 RX ADMIN — PROPOFOL 35.1 MICROGRAM(S)/KG/MIN: 10 INJECTION, EMULSION INTRAVENOUS at 20:56

## 2020-01-01 RX ADMIN — CHLORHEXIDINE GLUCONATE 15 MILLILITER(S): 213 SOLUTION TOPICAL at 04:08

## 2020-01-01 RX ADMIN — Medication 0.6 MILLIGRAM(S): at 20:47

## 2020-01-01 RX ADMIN — CHLORHEXIDINE GLUCONATE 15 MILLILITER(S): 213 SOLUTION TOPICAL at 16:20

## 2020-01-01 RX ADMIN — FENTANYL CITRATE 1.95 MICROGRAM(S)/KG/HR: 50 INJECTION INTRAVENOUS at 02:21

## 2020-01-01 RX ADMIN — FAMOTIDINE 20 MILLIGRAM(S): 10 INJECTION INTRAVENOUS at 17:33

## 2020-01-01 RX ADMIN — Medication 100 MILLIGRAM(S): at 17:33

## 2020-01-01 RX ADMIN — Medication 100 GRAM(S): at 05:34

## 2020-01-01 RX ADMIN — Medication 5 MILLIGRAM(S): at 21:36

## 2020-01-01 RX ADMIN — Medication 20 MILLIGRAM(S): at 23:36

## 2020-01-01 RX ADMIN — Medication 50 GRAM(S): at 18:17

## 2020-01-01 RX ADMIN — CHLORHEXIDINE GLUCONATE 15 MILLILITER(S): 213 SOLUTION TOPICAL at 17:04

## 2020-01-01 RX ADMIN — MIDAZOLAM HYDROCHLORIDE 4 MILLIGRAM(S): 1 INJECTION, SOLUTION INTRAMUSCULAR; INTRAVENOUS at 07:00

## 2020-01-01 RX ADMIN — FENTANYL CITRATE 100 MICROGRAM(S): 50 INJECTION INTRAVENOUS at 18:49

## 2020-01-01 RX ADMIN — QUETIAPINE FUMARATE 25 MILLIGRAM(S): 200 TABLET, FILM COATED ORAL at 08:20

## 2020-01-01 RX ADMIN — Medication 40 MILLIGRAM(S): at 14:22

## 2020-01-01 RX ADMIN — Medication 100 GRAM(S): at 10:42

## 2020-01-01 RX ADMIN — CHLORHEXIDINE GLUCONATE 15 MILLILITER(S): 213 SOLUTION TOPICAL at 17:03

## 2020-01-01 RX ADMIN — Medication 100 MILLIGRAM(S): at 04:02

## 2020-01-01 RX ADMIN — Medication 50 MILLILITER(S): at 02:15

## 2020-01-01 RX ADMIN — Medication 100 GRAM(S): at 06:25

## 2020-01-01 RX ADMIN — ENOXAPARIN SODIUM 40 MILLIGRAM(S): 100 INJECTION SUBCUTANEOUS at 13:59

## 2020-01-01 RX ADMIN — MEROPENEM 100 MILLIGRAM(S): 1 INJECTION INTRAVENOUS at 22:00

## 2020-01-01 RX ADMIN — Medication 10 MILLIGRAM(S): at 04:30

## 2020-01-01 RX ADMIN — Medication 500 MILLIGRAM(S): at 05:09

## 2020-01-01 RX ADMIN — LACTULOSE 10 GRAM(S): 10 SOLUTION ORAL at 12:32

## 2020-01-01 RX ADMIN — Medication 40 MILLIEQUIVALENT(S): at 06:28

## 2020-01-01 RX ADMIN — ENOXAPARIN SODIUM 40 MILLIGRAM(S): 100 INJECTION SUBCUTANEOUS at 17:03

## 2020-01-01 RX ADMIN — LACTULOSE 10 GRAM(S): 10 SOLUTION ORAL at 21:06

## 2020-01-01 RX ADMIN — Medication 250 MILLIGRAM(S): at 09:00

## 2020-01-01 RX ADMIN — Medication 1 APPLICATION(S): at 17:06

## 2020-01-01 RX ADMIN — FAMOTIDINE 20 MILLIGRAM(S): 10 INJECTION INTRAVENOUS at 04:18

## 2020-01-01 RX ADMIN — FAMOTIDINE 20 MILLIGRAM(S): 10 INJECTION INTRAVENOUS at 05:09

## 2020-01-01 RX ADMIN — FENTANYL CITRATE 1.95 MICROGRAM(S)/KG/HR: 50 INJECTION INTRAVENOUS at 09:51

## 2020-01-01 RX ADMIN — HUMAN INSULIN 3 UNIT(S): 100 INJECTION, SUSPENSION SUBCUTANEOUS at 11:22

## 2020-01-01 RX ADMIN — Medication 100 MILLIGRAM(S): at 20:02

## 2020-01-01 RX ADMIN — Medication 0.5 MILLIGRAM(S): at 22:25

## 2020-01-01 RX ADMIN — Medication 100 MILLIGRAM(S): at 04:45

## 2020-01-01 RX ADMIN — Medication 300 MILLIGRAM(S): at 18:15

## 2020-01-01 RX ADMIN — CHLORHEXIDINE GLUCONATE 1 APPLICATION(S): 213 SOLUTION TOPICAL at 04:50

## 2020-01-01 RX ADMIN — MIDAZOLAM HYDROCHLORIDE 2 MILLIGRAM(S): 1 INJECTION, SOLUTION INTRAMUSCULAR; INTRAVENOUS at 19:15

## 2020-01-01 RX ADMIN — Medication 40 MILLIEQUIVALENT(S): at 19:57

## 2020-01-01 RX ADMIN — PHENYLEPHRINE HYDROCHLORIDE 1.46 MICROGRAM(S)/KG/MIN: 10 INJECTION INTRAVENOUS at 11:16

## 2020-01-01 RX ADMIN — Medication 10 MILLIGRAM(S): at 12:41

## 2020-01-01 RX ADMIN — FAMOTIDINE 20 MILLIGRAM(S): 10 INJECTION INTRAVENOUS at 04:34

## 2020-01-01 RX ADMIN — Medication 0.5 MILLIGRAM(S): at 17:03

## 2020-01-01 RX ADMIN — POTASSIUM PHOSPHATE, MONOBASIC POTASSIUM PHOSPHATE, DIBASIC 62.5 MILLIMOLE(S): 236; 224 INJECTION, SOLUTION INTRAVENOUS at 00:30

## 2020-01-01 RX ADMIN — SODIUM CHLORIDE 2 GRAM(S): 9 INJECTION INTRAMUSCULAR; INTRAVENOUS; SUBCUTANEOUS at 17:07

## 2020-01-01 RX ADMIN — POLYETHYLENE GLYCOL 3350 17 GRAM(S): 17 POWDER, FOR SOLUTION ORAL at 16:28

## 2020-01-01 RX ADMIN — Medication 650 MILLIGRAM(S): at 07:30

## 2020-01-01 RX ADMIN — HUMAN INSULIN 3 UNIT(S): 100 INJECTION, SUSPENSION SUBCUTANEOUS at 18:05

## 2020-01-01 RX ADMIN — Medication 650 MILLIGRAM(S): at 08:00

## 2020-01-01 RX ADMIN — Medication 1 APPLICATION(S): at 04:41

## 2020-01-01 RX ADMIN — Medication 500 MILLIGRAM(S): at 13:17

## 2020-01-01 RX ADMIN — CHLORHEXIDINE GLUCONATE 15 MILLILITER(S): 213 SOLUTION TOPICAL at 17:27

## 2020-01-01 RX ADMIN — Medication 1 APPLICATION(S): at 05:30

## 2020-01-01 RX ADMIN — POLYETHYLENE GLYCOL 3350 17 GRAM(S): 17 POWDER, FOR SOLUTION ORAL at 16:36

## 2020-01-01 RX ADMIN — ENOXAPARIN SODIUM 40 MILLIGRAM(S): 100 INJECTION SUBCUTANEOUS at 16:48

## 2020-01-01 RX ADMIN — FENTANYL CITRATE 19.5 MICROGRAM(S)/KG/HR: 50 INJECTION INTRAVENOUS at 19:30

## 2020-01-01 RX ADMIN — Medication 200 MILLIGRAM(S): at 11:27

## 2020-01-01 RX ADMIN — Medication 40 MILLIGRAM(S): at 09:22

## 2020-01-01 RX ADMIN — CHLORHEXIDINE GLUCONATE 15 MILLILITER(S): 213 SOLUTION TOPICAL at 16:36

## 2020-01-01 RX ADMIN — Medication 40 MILLIEQUIVALENT(S): at 09:30

## 2020-01-01 RX ADMIN — PROPOFOL 35.1 MICROGRAM(S)/KG/MIN: 10 INJECTION, EMULSION INTRAVENOUS at 09:52

## 2020-01-01 RX ADMIN — FENTANYL CITRATE 100 MICROGRAM(S): 50 INJECTION INTRAVENOUS at 14:19

## 2020-01-01 RX ADMIN — ALBUTEROL 2 PUFF(S): 90 AEROSOL, METERED ORAL at 04:13

## 2020-01-01 RX ADMIN — Medication 100 MILLIGRAM(S): at 13:16

## 2020-01-01 RX ADMIN — Medication 1 APPLICATION(S): at 05:16

## 2020-01-01 RX ADMIN — MIDAZOLAM HYDROCHLORIDE 2 MILLIGRAM(S): 1 INJECTION, SOLUTION INTRAMUSCULAR; INTRAVENOUS at 21:32

## 2020-01-01 RX ADMIN — ENOXAPARIN SODIUM 40 MILLIGRAM(S): 100 INJECTION SUBCUTANEOUS at 11:05

## 2020-01-01 RX ADMIN — Medication 1 MILLIGRAM(S): at 11:02

## 2020-01-01 RX ADMIN — PROPOFOL 35.1 MICROGRAM(S)/KG/MIN: 10 INJECTION, EMULSION INTRAVENOUS at 10:27

## 2020-01-01 RX ADMIN — Medication 40 MILLIEQUIVALENT(S): at 05:16

## 2020-01-01 RX ADMIN — METHADONE HYDROCHLORIDE 10 MILLIGRAM(S): 40 TABLET ORAL at 11:00

## 2020-01-01 RX ADMIN — FENTANYL CITRATE 1 PATCH: 50 INJECTION INTRAVENOUS at 18:42

## 2020-01-01 RX ADMIN — Medication 65 MILLIGRAM(S): at 00:17

## 2020-01-01 RX ADMIN — FAMOTIDINE 20 MILLIGRAM(S): 10 INJECTION INTRAVENOUS at 04:10

## 2020-03-31 NOTE — ED PROVIDER NOTE - CLINICAL SUMMARY MEDICAL DECISION MAKING FREE TEXT BOX
43yo m pmhx DM, pw shortness of breath hypoxia, will eval for viral syndrome labs, cxr, ekg, abx, Tylenol prn fevers, po fluids for dehydration, will admit for further care of acute hypoxic respiratory failure 85% spo2 on RA, 95% 15l NRB 43yo m pmhx DM, pw shortness of breath hypoxia, will eval for viral syndrome labs, cxr, ekg, abx, Tylenol prn fevers, po fluids for dehydration, will admit for further care of acute hypoxic respiratory failure 85% spo2 on RA, 95% 15l NRB    Merly Chavez MD - Attending Physician: Pt here with URI, sob, noted significant hypoxia on RA on arrival, not corrected with NC. Improved on NRB. Labs, Xr, tba

## 2020-03-31 NOTE — ED PROVIDER NOTE - NS ED ROS FT
ROS:  GENERAL: + fever, +chills  EYES: no change in vision  HEENT: no trouble swallowing, no trouble speaking, +sore throat  CARDIAC: no chest pain  PULMONARY: + cough, + shortness of breath  GI: no abdominal pain, no nausea, no vomiting, no diarrhea, no constipation  : No dysuria, no frequency, no change in appearance, or odor of urine  SKIN: no rashes  NEURO: + headache, + weakness  MSK: +myalgias   ~Bebeto Marina D.O. -Resident

## 2020-03-31 NOTE — ED ADULT NURSE NOTE - OBJECTIVE STATEMENT
Pt is a 45 yo male c/o SOB and cough worsening over the last five days with brother as positive sick contact. Pt presented with O2 sat 84%, put on 15L non-rebreather O2 sat 95%, slightly tachypneic to low 20s but unlabored. Sinus tach to 120s. Pt resting on stretcher does not appear to be in acute distress. Call bell and personal belongings within reach, pt educated on safety measures and calling for assistance, verbalizes understanding. Will continue to monitor.

## 2020-03-31 NOTE — H&P ADULT - ATTENDING COMMENTS
Patient seen and examined at bedside with resident. Below are my findings and assessment:    44M with reported history of DM presents with non-productive cough and fever for several days. He has been on antibiotics for several days, yet no improvement. Also endorsing myalgias. On admission noted to be febrile, tachycardic, and hypoxic requiring non-rebreather. Labs showing neutrophilic pre-dominant leukocytosis and relative lymphopenia. He has slightly elevated lactate. CXR showing bilateral heterogenous infiltrates (L>R) which was personally reviewed by me.    Patient admitted for acute hypoxic respiratory failure due to COVID-19. Given non-rebreather use will start Solumedrol in attempt to reduce progression to intubation. Symptomatic treatment. Start Plaquenil. Likely can discontinue antibiotics. Leukocytosis likely reactive with very minimal elevation in procalcitonin. Trend COVID-19 labs per institution guidelines. Prognosis remains guarded given tachypnea and currently on 100% of FiO2.

## 2020-03-31 NOTE — ED PROVIDER NOTE - OBJECTIVE STATEMENT
43yo m pmhx DM pw fever, headache, myalgias, malaise, non-productive cough, sore throat, rhinorrhea, shortness of breath x 2 days. Reports worsening of symptoms since onset. brother ill for 4 days, and worse sx then him. Denies smoking history. Denies chest pain, abdominal pain, nausea, vomiting, diarrhea, constipation, urinary symptoms. Covid testing not done yet. reports shortness of breath worsening over the last 24 hours so came for evaluation. 85% spo2 on RA, 95% 15l NRB

## 2020-03-31 NOTE — H&P ADULT - PROBLEM SELECTOR PLAN 3
- patient septic on admission (fever, tachycardia, tachypnea) with PNA   - f/u COVID PCR, urine legionella  - blood cultures if COVID negative  - continue CAP coverage  - lactate elevated to 3.3, continue to trend

## 2020-03-31 NOTE — ED PROVIDER NOTE - ATTENDING CONTRIBUTION TO CARE
Merly Chavez MD - Attending Physician: I have personally seen and examined this patient with the resident/fellow.  I have fully participated in the care of this patient. I have reviewed all pertinent clinical information, including history, physical exam, plan and the Resident/Fellow’s note and agree except as noted. See MDM

## 2020-03-31 NOTE — ED PROVIDER NOTE - CARE PLAN
Principal Discharge DX:	Hypoxia Principal Discharge DX:	Suspected 2019 novel coronavirus infection  Secondary Diagnosis:	Hypoxia

## 2020-03-31 NOTE — H&P ADULT - NSHPSOCIALHISTORY_GEN_ALL_CORE
Patient denies any smoking history, is a social drinker. 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

## 2020-03-31 NOTE — H&P ADULT - PROBLEM SELECTOR PLAN 2
- patient with acute hypoxic respiratory failure most likely viral PNA from COVID however concern for possible superimposed bacterial PNA given leukocytosis and elevated procalcitonin  - continue ceftriaxone/azithro for CAP coverage  - f/u urine legionella

## 2020-03-31 NOTE — H&P ADULT - NSHPREVIEWOFSYSTEMS_GEN_ALL_CORE
REVIEW OF SYSTEMS:    CONSTITUTIONAL: generalized weakness, fevers  HEAD: no pain  EYES/ENT: No visual changes;  No vertigo or throat pain   NECK: No pain or stiffness  RESPIRATORY: see HPI  CARDIOVASCULAR: No chest pain or palpitations  GASTROINTESTINAL: No abdominal pain; nausea, vomiting;  diarrhea or constipation. No hemetemesis, melena or hematochezia.  GENITOURINARY: No dysuria, frequency or hematuria  NEUROLOGICAL: No numbness or weakness  SKIN: No itching, burning, rashes, or lesions   MUSCULOSKEL: myalgia   All other review of systems is negative unless indicated above.

## 2020-03-31 NOTE — H&P ADULT - NSHPPHYSICALEXAM_GEN_ALL_CORE
PHYSICAL EXAM:    Vital Signs Last 24 Hrs  T(C): 37.6 (31 Mar 2020 19:30), Max: 39.1 (31 Mar 2020 13:36)  T(F): 99.7 (31 Mar 2020 19:30), Max: 102.3 (31 Mar 2020 13:36)  HR: 104 (31 Mar 2020 19:30) (103 - 129)  BP: 115/78 (31 Mar 2020 19:30) (114/76 - 125/74)  BP(mean): --  RR: 28 (31 Mar 2020 19:30) (22 - 30)  SpO2: 95% (31 Mar 2020 19:30) (85% - 97%)    Physical Exam:  	Gen: ill appearing  	Head: normal appearing  	HEENT: normal conjunctiva, oral mucosa dry  	Lung: tachypnea and moderate respiratory distress, speaking in partial sentences, bibasilar crackles  	CV: tachycardic  	Abd: soft, ND, NT  	MSK: no visible deformities  	Neuro: No focal deficits  	Skin: Warm  	Psych: anxious

## 2020-03-31 NOTE — H&P ADULT - PROBLEM SELECTOR PLAN 1
- patient with fever, cough, CXR with b/l opacities concerning for COVID PNA  - ESR, CRP, LDH, ferritin elevated  - f/u COVID PCR  - on nonrebreather, will start steroids  - if test is positive, can start plaquenil (EKG with ) - patient with fever, cough, CXR with b/l opacities concerning for COVID PNA  - ESR, CRP, LDH, ferritin elevated  - COVID PCR positive, will start plaquenil (EKG with QTc 444), continue to trend QTC  - on nonrebreather, will start solumedrol BID  - low threshold to consult MICU for intubation

## 2020-03-31 NOTE — H&P ADULT - ASSESSMENT
44 M with no significant PMH presenting with fever and cough found to be septic on admission with CXR showing b/l patchy opacities, admitted for acute hypoxic respiratory failure, concerning for COVID19 PNA

## 2020-03-31 NOTE — H&P ADULT - PROBLEM SELECTOR PLAN 5
Transitions of Care Status:  1.  Name of PCP: Dr. Saba  2.  PCP Contacted on Admission: [ ] Y    [ ] N    3.  PCP contacted at Discharge: [ ] Y    [ ] N    [ ] N/A  4.  Post-Discharge Appointment Date and Location:  5.  Summary of Handoff given to PCP:

## 2020-03-31 NOTE — H&P ADULT - NSHPLABSRESULTS_GEN_ALL_CORE
CBC Full  -  ( 31 Mar 2020 14:56 )  WBC Count : 14.89 K/uL  Hemoglobin : 15.3 g/dL  Hematocrit : 48.7 %  Platelet Count - Automated : 258 K/uL  Mean Cell Volume : 80.9 fl  Mean Cell Hemoglobin : 25.4 pg  Mean Cell Hemoglobin Concentration : 31.4 gm/dL  Auto Neutrophil # : 12.58 K/uL  Auto Lymphocyte # : 0.99 K/uL  Auto Monocyte # : 1.17 K/uL  Auto Eosinophil # : 0.00 K/uL  Auto Basophil # : 0.02 K/uL  Auto Neutrophil % : 84.5 %  Auto Lymphocyte % : 6.6 %  Auto Monocyte % : 7.9 %  Auto Eosinophil % : 0.0 %  Auto Basophil % : 0.1 %    03-31    138  |  96  |  16  ----------------------------<  140<H>  4.2   |  24  |  0.94    Ca    9.1      31 Mar 2020 14:55    TPro  8.4<H>  /  Alb  4.1  /  TBili  1.3<H>  /  DBili  x   /  AST  31  /  ALT  34  /  AlkPhos  86  03-31    LIVER FUNCTIONS - ( 31 Mar 2020 14:55 )  Alb: 4.1 g/dL / Pro: 8.4 g/dL / ALK PHOS: 86 U/L / ALT: 34 U/L / AST: 31 U/L / GGT: x           < from: Xray Chest 1 View AP/PA (03.31.20 @ 15:29) >      INTERPRETATION:  EXAM:XR CHEST.     CLINICAL INDICATION: Shortness of Breath, Cough,  Fever .     TECHNIQUE: AP view.     PRIOR EXAM: None.     FINDINGS:      Evidence of bilateral heterogeneous infiltrates, left more than right suggesting atypical pneumonia. No sizable pleural effusion. Magnified cardiac and mediastinal silhouette.      IMPRESSION:     Bilateral pulmonary infiltrates.    < end of copied text >

## 2020-03-31 NOTE — H&P ADULT - HISTORY OF PRESENT ILLNESS
44 M with no significant PMH presenting with nonproductive cough and fevers for 3-4 days. Patient came in today due to worsening cough. He was prescribed antibiotics on Saturday because he wasn't feeling well and it did somewhat help. Patient has also been having myalgias. No chest pain, some mild shortness of breath. No nausea, vomiting, abdominal pain, diarrhea, or constipation. Patient's brother also symptomatic however they don't live together and patient denies any sick contacts. No recent travels.      In the ED, T 102.3, , /79, RR 22 satting 85% on room air. Patient was placed on 15L nonrebreather satting 95-97%. Patient given tylenol, ceftriaxone, and azithro.

## 2020-04-01 NOTE — PROGRESS NOTE ADULT - PROBLEM SELECTOR PLAN 2
- patient with acute hypoxic respiratory failure most likely viral PNA from COVID however concern for possible superimposed bacterial PNA given leukocytosis and elevated procalcitonin  - continue ceftriaxone/azithro for CAP coverage

## 2020-04-01 NOTE — PROGRESS NOTE ADULT - SUBJECTIVE AND OBJECTIVE BOX
Patient feels short of breath but improved with supplemental oxygen. Denies chest pain.    GENERAL: No fevers, no chills.  EYES: No blurry vision,  No photophobia  ENT: No sore throat.  No dysphagia  Cardiovascular: No chest pain, palpitations, orthopnea  Pulmonary: + cough, no wheezing. + shortness of breath  Gastrointestinal: No abdominal pain, no diarrhea, no constipation.    Musculoskeletal: No weakness.  No myalgias.  Dermatology:  No rashes.  Neuro: No Headache.  No vertigo.  No dizziness.  Psych: No anxiety, no depression.  Denies suicidal thoughts.    MEDICATIONS  (STANDING):  azithromycin   Tablet 500 milliGRAM(s) Oral daily  enoxaparin Injectable 40 milliGRAM(s) SubCutaneous daily  hydroxychloroquine 200 milliGRAM(s) Oral every 12 hours  hydroxychloroquine   Oral   methylPREDNISolone sodium succinate Injectable 30 milliGRAM(s) IV Push two times a day    MEDICATIONS  (PRN):  acetaminophen   Tablet .. 650 milliGRAM(s) Oral every 6 hours PRN Temp greater or equal to 38C (100.4F), Mild Pain (1 - 3), Moderate Pain (4 - 6)  ALBUTerol    90 MICROgram(s) HFA Inhaler 2 Puff(s) Inhalation every 6 hours PRN Shortness of Breath and/or Wheezing  guaiFENesin   Syrup  (Sugar-Free) 100 milliGRAM(s) Oral every 6 hours PRN Cough    Vital Signs Last 24 Hrs  T(C): 36.9 (01 Apr 2020 06:37), Max: 38.1 (31 Mar 2020 22:26)  T(F): 98.4 (01 Apr 2020 06:37), Max: 100.6 (31 Mar 2020 22:26)  HR: 88 (01 Apr 2020 06:37) (88 - 104)  BP: 118/65 (01 Apr 2020 06:37) (112/80 - 118/65)  BP(mean): --  RR: 20 (01 Apr 2020 06:37) (20 - 33)  SpO2: 95% (01 Apr 2020 06:37) (95% - 98%)    GENERAL: tachypneic   HEAD:  Atraumatic, Normocephalic  EYES: EOMI, PERRLA, conjunctiva and sclera clear  ENT: Pharynx not erythematous  PULMONARY: rales b/l  CARDIOVASCULAR: Regular rate and rhythm; No murmurs, rubs, or gallops  ABDOMEN: Soft, Nontender, Nondistended; Bowel sounds present  EXTREMITIES:  2+ Peripheral Pulses, No clubbing, cyanosis, or edema  MUSCULOSKELETAL: No calf tenderness  pSYCH: AAOx3, normal affect  SKIN: warm and dry, No rashes or lesions    .  LABS:                         14.9   18.25 )-----------( 268      ( 01 Apr 2020 05:55 )             45.1     04-01    138  |  97  |  20  ----------------------------<  194<H>  4.6   |  23  |  0.82    Ca    9.0      01 Apr 2020 05:55  Phos  4.4     04-01  Mg     2.2     04-01    TPro  7.7  /  Alb  3.8  /  TBili  1.4<H>  /  DBili  x   /  AST  34  /  ALT  42  /  AlkPhos  99  04-01              RADIOLOGY, EKG & ADDITIONAL TESTS: Reviewed.

## 2020-04-01 NOTE — ED ADULT NURSE REASSESSMENT NOTE - NS ED NURSE REASSESS COMMENT FT1
Pt tachypneic to high 20s but resting comfortably on non-rebreather, no retractions noted, speaking in full sentences, A&O x4, O2 sat 97%. Does not appear to be in acute distress, pt denies SOB, endorses feeling better than on arrival.
Spoke to MD Gonzalez, expressed further education on plaquenil. Made MD aware of pt's vitals, specifically pt's respiration rate.
patient RR high 20s. KRAIG PEARCE called x2. He states he was currently in the middle of a rapid response. Patient o2 sat stable, and he has no retractions noted. Dr Marina who cared for patient in ED made aware. Patient to remain on NRB. A&Ox3. Will continue to monitor.
Received report from Merly HELLER, pt currently awaiting bed. Pt is A&Ox4, noted to be on a non rebreather, reports improvement in breathing with the nonrebreather.

## 2020-04-01 NOTE — PROGRESS NOTE ADULT - ATTENDING COMMENTS
I have discussed the case with Dr. Browne. Will discuss with DR. Vasquez to enroll patient in IL6 trial  discussed with [facility] np dinorah Blanco D.O.  Hospitalist Pager # [phone]

## 2020-04-01 NOTE — PROGRESS NOTE ADULT - PROBLEM SELECTOR PLAN 1
- patient with fever, cough, CXR with b/l opacities concerning for COVID PNA  - ESR, CRP, LDH, ferritin elevated  - COVID PCR positive, c/w plaquenil (EKG with QTc 444), continue to trend QTC  - on nonrebreather, will c/w solumedrol BID  - low threshold to consult MICU for intubation

## 2020-04-01 NOTE — PROGRESS NOTE ADULT - ASSESSMENT
44 M with no significant PMH presenting with fever and cough found to be septic on admission with CXR showing b/l patchy opacities, admitted for acute hypoxic respiratory failure, concerning for COVID19 PNA.

## 2020-04-02 NOTE — PROGRESS NOTE ADULT - SUBJECTIVE AND OBJECTIVE BOX
Patient feels comfortable with nasal canula. Feels better today.     GENERAL: No fevers, no chills.  EYES: No blurry vision,  No photophobia  ENT: No sore throat.  No dysphagia  Cardiovascular: No chest pain, palpitations, orthopnea  Pulmonary: + cough, no wheezing. + shortness of breath  Gastrointestinal: No abdominal pain, no diarrhea, no constipation.    Musculoskeletal: No weakness.  No myalgias.  Dermatology:  No rashes.  Neuro: No Headache.  No vertigo.  No dizziness.  Psych: No anxiety, no depression.  Denies suicidal thoughts.    MEDICATIONS  (STANDING):  enoxaparin Injectable 40 milliGRAM(s) SubCutaneous daily  hydroxychloroquine 200 milliGRAM(s) Oral every 12 hours  hydroxychloroquine   Oral   methylPREDNISolone sodium succinate Injectable 30 milliGRAM(s) IV Push two times a day    MEDICATIONS  (PRN):  acetaminophen   Tablet .. 650 milliGRAM(s) Oral every 6 hours PRN Temp greater or equal to 38C (100.4F), Mild Pain (1 - 3), Moderate Pain (4 - 6)  ALBUTerol    90 MICROgram(s) HFA Inhaler 2 Puff(s) Inhalation every 6 hours PRN Shortness of Breath and/or Wheezing  guaiFENesin   Syrup  (Sugar-Free) 100 milliGRAM(s) Oral every 6 hours PRN Cough    Vital Signs Last 24 Hrs  T(C): 36.1 (02 Apr 2020 13:09), Max: 36.8 (01 Apr 2020 21:36)  T(F): 97 (02 Apr 2020 13:09), Max: 98.3 (01 Apr 2020 21:36)  HR: 77 (02 Apr 2020 13:09) (75 - 89)  BP: 111/73 (02 Apr 2020 13:09) (108/71 - 118/77)  BP(mean): --  RR: 20 (02 Apr 2020 13:09) (20 - 20)  SpO2: 97% (02 Apr 2020 13:09) (92% - 97%)    GENERAL: tachypneic   HEAD:  Atraumatic, Normocephalic  EYES: EOMI, PERRLA, conjunctiva and sclera clear  ENT: Pharynx not erythematous  PULMONARY: rales b/l  CARDIOVASCULAR: Regular rate and rhythm; No murmurs, rubs, or gallops  ABDOMEN: Soft, Nontender, Nondistended; Bowel sounds present  EXTREMITIES:  2+ Peripheral Pulses, No clubbing, cyanosis, or edema  MUSCULOSKELETAL: No calf tenderness  pSYCH: AAOx3, normal affect  SKIN: warm and dry, No rashes or lesions    .  LABS:                         14.2   14.97 )-----------( 313      ( 02 Apr 2020 06:23 )             45.1     04-02    138  |  100  |  24<H>  ----------------------------<  208<H>  4.6   |  24  |  0.72    Ca    9.0      02 Apr 2020 06:23  Phos  4.4     04-01  Mg     2.2     04-01    TPro  7.7  /  Alb  3.8  /  TBili  1.4<H>  /  DBili  x   /  AST  34  /  ALT  42  /  AlkPhos  99  04-01              RADIOLOGY, EKG & ADDITIONAL TESTS: Reviewed.

## 2020-04-02 NOTE — PROGRESS NOTE ADULT - PROBLEM SELECTOR PLAN 1
- patient with fever, cough, CXR with b/l opacities 2' COVID PNA  - ESR, CRP, LDH, ferritin elevated  - COVID PCR positive, c/w plaquenil (EKG with QTc 444), continue to trend QTC  - on nonrebreather, will c/w solumedrol BID  - I have discussed the case with Dr. Ezequiel Pickard-- to be enrolled in trial (awaiting)  - low threshold to consult MICU for intubation

## 2020-04-03 NOTE — PROGRESS NOTE ADULT - SUBJECTIVE AND OBJECTIVE BOX
Patient is a 44y old  Male who presents with a chief complaint of Cough (02 Apr 2020 18:25)        SUBJECTIVE / OVERNIGHT EVENTS:  overnight no acute events.  no n/v/f/chills, cp, sob, abd pain.    CAPILLARY BLOOD GLUCOSE        I&O's Summary    02 Apr 2020 07:01  -  03 Apr 2020 07:00  --------------------------------------------------------  IN: 200 mL / OUT: 200 mL / NET: 0 mL      Vital Signs Last 24 Hrs  T(C): 36.3 (03 Apr 2020 12:37), Max: 37.2 (03 Apr 2020 05:04)  T(F): 97.4 (03 Apr 2020 12:37), Max: 99 (03 Apr 2020 05:04)  HR: 73 (03 Apr 2020 12:37) (71 - 77)  BP: 108/71 (03 Apr 2020 12:37) (108/71 - 113/73)  BP(mean): --  RR: 20 (03 Apr 2020 12:37) (20 - 20)  SpO2: 91% (03 Apr 2020 12:37) (86% - 95%)    PHYSICAL EXAM:  GENERAL:  Well appearing, Kazakh speaking, NRB, in NAD  HEAD:  NCAT  EYES: PERRLA, conjunctiva clear  NECK: Supple  CHEST/LUNG: CTA B/L w/ occasional rhonchi  HEART: Reg rate. Normal S1, S2. No m/r/g.   ABDOMEN: SNTND  EXTREMITIES:  2+ Peripheral Pulses, No clubbing, cyanosis, edema.  PSYCH:  appropriate affect    LABS:                        14.4   13.76 )-----------( 374      ( 03 Apr 2020 13:02 )             46.3     04-02    138  |  100  |  24<H>  ----------------------------<  208<H>  4.6   |  24  |  0.72    Ca    9.0      02 Apr 2020 06:23                  RADIOLOGY & ADDITIONAL TESTS:  Care Discussed with Consultants/Other Providers: Floor NP/PA    MEDICATIONS  (STANDING):  enoxaparin Injectable 40 milliGRAM(s) SubCutaneous daily  hydroxychloroquine 200 milliGRAM(s) Oral every 12 hours  hydroxychloroquine   Oral   methylPREDNISolone sodium succinate Injectable 30 milliGRAM(s) IV Push two times a day    MEDICATIONS  (PRN):  acetaminophen   Tablet .. 650 milliGRAM(s) Oral every 6 hours PRN Temp greater or equal to 38C (100.4F), Mild Pain (1 - 3), Moderate Pain (4 - 6)  ALBUTerol    90 MICROgram(s) HFA Inhaler 2 Puff(s) Inhalation every 6 hours PRN Shortness of Breath and/or Wheezing  guaiFENesin   Syrup  (Sugar-Free) 100 milliGRAM(s) Oral every 6 hours PRN Cough

## 2020-04-03 NOTE — PROGRESS NOTE ADULT - PROBLEM SELECTOR PLAN 1
- patient with fever, cough, CXR with b/l opacities 2' COVID PNA  - c/w plaquenil x total 5 d  - on nonrebreather, unable to wean to NC today  - will c/w solumedrol BID  - to be considered for trial? f/u ID  - low threshold to consult MICU for intubation  - trend ferritin/CRP elevated

## 2020-04-03 NOTE — PROGRESS NOTE ADULT - ASSESSMENT
45 yo M with no significant PMH presenting with fever and cough found to be septic on admission with CXR showing b/l patchy opacities, admitted for acute hypoxic respiratory failure, adm with COVID19 PNA.

## 2020-04-03 NOTE — PROGRESS NOTE ADULT - ATTENDING COMMENTS
Montserrat Fernandes MD  Division of Hospital Medicine  Pager: [phone]  Office: [phone]    dx: [phone] J96.01  Acute respiratory failure with hypoxia

## 2020-04-04 NOTE — PROGRESS NOTE ADULT - ATTENDING COMMENTS
Montserrat Fernandes MD  Division of Hospital Medicine  Pager: [phone]  Office: [phone]    dx: [phone] J96.01  Acute respiratory failure with hypoxia

## 2020-04-04 NOTE — PROGRESS NOTE ADULT - PROBLEM SELECTOR PLAN 1
COVID PNA  - c/w plaquenil x total 5 d  - on nonrebreather, wean to NC as tolerates  - will c/w solumedrol BID to complete 5 day course on 4/5  - started on anakinra 4/3-  - low threshold to consult MICU for intubation  - trend ferritin/CRP now downtrended

## 2020-04-04 NOTE — PROGRESS NOTE ADULT - ASSESSMENT
43 yo M with no significant PMH presenting with fever and cough found to be septic on admission with CXR showing b/l patchy opacities, admitted for acute hypoxic respiratory failure due to COVID19 PNA.

## 2020-04-04 NOTE — PROGRESS NOTE ADULT - SUBJECTIVE AND OBJECTIVE BOX
Patient is a 44y old  Male who presents with a chief complaint of Cough (03 Apr 2020 13:50)        SUBJECTIVE / OVERNIGHT EVENTS:  overnight no acute events.  no n/v/f/chills, cp, sob, abd pain.  feels well.    CAPILLARY BLOOD GLUCOSE        I&O's Summary    03 Apr 2020 07:01  -  04 Apr 2020 07:00  --------------------------------------------------------  IN: 240 mL / OUT: 500 mL / NET: -260 mL    04 Apr 2020 07:01  -  04 Apr 2020 15:30  --------------------------------------------------------  IN: 360 mL / OUT: 0 mL / NET: 360 mL        Vital Signs Last 24 Hrs  T(C): 36.9 (04 Apr 2020 04:35), Max: 36.9 (04 Apr 2020 04:35)  T(F): 98.4 (04 Apr 2020 04:35), Max: 98.4 (04 Apr 2020 04:35)  HR: 62 (04 Apr 2020 04:35) (62 - 78)  BP: 108/64 (04 Apr 2020 04:35) (108/64 - 111/73)  BP(mean): --  RR: 20 (04 Apr 2020 04:35) (20 - 22)  SpO2: 92% (04 Apr 2020 04:35) (90% - 92%)    PHYSICAL EXAM:  GENERAL:  Well appearing, Latvian speaking, NRB, in NAD  HEAD:  NCAT  EYES: PERRLA, conjunctiva clear  NECK: Supple  CHEST/LUNG: CTA B/L w/ occasional rhonchi  HEART: Reg rate. Normal S1, S2. No m/r/g.   ABDOMEN: SNTND  EXTREMITIES:  2+ Peripheral Pulses, No clubbing, cyanosis, edema.  PSYCH:  appropriate affect    LABS:                        14.4   13.76 )-----------( 374      ( 03 Apr 2020 13:02 )             46.3                       RADIOLOGY & ADDITIONAL TESTS:  Care Discussed with Consultants/Other Providers: Floor NP/PA    MEDICATIONS  (STANDING):  anakinra Injectable 100 milliGRAM(s) SubCutaneous every 6 hours  enoxaparin Injectable 40 milliGRAM(s) SubCutaneous daily  hydroxychloroquine 200 milliGRAM(s) Oral every 12 hours  hydroxychloroquine   Oral   methylPREDNISolone sodium succinate Injectable 40 milliGRAM(s) IV Push every 12 hours    MEDICATIONS  (PRN):  acetaminophen   Tablet .. 650 milliGRAM(s) Oral every 6 hours PRN Temp greater or equal to 38C (100.4F), Mild Pain (1 - 3), Moderate Pain (4 - 6)  ALBUTerol    90 MICROgram(s) HFA Inhaler 2 Puff(s) Inhalation every 6 hours PRN Shortness of Breath and/or Wheezing  guaiFENesin   Syrup  (Sugar-Free) 100 milliGRAM(s) Oral every 6 hours PRN Cough

## 2020-04-05 NOTE — PROVIDER CONTACT NOTE (OTHER) - ACTION/TREATMENT ORDERED:
Administer Albuterol. Patient increased to 91% placed on the right side. Patient comfortable. Continue to monitor. Patient increased to 91% placed prone. Patient comfortable. Continue to monitor.

## 2020-04-05 NOTE — PROGRESS NOTE ADULT - PROBLEM SELECTOR PLAN 1
COVID PNA  s/p 5 d plaquenil, s/p 5 d solumedrol  - on nonrebreather, wean to NC as tolerates  - started on anakinra 4/3-4/6, may consider extending to q24 hr dosing after  - low threshold to consult MICU for intubation if desats on NRB  - ferritin/CRP now downtrended

## 2020-04-05 NOTE — PROGRESS NOTE ADULT - ATTENDING COMMENTS
Montserrat Fernandes MD  Division of Hospital Medicine  Pager: [phone]  Office: [phone]    dx: [phone] J96.01  Acute respiratory failure with hypoxia

## 2020-04-05 NOTE — PROGRESS NOTE ADULT - SUBJECTIVE AND OBJECTIVE BOX
Patient is a 44y old  Male who presents with a chief complaint of Cough (04 Apr 2020 15:30)        SUBJECTIVE / OVERNIGHT EVENTS:  overnight no acute events.  no n/v/f/chills, cp, sob, abd pain.  pt states he feels well no complaints.    CAPILLARY BLOOD GLUCOSE        I&O's Summary    04 Apr 2020 07:01  -  05 Apr 2020 07:00  --------------------------------------------------------  IN: 840 mL / OUT: 500 mL / NET: 340 mL        Vital Signs Last 24 Hrs  T(C): 37.3 (05 Apr 2020 04:48), Max: 37.3 (05 Apr 2020 04:48)  T(F): 99.1 (05 Apr 2020 04:48), Max: 99.1 (05 Apr 2020 04:48)  HR: 77 (05 Apr 2020 04:48) (69 - 88)  BP: 114/71 (05 Apr 2020 04:48) (114/71 - 122/80)  BP(mean): --  RR: 20 (05 Apr 2020 04:48) (20 - 20)  SpO2: 95% (05 Apr 2020 09:04) (91% - 96%)    PHYSICAL EXAM:  GENERAL:  Well appearing, Turkish speaking, NRB, in NAD  HEAD:  NCAT  EYES: PERRLA, conjunctiva clear  NECK: Supple  CHEST/LUNG: CTA B/L w/ occasional rhonchi  HEART: Reg rate. Normal S1, S2. No m/r/g.   ABDOMEN: SNTND  EXTREMITIES:  2+ Peripheral Pulses, No clubbing, cyanosis, edema.  PSYCH:  appropriate affect    LABS:                        14.9   14.70 )-----------( 361      ( 05 Apr 2020 07:05 )             47.4     04-05    138  |  101  |  20  ----------------------------<  114<H>  4.5   |  26  |  0.62    Ca    8.8      05 Apr 2020 07:03    TPro  6.7  /  Alb  3.2<L>  /  TBili  0.8  /  DBili  x   /  AST  16  /  ALT  24  /  AlkPhos  72  04-05                RADIOLOGY & ADDITIONAL TESTS:  Care Discussed with Consultants/Other Providers: Floor NP/PA    MEDICATIONS  (STANDING):  anakinra Injectable 100 milliGRAM(s) SubCutaneous every 6 hours  enoxaparin Injectable 40 milliGRAM(s) SubCutaneous daily    MEDICATIONS  (PRN):  acetaminophen   Tablet .. 650 milliGRAM(s) Oral every 6 hours PRN Temp greater or equal to 38C (100.4F), Mild Pain (1 - 3), Moderate Pain (4 - 6)  ALBUTerol    90 MICROgram(s) HFA Inhaler 2 Puff(s) Inhalation every 6 hours PRN Shortness of Breath and/or Wheezing  guaiFENesin   Syrup  (Sugar-Free) 100 milliGRAM(s) Oral every 6 hours PRN Cough

## 2020-04-05 NOTE — PROGRESS NOTE ADULT - ASSESSMENT
45 yo M with no significant PMH presenting with fever and cough found to be septic on admission with CXR showing b/l patchy opacities, admitted for acute hypoxic respiratory failure due to COVID19 PNA.

## 2020-04-06 NOTE — PROGRESS NOTE ADULT - PROBLEM SELECTOR PLAN 1
COVID PNA  s/p 5 d plaquenil, s/p 5 d solumedrol  - on nonrebreather, wean to NC as tolerates  - c/w anakinra 4/3-started on tapering dose today 4/6  - low threshold to consult MICU for intubation if desats on NRB, currently 93% on NRB  - ferritin/CRP now downtrended

## 2020-04-06 NOTE — DIETITIAN INITIAL EVALUATION ADULT. - PHYSICAL APPEARANCE
other (specify) Ht: 62 inches, Wt: 115lbs, BMI: 21.1kg/m2, IBW: 118lbs +/- 10%, %IBW: 97%  Edema: none, Skin: free of pressure injuries per nursing flow sheets

## 2020-04-06 NOTE — DIETITIAN INITIAL EVALUATION ADULT. - OTHER INFO
Pertinent Information: This is a 43 yo M with no significant PMH presenting with fever and cough found to be septic on admission with CXR showing b/l patchy opacities, admitted for acute hypoxic respiratory failure due to COVID19 PNA.    Unable to conduct a face to face interview or nutrition-focused physical exam due to limited contact restrictions related to patient's medical condition and isolation precaution. Spoke with patient over the phone via [employer]  ID [id number]. Pt reports good PO intake and appetite, does not follow any specific dietary restrictions. Confirms NKFA. Pt denies chewing/swallowing difficulty, nausea, vomiting, diarrhea, constipation PTA. Denies micronutrient supplementation.     Weights: pt reports weight has been stable, reports UBW as ?~ 180lbs however dosing weight this admission noted as 115lbs. Question accuracy of weight discrepancies, will continue to monitor and clarify as able. Pt denies significant changes in weight.    Since admission pt reports good PO intake and appetite, consuming % of meals in-house. Pt reports mostly completing breakfast this morning. No acute GI distress noted. Patient with no nutrition-related questions at this time. Declining need for nutrition supplement or offer to obtain food preferences.  Made aware RD remains available as needed.

## 2020-04-06 NOTE — PROGRESS NOTE ADULT - SUBJECTIVE AND OBJECTIVE BOX
Patient is a 44y old  Male who presents with a chief complaint of Cough (05 Apr 2020 11:22)      SUBJECTIVE / OVERNIGHT EVENTS: Patient seen and examined at bedside. Denies  any CP, abd pain, d/n/v, and dizziness. c/o SOB on talking.      ROS:  All other review of systems negative    Allergies    No Known Allergies    Intolerances        MEDICATIONS  (STANDING):  anakinra Injectable 100 milliGRAM(s) SubCutaneous every 12 hours  enoxaparin Injectable 40 milliGRAM(s) SubCutaneous daily    MEDICATIONS  (PRN):  acetaminophen   Tablet .. 650 milliGRAM(s) Oral every 6 hours PRN Temp greater or equal to 38C (100.4F), Mild Pain (1 - 3), Moderate Pain (4 - 6)  ALBUTerol    90 MICROgram(s) HFA Inhaler 2 Puff(s) Inhalation every 6 hours PRN Shortness of Breath and/or Wheezing  guaiFENesin   Syrup  (Sugar-Free) 100 milliGRAM(s) Oral every 6 hours PRN Cough      Vital Signs Last 24 Hrs  T(C): 36.3 (06 Apr 2020 12:23), Max: 36.8 (06 Apr 2020 04:56)  T(F): 97.3 (06 Apr 2020 12:23), Max: 98.2 (06 Apr 2020 04:56)  HR: 88 (06 Apr 2020 12:23) (72 - 98)  BP: 102/66 (06 Apr 2020 12:23) (97/55 - 102/66)  BP(mean): --  RR: 20 (06 Apr 2020 12:23) (18 - 20)  SpO2: 86% (06 Apr 2020 12:23) (86% - 95%)  CAPILLARY BLOOD GLUCOSE        I&O's Summary    05 Apr 2020 07:01  -  06 Apr 2020 07:00  --------------------------------------------------------  IN: 480 mL / OUT: 1200 mL / NET: -720 mL    06 Apr 2020 07:01  -  06 Apr 2020 17:17  --------------------------------------------------------  IN: 240 mL / OUT: 0 mL / NET: 240 mL        PHYSICAL EXAM:  GENERAL: NAD, well-developed, + NRB  HEAD:  Atraumatic, Normocephalic  EYES: EOMI, PERRLA, conjunctiva and sclera clear  NECK: Supple, No JVD  CHEST/LUNG: bibasilar rales; No wheeze  HEART: Regular rate and rhythm; No murmurs, rubs, or gallops  ABDOMEN: Soft, Nontender, Nondistended; Bowel sounds present  EXTREMITIES:  2+ Peripheral Pulses, No clubbing, cyanosis, or edema  NEUROLOGY: AAOx3, non-focal  PSYCH: calm  SKIN: No rashes or lesions    LABS:                        14.9   14.70 )-----------( 361      ( 05 Apr 2020 07:05 )             47.4     04-05    138  |  101  |  20  ----------------------------<  114<H>  4.5   |  26  |  0.62    Ca    8.8      05 Apr 2020 07:03    TPro  6.7  /  Alb  3.2<L>  /  TBili  0.8  /  DBili  x   /  AST  16  /  ALT  24  /  AlkPhos  72  04-05              RADIOLOGY & ADDITIONAL TESTS:    Care Discussed with Consultants/Other Providers: Medicine NP

## 2020-04-06 NOTE — DIETITIAN INITIAL EVALUATION ADULT. - ADD RECOMMEND
1) Continue current diet as tolerated. Encourage PO intake of protein-rich, nutrient dense foods. 2) RD to remain available and follow-up as medically appropriate.

## 2020-04-06 NOTE — DIETITIAN INITIAL EVALUATION ADULT. - PROBLEM SELECTOR PLAN 1
- patient with fever, cough, CXR with b/l opacities concerning for COVID PNA  - ESR, CRP, LDH, ferritin elevated  - COVID PCR positive, will start plaquenil (EKG with QTc 444), continue to trend QTC  - on nonrebreather, will start solumedrol BID  - low threshold to consult MICU for intubation

## 2020-04-07 NOTE — PROGRESS NOTE ADULT - SUBJECTIVE AND OBJECTIVE BOX
Patient is a 44y old  Male who presents with a chief complaint of Cough (06 Apr 2020 17:16)     [id number]    SUBJECTIVE / OVERNIGHT EVENTS: Patient seen and examined at bedside. he states that he feels a little better today, is able to lie on his back without significant SOB. Denies CP, abd pain and n/v.     ROS:  All other review of systems negative    Allergies    No Known Allergies    Intolerances        MEDICATIONS  (STANDING):  anakinra Injectable 100 milliGRAM(s) SubCutaneous every 12 hours  enoxaparin Injectable 40 milliGRAM(s) SubCutaneous daily    MEDICATIONS  (PRN):  acetaminophen   Tablet .. 650 milliGRAM(s) Oral every 6 hours PRN Temp greater or equal to 38C (100.4F), Mild Pain (1 - 3), Moderate Pain (4 - 6)  ALBUTerol    90 MICROgram(s) HFA Inhaler 2 Puff(s) Inhalation every 6 hours PRN Shortness of Breath and/or Wheezing  guaiFENesin   Syrup  (Sugar-Free) 100 milliGRAM(s) Oral every 6 hours PRN Cough      Vital Signs Last 24 Hrs  T(C): 36.9 (07 Apr 2020 12:32), Max: 36.9 (07 Apr 2020 04:24)  T(F): 98.5 (07 Apr 2020 12:32), Max: 98.5 (07 Apr 2020 04:24)  HR: 110 (07 Apr 2020 12:32) (95 - 110)  BP: 100/66 (07 Apr 2020 12:32) (97/61 - 100/66)  BP(mean): --  RR: 22 (07 Apr 2020 12:32) (20 - 22)  SpO2: 96% (07 Apr 2020 12:32) (89% - 96%)  CAPILLARY BLOOD GLUCOSE        I&O's Summary    06 Apr 2020 07:01  -  07 Apr 2020 07:00  --------------------------------------------------------  IN: 660 mL / OUT: 900 mL / NET: -240 mL    07 Apr 2020 07:01  -  07 Apr 2020 14:29  --------------------------------------------------------  IN: 240 mL / OUT: 0 mL / NET: 240 mL        PHYSICAL EXAM:  GENERAL: NAD, well-developed, + NRB  HEAD:  Atraumatic, Normocephalic  EYES: EOMI, PERRLA, conjunctiva and sclera clear  NECK: Supple, No JVD  CHEST/LUNG: bibasilar crackles; No wheeze  HEART: Regular rate and rhythm; No murmurs, rubs, or gallops  ABDOMEN: Soft, Nontender, Nondistended; Bowel sounds present  EXTREMITIES:  2+ Peripheral Pulses, No clubbing, cyanosis, or edema  NEUROLOGY: AAOx3, non-focal  PSYCH: calm  SKIN: No rashes or lesions    LABS:                        15.3   13.81 )-----------( 341      ( 07 Apr 2020 08:37 )             46.5     04-07    137  |  98  |  17  ----------------------------<  148<H>  4.2   |  27  |  0.62    Ca    8.6      07 Apr 2020 08:37    TPro  6.7  /  Alb  3.0<L>  /  TBili  1.2  /  DBili  x   /  AST  17  /  ALT  27  /  AlkPhos  76  04-07              RADIOLOGY & ADDITIONAL TESTS:    Care Discussed with Consultants/Other Providers: Medicine NP     Case Discussed with wife José Alexander, update about patient

## 2020-04-08 NOTE — PROGRESS NOTE ADULT - SUBJECTIVE AND OBJECTIVE BOX
Patient is a 44y old  Male who presents with a chief complaint of Cough (07 Apr 2020 14:29)      SUBJECTIVE / OVERNIGHT EVENTS:  SpO2: 92% (08 Apr 2020 12:38) (86% - 92%),  No fever         ADDITIONAL REVIEW OF SYSTEMS: Negative except for above    MEDICATIONS  (STANDING):  anakinra Injectable 100 milliGRAM(s) SubCutaneous every 12 hours  enoxaparin Injectable 40 milliGRAM(s) SubCutaneous daily    MEDICATIONS  (PRN):  acetaminophen   Tablet .. 650 milliGRAM(s) Oral every 6 hours PRN Temp greater or equal to 38C (100.4F), Mild Pain (1 - 3), Moderate Pain (4 - 6)  ALBUTerol    90 MICROgram(s) HFA Inhaler 2 Puff(s) Inhalation every 6 hours PRN Shortness of Breath and/or Wheezing  guaiFENesin   Syrup  (Sugar-Free) 100 milliGRAM(s) Oral every 6 hours PRN Cough      CAPILLARY BLOOD GLUCOSE        I&O's Summary    07 Apr 2020 07:01  -  08 Apr 2020 07:00  --------------------------------------------------------  IN: 240 mL / OUT: 1000 mL / NET: -760 mL    08 Apr 2020 07:01  -  08 Apr 2020 20:06  --------------------------------------------------------  IN: 600 mL / OUT: 700 mL / NET: -100 mL        PHYSICAL EXAM:  Vital Signs Last 24 Hrs  T(C): 36.4 (08 Apr 2020 12:38), Max: 37.7 (07 Apr 2020 20:23)  T(F): 97.6 (08 Apr 2020 12:38), Max: 99.8 (07 Apr 2020 20:23)  HR: 82 (08 Apr 2020 12:38) (82 - 101)  BP: 99/66 (08 Apr 2020 12:38) (99/66 - 106/71)  BP(mean): --  RR: 21 (08 Apr 2020 12:38) (21 - 24)  SpO2: 92% (08 Apr 2020 12:38) (86% - 92%)    PHYSICAL EXAM:  GENERAL: on NR  well-developed  HEAD:  Atraumatic, Normocephalic  NECK: Supple, No JVD  CHEST/LUNG:pos air entry bilaterally; No wheeze  HEART: Regular rate and rhythm; No murmurs, rubs, or gallops  ABDOMEN: Soft, Nontender, Nondistended; Bowel sounds present  EXTREMITIES:  2+ Peripheral Pulses, No clubbing, cyanosis, or edema  PSYCH: AAOx3  NEUROLOGY: non-focal      LABS:                        15.3   13.81 )-----------( 341      ( 07 Apr 2020 08:37 )             46.5     04-07    137  |  98  |  17  ----------------------------<  148<H>  4.2   |  27  |  0.62    Ca    8.6      07 Apr 2020 08:37    TPro  6.7  /  Alb  3.0<L>  /  TBili  1.2  /  DBili  x   /  AST  17  /  ALT  27  /  AlkPhos  76  04-07                RADIOLOGY & ADDITIONAL TESTS:    Imaging Personally Reviewed:    Electrocardiogram Personally Reviewed:    COORDINATION OF CARE:  Care Discussed with Consultants/Other Providers [Y/N]:  Prior or Outpatient Records Reviewed [Y/N]:

## 2020-04-08 NOTE — PROGRESS NOTE ADULT - PROBLEM SELECTOR PLAN 1
COVID PNA  s/p 5 d plaquenil, s/p 5 d solumedrol  - on nonrebreather, wean to NC as tolerates  - c/w anakinra 4/3-started on tapering dose to finish 4/12  - low threshold to consult MICU for intubation if desats on NRB, currently 94% on NRB  - ferritin/CRP now downtrended

## 2020-04-09 NOTE — PROGRESS NOTE ADULT - SUBJECTIVE AND OBJECTIVE BOX
Patient is a 44y old  Male who presents with a chief complaint of Cough (08 Apr 2020 20:05)      SUBJECTIVE / OVERNIGHT EVENTS:  SpO2: 93% (09 Apr 2020 04:48) (90% - 93%).  NO fever / Tm= 98.6F   SPO2= 93% ( on NR since 3/31)     Tele reviewed:  Sinus tach       ADDITIONAL REVIEW OF SYSTEMS: Negative except for above    MEDICATIONS  (STANDING):  enoxaparin Injectable 40 milliGRAM(s) SubCutaneous daily    MEDICATIONS  (PRN):  acetaminophen   Tablet .. 650 milliGRAM(s) Oral every 6 hours PRN Temp greater or equal to 38C (100.4F), Mild Pain (1 - 3), Moderate Pain (4 - 6)  ALBUTerol    90 MICROgram(s) HFA Inhaler 2 Puff(s) Inhalation every 6 hours PRN Shortness of Breath and/or Wheezing  guaiFENesin   Syrup  (Sugar-Free) 100 milliGRAM(s) Oral every 6 hours PRN Cough      CAPILLARY BLOOD GLUCOSE        I&O's Summary    08 Apr 2020 07:01  -  09 Apr 2020 07:00  --------------------------------------------------------  IN: 600 mL / OUT: 1050 mL / NET: -450 mL    09 Apr 2020 07:01  -  09 Apr 2020 12:48  --------------------------------------------------------  IN: 177 mL / OUT: 200 mL / NET: -23 mL        PHYSICAL EXAM:  Vital Signs Last 24 Hrs  T(C): 37 (09 Apr 2020 04:48), Max: 37 (09 Apr 2020 04:48)  T(F): 98.6 (09 Apr 2020 04:48), Max: 98.6 (09 Apr 2020 04:48)  HR: 85 (09 Apr 2020 04:48) (85 - 96)  BP: 95/57 (09 Apr 2020 04:48) (95/57 - 102/70)  BP(mean): --  RR: 22 (09 Apr 2020 04:48) (21 - 22)  SpO2: 93% (09 Apr 2020 04:48) (90% - 93%)    PHYSICAL EXAM:  GENERAL: on NR  well-developed  HEAD:  Atraumatic, Normocephalic  NECK: Supple, No JVD  CHEST/LUNG:pos air entry bilaterally; No wheeze  HEART: Regular rate and rhythm; No murmurs, rubs, or gallops  ABDOMEN: Soft, Nontender, Nondistended; Bowel sounds present  EXTREMITIES:  2+ Peripheral Pulses, No clubbing, cyanosis, or edema  PSYCH: AAOx3  NEUROLOGY: non-focal      LABS:                        14.6   10.90 )-----------( 394      ( 09 Apr 2020 07:38 )             44.6     04-09    137  |  99  |  15  ----------------------------<  128<H>  3.9   |  26  |  0.60    Ca    8.7      09 Apr 2020 07:38    TPro  6.6  /  Alb  2.9<L>  /  TBili  0.9  /  DBili  x   /  AST  17  /  ALT  35  /  AlkPhos  92  04-09                RADIOLOGY & ADDITIONAL TESTS:    Imaging Personally Reviewed:    Electrocardiogram Personally Reviewed:    COORDINATION OF CARE:  Care Discussed with Consultants/Other Providers [Y/N]:  Prior or Outpatient Records Reviewed [Y/N]: Patient is a 44y old  Male who presents with a chief complaint of Cough (08 Apr 2020 20:05)      SUBJECTIVE / OVERNIGHT EVENTS:  SpO2: 93% (09 Apr 2020 04:48) (90% - 93%).  NO fever / Tm= 98.6F   SPO2= 93% ( on NR since 3/31) , pt states to be doing the same, no dyspnea, no increased work of breathing .      Tele reviewed:  Sinus tac up to 110's        ADDITIONAL REVIEW OF SYSTEMS: Negative except for above    MEDICATIONS  (STANDING):  enoxaparin Injectable 40 milliGRAM(s) SubCutaneous daily    MEDICATIONS  (PRN):  acetaminophen   Tablet .. 650 milliGRAM(s) Oral every 6 hours PRN Temp greater or equal to 38C (100.4F), Mild Pain (1 - 3), Moderate Pain (4 - 6)  ALBUTerol    90 MICROgram(s) HFA Inhaler 2 Puff(s) Inhalation every 6 hours PRN Shortness of Breath and/or Wheezing  guaiFENesin   Syrup  (Sugar-Free) 100 milliGRAM(s) Oral every 6 hours PRN Cough      CAPILLARY BLOOD GLUCOSE        I&O's Summary    08 Apr 2020 07:01  -  09 Apr 2020 07:00  --------------------------------------------------------  IN: 600 mL / OUT: 1050 mL / NET: -450 mL    09 Apr 2020 07:01  -  09 Apr 2020 12:48  --------------------------------------------------------  IN: 177 mL / OUT: 200 mL / NET: -23 mL        PHYSICAL EXAM:  Vital Signs Last 24 Hrs  T(C): 37 (09 Apr 2020 04:48), Max: 37 (09 Apr 2020 04:48)  T(F): 98.6 (09 Apr 2020 04:48), Max: 98.6 (09 Apr 2020 04:48)  HR: 85 (09 Apr 2020 04:48) (85 - 96)  BP: 95/57 (09 Apr 2020 04:48) (95/57 - 102/70)  BP(mean): --  RR: 22 (09 Apr 2020 04:48) (21 - 22)  SpO2: 93% (09 Apr 2020 04:48) (90% - 93%)    PHYSICAL EXAM:  GENERAL: on NR  well-developed  HEAD:  Atraumatic, Normocephalic  NECK: Supple, No JVD  CHEST/LUNG: pos air entry bilaterally; No wheeze, no crackles   HEART: Regular rate and rhythm; No murmurs, rubs, or gallops  ABDOMEN: Soft, Nontender, Nondistended; Bowel sounds present  EXTREMITIES:  2+ Peripheral Pulses, No clubbing, cyanosis, or edema  NEUROLOGY: non-focal, AAOx3    LABS:                        14.6   10.90 )-----------( 394      ( 09 Apr 2020 07:38 )             44.6     04-09    137  |  99  |  15  ----------------------------<  128<H>  3.9   |  26  |  0.60    Ca    8.7      09 Apr 2020 07:38    TPro  6.6  /  Alb  2.9<L>  /  TBili  0.9  /  DBili  x   /  AST  17  /  ALT  35  /  AlkPhos  92  04-09                RADIOLOGY & ADDITIONAL TESTS:    Imaging Personally Reviewed:    Electrocardiogram Personally Reviewed:    COORDINATION OF CARE:  Care Discussed with Consultants/Other Providers [Y/N]:  Prior or Outpatient Records Reviewed [Y/N]:

## 2020-04-09 NOTE — CONSULT NOTE ADULT - SUBJECTIVE AND OBJECTIVE BOX
ID CONSULTATION-- Tono Olmos [phone]    Patient is a 44y old  Male who presents with a chief complaint of Cough (09 Apr 2020 12:47)    HPI:  44 M with no significant PMH presenting with nonproductive cough and fevers for 3-4 days. Patient came in today due to worsening cough. He was prescribed antibiotics on Saturday because he wasn't feeling well and it did somewhat help. Patient has also been having myalgias. No chest pain, some mild shortness of breath. No nausea, vomiting, abdominal pain, diarrhea, or constipation. Patient's brother also symptomatic however they don't live together and patient denies any sick contacts. No recent travels.      In the ED, T 102.3, , /79, RR 22 satting 85% on room air. Patient was placed on 15L nonrebreather satting 95-97%. Patient given tylenol, ceftriaxone, and azithro. (31 Mar 2020 20:47)    Since admission patient has required a non rebreather face mask to maintain oxygen level > 90%, he reports feeling a little better today      PAST MEDICAL & SURGICAL HISTORY:  No pertinent past medical history  S/P appendectomy      SOCIAL:  Lives alone    FAMILY HISTORY:  FH: type 2 diabetes mellitus: brother    REVIEW OF SYSTEMS    General: feels ill, fevers, cough, SOB on admission have been slowly resolving    Allergic/Immunologic:	No hives or rash   Allergies    No Known Allergies    Intolerances        ANTIMICROBIALS:        Vital Signs Last 24 Hrs  T(C): 36.9 (09 Apr 2020 12:24), Max: 37 (09 Apr 2020 04:48)  T(F): 98.5 (09 Apr 2020 12:24), Max: 98.6 (09 Apr 2020 04:48)  HR: 115 (09 Apr 2020 12:24) (85 - 115)  BP: 106/72 (09 Apr 2020 12:24) (95/57 - 106/72)  BP(mean): --  RR: 24 (09 Apr 2020 12:24) (21 - 24)  SpO2: 85% (09 Apr 2020 12:24) (85% - 93%)    PHYSICAL EXAM:Pleasant patient in no acute distress. but requiring non rebreather face mask oxygen      Constitutional:Comfortable.Awake and alert  No cachexia     Eyes:PERRL EOMI.NO discharge or conjunctival injection    ENMT:No sinus tenderness.No thrush.No pharyngeal exudate or erythema.Fair dental hygiene    Neck:Supple,No LN,no JVD      Respiratory: diminished air entry bilat    Cardiovascular:S1 S2 wnl, No murmurs,rub or gallops, tachy    Gastrointestinal:Soft BS(+) no tenderness no masses ,No rebound or guarding    Genitourinary:No CVA tendereness     Rectal:    Extremities:No cyanosis,clubbing or edema.    Vascular:peripheral pulses felt    Neurological:AAO X 3,No grossly focal deficits    Skin:No rash     Lymph Nodes:No palpable LNs    Musculoskeletal:No joint swelling or LOM    Psychiatric:Affect normal.                              14.6   10.90 )-----------( 394      ( 09 Apr 2020 07:38 )             44.6       04-09    137  |  99  |  15  ----------------------------<  128<H>  3.9   |  26  |  0.60    Ca    8.7      09 Apr 2020 07:38    TPro  6.6  /  Alb  2.9<L>  /  TBili  0.9  /  DBili  x   /  AST  17  /  ALT  35  /  AlkPhos  92  04-09      RECENT CULTURES:    COVID-19 PCR: Detected: This test has been validated by [employer] to be accurate;  though it has not been FDA cleared/approved by the usual pathway.  As with all laboratory tests, results should be correlated with clinical  findings. (03.31.20 @ 14:56)      RADIOLOGY:  < from: Xray Chest 1 View AP/PA (03.31.20 @ 15:29) >  IMPRESSION:     Bilateral pulmonary infiltrates.    < end of copied text >      IMPRESSION:    Rx:

## 2020-04-09 NOTE — PROGRESS NOTE ADULT - ATTENDING COMMENTS
Wife is updated/ will discuss with ID / monitor BP, no stable enough to titrate down       Shayy Poon   Hospitalist   [phone] Will get CXR and ABG stat     Shayy Aguilarre   Hospitalist    Will get CXR and ABG stat , wife is updated via [employer] interpretor ( ID # [id number]) , if patient maintains SPO2 <88 or if dyspneic please call MICU for evaluation .      Shayy Poon   Hospitalist   [phone]

## 2020-04-09 NOTE — PROGRESS NOTE ADULT - PROBLEM SELECTOR PLAN 2
- 2' covid-19 infection  - wean off O2 as tolerates  - has been on NR since 3/31 - 2' covid-19 infection  - will repeat CXR today , ABG in AM   - has been on NR since 3/31

## 2020-04-09 NOTE — PROGRESS NOTE ADULT - PROBLEM SELECTOR PLAN 3
Patient returned a call to talk about results. Please call the patient back    2' covid-19 infection  - monitor vitals, temp, wbc  - resolved now

## 2020-04-09 NOTE — PROGRESS NOTE ADULT - PROBLEM SELECTOR PLAN 1
COVID PNA ( pos on 3/31)   - s/p 5 d plaquenil, s/p 5 d solumedrol  - on nonrebreather, wean to NC as tolerates  - c/w anakinra 4/3-started on tapering dose to finish 4/12  - low threshold to consult MICU for intubation if desats on NRB, currently 94% on NRB/ will discuss with ID about pt since he is still on NR   - ferritin/CRP downtrended now with no fever COVID PNA ( pos on 3/31)   - s/p 5 d plaquenil, s/p 5 d solumedrol  - on nonrebreather ( since 3/31 ) , will get repeat CXR , ABG in AM   - c/w anakinra 4/3-&  tapering dose to finish 4/12  - low threshold to consult MICU for intubation if desats on NRB, cont prone position/  please f/up repeat CXR today    - ferritin/CRP downtrended now with no fever, but still on NR

## 2020-04-10 NOTE — PROGRESS NOTE ADULT - PROBLEM SELECTOR PLAN 2
- 2' covid-19 infection  - repeat CXR with increased lung infiltrates / pulm consult is called   - has been on NR since 3/31  - cont the COVID-19 treatment

## 2020-04-10 NOTE — BEHAVIORAL HEALTH ASSESSMENT NOTE - RISK ASSESSMENT
Low Acute Suicide Risk Risk factors: acute/chronic medical issues, recent loss, not receiving treatment    Protective factors: no current SIIP/HIIP, no h/o SA/SIB, no h/o psych admissions, no access to weapons, no active substance abuse, good physical health, no psychosis, engaged in work or school, domiciled, intact marriage, social supports    Overall, pt is a low risk of harm to self/others and does not require psychiatric admission for safety and stabilization.

## 2020-04-10 NOTE — PROGRESS NOTE ADULT - SUBJECTIVE AND OBJECTIVE BOX
Patient is a 44y old  Male who presents with a chief complaint of Cough (09 Apr 2020 16:36)      SUBJECTIVE / OVERNIGHT EVENTS:  SpO2: 88% (10 Apr 2020 07:48) (88% - 92%), no fever , no chest pain .  Pt is still on NR     Tele reviewed:  Sinus 's       ADDITIONAL REVIEW OF SYSTEMS: Negative except for above    MEDICATIONS  (STANDING):  anakinra Injectable 100 milliGRAM(s) SubCutaneous daily  enoxaparin Injectable 40 milliGRAM(s) SubCutaneous daily    MEDICATIONS  (PRN):  acetaminophen   Tablet .. 650 milliGRAM(s) Oral every 6 hours PRN Temp greater or equal to 38C (100.4F), Mild Pain (1 - 3), Moderate Pain (4 - 6)  ALBUTerol    90 MICROgram(s) HFA Inhaler 2 Puff(s) Inhalation every 6 hours PRN Shortness of Breath and/or Wheezing  guaiFENesin   Syrup  (Sugar-Free) 100 milliGRAM(s) Oral every 6 hours PRN Cough      CAPILLARY BLOOD GLUCOSE        I&O's Summary    09 Apr 2020 07:01  -  10 Apr 2020 07:00  --------------------------------------------------------  IN: 297 mL / OUT: 650 mL / NET: -353 mL        PHYSICAL EXAM:  Vital Signs Last 24 Hrs  T(C): 37.4 (10 Apr 2020 04:00), Max: 37.4 (10 Apr 2020 04:00)  T(F): 99.4 (10 Apr 2020 04:00), Max: 99.4 (10 Apr 2020 04:00)  HR: 96 (10 Apr 2020 04:00) (96 - 102)  BP: 102/64 (10 Apr 2020 04:00) (102/64 - 107/72)  BP(mean): --  RR: 21 (10 Apr 2020 04:00) (21 - 21)  SpO2: 88% (10 Apr 2020 07:48) (88% - 92%)    PHYSICAL EXAM:  GENERAL: on NR  well-developed  HEAD:  Atraumatic, Normocephalic  NECK: Supple, No JVD  CHEST/LUNG: pos air entry bilaterally; No wheeze, no crackles   HEART: Regular rate and rhythm; No murmurs, rubs, or gallops  ABDOMEN: Soft, Nontender, Nondistended; Bowel sounds present  EXTREMITIES:  2+ Peripheral Pulses, No clubbing, cyanosis, or edema  NEUROLOGY: non-focal, AAOx3      LABS:                        14.1   10.96 )-----------( 446      ( 10 Apr 2020 06:32 )             44.9     04-10    133<L>  |  95<L>  |  13  ----------------------------<  121<H>  4.2   |  23  |  0.60    Ca    8.8      10 Apr 2020 06:34    TPro  6.8  /  Alb  2.7<L>  /  TBili  0.8  /  DBili  x   /  AST  21  /  ALT  41  /  AlkPhos  100  04-10                RADIOLOGY & ADDITIONAL TESTS:    Imaging Personally Reviewed:    Electrocardiogram Personally Reviewed:    COORDINATION OF CARE:  Care Discussed with Consultants/Other Providers [Y/N]:  Prior or Outpatient Records Reviewed [Y/N]:

## 2020-04-10 NOTE — BEHAVIORAL HEALTH ASSESSMENT NOTE - NSBHCHARTREVIEWLAB_PSY_A_CORE FT
14.1   10.96 )-----------( 446      ( 10 Apr 2020 06:32 )             44.9     04-10    133<L>  |  95<L>  |  13  ----------------------------<  121<H>  4.2   |  23  |  0.60    Ca    8.8      10 Apr 2020 06:34    TPro  6.8  /  Alb  2.7<L>  /  TBili  0.8  /  DBili  x   /  AST  21  /  ALT  41  /  AlkPhos  100  04-10

## 2020-04-10 NOTE — BEHAVIORAL HEALTH ASSESSMENT NOTE - NSBHCHARTREVIEWVS_PSY_A_CORE FT
Vital Signs Last 24 Hrs  T(C): 37.4 (10 Apr 2020 04:00), Max: 37.4 (10 Apr 2020 04:00)  T(F): 99.4 (10 Apr 2020 04:00), Max: 99.4 (10 Apr 2020 04:00)  HR: 96 (10 Apr 2020 04:00) (96 - 102)  BP: 102/64 (10 Apr 2020 04:00) (102/64 - 107/72)  BP(mean): --  RR: 21 (10 Apr 2020 04:00) (21 - 21)  SpO2: 88% (10 Apr 2020 07:48) (88% - 92%)

## 2020-04-10 NOTE — CONSULT NOTE ADULT - SUBJECTIVE AND OBJECTIVE BOX
Pulmonary Consult Note    KIMBERLY SOUTH  MRN-[id number]    Chief Complaint: Patient is a 44y old  Male who presents with a chief complaint of Cough (10 Apr 2020 13:29)      HPI:  44yMale   44 M with no significant PMH presenting with nonproductive cough and fevers for 3-4 days. Patient came in today due to worsening cough. He was prescribed antibiotics on Saturday because he wasn't feeling well and it did somewhat help. Patient has also been having myalgias. No chest pain, some mild shortness of breath. No nausea, vomiting, abdominal pain, diarrhea, or constipation. Patient's brother also symptomatic however they don't live together and patient denies any sick contacts. No recent travels.      In the ED, T 102.3, , /79, RR 22 satting 85% on room air. Patient was placed on 15L nonrebreather satting 95-97%. Patient given tylenol, ceftriaxone, and azithro.  COVID +    has so far received:  HCQ x 5 days  solumedrol x 5 days  started anakinra protocol 4/3  unable to wean from NRB    Patient currently self proning in bed on NRB, denies pain    ROS:  -  -  All other systems reviewed and negative    PAST MEDICAL HISTORY: HEALTH ISSUES - PROBLEM Dx:  Reactive depression: Reactive depression  Discharge planning issues: Discharge planning issues  Preventive measure: Preventive measure  Sepsis: Sepsis  Acute respiratory failure with hypoxia: Acute respiratory failure with hypoxia  Suspected 2019 novel coronavirus infection: Suspected 2019 novel coronavirus infection          SOCIAL HISTORY:     ACTIVE MEDICATION LIST:  MEDICATIONS  (STANDING):  anakinra Injectable 100 milliGRAM(s) SubCutaneous daily  enoxaparin Injectable 40 milliGRAM(s) SubCutaneous daily    MEDICATIONS  (PRN):  acetaminophen   Tablet .. 650 milliGRAM(s) Oral every 6 hours PRN Temp greater or equal to 38C (100.4F), Mild Pain (1 - 3), Moderate Pain (4 - 6)  ALBUTerol    90 MICROgram(s) HFA Inhaler 2 Puff(s) Inhalation every 6 hours PRN Shortness of Breath and/or Wheezing  guaiFENesin   Syrup  (Sugar-Free) 100 milliGRAM(s) Oral every 6 hours PRN Cough      EXAM:  Vital Signs Last 24 Hrs  T(C): 37.4 (10 Apr 2020 04:00), Max: 37.4 (10 Apr 2020 04:00)  T(F): 99.4 (10 Apr 2020 04:00), Max: 99.4 (10 Apr 2020 04:00)  HR: 96 (10 Apr 2020 04:00) (96 - 102)  BP: 102/64 (10 Apr 2020 04:00) (102/64 - 107/72)  BP(mean): --  RR: 21 (10 Apr 2020 04:00) (21 - 21)  SpO2: 88% (10 Apr 2020 07:48) (88% - 92%)  GENERAL: No acute distress  NEURO: Alert   LUNGS: Clear to auscultation bilaterally, no rales or wheezes  CV: S1/S2, no murmur  ABDOMEN: +BS, nontender  EXTREMITIES: no clubbing, cyanosis, edema    LABS/IMAGING: reviewed                        14.1   10.96 )-----------( 446      ( 10 Apr 2020 06:32 )             44.9   04-10    133<L>  |  95<L>  |  13  ----------------------------<  121<H>  4.2   |  23  |  0.60    Ca    8.8      10 Apr 2020 06:34    TPro  6.8  /  Alb  2.7<L>  /  TBili  0.8  /  DBili  x   /  AST  21  /  ALT  41  /  AlkPhos  100  04-10    < from: Xray Chest 1 View- PORTABLE-Urgent (04.09.20 @ 18:28) >  IMPRESSION:    1.  Bilateral opacities have increased in all lung zones.        < end of copied text >    PROBLEM LIST:  44yMale with HEALTH ISSUES - PROBLEM Dx:  COVID infection  Reactive depression: Reactive depression  Acute respiratory failure with hypoxia: Acute respiratory failure with hypoxia    RECS:  -continue with supplemental O2, self proning as tolerated, continuous pulse ox, wean as tolerated, incentive georgiana  -would check ddimer if >1000 would start full dose lovenox 1mg/kg bid  -if ddimer <1000 would increase lovenox to 40mg BID as part of COVID protocol DVT prophylaxis  -can add colchicine 0.6mg TID x 1 day then decrease to BID x 3-5 more days  -ferritin/crp still elevated, cont to trend.  -complete anakinra protocol      Thank you for this consultation, please feel free to call with any questions [phone]  Shayy Del Rio MD

## 2020-04-10 NOTE — BEHAVIORAL HEALTH ASSESSMENT NOTE - CASE SUMMARY
43 y/o  male, with 4 adult children in [place] (18, 22, 24, 26), works as a runner in a restaurant, lives with his wife in his brother's family's home in [place], with no PPHx, no SA/SIB, no substance abuse, no inpt psychiatric hospitalizations with no significant PMH presenting with fever and cough found to be septic on admission with CXR showing b/l patchy opacities, admitted for acute hypoxic respiratory failure due to COVID19 PNA.  Psychiatry consulted to assess for depression. pt denies severe depression, no si/hil, severe anxiety. Pt not interested in meds at this time.

## 2020-04-10 NOTE — PROGRESS NOTE ADULT - PROBLEM SELECTOR PLAN 1
COVID PNA ( pos on 3/31)   - s/p 5 d plaquenil, s/p 5 d solumedrol  - on nonrebreather ( since 3/31 )   -  CXR with increased infiltrates/ Pulm consult is called today for any further recommendation  - c/w anakinra 4/3-&  tapering dose to finish 4/12  - low threshold to consult MICU for intubation if desats on NRB, cont prone position/     - ferritin/CRP downtrended now with no fever, but still on NR

## 2020-04-10 NOTE — BEHAVIORAL HEALTH ASSESSMENT NOTE - SUICIDE PROTECTIVE FACTORS
Identifies reasons for living/Responsibility to family and others/Mandaeism beliefs/Supportive social network of family or friends/Cultural, spiritual and/or moral attitudes against suicide/Engaged in work or school

## 2020-04-10 NOTE — PROGRESS NOTE ADULT - ATTENDING COMMENTS
Wife is updated via [employer] interpretor ( ID # [id number]) , if patient maintains SPO2 <88 or if dyspneic please call MICU for evaluation .   Psych consult is called .      Shayy Poon   Hospitalist   [phone] Wife is updated via [employer] interpretor ( ID # [id number]) , if patient maintains SPO2 <88 or if dyspneic please call MICU for evaluation .   Psych consult is called .  Please follow up serum Na and if not improved in Am will need work up .  F/UP Quantiferon as per ID.      Shayy Poon   Hospitalist   [phone]

## 2020-04-10 NOTE — BEHAVIORAL HEALTH ASSESSMENT NOTE - NSBHCHARTREVIEWINVESTIGATE_PSY_A_CORE FT
< from: 12 Lead ECG (03.31.20 @ 14:04) >      Ventricular Rate 116 BPM    Atrial Rate 116 BPM    P-R Interval 122 ms    QRS Duration 90 ms    Q-T Interval 320 ms    QTC Calculation(Bezet) 444 ms    P Axis 26 degrees    R Axis 0 degrees    T Axis 17 degrees    Diagnosis Line SINUS TACHYCARDIA WITH FUSION COMPLEXES  OTHERWISE NORMAL ECG    Confirmed by THONG PEARCE, TANVI ([id number]) on 4/2/2020 11:55:40 AM    < end of copied text >

## 2020-04-10 NOTE — BEHAVIORAL HEALTH ASSESSMENT NOTE - HPI (INCLUDE ILLNESS QUALITY, SEVERITY, DURATION, TIMING, CONTEXT, MODIFYING FACTORS, ASSOCIATED SIGNS AND SYMPTOMS)
45 y/o  male, with 4 adult children in [place] (18, 22, 24, 26), works as a runner in a restaurant, lives with his wife in his brother's family's home in [place], with no PPHx, no SA/SIB, no substance abuse, no inpt psychiatric hospitalizations with no significant PMH presenting with fever and cough found to be septic on admission with CXR showing b/l patchy opacities, admitted for acute hypoxic respiratory failure due to COVID19 PNA.  Psychiatry consulted to assess for depression.    Spoke with patient using , Adriane ([id number]), who reports that he is feeling good right now, states has been eating well, going to the bathroom and walking on his own.  States he feels as if he is going to make a full recovery, states "I'm 100% going to make it through this one." States has been feeling sad, more depressed, 2/10 scale, about being in the hospital also states concerns that he doesn't know how his brother is doing, states that his brother was also admitted to the hospital at the same time he was admitted.  Denies having any other concerns or stressors.  States feeling anxious a few days ago, however currently denies, states restless "and just wants to get up and walk around."  He denies S/H/I/I/P, A/V/H, states that he has his 4 children to live for who are currently still in [place].  Patient denies use of any illicit substances, states that he has "an occasional beer", but denies dependence.  He reports fragmented sleep while in the hospital, sleeps 3 hours stretches, reports appetite is improving.  Offered medications for sleep but patient declines.     Spoke with patient's wife, Catherine Tirado, who reports patient has no PPHx, states that prior to this hospitalizations was happy, mood was stable.  She reports when patient's room mate passed, patient was more quiet, not talking as much, more withdrawn, suspects that patient may have possibly found out about his brother's passing on social media.  She reports patient's brother was also admitted when he was, however brother has since passed which patient has not been informed about.  States family is waiting for patient to recover and return home to inform him.  She reports patient never endorsed suicidality, never aggressive or had psychosis sx.  She has no concerns for patient's safety.  -

## 2020-04-10 NOTE — BEHAVIORAL HEALTH ASSESSMENT NOTE - SUMMARY
45 y/o  male, with 4 adult children in [place] (18, 22, 24, 26), works as a runner in a restaurant, lives with his wife in his brother's family's home in [place], with no PPHx, no SA/SIB, no substance abuse, no inpt psychiatric hospitalizations with no significant PMH presenting with fever and cough found to be septic on admission with CXR showing b/l patchy opacities, admitted for acute hypoxic respiratory failure due to COVID19 PNA.  Psychiatry consulted to assess for depression.  Patient seen and evaluated, awake and alert, states he has no PPHx, reports while in the hospital has been feeling more depressed 2/2 being in the hospital and not knowing the status of his brother who was also admitted when he was.  Reports depression is 2/10.  Denies S/H/I/I/P, A/V/H, or thoughts of paranoia.  Reports he is hopeful he will make a full recovery, states he has been eating better, walking, going to the bathroom, etc.  Per wife, patient has no PPH, states that patient's brother passed however patient has not been informed yet, states family is waiting for patient to feel better and return home.  She reports that patient's room mate passed and since then patient has been more withdrawn, not talking, endorsing feeling anxious.  She denies patient has endorsed SI, denies having concerns for patient's safety.  Patient's  presentation consistent with adjustment d/o.

## 2020-04-11 NOTE — PROGRESS NOTE ADULT - ASSESSMENT
44 M with no significant PMH presenting with nonproductive cough and fevers for 3-4 days. DX w/ covid19 pneumonitis--s/p initial anakinra protocol on tapering rx.  ***************  4/11-feels better-less sob

## 2020-04-11 NOTE — PROGRESS NOTE ADULT - PROBLEM SELECTOR PLAN 1
COVID PNA ( pos on 3/31)   - s/p 5 d plaquenil, s/p 5 d solumedrol  - on nonrebreather ( since 3/31 )   -  CXR with increased infiltrates,  Pulm consult appreciated, started on colchicine .6 tid then q day   - c/w anakinra 4/3-&  tapering dose to finish 4/12  - low threshold to consult MICU for intubation if desats on NRB, cont prone position/     - ferritin/CRP downtrended now with no fever, but still on NRB

## 2020-04-11 NOTE — PROGRESS NOTE ADULT - PROBLEM SELECTOR PLAN 2
- 2' covid-19 infection  - repeat CXR with increased lung infiltrates, see above  - cont the COVID-19 treatment

## 2020-04-11 NOTE — PROGRESS NOTE ADULT - PROBLEM SELECTOR PLAN 5
Pt's wife states that the patient is depressed because his brother just  and his roomate in the hospital just  and he is depressed as per wife   -Psychiatry consult appreciated: adjustment disorder, no new interventions needed at this time

## 2020-04-11 NOTE — PROGRESS NOTE ADULT - SUBJECTIVE AND OBJECTIVE BOX
Patient is a 44y old  Male who presents with a chief complaint of Cough (11 Apr 2020 07:35)    : Deon [id number]    SUBJECTIVE / OVERNIGHT EVENTS: Patient seen and examined at bedside. He states he feels ok, denies CP, abd pain, diarrhea n/v and dizziness. States he is SOB when he ambulated to the bathroom. No acute events overnight     ROS:  All other review of systems negative    Allergies    No Known Allergies    Intolerances        MEDICATIONS  (STANDING):  anakinra Injectable 100 milliGRAM(s) SubCutaneous daily  ascorbic acid 500 milliGRAM(s) Oral three times a day  colchicine 0.6 milliGRAM(s) Oral three times a day  enoxaparin Injectable 40 milliGRAM(s) SubCutaneous two times a day  famotidine    Tablet 20 milliGRAM(s) Oral two times a day  magnesium oxide 400 milliGRAM(s) Oral daily  zinc sulfate 220 milliGRAM(s) Oral daily    MEDICATIONS  (PRN):  acetaminophen   Tablet .. 650 milliGRAM(s) Oral every 6 hours PRN Temp greater or equal to 38C (100.4F), Mild Pain (1 - 3), Moderate Pain (4 - 6)  ALBUTerol    90 MICROgram(s) HFA Inhaler 2 Puff(s) Inhalation every 6 hours PRN Shortness of Breath and/or Wheezing  guaiFENesin   Syrup  (Sugar-Free) 100 milliGRAM(s) Oral every 6 hours PRN Cough      Vital Signs Last 24 Hrs  T(C): 36.6 (11 Apr 2020 04:55), Max: 36.9 (10 Apr 2020 20:49)  T(F): 97.9 (11 Apr 2020 04:55), Max: 98.4 (10 Apr 2020 20:49)  HR: 95 (11 Apr 2020 04:55) (95 - 107)  BP: 100/65 (11 Apr 2020 04:55) (100/65 - 114/69)  BP(mean): --  RR: 20 (11 Apr 2020 04:55) (20 - 20)  SpO2: 94% (11 Apr 2020 04:55) (93% - 94%)  CAPILLARY BLOOD GLUCOSE        I&O's Summary    10 Apr 2020 07:01  -  11 Apr 2020 07:00  --------------------------------------------------------  IN: 540 mL / OUT: 1500 mL / NET: -960 mL    11 Apr 2020 07:01  -  11 Apr 2020 12:59  --------------------------------------------------------  IN: 0 mL / OUT: 200 mL / NET: -200 mL        PHYSICAL EXAM:  GENERAL: NAD, well-developed, + NRB  HEAD:  Atraumatic, Normocephalic  EYES: EOMI, PERRLA, conjunctiva and sclera clear  NECK: Supple, No JVD  CHEST/LUNG: bibasilar rales; No wheeze  HEART: Regular rate and rhythm; No murmurs, rubs, or gallops  ABDOMEN: Soft, Nontender, Nondistended; Bowel sounds present  EXTREMITIES:  2+ Peripheral Pulses, No clubbing, cyanosis, or edema  NEUROLOGY: AAOx3, non-focal  PSYCH: calm  SKIN: No rashes or lesions    LABS:                        13.7   9.93  )-----------( 450      ( 11 Apr 2020 06:58 )             43.4     04-11    138  |  99  |  12  ----------------------------<  132<H>  3.8   |  25  |  0.56    Ca    8.8      11 Apr 2020 06:58    TPro  6.7  /  Alb  2.8<L>  /  TBili  0.7  /  DBili  x   /  AST  19  /  ALT  40  /  AlkPhos  93  04-11              RADIOLOGY & ADDITIONAL TESTS:    Care Discussed with Consultants/Other Providers: pulmonary rec noted     Case Discussed with wife, updated about pt

## 2020-04-11 NOTE — PROGRESS NOTE ADULT - ATTENDING COMMENTS
as above-  respiratory failure-due to covid19 pneumonitis--s/p initial anakinra protocol and currently on tapering dose of qday 100-on 100NRB-taper O2-sat above 90%  covid19 pneumonitis-on anakinra taper, colchicine .6 tid then q day as of today  DVT prophylaxis--lovenox to remain at 40 bid  GI prophylaxis pepcid 40 bid    other Zn, mg, vit c       OOB  Jayden Fabian MD-Pulmonary   [phone]

## 2020-04-11 NOTE — PROGRESS NOTE ADULT - SUBJECTIVE AND OBJECTIVE BOX
CHIEF COMPLAINT: f/up resp failure-covid19 pneumonitis--feels better despite being on high O2    Interval Events: no signif changes    REVIEW OF SYSTEMS:  Constitutional: No fevers or chills. No weight loss. + fatigue or generalized malaise.  Eyes: No itching or discharge from the eyes  ENT: No ear pain. No ear discharge. No nasal congestion. No post nasal drip. No epistaxis. No throat pain. No sore throat. No difficulty swallowing.   CV: No chest pain. No palpitations. No lightheadedness or dizziness.   Resp: No dyspnea at rest. + dyspnea on exertion. No orthopnea. No wheezing. No cough. No stridor. No sputum production. No chest pain with respiration.  GI: No nausea. No vomiting. No diarrhea.  MSK: No joint pain or pain in any extremities  Integumentary: No skin lesions. No pedal edema.  Neurological: No gross motor weakness. No sensory changes.  [+ ] All other systems negative  [ ] Unable to assess ROS because ________    OBJECTIVE:  ICU Vital Signs Last 24 Hrs  T(C): 36.6 (11 Apr 2020 04:55), Max: 36.9 (10 Apr 2020 20:49)  T(F): 97.9 (11 Apr 2020 04:55), Max: 98.4 (10 Apr 2020 20:49)  HR: 95 (11 Apr 2020 04:55) (95 - 107)  BP: 100/65 (11 Apr 2020 04:55) (100/65 - 114/69)  BP(mean): --  ABP: --  ABP(mean): --  RR: 20 (11 Apr 2020 04:55) (20 - 20)  SpO2: 94% (11 Apr 2020 04:55) (88% - 94%)        04-10 @ 07:01  -  04-11 @ 07:00  --------------------------------------------------------  IN: 540 mL / OUT: 1500 mL / NET: -960 mL    04-11 @ 07:01  -  04-11 @ 07:37  --------------------------------------------------------  IN: 0 mL / OUT: 200 mL / NET: -200 mL      CAPILLARY BLOOD GLUCOSE          PHYSICAL EXAM: in bed in NAD on NRB  General: Awake, alert, oriented X 3.   HEENT: Atraumatic, normocephalic.                 Mallampatti Grade 3                No nasal congestion.                No tonsillar or pharyngeal exudates.  Lymph Nodes: No palpable lymphadenopathy  Neck: No JVD. No carotid bruit.   Respiratory: Normal chest expansion                         Normal percussion                         Normal and equal air entry                         No wheeze, rhonchi but bibasilar rales.  Cardiovascular: S1 S2 normal. No murmurs, rubs or gallops.   Abdomen: Soft, non-tender, non-distended. No organomegaly. Normoactive bowel sounds.  Extremities: Warm to touch. Peripheral pulse palpable. No pedal edema.   Skin: No rashes or skin lesions  Neurological: Motor and sensory examination equal and normal in all four extremities.  Psychiatry: Appropriate mood and affect.    HOSPITAL MEDICATIONS:  MEDICATIONS  (STANDING):  anakinra Injectable 100 milliGRAM(s) SubCutaneous daily  colchicine 0.6 milliGRAM(s) Oral three times a day  enoxaparin Injectable 40 milliGRAM(s) SubCutaneous two times a day    MEDICATIONS  (PRN):  acetaminophen   Tablet .. 650 milliGRAM(s) Oral every 6 hours PRN Temp greater or equal to 38C (100.4F), Mild Pain (1 - 3), Moderate Pain (4 - 6)  ALBUTerol    90 MICROgram(s) HFA Inhaler 2 Puff(s) Inhalation every 6 hours PRN Shortness of Breath and/or Wheezing  guaiFENesin   Syrup  (Sugar-Free) 100 milliGRAM(s) Oral every 6 hours PRN Cough      LABS:                        14.1   10.96 )-----------( 446      ( 10 Apr 2020 06:32 )             44.9     04-10    133<L>  |  95<L>  |  13  ----------------------------<  121<H>  4.2   |  23  |  0.60    Ca    8.8      10 Apr 2020 06:34    TPro  6.8  /  Alb  2.7<L>  /  TBili  0.8  /  DBili  x   /  AST  21  /  ALT  41  /  AlkPhos  100  04-10        Arterial Blood Gas:  04-10 @ 06:17  7.47/37/75/26/96/3.2  ABG lactate: --  Arterial Blood Gas:  04-09 @ 19:05  7.50/35/80/27/96/4.4  ABG lactate: --        MICROBIOLOGY:     RADIOLOGY:  [ ] Reviewed and interpreted by me    Point of Care Ultrasound Findings:    PFT:    EKG:

## 2020-04-12 NOTE — PROGRESS NOTE ADULT - SUBJECTIVE AND OBJECTIVE BOX
CHIEF COMPLAINT:  f/up resp failure-covid19 pneumonitis--better but still on high amounts of O2, no cough or cp    Interval Events: OOB more    REVIEW OF SYSTEMS:  Constitutional: No fevers or chills. No weight loss. + fatigue or generalized malaise.  Eyes: No itching or discharge from the eyes  ENT: No ear pain. No ear discharge. No nasal congestion. No post nasal drip. No epistaxis. No throat pain. No sore throat. No difficulty swallowing.   CV: No chest pain. No palpitations. No lightheadedness or dizziness.   Resp: No dyspnea at rest. + dyspnea on exertion. No orthopnea. No wheezing. + cough. No stridor. No sputum production. No chest pain with respiration.  GI: No nausea. No vomiting. No diarrhea.  MSK: No joint pain or pain in any extremities  Integumentary: No skin lesions. No pedal edema.  Neurological: No gross motor weakness. No sensory changes.  [+ ] All other systems negative  [ ] Unable to assess ROS because ________    OBJECTIVE:  ICU Vital Signs Last 24 Hrs  T(C): 36.7 (12 Apr 2020 04:50), Max: 36.9 (11 Apr 2020 13:02)  T(F): 98.1 (12 Apr 2020 04:50), Max: 98.5 (11 Apr 2020 13:02)  HR: 105 (12 Apr 2020 04:50) (105 - 109)  BP: 99/66 (12 Apr 2020 04:50) (99/66 - 129/64)  BP(mean): --  ABP: --  ABP(mean): --  RR: 19 (12 Apr 2020 04:50) (19 - 20)  SpO2: 92% (12 Apr 2020 04:50) (92% - 94%)        04-11 @ 07:01  -  04-12 @ 07:00  --------------------------------------------------------  IN: 420 mL / OUT: 1490 mL / NET: -1070 mL      CAPILLARY BLOOD GLUCOSE          PHYSICAL EXAM: NAD in bed on NRB  General: Awake, alert, oriented X 3.   HEENT: Atraumatic, normocephalic.                 Mallampatti Grade 3                No nasal congestion.                No tonsillar or pharyngeal exudates.  Lymph Nodes: No palpable lymphadenopathy  Neck: No JVD. No carotid bruit.   Respiratory: Normal chest expansion                         Normal percussion                         Normal and equal air entry                         No wheeze, rhonchi but bibasilar rales.  Cardiovascular: S1 S2 normal. No murmurs, rubs or gallops.   Abdomen: Soft, non-tender, non-distended. No organomegaly. Normoactive bowel sounds.  Extremities: Warm to touch. Peripheral pulse palpable. No pedal edema.   Skin: No rashes or skin lesions  Neurological: Motor and sensory examination equal and normal in all four extremities.  Psychiatry: Appropriate mood and affect.    HOSPITAL MEDICATIONS:  MEDICATIONS  (STANDING):  anakinra Injectable 100 milliGRAM(s) SubCutaneous daily  ascorbic acid 500 milliGRAM(s) Oral three times a day  colchicine 0.6 milliGRAM(s) Oral two times a day  enoxaparin Injectable 40 milliGRAM(s) SubCutaneous two times a day  famotidine    Tablet 20 milliGRAM(s) Oral two times a day  magnesium oxide 400 milliGRAM(s) Oral daily  zinc sulfate 220 milliGRAM(s) Oral daily    MEDICATIONS  (PRN):  acetaminophen   Tablet .. 650 milliGRAM(s) Oral every 6 hours PRN Temp greater or equal to 38C (100.4F), Mild Pain (1 - 3), Moderate Pain (4 - 6)  ALBUTerol    90 MICROgram(s) HFA Inhaler 2 Puff(s) Inhalation every 6 hours PRN Shortness of Breath and/or Wheezing  guaiFENesin   Syrup  (Sugar-Free) 100 milliGRAM(s) Oral every 6 hours PRN Cough      LABS:                        13.7   9.93  )-----------( 450      ( 11 Apr 2020 06:58 )             43.4     04-11    138  |  99  |  12  ----------------------------<  132<H>  3.8   |  25  |  0.56    Ca    8.8      11 Apr 2020 06:58    TPro  6.7  /  Alb  2.8<L>  /  TBili  0.7  /  DBili  x   /  AST  19  /  ALT  40  /  AlkPhos  93  04-11              MICROBIOLOGY:     RADIOLOGY:  [ ] Reviewed and interpreted by me    Point of Care Ultrasound Findings:    PFT:    EKG:

## 2020-04-12 NOTE — PROGRESS NOTE ADULT - ATTENDING COMMENTS
as above-better over all  respiratory failure-due to covid19 pneumonitis--s/p initial anakinra protocol and currently on tapering dose of qday 100-on 100NRB-taper O2-sat above 90% (will taper)  covid19 pneumonitis-on anakinra taper, colchicine .6 tid now  q day   DVT prophylaxis--lovenox to remain at 40 bid  GI prophylaxis pepcid 40 bid    other Zn, mg, vit c       OOB as able  Jayden Fabian MD-Pulmonary   [phone]

## 2020-04-12 NOTE — PROGRESS NOTE ADULT - ASSESSMENT
44 M with no significant PMH presenting with nonproductive cough and fevers for 3-4 days. DX w/ covid19 pneumonitis--s/p initial anakinra protocol on tapering rx.  ***************  4/11-feels better-less sob  4/12-better as per pt--will work to drop O2 (d/w nurse and pt)

## 2020-04-12 NOTE — PROGRESS NOTE ADULT - SUBJECTIVE AND OBJECTIVE BOX
Jeffrey Sandy M.D. Pager Number [phone]    Patient is a 44y old  Male who presents with a chief complaint of Cough (12 Apr 2020 07:20)      SUBJECTIVE / OVERNIGHT EVENTS:  Pt seen and examined at bedside. No acute events overnight.  Pt denies cp, palpitations, sob, abd pain, N/V, fever, chills.    ROS:  All other review of systems negative    Allergies    No Known Allergies    Intolerances        MEDICATIONS  (STANDING):  ascorbic acid 500 milliGRAM(s) Oral three times a day  colchicine 0.6 milliGRAM(s) Oral two times a day  enoxaparin Injectable 40 milliGRAM(s) SubCutaneous two times a day  famotidine    Tablet 20 milliGRAM(s) Oral two times a day  magnesium oxide 400 milliGRAM(s) Oral daily  zinc sulfate 220 milliGRAM(s) Oral daily    MEDICATIONS  (PRN):  acetaminophen   Tablet .. 650 milliGRAM(s) Oral every 6 hours PRN Temp greater or equal to 38C (100.4F), Mild Pain (1 - 3), Moderate Pain (4 - 6)  ALBUTerol    90 MICROgram(s) HFA Inhaler 2 Puff(s) Inhalation every 6 hours PRN Shortness of Breath and/or Wheezing  guaiFENesin   Syrup  (Sugar-Free) 100 milliGRAM(s) Oral every 6 hours PRN Cough      Vital Signs Last 24 Hrs  T(C): 37 (12 Apr 2020 11:09), Max: 37 (12 Apr 2020 11:09)  T(F): 98.6 (12 Apr 2020 11:09), Max: 98.6 (12 Apr 2020 11:09)  HR: 111 (12 Apr 2020 11:09) (105 - 111)  BP: 114/72 (12 Apr 2020 11:09) (99/66 - 129/64)  BP(mean): --  RR: 18 (12 Apr 2020 11:09) (18 - 20)  SpO2: 91% (12 Apr 2020 11:09) (90% - 94%)  CAPILLARY BLOOD GLUCOSE        I&O's Summary    11 Apr 2020 07:01  -  12 Apr 2020 07:00  --------------------------------------------------------  IN: 420 mL / OUT: 1490 mL / NET: -1070 mL        PHYSICAL EXAM:  GENERAL: NAD, well-developed  HEAD:  Atraumatic, Normocephalic  EYES: EOMI, PERRLA, conjunctiva and sclera clear  NECK: Supple, No JVD  CHEST/LUNG: Bibasilar rales; No wheeze. On NRB  HEART: Regular rate and rhythm; No murmurs, rubs, or gallops  ABDOMEN: Soft, Nontender, Nondistended; Bowel sounds present  EXTREMITIES:  2+ Peripheral Pulses, No clubbing, cyanosis, or edema  NEUROLOGY: AAOx3, non-focal  PSYCH: calm  SKIN: No rashes or lesions    LABS:                        13.7   9.93  )-----------( 450      ( 11 Apr 2020 06:58 )             43.4     04-11    138  |  99  |  12  ----------------------------<  132<H>  3.8   |  25  |  0.56    Ca    8.8      11 Apr 2020 06:58    TPro  6.7  /  Alb  2.8<L>  /  TBili  0.7  /  DBili  x   /  AST  19  /  ALT  40  /  AlkPhos  93  04-11              RADIOLOGY & ADDITIONAL TESTS:  Results Reviewed:   Imaging Personally Reviewed:  Electrocardiogram Personally Reviewed:    COORDINATION OF CARE:  Care Discussed with Consultants/Other Providers [Y/N]:  Prior or Outpatient Records Reviewed [Y/N]:

## 2020-04-12 NOTE — PROGRESS NOTE ADULT - PROBLEM SELECTOR PLAN 2
- 2' covid-19 infection  - repeat CXR with increased lung infiltrates, see above  - cont the COVID-19 treatment as mentioned above

## 2020-04-12 NOTE — PROGRESS NOTE ADULT - PROBLEM SELECTOR PLAN 1
COVID PNA ( pos on 3/31)   - s/p 5 d plaquenil, s/p 5 d solumedrol. Complete Anakinra today  - on nonrebreather ( since 3/31 )   - CXR with increased infiltrates  - Pulm on board  - Continue Colchicine  - Encourage Prone position. If cannot tolerate, Left lateral decubitus position  - low threshold to consult MICU for intubation if desats on NRB

## 2020-04-13 NOTE — PROGRESS NOTE ADULT - ATTENDING COMMENTS
I have discussed with rayne dunne  I have discussed with Dr. TIARRA Blanco D.O.  Hospitalist Pager # [phone]

## 2020-04-13 NOTE — PROGRESS NOTE ADULT - PROBLEM SELECTOR PLAN 2
- COVID PNA ( pos on 3/31)   - s/p 5 d plaquenil, s/p 5 d solumedrol, s/p anakinra taper   - c/w nonrebreather ( since 3/31 ), taper down as tolerated  - CXR with increased infiltrates  - Pulm on board  - DC Colchicine  - Encourage Prone position. If cannot tolerate, Left lateral decubitus position

## 2020-04-13 NOTE — PROGRESS NOTE ADULT - SUBJECTIVE AND OBJECTIVE BOX
CHIEF COMPLAINT:    Interval Events:    REVIEW OF SYSTEMS:  Constitutional: [ ] negative [ ] fevers [ ] chills [ ] weight loss [ ] weight gain  HEENT: [ ] negative [ ] dry eyes [ ] eye irritation [ ] postnasal drip [ ] nasal congestion  CV: [ ] negative  [ ] chest pain [ ] orthopnea [ ] palpitations [ ] murmur  Resp: [ ] negative [ ] cough [ ] shortness of breath [ ] dyspnea [ ] wheezing [ ] sputum [ ] hemoptysis  GI: [ ] negative [ ] nausea [ ] vomiting [ ] diarrhea [ ] constipation [ ] abd pain [ ] dysphagia   : [ ] negative [ ] dysuria [ ] nocturia [ ] hematuria [ ] increased urinary frequency  Musculoskeletal: [ ] negative [ ] back pain [ ] myalgias [ ] arthralgias [ ] fracture  Skin: [ ] negative [ ] rash [ ] itch  Neurological: [ ] negative [ ] headache [ ] dizziness [ ] syncope [ ] weakness [ ] numbness  Psychiatric: [ ] negative [ ] anxiety [ ] depression  Endocrine: [ ] negative [ ] diabetes [ ] thyroid problem  Hematologic/Lymphatic: [ ] negative [ ] anemia [ ] bleeding problem  Allergic/Immunologic: [ ] negative [ ] itchy eyes [ ] nasal discharge [ ] hives [ ] angioedema  [ ] All other systems negative  [ ] Unable to assess ROS because ________    OBJECTIVE:  ICU Vital Signs Last 24 Hrs  T(C): 36.8 (13 Apr 2020 04:47), Max: 37 (12 Apr 2020 11:09)  T(F): 98.2 (13 Apr 2020 04:47), Max: 98.6 (12 Apr 2020 11:09)  HR: 112 (13 Apr 2020 04:47) (110 - 112)  BP: 109/72 (13 Apr 2020 04:47) (109/72 - 114/72)  BP(mean): --  ABP: --  ABP(mean): --  RR: 22 (13 Apr 2020 08:53) (18 - 22)  SpO2: 92% (13 Apr 2020 08:53) (91% - 93%)        04-12 @ 07:01  -  04-13 @ 07:00  --------------------------------------------------------  IN: 1320 mL / OUT: 1650 mL / NET: -330 mL    04-13 @ 07:01  -  04-13 @ 09:06  --------------------------------------------------------  IN: 0 mL / OUT: 600 mL / NET: -600 mL      CAPILLARY BLOOD GLUCOSE          PHYSICAL EXAM:  General:   HEENT:   Lymph Nodes:  Neck:   Respiratory:   Cardiovascular:   Abdomen:   Extremities:   Skin:   Neurological:  Psychiatry:    HOSPITAL MEDICATIONS:  MEDICATIONS  (STANDING):  ascorbic acid 500 milliGRAM(s) Oral three times a day  colchicine 0.6 milliGRAM(s) Oral two times a day  enoxaparin Injectable 40 milliGRAM(s) SubCutaneous two times a day  famotidine    Tablet 20 milliGRAM(s) Oral two times a day  magnesium oxide 400 milliGRAM(s) Oral daily  zinc sulfate 220 milliGRAM(s) Oral daily    MEDICATIONS  (PRN):  acetaminophen   Tablet .. 650 milliGRAM(s) Oral every 6 hours PRN Temp greater or equal to 38C (100.4F), Mild Pain (1 - 3), Moderate Pain (4 - 6)  ALBUTerol    90 MICROgram(s) HFA Inhaler 2 Puff(s) Inhalation every 6 hours PRN Shortness of Breath and/or Wheezing  guaiFENesin   Syrup  (Sugar-Free) 100 milliGRAM(s) Oral every 6 hours PRN Cough      LABS:                        14.4   12.75 )-----------( 457      ( 13 Apr 2020 05:33 )             45.8     Hgb Trend: 14.4<--, 13.7<--, 14.1<--, 14.6<--, 15.3<--  04-13    136  |  98  |  15  ----------------------------<  121<H>  4.2   |  24  |  0.51    Ca    9.0      13 Apr 2020 05:33    TPro  7.0  /  Alb  2.7<L>  /  TBili  0.8  /  DBili  x   /  AST  29  /  ALT  43  /  AlkPhos  100  04-13    Creatinine Trend: 0.51<--, 0.56<--, 0.60<--, 0.60<--, 0.62<--, 0.62<--            MICROBIOLOGY:       RADIOLOGY:  [ ] Reviewed and interpreted by me    PULMONARY FUNCTION TESTS:    EKG: CHIEF COMPLAINT:  He was admitted with shortness of breaht and cough  Interval Events:  feeling better, denies dyspnea at present.  REVIEW OF SYSTEMS:  He denies shortness of breath at the moment, occasional cough.  Denies fever or myalgias.  Reports dry mouth some muscle soreness with cough  [x ] All other systems negative  [ ] Unable to assess ROS because ________    OBJECTIVE:  ICU Vital Signs Last 24 Hrs  T(C): 36.8 (13 Apr 2020 04:47), Max: 37 (12 Apr 2020 11:09)  T(F): 98.2 (13 Apr 2020 04:47), Max: 98.6 (12 Apr 2020 11:09)  HR: 112 (13 Apr 2020 04:47) (110 - 112)  BP: 109/72 (13 Apr 2020 04:47) (109/72 - 114/72)  BP(mean): --  ABP: --  ABP(mean): --  RR: 22 (13 Apr 2020 08:53) (18 - 22)  SpO2: 92% (13 Apr 2020 08:53) (91% - 93%)        04-12 @ 07:01 - 04-13 @ 07:00  --------------------------------------------------------  IN: 1320 mL / OUT: 1650 mL / NET: -330 mL    04-13 @ 07:01 - 04-13 @ 09:06  --------------------------------------------------------  IN: 0 mL / OUT: 600 mL / NET: -600 mL      CAPILLARY BLOOD GLUCOSE          PHYSICAL EXAM:  General:   HEENT:   Lymph Nodes:  Neck:   Respiratory:   Cardiovascular:   Abdomen:   Extremities:   Skin:   Neurological:  Psychiatry:    HOSPITAL MEDICATIONS:  MEDICATIONS  (STANDING):  ascorbic acid 500 milliGRAM(s) Oral three times a day  colchicine 0.6 milliGRAM(s) Oral two times a day  enoxaparin Injectable 40 milliGRAM(s) SubCutaneous two times a day  famotidine    Tablet 20 milliGRAM(s) Oral two times a day  magnesium oxide 400 milliGRAM(s) Oral daily  zinc sulfate 220 milliGRAM(s) Oral daily    MEDICATIONS  (PRN):  acetaminophen   Tablet .. 650 milliGRAM(s) Oral every 6 hours PRN Temp greater or equal to 38C (100.4F), Mild Pain (1 - 3), Moderate Pain (4 - 6)  ALBUTerol    90 MICROgram(s) HFA Inhaler 2 Puff(s) Inhalation every 6 hours PRN Shortness of Breath and/or Wheezing  guaiFENesin   Syrup  (Sugar-Free) 100 milliGRAM(s) Oral every 6 hours PRN Cough      LABS:                        14.4   12.75 )-----------( 457      ( 13 Apr 2020 05:33 )             45.8     Hgb Trend: 14.4<--, 13.7<--, 14.1<--, 14.6<--, 15.3<--  04-13    136  |  98  |  15  ----------------------------<  121<H>  4.2   |  24  |  0.51    Ca    9.0      13 Apr 2020 05:33    TPro  7.0  /  Alb  2.7<L>  /  TBili  0.8  /  DBili  x   /  AST  29  /  ALT  43  /  AlkPhos  100  04-13    Creatinine Trend: 0.51<--, 0.56<--, 0.60<--, 0.60<--, 0.62<--, 0.62<--            MICROBIOLOGY:       RADIOLOGY:  [ ] Reviewed and interpreted by me    PULMONARY FUNCTION TESTS:    EKG:

## 2020-04-13 NOTE — PROGRESS NOTE ADULT - ASSESSMENT
44 M with no significant PMH presenting with nonproductive cough and fevers for 3-4 days. DX w/ covid19 pneumonitis--s/p initial anakinra protocol on tapering rx.  ***************  4/11-feels better-less sob  4/12-better as per pt--will work to drop O2 (d/w nurse and pt) 44 M with no significant PMH presenting with nonproductive cough and fevers for 3-4 days. DX w/ covid19 pneumonitis--s/p initial anakinra protocol on tapering rx. Also status post Plaquenil and solumedro.  Currently receiving colchicine, lovenox 40 BID, famotidine and albuterol as needed.  States that he is feeling better today with less shortness of breath.    ***************  Agree with current management.    Would stop colchicine as per order.    Decrease oxygen as tolerated.

## 2020-04-13 NOTE — PROGRESS NOTE ADULT - SUBJECTIVE AND OBJECTIVE BOX
Patient feels comfortable with nrb at rest but does become dyspneic with exertion and also with pleuritic chest pain with exertion.    GENERAL: No fevers, no chills.  EYES: No blurry vision,  No photophobia  ENT: No sore throat.  No dysphagia  Cardiovascular: + chest pain, palpitations, orthopnea  Pulmonary: + cough, no wheezing. + shortness of breath  Gastrointestinal: No abdominal pain, no diarrhea, no constipation  Musculoskeletal: No weakness.  No myalgias.  Dermatology:  No rashes.  Neuro: No Headache.  No vertigo.  No dizziness.  Psych: No anxiety, no depression.  Denies suicidal thoughts.    MEDICATIONS  (STANDING):  ascorbic acid 500 milliGRAM(s) Oral three times a day  enoxaparin Injectable 40 milliGRAM(s) SubCutaneous two times a day  famotidine    Tablet 20 milliGRAM(s) Oral two times a day  magnesium oxide 400 milliGRAM(s) Oral daily  zinc sulfate 220 milliGRAM(s) Oral daily    MEDICATIONS  (PRN):  acetaminophen   Tablet .. 650 milliGRAM(s) Oral every 6 hours PRN Temp greater or equal to 38C (100.4F), Mild Pain (1 - 3), Moderate Pain (4 - 6)  ALBUTerol    90 MICROgram(s) HFA Inhaler 2 Puff(s) Inhalation every 6 hours PRN Shortness of Breath and/or Wheezing  guaiFENesin   Syrup  (Sugar-Free) 100 milliGRAM(s) Oral every 6 hours PRN Cough    Vital Signs Last 24 Hrs  T(C): 38.9 (13 Apr 2020 18:01), Max: 38.9 (13 Apr 2020 18:00)  T(F): 102.1 (13 Apr 2020 18:01), Max: 102.1 (13 Apr 2020 18:00)  HR: 118 (13 Apr 2020 18:01) (110 - 120)  BP: 115/68 (13 Apr 2020 18:01) (109/72 - 115/68)  BP(mean): --  RR: 24 (13 Apr 2020 18:01) (20 - 24)  SpO2: 88% (13 Apr 2020 18:33) (80% - 93%)    GENERAL: ON NRB  HEAD:  Atraumatic, Normocephalic  EYES: EOMI, PERRLA, conjunctiva and sclera clear  ENT: Pharynx not erythematous  PULMONARY: Clear to auscultation bilaterally; No wheeze  CARDIOVASCULAR: Regular rate and rhythm; No murmurs, rubs, or gallops  ABDOMEN: Soft, Nontender, Nondistended; Bowel sounds present  EXTREMITIES:  2+ Peripheral Pulses, No clubbing, cyanosis, or edema  MUSCULOSKELETAL: No calf tenderness  PSYCH: AAOx3, normal affect  SKIN: warm and dry, No rashes or lesions    .  LABS:                         14.4   12.75 )-----------( 457      ( 13 Apr 2020 05:33 )             45.8     04-13    136  |  98  |  15  ----------------------------<  121<H>  4.2   |  24  |  0.51    Ca    9.0      13 Apr 2020 05:33    TPro  7.0  /  Alb  2.7<L>  /  TBili  0.8  /  DBili  x   /  AST  29  /  ALT  43  /  AlkPhos  100  04-13              RADIOLOGY, EKG & ADDITIONAL TESTS: Reviewed.

## 2020-04-13 NOTE — CHART NOTE - NSCHARTNOTEFT_GEN_A_CORE
Pt. desat to 86% on NRB whilst eating. Pt. does not feel SOB   Pt. placed in prone position.   P.t noted to have an elevated temp.   iv Tylenol x 1 stat.   c/w prone positioning.   Oxygen improving to 90% on NRB   Will continue to monitor   Supriya Taylor FNP-BC

## 2020-04-13 NOTE — PROGRESS NOTE ADULT - ATTENDING COMMENTS
as above-better over all  respiratory failure-due to covid19 pneumonitis--s/p initial anakinra protocol and currently on tapering dose of qday 100-on 100NRB-taper O2-sat above 90% (will taper)  covid19 pneumonitis-on anakinra taper, colchicine .6 tid now  q day   DVT prophylaxis--lovenox to remain at 40 bid  GI prophylaxis pepcid 40 bid    other Zn, mg, vit c       OOB as able

## 2020-04-13 NOTE — PROVIDER CONTACT NOTE (CHANGE IN STATUS NOTIFICATION) - SITUATION
pt had episode of oxygen dropping  to 70/80%  on NRB while having dinner. Pt had elevated temp of 102.1.

## 2020-04-14 NOTE — PROGRESS NOTE ADULT - SUBJECTIVE AND OBJECTIVE BOX
CHIEF COMPLAINT: COVID-19    Interval Events:    REVIEW OF SYSTEMS:  Constitutional: [ ] negative [ ] fevers [ ] chills [ ] weight loss [ ] weight gain  HEENT: [ ] negative [ ] dry eyes [ ] eye irritation [ ] postnasal drip [ ] nasal congestion  CV: [ ] negative  [ ] chest pain [ ] orthopnea [ ] palpitations [ ] murmur  Resp: [ ] negative [ ] cough [ ] shortness of breath [ ] dyspnea [ ] wheezing [ ] sputum [ ] hemoptysis  GI: [ ] negative [ ] nausea [ ] vomiting [ ] diarrhea [ ] constipation [ ] abd pain [ ] dysphagia   : [ ] negative [ ] dysuria [ ] nocturia [ ] hematuria [ ] increased urinary frequency  Musculoskeletal: [ ] negative [ ] back pain [ ] myalgias [ ] arthralgias [ ] fracture  Skin: [ ] negative [ ] rash [ ] itch  Neurological: [ ] negative [ ] headache [ ] dizziness [ ] syncope [ ] weakness [ ] numbness  Psychiatric: [ ] negative [ ] anxiety [ ] depression  Endocrine: [ ] negative [ ] diabetes [ ] thyroid problem  Hematologic/Lymphatic: [ ] negative [ ] anemia [ ] bleeding problem  Allergic/Immunologic: [ ] negative [ ] itchy eyes [ ] nasal discharge [ ] hives [ ] angioedema  [ ] All other systems negative  [ ] Unable to assess ROS because ________    OBJECTIVE:  ICU Vital Signs Last 24 Hrs  T(C): 37.5 (14 Apr 2020 13:03), Max: 38.9 (13 Apr 2020 18:00)  T(F): 99.5 (14 Apr 2020 13:03), Max: 102.1 (13 Apr 2020 18:00)  HR: 112 (14 Apr 2020 13:03) (111 - 120)  BP: 111/68 (14 Apr 2020 13:03) (103/70 - 115/68)  BP(mean): --  ABP: --  ABP(mean): --  RR: 22 (14 Apr 2020 13:03) (22 - 24)  SpO2: 92% (14 Apr 2020 13:03) (80% - 93%)        04-13 @ 07:01  -  04-14 @ 07:00  --------------------------------------------------------  IN: 840 mL / OUT: 1600 mL / NET: -760 mL    04-14 @ 07:01 - 04-14 @ 17:09  --------------------------------------------------------  IN: 360 mL / OUT: 500 mL / NET: -140 mL      CAPILLARY BLOOD GLUCOSE          PHYSICAL EXAM:  General: NAD  HEENT: atraumatic, normocephalic;  Neck: supple, no LAD, no thyromegaly  Respiratory: intubated, decreased air entry bilaterally   Cardiovascular: RRR, S1, S2, no M/R/G  Abdomen: normal BS; soft, non-distended, non-tender; no R/G  Extremities: radial and DP pulses intact b/l; no clubbing, cyanosis;  Skin: no rash appreciated;  Neurological: non-focal  Psychiatry: normal affect    LINES:    HOSPITAL MEDICATIONS:  Standing Meds:  ascorbic acid 500 milliGRAM(s) Oral three times a day  chlorhexidine 0.12% Liquid 15 milliLiter(s) Oral Mucosa every 12 hours  enoxaparin Injectable 80 milliGRAM(s) SubCutaneous two times a day  famotidine    Tablet 20 milliGRAM(s) Oral two times a day  fentaNYL   Infusion. 0.5 MICROgram(s)/kG/Hr IV Continuous <Continuous>  magnesium oxide 400 milliGRAM(s) Oral daily  phenylephrine    Infusion 0.1 MICROgram(s)/kG/Min IV Continuous <Continuous>  propofol Infusion 10 MICROgram(s)/kG/Min IV Continuous <Continuous>  zinc sulfate 220 milliGRAM(s) Oral daily      PRN Meds:  acetaminophen   Tablet .. 650 milliGRAM(s) Oral every 6 hours PRN  ALBUTerol    90 MICROgram(s) HFA Inhaler 2 Puff(s) Inhalation every 6 hours PRN  guaiFENesin   Syrup  (Sugar-Free) 100 milliGRAM(s) Oral every 6 hours PRN      LABS:                        14.4   16.67 )-----------( 488      ( 14 Apr 2020 05:39 )             45.3     Hgb Trend: 14.4<--, 14.4<--, 13.7<--, 14.1<--, 14.6<--  04-14    133<L>  |  95<L>  |  18  ----------------------------<  146<H>  3.9   |  24  |  0.61    Ca    9.1      14 Apr 2020 05:39    TPro  7.3  /  Alb  2.7<L>  /  TBili  0.8  /  DBili  x   /  AST  26  /  ALT  43  /  AlkPhos  106  04-14    Creatinine Trend: 0.61<--, 0.51<--, 0.56<--, 0.60<--, 0.60<--, 0.62<--      PF ratio:        CRP:    Q3D labs:  ESR:  T-cell subset  D-Dimer  LDH  Feritin  Troponin   CK      MICROBIOLOGY:       RADIOLOGY:  [ ] Reviewed and interpreted by me    POCUS:      Assessment and Plan:   · Assessment	  45 yo M with no significant PMH presenting with fever and cough found to be septic on admission with CXR showing b/l patchy opacities, admitted for acute hypoxic respiratory failure due to COVID19 PNA.    Pulm  ·  Problem: Acute respiratory failure with hypoxia.  Plan: - 2' covid-19 infection  - significant hypoxia on NRB today; RRTS x 2, intubated during second RRT   - could there be a pulmonary embolism? started on treatment dosing lovenox, CTA chest ordered however not stable enough to go down to CT scan.   - will get chest xray  -get ABG  vent setting:  ( IDEAL BODY WEIGHT IS 63, RR=35, PEEP 5 FIO2 80 %)  will get ABG and adjust vent settings accordingly   plateau P 45 post-intubation, it s 31 on the curent vent settings     ·  Problem: Suspected 2019 novel coronavirus infection.  Plan: - COVID PNA ( pos on 3/31)   - s/p 5 d plaquenil, s/p 5 d solumedrol, s/p anakinra taper   - CXR with increased infiltrates  -  low threshold to prone      Hem   ·  Problem: Preventive measure.  Plan: - DVT ppx: Lovenox (1mg/kg bid)  - Dispo: pending clinical improvement.     GI  Place NG and start TF   pantoprazole    endo: finger sticks q 6 hr, ISS     critical care time 40

## 2020-04-14 NOTE — PROGRESS NOTE ADULT - ASSESSMENT
44 M with no significant PMH presenting with nonproductive cough and fevers for 3-4 days. DX w/ covid19 pneumonitis--s/p initial anakinra protocol on tapering rx. Also status post Plaquenil and solumedrol.  Currently receiving colchicine, lovenox 40 BID, famotidine and albuterol as needed. Desaturated overnight and CTPA ordered to rule out PE and also to evaluate for hospital acquired pneumonia.    Patient required intubation    At this point given his rising leukocytosis 26k <---16k and unclear clinical picture if this all cytokine storm from COVID infection or a nosocomial process or a pulmonary embolus  Would    send blood cultures x 2 sets  repeat CXR  CT angiogram if feasible- anticoagulate  Would taper steroids   Would give antibiotics with Vancomycin 1 gram IV q 12hr  check trough level prior to fourth dose  Cefepime 2 gram IV q 8hr    would also repeat the inflammatory markers in AM with ferritin, ESR, CRP,  pro calcitonin, D-dimer    Tono Olmos MD  [phone]  After 5pm/weekends [phone]

## 2020-04-14 NOTE — PROGRESS NOTE ADULT - SUBJECTIVE AND OBJECTIVE BOX
CHIEF COMPLAINT:    Interval Events:  desaturated overnight to high 80s on 100% nonrebreather  REVIEW OF SYSTEMS:  [ ] All other systems negative  [ ] Unable to assess ROS because ________    OBJECTIVE:  ICU Vital Signs Last 24 Hrs  T(C): 37.5 (14 Apr 2020 13:03), Max: 38.9 (13 Apr 2020 18:00)  T(F): 99.5 (14 Apr 2020 13:03), Max: 102.1 (13 Apr 2020 18:00)  HR: 112 (14 Apr 2020 13:03) (111 - 120)  BP: 111/68 (14 Apr 2020 13:03) (103/70 - 115/68)  BP(mean): --  ABP: --  ABP(mean): --  RR: 22 (14 Apr 2020 13:03) (22 - 24)  SpO2: 92% (14 Apr 2020 13:03) (80% - 93%)        04-13 @ 07:01 - 04-14 @ 07:00  --------------------------------------------------------  IN: 840 mL / OUT: 1600 mL / NET: -760 mL    04-14 @ 07:01 - 04-14 @ 13:34  --------------------------------------------------------  IN: 360 mL / OUT: 500 mL / NET: -140 mL      CAPILLARY BLOOD GLUCOSE    HOSPITAL MEDICATIONS:  MEDICATIONS  (STANDING):  ascorbic acid 500 milliGRAM(s) Oral three times a day  enoxaparin Injectable 50 milliGRAM(s) SubCutaneous every 12 hours  famotidine    Tablet 20 milliGRAM(s) Oral two times a day  magnesium oxide 400 milliGRAM(s) Oral daily  zinc sulfate 220 milliGRAM(s) Oral daily    MEDICATIONS  (PRN):  acetaminophen   Tablet .. 650 milliGRAM(s) Oral every 6 hours PRN Temp greater or equal to 38C (100.4F), Mild Pain (1 - 3), Moderate Pain (4 - 6)  ALBUTerol    90 MICROgram(s) HFA Inhaler 2 Puff(s) Inhalation every 6 hours PRN Shortness of Breath and/or Wheezing  guaiFENesin   Syrup  (Sugar-Free) 100 milliGRAM(s) Oral every 6 hours PRN Cough      LABS:                        14.4   16.67 )-----------( 488      ( 14 Apr 2020 05:39 )             45.3     Hgb Trend: 14.4<--, 14.4<--, 13.7<--, 14.1<--, 14.6<--  04-14    133<L>  |  95<L>  |  18  ----------------------------<  146<H>  3.9   |  24  |  0.61    Ca    9.1      14 Apr 2020 05:39    TPro  7.3  /  Alb  2.7<L>  /  TBili  0.8  /  DBili  x   /  AST  26  /  ALT  43  /  AlkPhos  106  04-14    Creatinine Trend: 0.61<--, 0.51<--, 0.56<--, 0.60<--, 0.60<--, 0.62<--            MICROBIOLOGY:       RADIOLOGY:  [ ] Reviewed and interpreted by me    PULMONARY FUNCTION TESTS:    EKG:

## 2020-04-14 NOTE — PROGRESS NOTE ADULT - SUBJECTIVE AND OBJECTIVE BOX
INFECTIOUS DISEASES FOLLOW UP-- Ingrid Olmos  [phone]    This is a follow up note for this  44yMale with  Other general symptom or sign  Patient seen just has he was decompensating and requiring an RRT for intubation      ROS:  CONSTITUTIONAL: low grade fevers, increasing respiratory distress    Allergies    No Known Allergies    Intolerances        ANTIBIOTICS/RELEVANT:  antimicrobials    immunologic:    OTHER:  acetaminophen   Tablet .. 650 milliGRAM(s) Oral every 6 hours PRN  ALBUTerol    90 MICROgram(s) HFA Inhaler 2 Puff(s) Inhalation every 6 hours PRN  ascorbic acid 500 milliGRAM(s) Oral three times a day  chlorhexidine 0.12% Liquid 15 milliLiter(s) Oral Mucosa every 12 hours  enoxaparin Injectable 80 milliGRAM(s) SubCutaneous two times a day  famotidine    Tablet 20 milliGRAM(s) Oral two times a day  fentaNYL   Infusion. 0.5 MICROgram(s)/kG/Hr IV Continuous <Continuous>  guaiFENesin   Syrup  (Sugar-Free) 100 milliGRAM(s) Oral every 6 hours PRN  magnesium oxide 400 milliGRAM(s) Oral daily  methylPREDNISolone sodium succinate Injectable 60 milliGRAM(s) IV Push every 12 hours  phenylephrine    Infusion 0.1 MICROgram(s)/kG/Min IV Continuous <Continuous>  propofol Infusion 10 MICROgram(s)/kG/Min IV Continuous <Continuous>  rocuronium Infusion 8 MICROgram(s)/kG/Min IV Continuous <Continuous>  zinc sulfate 220 milliGRAM(s) Oral daily      Objective:  Vital Signs Last 24 Hrs  T(C): 37.6 (14 Apr 2020 16:20), Max: 37.6 (14 Apr 2020 16:20)  T(F): 99.6 (14 Apr 2020 16:20), Max: 99.6 (14 Apr 2020 16:20)  HR: 138 (14 Apr 2020 17:16) (111 - 138)  BP: 147/99 (14 Apr 2020 16:20) (103/70 - 147/99)  BP(mean): 115 (14 Apr 2020 16:20) (115 - 115)  RR: 23 (14 Apr 2020 16:20) (22 - 23)  SpO2: 92% (14 Apr 2020 17:16) (88% - 95%)    PHYSICAL EXAM:  Constitutional: seen in acute distress  Eyes:MARIA FERNANDA, EOMI  Ear/Nose/Throat: no oral lesions, 	  Respiratory: diminished BS bilaterally  Cardiovascular: S1S2 tachy  Gastrointestinal:soft, (+) BS, no tenderness  Extremities:no e/e/c no swelling  No Lymphadenopathy  IV sites not inflammed.    LABS:                        14.1   26.66 )-----------( 527      ( 14 Apr 2020 17:33 )             45.7     04-14    136  |  98  |  19  ----------------------------<  163<H>  4.2   |  24  |  0.56    Ca    8.5      14 Apr 2020 17:33    TPro  7.0  /  Alb  2.6<L>  /  TBili  0.8  /  DBili  x   /  AST  30  /  ALT  43  /  AlkPhos  120  04-14    PT/INR - ( 14 Apr 2020 17:33 )   PT: 15.0 sec;   INR: 1.29 ratio         PTT - ( 14 Apr 2020 17:33 )  PTT:28.8 sec      MICROBIOLOGY:    COVID-19 PCR: Detected: This test has been validated by [employer] to be accurate;  though it has not been FDA cleared/approved by the usual pathway.  As with all laboratory tests, results should be correlated with clinical  findings. (03.31.20 @ 14:56)            RECENT CULTURES:      RADIOLOGY & ADDITIONAL STUDIES:    < from: Xray Chest 1 View- PORTABLE-Urgent (04.14.20 @ 18:14) >  ******PRELIMINARY REPORT******    ******PRELIMINARY REPORT******              INTERPRETATION:  ETT above the umang.    < end of copied text >

## 2020-04-14 NOTE — PROGRESS NOTE ADULT - PROBLEM SELECTOR PLAN 2
- COVID PNA ( pos on 3/31)   - s/p 5 d plaquenil, s/p 5 d solumedrol, s/p anakinra taper   - c/w nonrebreather ( since 3/31), taper down as tolerated  - CXR with increased infiltrates  - Pulm on board  - DC Colchicine  - Encourage Prone position. If cannot tolerate, Left lateral decubitus position

## 2020-04-14 NOTE — PROGRESS NOTE ADULT - PROBLEM SELECTOR PLAN 3
- DVT ppx: Lovenox (1mg/kg bid)  - Dispo: pending clinical improvement [Lower Ext Edema] : lower extremity edema [Muscle Pain] : muscle pain [Negative] : Allergic/Immunologic [FreeTextEntry5] : stable  [FreeTextEntry7] : see history [FreeTextEntry8] : nocturia 2 times nightly  [FreeTextEntry9] : lower back

## 2020-04-14 NOTE — PROGRESS NOTE ADULT - ATTENDING COMMENTS
I have discussed with [facility] and [facility] nicole sotomayor  I have discussed with Dr. MAYEN, Dr. Dorie Blanco D.O.  Hospitalist Pager # [phone]

## 2020-04-14 NOTE — PROGRESS NOTE ADULT - SUBJECTIVE AND OBJECTIVE BOX
Patient feels short of breath today and very tired.    RRT called twice for hypoxia- intubated during second RRT.     GENERAL: No fevers, no chills.  EYES: No blurry vision,  No photophobia  ENT: No sore throat.  No dysphagia  Cardiovascular: + chest pain, palpitations, orthopnea  Pulmonary: + cough, no wheezing. + shortness of breath  Gastrointestinal: No abdominal pain, no diarrhea, no constipation  Musculoskeletal: No weakness.  No myalgias.  Dermatology:  No rashes.  Neuro: No Headache.  No vertigo.  No dizziness.  Psych: No anxiety, no depression.  Denies suicidal thoughts.    MEDICATIONS  (STANDING):  ascorbic acid 500 milliGRAM(s) Oral three times a day  chlorhexidine 0.12% Liquid 15 milliLiter(s) Oral Mucosa every 12 hours  enoxaparin Injectable 50 milliGRAM(s) SubCutaneous every 12 hours  famotidine    Tablet 20 milliGRAM(s) Oral two times a day  magnesium oxide 400 milliGRAM(s) Oral daily  phenylephrine    Infusion 0.1 MICROgram(s)/kG/Min (1.96 mL/Hr) IV Continuous <Continuous>  propofol Infusion 10 MICROgram(s)/kG/Min (3.13 mL/Hr) IV Continuous <Continuous>  zinc sulfate 220 milliGRAM(s) Oral daily    MEDICATIONS  (PRN):  acetaminophen   Tablet .. 650 milliGRAM(s) Oral every 6 hours PRN Temp greater or equal to 38C (100.4F), Mild Pain (1 - 3), Moderate Pain (4 - 6)  ALBUTerol    90 MICROgram(s) HFA Inhaler 2 Puff(s) Inhalation every 6 hours PRN Shortness of Breath and/or Wheezing  guaiFENesin   Syrup  (Sugar-Free) 100 milliGRAM(s) Oral every 6 hours PRN Cough    Vital Signs Last 24 Hrs  T(C): 37.5 (14 Apr 2020 13:03), Max: 38.9 (13 Apr 2020 18:00)  T(F): 99.5 (14 Apr 2020 13:03), Max: 102.1 (13 Apr 2020 18:00)  HR: 112 (14 Apr 2020 13:03) (111 - 120)  BP: 111/68 (14 Apr 2020 13:03) (103/70 - 115/68)  BP(mean): --  RR: 22 (14 Apr 2020 13:03) (22 - 24)  SpO2: 92% (14 Apr 2020 13:03) (80% - 93%)    GENERAL: ON NRB, in acute distress  HEAD:  Atraumatic, Normocephalic  EYES: EOMI, PERRLA, conjunctiva and sclera clear  ENT: Pharynx not erythematous  PULMONARY: scant rales b/l   CARDIOVASCULAR: Regular rate and rhythm; No murmurs, rubs, or gallops  ABDOMEN: Soft, Nontender, Nondistended; Bowel sounds present  EXTREMITIES:  2+ Peripheral Pulses, No clubbing, cyanosis, or edema  MUSCULOSKELETAL: No calf tenderness  PSYCH: AAOx3, normal affect  SKIN: warm and dry, No rashes or lesions    .  LABS:                         14.4   16.67 )-----------( 488      ( 14 Apr 2020 05:39 )             45.3     04-14    133<L>  |  95<L>  |  18  ----------------------------<  146<H>  3.9   |  24  |  0.61    Ca    9.1      14 Apr 2020 05:39    TPro  7.3  /  Alb  2.7<L>  /  TBili  0.8  /  DBili  x   /  AST  26  /  ALT  43  /  AlkPhos  106  04-14        Serum Pro-Brain Natriuretic Peptide: 449 pg/mL (04-14 @ 05:39)        RADIOLOGY, EKG & ADDITIONAL TESTS: Reviewed.

## 2020-04-14 NOTE — PROGRESS NOTE ADULT - ASSESSMENT
44 M with no significant PMH presenting with nonproductive cough and fevers for 3-4 days. DX w/ covid19 pneumonitis--s/p initial anakinra protocol on tapering rx. Also status post Plaquenil and solumedrol.  Currently receiving colchicine, lovenox 40 BID, famotidine and albuterol as needed. Desaturated overnight and CTPA ordered to rule out PE and also to evaluate for hospital acquired pneumonia.  Agree with current management.    Would stop colchicine as per order.    Decrease oxygen as tolerated.    Will review CTPA once completed.    ID to see patient- depending on recommendations may resume steroids once CTPA is seen and other etiologies are ruled out- PE, PTX, etc.

## 2020-04-14 NOTE — PROGRESS NOTE ADULT - PROBLEM SELECTOR PLAN 1
- 2' covid-19 infection  - significant hypoxia on NRB today; RRTS x 2, intubated during second RRT   - could there be a pulmonary embolism? started on treatment dosing lovenox, CTA chest ordered however not stable enough to go down to CT scan.   - CTA needed r/o PE; if fibrosis, may need another steroids course; if new consolidation, may need antibiotics (worsening leukocytosis today)

## 2020-04-14 NOTE — RAPID RESPONSE TEAM SUMMARY - NSSITUATIONBACKGROUNDRRT_GEN_ALL_CORE
See rapid sheet for more details.   RRT called for hypoxia in the 70s. Patient was proned, new NRB mask was placed. Patient was given IV acetaminophen. I spoke with family at patient's request and updated them. I performed chest percussion therapy. His oxygen improved to 90% and patient felt improved. Patient does not need to intubated at this time. RRT was ended. Discuss with primary team plan. Possible role of slow steroid taper vs re-dose anakinra. ID team to follow up recommended. Provider to notify attending. Please have patient on standing tylenol every 6 hours for the next with days.
Patient seen at bedside, hypoxic to the low 80s and in severe respiratory distress. Patient endorsed feeling tired out. Spoke to wife, and patient spoke to her prior to intubation.  Called anesthesia. Patient received prop, dwayne and fentanyl for sedation. Started on prop and levophed drip. Vitals SBP 110s, and saturation >90% at the time of transport. Patient was transferred to NSICU, sign out given to NSICU attending.     See RRT sheet for more details.

## 2020-04-15 NOTE — CHART NOTE - NSCHARTNOTEFT_GEN_A_CORE
Nutrition Follow Up Note   Patient seen for: nutrition follow up on COVID ICU     Source: medical record, communication with team. Unable to speak to pt due to current airborne isolation contact precautions related to COVID-19. Pt remains intubated.     Chart reviewed, events noted. Pt is a 45 yo M with no significant PMH. Presented with fever, cough. Found to be septic on admission with CXR showing b/l patchy opacities, admitted for acute hypoxic respiratory failure due to COVID19 pneumonia. S/P 5 days of Plaquenil, S/P 5 days of Solu-Medrol and S/P Anakinra taper. Hospital course significant for rapid response (x2) for patient experiencing hypoxia and severe respiratory distress. Pt placed in prone position, ultimately intubated. NGT placed.     Diet Order: No active diet order in place  CURRENT EN ORDER PROVIDES: N/A  Nutrition Events: Pt previously ordered regular diet. Per nursing flow sheet, pt had been consuming ~% of meals. Continues on antihyperglycemic regimen (Humalog, NPH) for blood glucose control. Continues on antibiotics as ordered.     Pt now intubated/sedated. Receiving propofol at 28ml/hr. If to continue x 24 hrs, will provide 739kcal daily.     GI: Last BM 4/14 x 1. On bowel regimen as ordered (Miralax, Senna).     Anthrpometric Measurements:   Height (cm): 167.6 (04-14-20 @ 16:20)  Weight (kg): 77.9 (04-14-20 @ 16:20)  BMI (kg/m2): 27.7 (04-14-20 @ 16:20)  IBW: 142 lbs  %IBW: 121%    Medications: MEDICATIONS  (STANDING):  ascorbic acid 500 milliGRAM(s) Oral three times a day  cefepime   IVPB      cefepime   IVPB 2000 milliGRAM(s) IV Intermittent every 8 hours  chlorhexidine 0.12% Liquid 15 milliLiter(s) Oral Mucosa every 12 hours  chlorhexidine 4% Liquid 1 Application(s) Topical <User Schedule>  enoxaparin Injectable 80 milliGRAM(s) SubCutaneous two times a day  famotidine    Tablet 20 milliGRAM(s) Oral two times a day  fentaNYL   Infusion. 0.5 MICROgram(s)/kG/Hr (1.95 mL/Hr) IV Continuous <Continuous>  insulin lispro (HumaLOG) corrective regimen sliding scale   SubCutaneous every 6 hours  insulin NPH human recombinant 3 Unit(s) SubCutaneous every 6 hours  magnesium oxide 400 milliGRAM(s) Oral daily  methylPREDNISolone sodium succinate Injectable 60 milliGRAM(s) IV Push every 12 hours  midazolam Infusion 0.15 mG/kG/Hr (11.7 mL/Hr) IV Continuous <Continuous>  phenylephrine    Infusion 0.5 MICROgram(s)/kG/Min (7.3 mL/Hr) IV Continuous <Continuous>  polyethylene glycol 3350 17 Gram(s) Oral every 12 hours  propofol Infusion 75 MICROgram(s)/kG/Min (35.1 mL/Hr) IV Continuous <Continuous>  rocuronium Infusion 8 MICROgram(s)/kG/Min (7.48 mL/Hr) IV Continuous <Continuous>  senna Syrup 10 milliLiter(s) Oral daily  vancomycin  IVPB      vancomycin  IVPB 1000 milliGRAM(s) IV Intermittent every 12 hours  zinc sulfate 220 milliGRAM(s) Oral daily    MEDICATIONS  (PRN):  acetaminophen   Tablet .. 650 milliGRAM(s) Oral every 6 hours PRN Temp greater or equal to 38C (100.4F), Mild Pain (1 - 3), Moderate Pain (4 - 6)  ALBUTerol    90 MICROgram(s) HFA Inhaler 2 Puff(s) Inhalation every 6 hours PRN Shortness of Breath and/or Wheezing  sodium chloride 0.9% lock flush 10 milliLiter(s) IV Push every 1 hour PRN Pre/post blood products, medications, blood draw, and to maintain line patency    Labs: 04-15 @ 05:09: Sodium 142, Potassium 4.4, Calcium 9.0, Magnesium 2.5, Phosphorus 3.1, BUN 25<H>, Creatinine 0.96, Glucose 183<H>, Alk Phos 104, ALT/SGPT 37, AST/SGOT 21, Albumin 2.7<L>, Prealbumin --, Total Bilirubin 0.5, Hemoglobin 13.5, Hematocrit 43.4, Ferritin --, C-Reactive Protein --, Creatine Kinase <<27>  04-14 @ 22:35: Sodium 138, Potassium 5.0, Calcium 8.2<L>, Magnesium 2.4, Phosphorus 8.1<H>, BUN 26<H>, Creatinine 1.12, Glucose 225<H>, Alk Phos 115, ALT/SGPT 39, AST/SGOT 25, Albumin 2.6<L>, Prealbumin --, Total Bilirubin 0.5, Hemoglobin 13.3, Hematocrit 44.5, Ferritin --, C-Reactive Protein --, Creatine Kinase <<27>  04-14 @ 21:44: Sodium --, Potassium --, Calcium --, Magnesium --, Phosphorus --, BUN --, Creatinine --, Glucose --, Alk Phos --, ALT/SGPT --, AST/SGOT --, Albumin --, Prealbumin --, Total Bilirubin --, Hemoglobin --, Hematocrit --, Ferritin 1129<H>, C-Reactive Protein 16.66<H>, Creatine Kinase <<27>  04-14 @ 17:33: Sodium 136, Potassium 4.2, Calcium 8.5, Magnesium --, Phosphorus --, BUN 19, Creatinine 0.56, Glucose 163<H>, Alk Phos 120, ALT/SGPT 43, AST/SGOT 30, Albumin 2.6<L>, Prealbumin --, Total Bilirubin 0.8, Hemoglobin 14.1, Hematocrit 45.7, Ferritin --, C-Reactive Protein --, Creatine Kinase <<27>        POCT Blood Glucose.: 156 mg/dL (04-15-20 @ 03:50)  POCT Blood Glucose.: 218 mg/dL (04-15-20 @ 00:53)    Skin: No pressure injuries noted  Edema: None noted    Estimated Needs: (based on dosing wt 77.9kg)  Energy: (20-25kcal/kg): 1558-1948kcal  Protein: (1.2-1.4g protein/kg): 93-109g protein    Previous Nutrition Diagnosis: None   Nutrition Diagnosis is:     New Nutrition Diagnosis: functional (swallowing difficulty) in context of intubated/sedated status r/t complications of COVID 19+ as evidenced by pt NPO, with no oral means of nutrition/hydration at this time     Recommended Interventions:   1. As able, recommend to initiate bolus feeds of Vital AF (1.2) at 50ml q 6 hrs (4x daily). Monitor s/s of GI distress/intolerance. If none noted, consider advancing bolus volume by 50ml q daily until goal rate of 200ml q 6 hrs (4x daily) with No Carb Prosource 3x daily (+60kcal, +15g protein/serving). With inclusion of propofol (+739kcal), will provide: (based on dosing wt 77.9kg): 800ml, 1879kcal (24kcal/kg) and 105g protein (1.3g protein/kg)   2. Monitor provision of propofol and GI tolerance. RD to remain available to adjust EN formulary, volume/rate PRN. Bowel regimen as per medical team.   3. Trend BG levels, renal indices, LFT's, electrolytes and triglycerides       Monitoring and Evaluation:   Continue to monitor nutrition provision and tolerance, weights, labs, skin integrity.   RD remains available upon request and will follow up per protocol.    Alison Kleiner, RD [facility]; pager number [phone]

## 2020-04-15 NOTE — PROGRESS NOTE ADULT - SUBJECTIVE AND OBJECTIVE BOX
CHIEF COMPLAINT: COVID-19    Interval Events: proned    REVIEW OF SYSTEMS:  Constitutional: [ ] negative [ ] fevers [ ] chills [ ] weight loss [ ] weight gain  HEENT: [ ] negative [ ] dry eyes [ ] eye irritation [ ] postnasal drip [ ] nasal congestion  CV: [ ] negative  [ ] chest pain [ ] orthopnea [ ] palpitations [ ] murmur  Resp: [ ] negative [ ] cough [ ] shortness of breath [ ] dyspnea [ ] wheezing [ ] sputum [ ] hemoptysis  GI: [ ] negative [ ] nausea [ ] vomiting [ ] diarrhea [ ] constipation [ ] abd pain [ ] dysphagia   : [ ] negative [ ] dysuria [ ] nocturia [ ] hematuria [ ] increased urinary frequency  Musculoskeletal: [ ] negative [ ] back pain [ ] myalgias [ ] arthralgias [ ] fracture  Skin: [ ] negative [ ] rash [ ] itch  Neurological: [ ] negative [ ] headache [ ] dizziness [ ] syncope [ ] weakness [ ] numbness  Psychiatric: [ ] negative [ ] anxiety [ ] depression  Endocrine: [ ] negative [ ] diabetes [ ] thyroid problem  Hematologic/Lymphatic: [ ] negative [ ] anemia [ ] bleeding problem  Allergic/Immunologic: [ ] negative [ ] itchy eyes [ ] nasal discharge [ ] hives [ ] angioedema  [ ] All other systems negative  [x ] Unable to assess ROS because __sedated______    T(C): 38 (04-15-20 @ 07:00), Max: 38 (04-15-20 @ 07:00)  HR: 103 (04-15-20 @ 07:00) (98 - 139)  BP: 147/99 (04-14-20 @ 16:20) (111/68 - 147/99)  RR: 35 (04-15-20 @ 07:00) (0 - 35)  SpO2: 97% (04-15-20 @ 07:00) (91% - 97%)  04-14-20 @ 07:01  -  04-15-20 @ 07:00  --------------------------------------------------------  IN: 3476.1 mL / OUT: 1690 mL / NET: 1786.1 mL    04-15-20 @ 07:01  -  04-15-20 @ 08:21  --------------------------------------------------------  IN: 103.2 mL / OUT: 300 mL / NET: -196.8 mL    acetaminophen   Tablet .. 650 milliGRAM(s) Oral every 6 hours PRN  ALBUTerol    90 MICROgram(s) HFA Inhaler 2 Puff(s) Inhalation every 6 hours PRN  ascorbic acid 500 milliGRAM(s) Oral three times a day  cefepime   IVPB      cefepime   IVPB 2000 milliGRAM(s) IV Intermittent every 8 hours  chlorhexidine 0.12% Liquid 15 milliLiter(s) Oral Mucosa every 12 hours  chlorhexidine 4% Liquid 1 Application(s) Topical <User Schedule>  enoxaparin Injectable 80 milliGRAM(s) SubCutaneous two times a day  famotidine    Tablet 20 milliGRAM(s) Oral two times a day  fentaNYL   Infusion. 0.5 MICROgram(s)/kG/Hr IV Continuous <Continuous>  insulin lispro (HumaLOG) corrective regimen sliding scale   SubCutaneous every 6 hours  insulin NPH human recombinant 3 Unit(s) SubCutaneous every 6 hours  magnesium oxide 400 milliGRAM(s) Oral daily  methylPREDNISolone sodium succinate Injectable 60 milliGRAM(s) IV Push every 12 hours  midazolam Infusion 0.15 mG/kG/Hr IV Continuous <Continuous>  phenylephrine    Infusion 0.5 MICROgram(s)/kG/Min IV Continuous <Continuous>  polyethylene glycol 3350 17 Gram(s) Oral every 12 hours  propofol Infusion 75 MICROgram(s)/kG/Min IV Continuous <Continuous>  rocuronium Infusion 8 MICROgram(s)/kG/Min IV Continuous <Continuous>  senna Syrup 10 milliLiter(s) Oral daily  sodium chloride 0.9% lock flush 10 milliLiter(s) IV Push every 1 hour PRN  vancomycin  IVPB      vancomycin  IVPB 1000 milliGRAM(s) IV Intermittent every 12 hours  zinc sulfate 220 milliGRAM(s) Oral daily  Mode: AC/ CMV (Assist Control/ Continuous Mandatory Ventilation), RR (machine): 35, TV (machine): 240, FiO2: 100, PEEP: 7, ITime: 0.65, MAP: 22, PC: 40, PIP: 49  PHYSICAL EXAM:  General: NAD  HEENT: atraumatic, normocephalic;  Neck: supple, no LAD, no thyromegaly  Respiratory: decrease air entry bilaterally   Cardiovascular: RRR, S1, S2, no M/R/G  Abdomen: normal BS; soft, non-distended, non-tender; no R/G  Extremities: radial and DP pulses intact b/l; no clubbing, cyanosis;  Skin: no rash appreciated;  Neurological: non-focal  Psychiatry: normal affect    LINES:    HOSPITAL MEDICATIONS:  Standing Meds:  ascorbic acid 500 milliGRAM(s) Oral three times a day  cefepime   IVPB      cefepime   IVPB 2000 milliGRAM(s) IV Intermittent every 8 hours  chlorhexidine 0.12% Liquid 15 milliLiter(s) Oral Mucosa every 12 hours  chlorhexidine 4% Liquid 1 Application(s) Topical <User Schedule>  enoxaparin Injectable 80 milliGRAM(s) SubCutaneous two times a day  famotidine    Tablet 20 milliGRAM(s) Oral two times a day  fentaNYL   Infusion. 0.5 MICROgram(s)/kG/Hr IV Continuous <Continuous>  insulin lispro (HumaLOG) corrective regimen sliding scale   SubCutaneous every 6 hours  insulin NPH human recombinant 3 Unit(s) SubCutaneous every 6 hours  magnesium oxide 400 milliGRAM(s) Oral daily  methylPREDNISolone sodium succinate Injectable 60 milliGRAM(s) IV Push every 12 hours  midazolam Infusion 0.15 mG/kG/Hr IV Continuous <Continuous>  phenylephrine    Infusion 0.5 MICROgram(s)/kG/Min IV Continuous <Continuous>  polyethylene glycol 3350 17 Gram(s) Oral every 12 hours  propofol Infusion 75 MICROgram(s)/kG/Min IV Continuous <Continuous>  rocuronium Infusion 8 MICROgram(s)/kG/Min IV Continuous <Continuous>  senna Syrup 10 milliLiter(s) Oral daily  vancomycin  IVPB      vancomycin  IVPB 1000 milliGRAM(s) IV Intermittent every 12 hours  zinc sulfate 220 milliGRAM(s) Oral daily      PRN Meds:  acetaminophen   Tablet .. 650 milliGRAM(s) Oral every 6 hours PRN  ALBUTerol    90 MICROgram(s) HFA Inhaler 2 Puff(s) Inhalation every 6 hours PRN  sodium chloride 0.9% lock flush 10 milliLiter(s) IV Push every 1 hour PRN      LABS:                        13.3   31.75 )-----------( 671      ( 14 Apr 2020 22:35 )             44.5     Hgb Trend: 13.3<--, 14.1<--, 14.4<--, 14.4<--, 13.7<--  04-14    138  |  99  |  26<H>  ----------------------------<  225<H>  5.0   |  23  |  1.12    Ca    8.2<L>      14 Apr 2020 22:35  Phos  8.1     04-14  Mg     2.4     04-14    TPro  6.8  /  Alb  2.6<L>  /  TBili  0.5  /  DBili  x   /  AST  25  /  ALT  39  /  AlkPhos  115  04-14    Creatinine Trend: 1.12<--, 0.56<--, 0.61<--, 0.51<--, 0.56<--, 0.60<--  PT/INR - ( 14 Apr 2020 17:33 )   PT: 15.0 sec;   INR: 1.29 ratio         PTT - ( 14 Apr 2020 17:33 )  PTT:28.8 sec  ABG - ( 15 Apr 2020 01:31 )  pH, Arterial: 7.28  pH, Blood: x     /  pCO2: 55    /  pO2: 93    / HCO3: 25    / Base Excess: -2.0  /  SaO2: 96                PF ratio:    Arterial Blood Gas:  04-15 @ 01:31  7.28/55/93/25/96/-2.0  ABG lactate: --  Arterial Blood Gas:  04-14 @ 22:36  7.14/80/91/26/93/-5.2  ABG lactate: --  Arterial Blood Gas:  04-14 @ 21:23  6.98/>104/119/32/94/-5.9  ABG lactate: --  Arterial Blood Gas:  04-14 @ 17:49  7.28/61/76/28/92/.0  ABG lactate: --           PTT - ( 14 Apr 2020 17:33 )  PTT:28.8 sec    MICROBIOLOGY:       RADIOLOGY:  [ ] Reviewed and interpreted by me          Assessment and Plan:   · Assessment	  43 yo M with no significant PMH presenting with fever and cough found to be septic on admission with CXR showing b/l patchy opacities, admitted for acute hypoxic respiratory failure due to COVID19 PNA.    Pulm   Acute respiratory failure with hypoxia. ARDS, COVID +  Is being proned   -His decompensation is less likely due to PE however he is on empiric treatment dose, with the FATOU, will change lovenox to heparin   vent setting:  ( IDEAL BODY WEIGHT IS 63, RR=35, PEEP 5 FIO2 80 %), wean FiO2  will get ABG and adjust vent settings accordingly   plateau P 45 post-intubation, it s 31 on the curent vent settings     ·  Problem: Suspected 2019 novel coronavirus infection.  Plan: - COVID PNA ( pos on 3/31)   - s/p 5 d plaquenil, s/p 5 d solumedrol, s/p anakinra taper   - d/c steroids  -consider tocilizumab   - CXR with increased infiltrates     Hem   ·  Problem: Preventive measure.  Plan: - DVT ppx: Lovenox (1mg/kg bid) change to heparin given FATOU  GI  Place NG and start TF   pantoprazole    neprho:  FATOU, avoid neprhotoxic medications  I/O IN: 2470.4 mL / OUT: 1160 mL / NET: 1310.4 mL    Card:  on phenylephrine to keep MAP> 65 mmhg hypotension likely from the sedation     ID:  started emperically on cefepime and vancomycin  pending cultures     endo: finger sticks q 6 hr, ISS     critical care time 60 min CHIEF COMPLAINT: COVID-19    Interval Events: proned    REVIEW OF SYSTEMS:  Constitutional: [ ] negative [ ] fevers [ ] chills [ ] weight loss [ ] weight gain  HEENT: [ ] negative [ ] dry eyes [ ] eye irritation [ ] postnasal drip [ ] nasal congestion  CV: [ ] negative  [ ] chest pain [ ] orthopnea [ ] palpitations [ ] murmur  Resp: [ ] negative [ ] cough [ ] shortness of breath [ ] dyspnea [ ] wheezing [ ] sputum [ ] hemoptysis  GI: [ ] negative [ ] nausea [ ] vomiting [ ] diarrhea [ ] constipation [ ] abd pain [ ] dysphagia   : [ ] negative [ ] dysuria [ ] nocturia [ ] hematuria [ ] increased urinary frequency  Musculoskeletal: [ ] negative [ ] back pain [ ] myalgias [ ] arthralgias [ ] fracture  Skin: [ ] negative [ ] rash [ ] itch  Neurological: [ ] negative [ ] headache [ ] dizziness [ ] syncope [ ] weakness [ ] numbness  Psychiatric: [ ] negative [ ] anxiety [ ] depression  Endocrine: [ ] negative [ ] diabetes [ ] thyroid problem  Hematologic/Lymphatic: [ ] negative [ ] anemia [ ] bleeding problem  Allergic/Immunologic: [ ] negative [ ] itchy eyes [ ] nasal discharge [ ] hives [ ] angioedema  [ ] All other systems negative  [x ] Unable to assess ROS because __sedated______        T(C): 38 (04-15-20 @ 07:00), Max: 38 (04-15-20 @ 07:00)  HR: 103 (04-15-20 @ 07:00) (98 - 139)  BP: 147/99 (04-14-20 @ 16:20) (111/68 - 147/99)  RR: 35 (04-15-20 @ 07:00) (0 - 35)  SpO2: 97% (04-15-20 @ 07:00) (91% - 97%)  04-14-20 @ 07:01  -  04-15-20 @ 07:00  --------------------------------------------------------  IN: 3476.1 mL / OUT: 1690 mL / NET: 1786.1 mL    04-15-20 @ 07:01  -  04-15-20 @ 08:25  --------------------------------------------------------  IN: 103.2 mL / OUT: 300 mL / NET: -196.8 mL    acetaminophen   Tablet .. 650 milliGRAM(s) Oral every 6 hours PRN  ALBUTerol    90 MICROgram(s) HFA Inhaler 2 Puff(s) Inhalation every 6 hours PRN  ascorbic acid 500 milliGRAM(s) Oral three times a day  cefepime   IVPB      cefepime   IVPB 2000 milliGRAM(s) IV Intermittent every 8 hours  chlorhexidine 0.12% Liquid 15 milliLiter(s) Oral Mucosa every 12 hours  chlorhexidine 4% Liquid 1 Application(s) Topical <User Schedule>  enoxaparin Injectable 80 milliGRAM(s) SubCutaneous two times a day  famotidine    Tablet 20 milliGRAM(s) Oral two times a day  fentaNYL   Infusion. 0.5 MICROgram(s)/kG/Hr IV Continuous <Continuous>  insulin lispro (HumaLOG) corrective regimen sliding scale   SubCutaneous every 6 hours  insulin NPH human recombinant 3 Unit(s) SubCutaneous every 6 hours  magnesium oxide 400 milliGRAM(s) Oral daily  methylPREDNISolone sodium succinate Injectable 60 milliGRAM(s) IV Push every 12 hours  midazolam Infusion 0.15 mG/kG/Hr IV Continuous <Continuous>  phenylephrine    Infusion 0.5 MICROgram(s)/kG/Min IV Continuous <Continuous>  polyethylene glycol 3350 17 Gram(s) Oral every 12 hours  propofol Infusion 75 MICROgram(s)/kG/Min IV Continuous <Continuous>  rocuronium Infusion 8 MICROgram(s)/kG/Min IV Continuous <Continuous>  senna Syrup 10 milliLiter(s) Oral daily  sodium chloride 0.9% lock flush 10 milliLiter(s) IV Push every 1 hour PRN  vancomycin  IVPB      vancomycin  IVPB 1000 milliGRAM(s) IV Intermittent every 12 hours  zinc sulfate 220 milliGRAM(s) Oral daily  Mode: AC/ CMV (Assist Control/ Continuous Mandatory Ventilation), RR (machine): 35, TV (machine): 240, FiO2: 100, PEEP: 7, ITime: 0.65, MAP: 22, PC: 40, PIP: 49    PHYSICAL EXAM:  General: NAD  HEENT: atraumatic, normocephalic;  Neck: supple, no LAD, no thyromegaly  Respiratory: decrease air entry bilaterally   Cardiovascular: RRR, S1, S2, no M/R/G  Abdomen: normal BS; soft, non-distended, non-tender; no R/G  Extremities: radial and DP pulses intact b/l; no clubbing, cyanosis;  Skin: no rash appreciated;  Neurological: non-focal  Psychiatry: normal affect    LINES:    Mode: AC/ CMV (Assist Control/ Continuous Mandatory Ventilation), RR (machine): 35, TV (machine): 240, FiO2: 100, PEEP: 7, ITime: 0.65, MAP: 22, PC: 40, PIP: 49      LABS:  Na: 142 (04-15 @ 05:09), 138 (04-14 @ 22:35), 136 (04-14 @ 17:33), 133 (04-14 @ 05:39), 136 (04-13 @ 05:33)  K: 4.4 (04-15 @ 05:09), 5.0 (04-14 @ 22:35), 4.2 (04-14 @ 17:33), 3.9 (04-14 @ 05:39), 4.2 (04-13 @ 05:33)  Cl: 104 (04-15 @ 05:09), 99 (04-14 @ 22:35), 98 (04-14 @ 17:33), 95 (04-14 @ 05:39), 98 (04-13 @ 05:33)  CO2: 25 (04-15 @ 05:09), 23 (04-14 @ 22:35), 24 (04-14 @ 17:33), 24 (04-14 @ 05:39), 24 (04-13 @ 05:33)  BUN: 25 (04-15 @ 05:09), 26 (04-14 @ 22:35), 19 (04-14 @ 17:33), 18 (04-14 @ 05:39), 15 (04-13 @ 05:33)  Cr: 0.96 (04-15 @ 05:09), 1.12 (04-14 @ 22:35), 0.56 (04-14 @ 17:33), 0.61 (04-14 @ 05:39), 0.51 (04-13 @ 05:33)  Glu: 183(04-15 @ 05:09), 225(04-14 @ 22:35), 163(04-14 @ 17:33), 146(04-14 @ 05:39), 121(04-13 @ 05:33)    Hgb: 13.5 (04-15 @ 05:09), 13.3 (04-14 @ 22:35), 14.1 (04-14 @ 17:33), 14.4 (04-14 @ 05:39), 14.4 (04-13 @ 05:33)  Hct: 43.4 (04-15 @ 05:09), 44.5 (04-14 @ 22:35), 45.7 (04-14 @ 17:33), 45.3 (04-14 @ 05:39), 45.8 (04-13 @ 05:33)  WBC: 31.63 (04-15 @ 05:09), 31.75 (04-14 @ 22:35), 26.66 (04-14 @ 17:33), 16.67 (04-14 @ 05:39), 12.75 (04-13 @ 05:33)  Plt: 639 (04-15 @ 05:09), 671 (04-14 @ 22:35), 527 (04-14 @ 17:33), 488 (04-14 @ 05:39), 457 (04-13 @ 05:33)    INR: 1.14 04-15-20 @ 05:09, 1.29 04-14-20 @ 17:33  PTT: 32.8 04-15-20 @ 05:09, 28.8 04-14-20 @ 17:33            Assessment and Plan:   · Assessment	  45 yo M with no significant PMH presenting with fever and cough found to be septic on admission with CXR showing b/l patchy opacities, admitted for acute hypoxic respiratory failure due to COVID19 PNA.    Pulm   Acute respiratory failure with hypoxia. ARDS, COVID +  Is being proned   -His decompensation is less likely due to PE however he is on empiric treatment dose, with the FATOU, will change lovenox to heparin   vent setting: Mode: AC/ CMV (Assist Control/ Continuous Mandatory Ventilation), RR (machine): 35, TV (machine): 240, FiO2: 100, PEEP: 7, ITime: 0.65, MAP: 22, PC: 40, PIP: 49  will get ABG and adjust vent settings accordingly   plateau P 45 post-intubation, it s 31 on the curent vent settings     ·  Problem: Suspected 2019 novel coronavirus infection.  Plan: - COVID PNA ( pos on 3/31)   - s/p 5 d plaquenil, s/p 5 d solumedrol, s/p anakinra taper   - d/c steroids  -consider tocilizumab   - CXR with increased infiltrates     Hem   ·  Problem: Preventive measure.  Plan: - DVT ppx: Lovenox (1mg/kg bid) change to heparin given FATOU  GI  Place NG and start TF   pantoprazole    neprho:  FATOU, avoid neprhotoxic medications  I/O IN: 2470.4 mL / OUT: 1160 mL / NET: 1310.4 mL    Card:  on phenylephrine to keep MAP> 65 mmhg hypotension likely from the sedation     ID:  started emperically on cefepime and vancomycin  pending cultures     endo: finger sticks q 6 hr, ISS     critical care time 60 min CHIEF COMPLAINT: COVID-19    Interval Events: proned    REVIEW OF SYSTEMS:  Constitutional: [ ] negative [ ] fevers [ ] chills [ ] weight loss [ ] weight gain  HEENT: [ ] negative [ ] dry eyes [ ] eye irritation [ ] postnasal drip [ ] nasal congestion  CV: [ ] negative  [ ] chest pain [ ] orthopnea [ ] palpitations [ ] murmur  Resp: [ ] negative [ ] cough [ ] shortness of breath [ ] dyspnea [ ] wheezing [ ] sputum [ ] hemoptysis  GI: [ ] negative [ ] nausea [ ] vomiting [ ] diarrhea [ ] constipation [ ] abd pain [ ] dysphagia   : [ ] negative [ ] dysuria [ ] nocturia [ ] hematuria [ ] increased urinary frequency  Musculoskeletal: [ ] negative [ ] back pain [ ] myalgias [ ] arthralgias [ ] fracture  Skin: [ ] negative [ ] rash [ ] itch  Neurological: [ ] negative [ ] headache [ ] dizziness [ ] syncope [ ] weakness [ ] numbness  Psychiatric: [ ] negative [ ] anxiety [ ] depression  Endocrine: [ ] negative [ ] diabetes [ ] thyroid problem  Hematologic/Lymphatic: [ ] negative [ ] anemia [ ] bleeding problem  Allergic/Immunologic: [ ] negative [ ] itchy eyes [ ] nasal discharge [ ] hives [ ] angioedema  [ ] All other systems negative  [x ] Unable to assess ROS because __sedated______        T(C): 38 (04-15-20 @ 07:00), Max: 38 (04-15-20 @ 07:00)  HR: 103 (04-15-20 @ 07:00) (98 - 139)  BP: 147/99 (04-14-20 @ 16:20) (111/68 - 147/99)  RR: 35 (04-15-20 @ 07:00) (0 - 35)  SpO2: 97% (04-15-20 @ 07:00) (91% - 97%)  04-14-20 @ 07:01  -  04-15-20 @ 07:00  --------------------------------------------------------  IN: 3476.1 mL / OUT: 1690 mL / NET: 1786.1 mL    04-15-20 @ 07:01  -  04-15-20 @ 08:25  --------------------------------------------------------  IN: 103.2 mL / OUT: 300 mL / NET: -196.8 mL    acetaminophen   Tablet .. 650 milliGRAM(s) Oral every 6 hours PRN  ALBUTerol    90 MICROgram(s) HFA Inhaler 2 Puff(s) Inhalation every 6 hours PRN  ascorbic acid 500 milliGRAM(s) Oral three times a day  cefepime   IVPB      cefepime   IVPB 2000 milliGRAM(s) IV Intermittent every 8 hours  chlorhexidine 0.12% Liquid 15 milliLiter(s) Oral Mucosa every 12 hours  chlorhexidine 4% Liquid 1 Application(s) Topical <User Schedule>  enoxaparin Injectable 80 milliGRAM(s) SubCutaneous two times a day  famotidine    Tablet 20 milliGRAM(s) Oral two times a day  fentaNYL   Infusion. 0.5 MICROgram(s)/kG/Hr IV Continuous <Continuous>  insulin lispro (HumaLOG) corrective regimen sliding scale   SubCutaneous every 6 hours  insulin NPH human recombinant 3 Unit(s) SubCutaneous every 6 hours  magnesium oxide 400 milliGRAM(s) Oral daily  methylPREDNISolone sodium succinate Injectable 60 milliGRAM(s) IV Push every 12 hours  midazolam Infusion 0.15 mG/kG/Hr IV Continuous <Continuous>  phenylephrine    Infusion 0.5 MICROgram(s)/kG/Min IV Continuous <Continuous>  polyethylene glycol 3350 17 Gram(s) Oral every 12 hours  propofol Infusion 75 MICROgram(s)/kG/Min IV Continuous <Continuous>  rocuronium Infusion 8 MICROgram(s)/kG/Min IV Continuous <Continuous>  senna Syrup 10 milliLiter(s) Oral daily  sodium chloride 0.9% lock flush 10 milliLiter(s) IV Push every 1 hour PRN  vancomycin  IVPB      vancomycin  IVPB 1000 milliGRAM(s) IV Intermittent every 12 hours  zinc sulfate 220 milliGRAM(s) Oral daily  Mode: AC/ CMV (Assist Control/ Continuous Mandatory Ventilation), RR (machine): 35, TV (machine): 240, FiO2: 100, PEEP: 7, ITime: 0.65, MAP: 22, PC: 40, PIP: 49    PHYSICAL EXAM:  General: NAD  HEENT: atraumatic, normocephalic;  Neck: supple, no LAD, no thyromegaly  Respiratory: decrease air entry bilaterally   Cardiovascular: RRR, S1, S2, no M/R/G  Abdomen: normal BS; soft, non-distended, non-tender; no R/G  Extremities: radial and DP pulses intact b/l; no clubbing, cyanosis;  Skin: no rash appreciated;  Neurological: non-focal  Psychiatry: normal affect      LABS:  Na: 142 (04-15 @ 05:09), 138 (04-14 @ 22:35), 136 (04-14 @ 17:33), 133 (04-14 @ 05:39), 136 (04-13 @ 05:33)  K: 4.4 (04-15 @ 05:09), 5.0 (04-14 @ 22:35), 4.2 (04-14 @ 17:33), 3.9 (04-14 @ 05:39), 4.2 (04-13 @ 05:33)  Cl: 104 (04-15 @ 05:09), 99 (04-14 @ 22:35), 98 (04-14 @ 17:33), 95 (04-14 @ 05:39), 98 (04-13 @ 05:33)  CO2: 25 (04-15 @ 05:09), 23 (04-14 @ 22:35), 24 (04-14 @ 17:33), 24 (04-14 @ 05:39), 24 (04-13 @ 05:33)  BUN: 25 (04-15 @ 05:09), 26 (04-14 @ 22:35), 19 (04-14 @ 17:33), 18 (04-14 @ 05:39), 15 (04-13 @ 05:33)  Cr: 0.96 (04-15 @ 05:09), 1.12 (04-14 @ 22:35), 0.56 (04-14 @ 17:33), 0.61 (04-14 @ 05:39), 0.51 (04-13 @ 05:33)  Glu: 183(04-15 @ 05:09), 225(04-14 @ 22:35), 163(04-14 @ 17:33), 146(04-14 @ 05:39), 121(04-13 @ 05:33)    Hgb: 13.5 (04-15 @ 05:09), 13.3 (04-14 @ 22:35), 14.1 (04-14 @ 17:33), 14.4 (04-14 @ 05:39), 14.4 (04-13 @ 05:33)  Hct: 43.4 (04-15 @ 05:09), 44.5 (04-14 @ 22:35), 45.7 (04-14 @ 17:33), 45.3 (04-14 @ 05:39), 45.8 (04-13 @ 05:33)  WBC: 31.63 (04-15 @ 05:09), 31.75 (04-14 @ 22:35), 26.66 (04-14 @ 17:33), 16.67 (04-14 @ 05:39), 12.75 (04-13 @ 05:33)  Plt: 639 (04-15 @ 05:09), 671 (04-14 @ 22:35), 527 (04-14 @ 17:33), 488 (04-14 @ 05:39), 457 (04-13 @ 05:33)    INR: 1.14 04-15-20 @ 05:09, 1.29 04-14-20 @ 17:33  PTT: 32.8 04-15-20 @ 05:09, 28.8 04-14-20 @ 17:33            Assessment and Plan:   · Assessment	  43 yo M with no significant PMH presenting with fever and cough found to be septic on admission with CXR showing b/l patchy opacities, admitted for acute hypoxic respiratory failure due to COVID19 PNA.    Pulm   Acute respiratory failure with hypoxia. ARDS, COVID +   prone since yesterday at around 8 pm, unrpone at around 2 pm   -His decompensation is less likely due to PE however he is on empiric treatment dose, with the FATOU, will change lovenox to heparin   vent setting: Mode: AC/ CMV (Assist Control/ Continuous Mandatory Ventilation), RR (machine): 35, TV (machine): 240, FiO2: 100, PEEP: 7, ITime: 0.65, MAP: 22, PC: 40, PIP: 49  will get ABG and adjust vent settings accordingly   plateau P 45 post-intubation, it s 31 on the curent vent settings   decreased the P to 33     ·  Problem: Suspected 2019 novel coronavirus infection.  Plan: - COVID PNA ( pos on 3/31)   - s/p 5 d plaquenil, s/p 5 d solumedrol, s/p anakinra taper   - d/c steroids  - CXR with increased infiltrates     Hem   ·  Problem: Preventive measure.  Plan: - DVT ppx: Lovenox (1mg/kg bid) watch for FATOU  GI  Place NG and start TF once he is unproned   pantoprazole    neuro  dwayne 8   prop   d/c versed  continue fentanyl       neprho:  FATOU, avoid neprhotoxic medications  I/O IN: 2470.4 mL / OUT: 1160 mL / NET: 1310.4 mL  --------------------------------------------------------  IN: 3476.1 mL / OUT: 1690 mL / NET: 1786.1 mL    Card:  on phenylephrine to keep MAP> 65 mmhg hypotension likely from the sedation     ID:  started empirically on cefepime and vancomycin per ID   vanco through in am tomorrow  pending cultures     endo: finger sticks q 6 hr, ISS     critical care time 45  min

## 2020-04-15 NOTE — PROGRESS NOTE ADULT - SUBJECTIVE AND OBJECTIVE BOX
CHIEF COMPLAINT: COVID-19    Interval Events: proned    REVIEW OF SYSTEMS:  Constitutional: [ ] negative [ ] fevers [ ] chills [ ] weight loss [ ] weight gain  HEENT: [ ] negative [ ] dry eyes [ ] eye irritation [ ] postnasal drip [ ] nasal congestion  CV: [ ] negative  [ ] chest pain [ ] orthopnea [ ] palpitations [ ] murmur  Resp: [ ] negative [ ] cough [ ] shortness of breath [ ] dyspnea [ ] wheezing [ ] sputum [ ] hemoptysis  GI: [ ] negative [ ] nausea [ ] vomiting [ ] diarrhea [ ] constipation [ ] abd pain [ ] dysphagia   : [ ] negative [ ] dysuria [ ] nocturia [ ] hematuria [ ] increased urinary frequency  Musculoskeletal: [ ] negative [ ] back pain [ ] myalgias [ ] arthralgias [ ] fracture  Skin: [ ] negative [ ] rash [ ] itch  Neurological: [ ] negative [ ] headache [ ] dizziness [ ] syncope [ ] weakness [ ] numbness  Psychiatric: [ ] negative [ ] anxiety [ ] depression  Endocrine: [ ] negative [ ] diabetes [ ] thyroid problem  Hematologic/Lymphatic: [ ] negative [ ] anemia [ ] bleeding problem  Allergic/Immunologic: [ ] negative [ ] itchy eyes [ ] nasal discharge [ ] hives [ ] angioedema  [ ] All other systems negative  [x ] Unable to assess ROS because __sedated______    OBJECTIVE:  ICU Vital Signs Last 24 Hrs  T(C): 36.4 (14 Apr 2020 23:00), Max: 37.6 (14 Apr 2020 16:20)  T(F): 97.5 (14 Apr 2020 23:00), Max: 99.6 (14 Apr 2020 16:20)  HR: 98 (15 Apr 2020 03:16) (98 - 139)  BP: 147/99 (14 Apr 2020 16:20) (111/68 - 147/99)  BP(mean): 115 (14 Apr 2020 16:20) (115 - 115)  ABP: 103/59 (14 Apr 2020 23:00) (102/60 - 103/59)  ABP(mean): 76 (14 Apr 2020 23:00) (76 - 76)  RR: 35 (14 Apr 2020 23:00) (22 - 35)  SpO2: 97% (15 Apr 2020 03:16) (88% - 97%)    Mode: AC/ CMV (Assist Control/ Continuous Mandatory Ventilation), RR (machine): 35, TV (machine): 240, FiO2: 100, PEEP: 7, ITime: 0.65, MAP: 22, PC: 40, PIP: 49    04-13 @ 07:01  - 04-14 @ 07:00  --------------------------------------------------------  IN: 840 mL / OUT: 1600 mL / NET: -760 mL    04-14 @ 07:01 - 04-15 @ 04:36  --------------------------------------------------------  IN: 2470.4 mL / OUT: 1160 mL / NET: 1310.4 mL      CAPILLARY BLOOD GLUCOSE      POCT Blood Glucose.: 156 mg/dL (15 Apr 2020 03:50)      PHYSICAL EXAM:  General: NAD  HEENT: atraumatic, normocephalic;  Neck: supple, no LAD, no thyromegaly  Respiratory: decrease air entry bilaterally   Cardiovascular: RRR, S1, S2, no M/R/G  Abdomen: normal BS; soft, non-distended, non-tender; no R/G  Extremities: radial and DP pulses intact b/l; no clubbing, cyanosis;  Skin: no rash appreciated;  Neurological: non-focal  Psychiatry: normal affect    LINES:    HOSPITAL MEDICATIONS:  Standing Meds:  ascorbic acid 500 milliGRAM(s) Oral three times a day  cefepime   IVPB      cefepime   IVPB 2000 milliGRAM(s) IV Intermittent every 8 hours  chlorhexidine 0.12% Liquid 15 milliLiter(s) Oral Mucosa every 12 hours  chlorhexidine 4% Liquid 1 Application(s) Topical <User Schedule>  enoxaparin Injectable 80 milliGRAM(s) SubCutaneous two times a day  famotidine    Tablet 20 milliGRAM(s) Oral two times a day  fentaNYL   Infusion. 0.5 MICROgram(s)/kG/Hr IV Continuous <Continuous>  insulin lispro (HumaLOG) corrective regimen sliding scale   SubCutaneous every 6 hours  insulin NPH human recombinant 3 Unit(s) SubCutaneous every 6 hours  magnesium oxide 400 milliGRAM(s) Oral daily  methylPREDNISolone sodium succinate Injectable 60 milliGRAM(s) IV Push every 12 hours  midazolam Infusion 0.15 mG/kG/Hr IV Continuous <Continuous>  phenylephrine    Infusion 0.5 MICROgram(s)/kG/Min IV Continuous <Continuous>  polyethylene glycol 3350 17 Gram(s) Oral every 12 hours  propofol Infusion 75 MICROgram(s)/kG/Min IV Continuous <Continuous>  rocuronium Infusion 8 MICROgram(s)/kG/Min IV Continuous <Continuous>  senna Syrup 10 milliLiter(s) Oral daily  vancomycin  IVPB      vancomycin  IVPB 1000 milliGRAM(s) IV Intermittent every 12 hours  zinc sulfate 220 milliGRAM(s) Oral daily      PRN Meds:  acetaminophen   Tablet .. 650 milliGRAM(s) Oral every 6 hours PRN  ALBUTerol    90 MICROgram(s) HFA Inhaler 2 Puff(s) Inhalation every 6 hours PRN  sodium chloride 0.9% lock flush 10 milliLiter(s) IV Push every 1 hour PRN      LABS:                        13.3   31.75 )-----------( 671      ( 14 Apr 2020 22:35 )             44.5     Hgb Trend: 13.3<--, 14.1<--, 14.4<--, 14.4<--, 13.7<--  04-14    138  |  99  |  26<H>  ----------------------------<  225<H>  5.0   |  23  |  1.12    Ca    8.2<L>      14 Apr 2020 22:35  Phos  8.1     04-14  Mg     2.4     04-14    TPro  6.8  /  Alb  2.6<L>  /  TBili  0.5  /  DBili  x   /  AST  25  /  ALT  39  /  AlkPhos  115  04-14    Creatinine Trend: 1.12<--, 0.56<--, 0.61<--, 0.51<--, 0.56<--, 0.60<--  PT/INR - ( 14 Apr 2020 17:33 )   PT: 15.0 sec;   INR: 1.29 ratio         PTT - ( 14 Apr 2020 17:33 )  PTT:28.8 sec  ABG - ( 15 Apr 2020 01:31 )  pH, Arterial: 7.28  pH, Blood: x     /  pCO2: 55    /  pO2: 93    / HCO3: 25    / Base Excess: -2.0  /  SaO2: 96                PF ratio:    Arterial Blood Gas:  04-15 @ 01:31  7.28/55/93/25/96/-2.0  ABG lactate: --  Arterial Blood Gas:  04-14 @ 22:36  7.14/80/91/26/93/-5.2  ABG lactate: --  Arterial Blood Gas:  04-14 @ 21:23  6.98/>104/119/32/94/-5.9  ABG lactate: --  Arterial Blood Gas:  04-14 @ 17:49  7.28/61/76/28/92/.0  ABG lactate: --           PTT - ( 14 Apr 2020 17:33 )  PTT:28.8 sec    MICROBIOLOGY:       RADIOLOGY:  [ ] Reviewed and interpreted by me          Assessment and Plan:   · Assessment	  43 yo M with no significant PMH presenting with fever and cough found to be septic on admission with CXR showing b/l patchy opacities, admitted for acute hypoxic respiratory failure due to COVID19 PNA.    Pulm   Acute respiratory failure with hypoxia. ARDS, COVID +  Is being proned   -His decompensation is less likely due to PE however he is on empiric treatment dose, with the FATOU, will change lovenox to heparin   vent setting:  ( IDEAL BODY WEIGHT IS 63, RR=35, PEEP 5 FIO2 80 %), wean FiO2  will get ABG and adjust vent settings accordingly   plateau P 45 post-intubation, it s 31 on the curent vent settings     ·  Problem: Suspected 2019 novel coronavirus infection.  Plan: - COVID PNA ( pos on 3/31)   - s/p 5 d plaquenil, s/p 5 d solumedrol, s/p anakinra taper   - d/c steroids  -consider tocilizumab   - CXR with increased infiltrates     Hem   ·  Problem: Preventive measure.  Plan: - DVT ppx: Lovenox (1mg/kg bid) change to heparin given FATOU  GI  Place NG and start TF   pantoprazole    neprho:  FATOU, avoid neprhotoxic medications  I/O IN: 2470.4 mL / OUT: 1160 mL / NET: 1310.4 mL    Card:  on phenylephrine to keep MAP> 65 mmhg hypotension likely from the sedation     ID:  started emperically on cefepime and vancomycin  pending cultures     endo: finger sticks q 6 hr, ISS     critical care time 60 min

## 2020-04-16 NOTE — PROGRESS NOTE ADULT - SUBJECTIVE AND OBJECTIVE BOX
CHIEF COMPLAINT: COVID-19    Interval Events: unproned   low grade fever   s/p lasix     REVIEW OF SYSTEMS:  Constitutional: [ ] negative [ ] fevers [ ] chills [ ] weight loss [ ] weight gain  HEENT: [ ] negative [ ] dry eyes [ ] eye irritation [ ] postnasal drip [ ] nasal congestion  CV: [ ] negative  [ ] chest pain [ ] orthopnea [ ] palpitations [ ] murmur  Resp: [ ] negative [ ] cough [ ] shortness of breath [ ] dyspnea [ ] wheezing [ ] sputum [ ] hemoptysis  GI: [ ] negative [ ] nausea [ ] vomiting [ ] diarrhea [ ] constipation [ ] abd pain [ ] dysphagia   : [ ] negative [ ] dysuria [ ] nocturia [ ] hematuria [ ] increased urinary frequency  Musculoskeletal: [ ] negative [ ] back pain [ ] myalgias [ ] arthralgias [ ] fracture  Skin: [ ] negative [ ] rash [ ] itch  Neurological: [ ] negative [ ] headache [ ] dizziness [ ] syncope [ ] weakness [ ] numbness  Psychiatric: [ ] negative [ ] anxiety [ ] depression  Endocrine: [ ] negative [ ] diabetes [ ] thyroid problem  Hematologic/Lymphatic: [ ] negative [ ] anemia [ ] bleeding problem  Allergic/Immunologic: [ ] negative [ ] itchy eyes [ ] nasal discharge [ ] hives [ ] angioedema  [ ] All other systems negative  [x ] Unable to assess ROS because __sedated______      T(C): 36.5 (04-16-20 @ 07:00), Max: 38 (04-15-20 @ 11:00)  HR: 75 (04-16-20 @ 07:00) (75 - 106)  BP: --  RR: 35 (04-16-20 @ 07:00) (0 - 35)  SpO2: 97% (04-16-20 @ 07:00) (95% - 97%)  04-15-20 @ 07:01  -  04-16-20 @ 07:00  --------------------------------------------------------  IN: 2461.9 mL / OUT: 2010 mL / NET: 451.9 mL    acetaminophen   Tablet .. 650 milliGRAM(s) Oral every 6 hours PRN  ALBUTerol    90 MICROgram(s) HFA Inhaler 2 Puff(s) Inhalation every 6 hours PRN  cefepime   IVPB      cefepime   IVPB 2000 milliGRAM(s) IV Intermittent every 8 hours  chlorhexidine 0.12% Liquid 15 milliLiter(s) Oral Mucosa every 12 hours  chlorhexidine 4% Liquid 1 Application(s) Topical <User Schedule>  enoxaparin Injectable 80 milliGRAM(s) SubCutaneous two times a day  famotidine    Tablet 20 milliGRAM(s) Oral two times a day  fentaNYL   Infusion. 0.5 MICROgram(s)/kG/Hr IV Continuous <Continuous>  insulin lispro (HumaLOG) corrective regimen sliding scale   SubCutaneous every 6 hours  insulin NPH human recombinant 3 Unit(s) SubCutaneous every 6 hours  lactulose Syrup 10 Gram(s) Oral every 8 hours  magnesium oxide 400 milliGRAM(s) Oral daily  phenylephrine    Infusion 0.5 MICROgram(s)/kG/Min IV Continuous <Continuous>  polyethylene glycol 3350 17 Gram(s) Oral every 12 hours  potassium chloride  10 mEq/100 mL IVPB 10 milliEquivalent(s) IV Intermittent every 1 hour  propofol Infusion 75 MICROgram(s)/kG/Min IV Continuous <Continuous>  rocuronium Infusion 8 MICROgram(s)/kG/Min IV Continuous <Continuous>  senna Syrup 10 milliLiter(s) Oral daily  sodium chloride 0.9% lock flush 10 milliLiter(s) IV Push every 1 hour PRN  vancomycin  IVPB 1250 milliGRAM(s) IV Intermittent every 12 hours  Mode: AC/ CMV (Assist Control/ Continuous Mandatory Ventilation), RR (machine): 35, FiO2: 80, PEEP: 7, ITime: 0.63, MAP: 19, PC: 32, PIP: 41    PHYSICAL EXAM:  General: NAD  HEENT: atraumatic, normocephalic;  Neck: supple, no LAD, no thyromegaly  Respiratory: decrease air entry bilaterally   Cardiovascular: RRR, S1, S2, no M/R/G  Abdomen: normal BS; soft, non-distended, non-tender; no R/G  Extremities: radial and DP pulses intact b/l; no clubbing, cyanosis;  Skin: no rash appreciated;  Neurological: non-focal  Psychiatry: normal affect      LABS:  Na: 142 (04-15 @ 05:09), 138 (04-14 @ 22:35), 136 (04-14 @ 17:33), 133 (04-14 @ 05:39), 136 (04-13 @ 05:33)  K: 4.4 (04-15 @ 05:09), 5.0 (04-14 @ 22:35), 4.2 (04-14 @ 17:33), 3.9 (04-14 @ 05:39), 4.2 (04-13 @ 05:33)  Cl: 104 (04-15 @ 05:09), 99 (04-14 @ 22:35), 98 (04-14 @ 17:33), 95 (04-14 @ 05:39), 98 (04-13 @ 05:33)  CO2: 25 (04-15 @ 05:09), 23 (04-14 @ 22:35), 24 (04-14 @ 17:33), 24 (04-14 @ 05:39), 24 (04-13 @ 05:33)  BUN: 25 (04-15 @ 05:09), 26 (04-14 @ 22:35), 19 (04-14 @ 17:33), 18 (04-14 @ 05:39), 15 (04-13 @ 05:33)  Cr: 0.96 (04-15 @ 05:09), 1.12 (04-14 @ 22:35), 0.56 (04-14 @ 17:33), 0.61 (04-14 @ 05:39), 0.51 (04-13 @ 05:33)  Glu: 183(04-15 @ 05:09), 225(04-14 @ 22:35), 163(04-14 @ 17:33), 146(04-14 @ 05:39), 121(04-13 @ 05:33)    Hgb: 13.5 (04-15 @ 05:09), 13.3 (04-14 @ 22:35), 14.1 (04-14 @ 17:33), 14.4 (04-14 @ 05:39), 14.4 (04-13 @ 05:33)  Hct: 43.4 (04-15 @ 05:09), 44.5 (04-14 @ 22:35), 45.7 (04-14 @ 17:33), 45.3 (04-14 @ 05:39), 45.8 (04-13 @ 05:33)  WBC: 31.63 (04-15 @ 05:09), 31.75 (04-14 @ 22:35), 26.66 (04-14 @ 17:33), 16.67 (04-14 @ 05:39), 12.75 (04-13 @ 05:33)  Plt: 639 (04-15 @ 05:09), 671 (04-14 @ 22:35), 527 (04-14 @ 17:33), 488 (04-14 @ 05:39), 457 (04-13 @ 05:33)    INR: 1.14 04-15-20 @ 05:09, 1.29 04-14-20 @ 17:33  PTT: 32.8 04-15-20 @ 05:09, 28.8 04-14-20 @ 17:33            Assessment and Plan:   · Assessment	  45 yo M with no significant PMH presenting with fever and cough found to be septic on admission with CXR showing b/l patchy opacities, admitted for acute hypoxic respiratory failure due to COVID19 PNA.    Pulm   Acute respiratory failure with hypoxia. ARDS, COVID +   unproned   -His decompensation is less likely due to PE however he is on empiric treatment dose, with the FATOU, will change lovenox to heparin   vent setting changed tp Mode:AC/ CMV (Assist Control/ Continuous Mandatory Ventilation), RR (machine): 35, FiO2: 80, PEEP: 7, ITime: 0.63, MAP: 19, PC: 32, PIP: 41  plateau 3  will get ABG and adjust vent settings accordingly     ·  Problem: Suspected 2019 novel coronavirus infection.  Plan: - COVID PNA ( pos on 3/31)   - s/p 5 d plaquenil, s/p 5 d solumedrol, s/p anakinra taper   - d/c steroids  - CXR with increased infiltrates     Hem   ·  Problem: Preventive measure.  Plan: - DVT ppx: Lovenox (1mg/kg bid) watch for FATOU  GI  NG start vital 50 ml/hr   pantoprazole    neuro  prop   d/c versed  continue fentanyl     nephro  FATOU, avoid neprhotoxic medications  IN: 2461.9 mL / OUT: 2010 mL / NET: 451.9 mL  s/p lasix     Card:  on phenylephrine to keep MAP> 65 mmhg hypotension likely from the sedation     ID:  started empirically on cefepime  d/c vancomycin   NPH 3 u  endo: finger sticks q 6 hr, ISS     critical care time 45  min

## 2020-04-17 NOTE — PROGRESS NOTE ADULT - SUBJECTIVE AND OBJECTIVE BOX
vitals/labs/meds reviewed    Currently on 33/310/5/%  hypercapneic respiratory failure--Co2 retention, acidodic    prone    sedation  rocuronium gtt added for ventilator synchrony vitals/labs/meds reviewed    Currently on 33/310/5/%  worsening respiratory acidosis  sedation  rocuronium gtt added for ventilator synchrony  ?prone s/p plaquenil, anakinra, steroid for covid    vitals/labs/meds reviewed    Currently on 35/320/5/70%  worsening respiratory acidosis  sedation--prop to 50 and fent to 4  rocuronium gtt added for ventilator synchrony--repeat ABG now, would wean FiO2  ?prone--potentially after BM  no BM for >2wks, s/p mg citrate, fleet enema, lactulose tonight  lovenox full dose AC  MAP>65 phenylephrine prn  febrile today, pancx'ed  cefepime for empiric pna tx  euglycemic, d/c FS

## 2020-04-17 NOTE — CHART NOTE - NSCHARTNOTEFT_GEN_A_CORE
Spoke with wife Catherine [phone] via  Jorge ID#[id number] and gave her updates and answered questions regarding her spouse.

## 2020-04-17 NOTE — PROGRESS NOTE ADULT - SUBJECTIVE AND OBJECTIVE BOX
· Subjective and Objective:     CHIEF COMPLAINT: COVID-19    Interval Events: unproned   low grade fever   s/p lasix     REVIEW OF SYSTEMS:  Constitutional: [ ] negative [ ] fevers [ ] chills [ ] weight loss [ ] weight gain  HEENT: [ ] negative [ ] dry eyes [ ] eye irritation [ ] postnasal drip [ ] nasal congestion  CV: [ ] negative  [ ] chest pain [ ] orthopnea [ ] palpitations [ ] murmur  Resp: [ ] negative [ ] cough [ ] shortness of breath [ ] dyspnea [ ] wheezing [ ] sputum [ ] hemoptysis  GI: [ ] negative [ ] nausea [ ] vomiting [ ] diarrhea [ ] constipation [ ] abd pain [ ] dysphagia   : [ ] negative [ ] dysuria [ ] nocturia [ ] hematuria [ ] increased urinary frequency  Musculoskeletal: [ ] negative [ ] back pain [ ] myalgias [ ] arthralgias [ ] fracture  Skin: [ ] negative [ ] rash [ ] itch  Neurological: [ ] negative [ ] headache [ ] dizziness [ ] syncope [ ] weakness [ ] numbness  Psychiatric: [ ] negative [ ] anxiety [ ] depression  Endocrine: [ ] negative [ ] diabetes [ ] thyroid problem  Hematologic/Lymphatic: [ ] negative [ ] anemia [ ] bleeding problem  Allergic/Immunologic: [ ] negative [ ] itchy eyes [ ] nasal discharge [ ] hives [ ] angioedema  [ ] All other systems negative  [x ] Unable to assess ROS because __sedated______    T(C): 37.4 (04-17-20 @ 07:00), Max: 37.9 (04-16-20 @ 23:00)  HR: 96 (04-17-20 @ 07:00) (77 - 112)  BP: --  RR: 33 (04-17-20 @ 07:00) (29 - 35)  SpO2: 96% (04-17-20 @ 07:00) (89% - 96%)  04-16-20 @ 07:01  -  04-17-20 @ 07:00  --------------------------------------------------------  IN: 2495.2 mL / OUT: 2090 mL / NET: 405.2 mL    acetaminophen   Tablet .. 650 milliGRAM(s) Oral every 6 hours PRN  ALBUTerol    90 MICROgram(s) HFA Inhaler 2 Puff(s) Inhalation every 6 hours PRN  cefepime   IVPB      cefepime   IVPB 2000 milliGRAM(s) IV Intermittent every 8 hours  chlorhexidine 0.12% Liquid 15 milliLiter(s) Oral Mucosa every 12 hours  chlorhexidine 4% Liquid 1 Application(s) Topical <User Schedule>  enoxaparin Injectable 80 milliGRAM(s) SubCutaneous two times a day  famotidine    Tablet 20 milliGRAM(s) Oral two times a day  fentaNYL   Infusion. 0.5 MICROgram(s)/kG/Hr IV Continuous <Continuous>  insulin lispro (HumaLOG) corrective regimen sliding scale   SubCutaneous every 6 hours  lactulose Syrup 10 Gram(s) Oral every 8 hours  magnesium oxide 400 milliGRAM(s) Oral daily  midazolam Infusion 0.02 mG/kG/Hr IV Continuous <Continuous>  phenylephrine    Infusion 0.5 MICROgram(s)/kG/Min IV Continuous <Continuous>  polyethylene glycol 3350 17 Gram(s) Oral every 12 hours  potassium chloride   Solution 40 milliEquivalent(s) Oral once  propofol Infusion 75 MICROgram(s)/kG/Min IV Continuous <Continuous>  senna Syrup 10 milliLiter(s) Oral daily  sodium chloride 0.9% lock flush 10 milliLiter(s) IV Push every 1 hour PRN  Mode: AC/ CMV (Assist Control/ Continuous Mandatory Ventilation), RR (machine): 33, TV (machine): 360, FiO2: 70, PEEP: 6, ITime: 1, MAP: 15, PC: 32, PIP: 41  PHYSICAL EXAM:  General: NAD  HEENT: atraumatic, normocephalic;  Neck: supple, no LAD, no thyromegaly  Respiratory: decrease air entry bilaterally   Cardiovascular: RRR, S1, S2, no M/R/G  Abdomen: normal BS; soft, non-distended, non-tender; no R/G  Extremities: radial and DP pulses intact b/l; no clubbing, cyanosis;  Skin: no rash appreciated;  Neurological: non-focal  Psychiatry: normal affect        LABS:  Na: 145 (04-17 @ 06:03), 145 (04-16 @ 17:51), 144 (04-16 @ 05:27), 144 (04-15 @ 17:38), 142 (04-15 @ 05:09), 138 (04-14 @ 22:35), 136 (04-14 @ 17:33)  K: 3.4 (04-17 @ 06:03), 4.5 (04-16 @ 17:51), 3.4 (04-16 @ 05:27), 4.7 (04-15 @ 17:38), 4.4 (04-15 @ 05:09), 5.0 (04-14 @ 22:35), 4.2 (04-14 @ 17:33)  Cl: 109 (04-17 @ 06:03), 110 (04-16 @ 17:51), 106 (04-16 @ 05:27), 108 (04-15 @ 17:38), 104 (04-15 @ 05:09), 99 (04-14 @ 22:35), 98 (04-14 @ 17:33)  CO2: 25 (04-17 @ 06:03), 26 (04-16 @ 17:51), 27 (04-16 @ 05:27), 24 (04-15 @ 17:38), 25 (04-15 @ 05:09), 23 (04-14 @ 22:35), 24 (04-14 @ 17:33)  BUN: 28 (04-17 @ 06:03), 28 (04-16 @ 17:51), 26 (04-16 @ 05:27), 25 (04-15 @ 17:38), 25 (04-15 @ 05:09), 26 (04-14 @ 22:35), 19 (04-14 @ 17:33)  Cr: 0.74 (04-17 @ 06:03), 0.73 (04-16 @ 17:51), 0.90 (04-16 @ 05:27), 0.95 (04-15 @ 17:38), 0.96 (04-15 @ 05:09), 1.12 (04-14 @ 22:35), 0.56 (04-14 @ 17:33)  Glu: 125(04-17 @ 06:03), 100(04-16 @ 17:51), 148(04-16 @ 05:27), 166(04-15 @ 17:38), 183(04-15 @ 05:09), 225(04-14 @ 22:35), 163(04-14 @ 17:33)    Hgb: 13.0 (04-17 @ 06:03), 12.6 (04-16 @ 17:51), 12.5 (04-16 @ 05:27), 12.8 (04-15 @ 17:38), 13.5 (04-15 @ 05:09), 13.3 (04-14 @ 22:35), 14.1 (04-14 @ 17:33)  Hct: 42.5 (04-17 @ 06:03), 42.0 (04-16 @ 17:51), 40.5 (04-16 @ 05:27), 42.3 (04-15 @ 17:38), 43.4 (04-15 @ 05:09), 44.5 (04-14 @ 22:35), 45.7 (04-14 @ 17:33)  WBC: 14.78 (04-17 @ 06:03), 19.53 (04-16 @ 17:51), 20.71 (04-16 @ 05:27), 24.28 (04-15 @ 17:38), 31.63 (04-15 @ 05:09), 31.75 (04-14 @ 22:35), 26.66 (04-14 @ 17:33)  Plt: 336 (04-17 @ 06:03), 423 (04-16 @ 17:51), 437 (04-16 @ 05:27), 562 (04-15 @ 17:38), 639 (04-15 @ 05:09), 671 (04-14 @ 22:35), 527 (04-14 @ 17:33)    INR: 1.09 04-17-20 @ 06:03, 1.10 04-16-20 @ 05:27, 1.14 04-15-20 @ 05:09, 1.29 04-14-20 @ 17:33  PTT: 34.7 04-17-20 @ 06:03, 33.1 04-16-20 @ 05:27, 32.8 04-15-20 @ 05:09, 28.8 04-14-20 @ 17:33                  Assessment and Plan:   · Assessment	  45 yo M with no significant PMH presenting with fever and cough found to be septic on admission with CXR showing b/l patchy opacities, admitted for acute hypoxic respiratory failure due to COVID19 PNA.    Pulm   Acute respiratory failure with hypoxia. ARDS, COVID +   unproned   -His decompensation is less likely due to PE however he is on empiric treatment dose, with the FATOU, will change lovenox to heparin   vent setting changed tp Mode:AC/ CMV (Assist Control/ Continuous Mandatory Ventilation), RR (machine): 35, FiO2: 80, PEEP: 7, ITime: 0.63, MAP: 19, PC: 32, PIP: 41  plateau 3  will get ABG and adjust vent settings accordingly     ·  Problem: Suspected 2019 novel coronavirus infection.  Plan: - COVID PNA ( pos on 3/31)   - s/p 5 d plaquenil, s/p 5 d solumedrol, s/p anakinra taper   - d/c steroids  - CXR with increased infiltrates     Hem   ·  Problem: Preventive measure.  Plan: - DVT ppx: Lovenox (1mg/kg bid) watch for FATOU  GI  NG start vital 50 ml/hr   pantoprazole    neuro  prop   d/c versed  continue fentanyl     nephro  FATOU, avoid neprhotoxic medications  IN: 2461.9 mL / OUT: 2010 mL / NET: 451.9 mL  s/p lasix     Card:  on phenylephrine to keep MAP> 65 mmhg hypotension likely from the sedation     ID:  started empirically on cefepime  d/c vancomycin   NPH 3 u  endo: finger sticks q 6 hr, ISS     critical care time 45  min · Subjective and Objective:     CHIEF COMPLAINT: COVID-19    Interval Events: unproned   low grade fever   s/p lasix     REVIEW OF SYSTEMS:  Constitutional: [ ] negative [ ] fevers [ ] chills [ ] weight loss [ ] weight gain  HEENT: [ ] negative [ ] dry eyes [ ] eye irritation [ ] postnasal drip [ ] nasal congestion  CV: [ ] negative  [ ] chest pain [ ] orthopnea [ ] palpitations [ ] murmur  Resp: [ ] negative [ ] cough [ ] shortness of breath [ ] dyspnea [ ] wheezing [ ] sputum [ ] hemoptysis  GI: [ ] negative [ ] nausea [ ] vomiting [ ] diarrhea [ ] constipation [ ] abd pain [ ] dysphagia   : [ ] negative [ ] dysuria [ ] nocturia [ ] hematuria [ ] increased urinary frequency  Musculoskeletal: [ ] negative [ ] back pain [ ] myalgias [ ] arthralgias [ ] fracture  Skin: [ ] negative [ ] rash [ ] itch  Neurological: [ ] negative [ ] headache [ ] dizziness [ ] syncope [ ] weakness [ ] numbness  Psychiatric: [ ] negative [ ] anxiety [ ] depression  Endocrine: [ ] negative [ ] diabetes [ ] thyroid problem  Hematologic/Lymphatic: [ ] negative [ ] anemia [ ] bleeding problem  Allergic/Immunologic: [ ] negative [ ] itchy eyes [ ] nasal discharge [ ] hives [ ] angioedema  [ ] All other systems negative  [x ] Unable to assess ROS because __sedated______    T(C): 37.4 (04-17-20 @ 07:00), Max: 37.9 (04-16-20 @ 23:00)  HR: 96 (04-17-20 @ 07:00) (77 - 112)  BP: --  RR: 33 (04-17-20 @ 07:00) (29 - 35)  SpO2: 96% (04-17-20 @ 07:00) (89% - 96%)  04-16-20 @ 07:01  -  04-17-20 @ 07:00  --------------------------------------------------------  IN: 2495.2 mL / OUT: 2090 mL / NET: 405.2 mL    acetaminophen   Tablet .. 650 milliGRAM(s) Oral every 6 hours PRN  ALBUTerol    90 MICROgram(s) HFA Inhaler 2 Puff(s) Inhalation every 6 hours PRN  cefepime   IVPB      cefepime   IVPB 2000 milliGRAM(s) IV Intermittent every 8 hours  chlorhexidine 0.12% Liquid 15 milliLiter(s) Oral Mucosa every 12 hours  chlorhexidine 4% Liquid 1 Application(s) Topical <User Schedule>  enoxaparin Injectable 80 milliGRAM(s) SubCutaneous two times a day  famotidine    Tablet 20 milliGRAM(s) Oral two times a day  fentaNYL   Infusion. 0.5 MICROgram(s)/kG/Hr IV Continuous <Continuous>  insulin lispro (HumaLOG) corrective regimen sliding scale   SubCutaneous every 6 hours  lactulose Syrup 10 Gram(s) Oral every 8 hours  magnesium oxide 400 milliGRAM(s) Oral daily  midazolam Infusion 0.02 mG/kG/Hr IV Continuous <Continuous>  phenylephrine    Infusion 0.5 MICROgram(s)/kG/Min IV Continuous <Continuous>  polyethylene glycol 3350 17 Gram(s) Oral every 12 hours  potassium chloride   Solution 40 milliEquivalent(s) Oral once  propofol Infusion 75 MICROgram(s)/kG/Min IV Continuous <Continuous>  senna Syrup 10 milliLiter(s) Oral daily  sodium chloride 0.9% lock flush 10 milliLiter(s) IV Push every 1 hour PRN    Mode: AC/ CMV (Assist Control/ Continuous Mandatory Ventilation), RR (machine): 33, TV (machine): 360, FiO2: 70, PEEP: 6, ITime: 1, MAP: 15, PC: 32, PIP: 41  PHYSICAL EXAM:  General: NAD  HEENT: atraumatic, normocephalic;  Neck: supple, no LAD, no thyromegaly  Respiratory: decrease air entry bilaterally   Cardiovascular: RRR, S1, S2, no M/R/G  Abdomen: normal BS; soft, non-distended, non-tender; no R/G  Extremities: radial and DP pulses intact b/l; no clubbing, cyanosis;  Skin: no rash appreciated;  Neurological: non-focal  Psychiatry: normal affect        LABS:  Na: 145 (04-17 @ 06:03), 145 (04-16 @ 17:51), 144 (04-16 @ 05:27), 144 (04-15 @ 17:38), 142 (04-15 @ 05:09), 138 (04-14 @ 22:35), 136 (04-14 @ 17:33)  K: 3.4 (04-17 @ 06:03), 4.5 (04-16 @ 17:51), 3.4 (04-16 @ 05:27), 4.7 (04-15 @ 17:38), 4.4 (04-15 @ 05:09), 5.0 (04-14 @ 22:35), 4.2 (04-14 @ 17:33)  Cl: 109 (04-17 @ 06:03), 110 (04-16 @ 17:51), 106 (04-16 @ 05:27), 108 (04-15 @ 17:38), 104 (04-15 @ 05:09), 99 (04-14 @ 22:35), 98 (04-14 @ 17:33)  CO2: 25 (04-17 @ 06:03), 26 (04-16 @ 17:51), 27 (04-16 @ 05:27), 24 (04-15 @ 17:38), 25 (04-15 @ 05:09), 23 (04-14 @ 22:35), 24 (04-14 @ 17:33)  BUN: 28 (04-17 @ 06:03), 28 (04-16 @ 17:51), 26 (04-16 @ 05:27), 25 (04-15 @ 17:38), 25 (04-15 @ 05:09), 26 (04-14 @ 22:35), 19 (04-14 @ 17:33)  Cr: 0.74 (04-17 @ 06:03), 0.73 (04-16 @ 17:51), 0.90 (04-16 @ 05:27), 0.95 (04-15 @ 17:38), 0.96 (04-15 @ 05:09), 1.12 (04-14 @ 22:35), 0.56 (04-14 @ 17:33)  Glu: 125(04-17 @ 06:03), 100(04-16 @ 17:51), 148(04-16 @ 05:27), 166(04-15 @ 17:38), 183(04-15 @ 05:09), 225(04-14 @ 22:35), 163(04-14 @ 17:33)    Hgb: 13.0 (04-17 @ 06:03), 12.6 (04-16 @ 17:51), 12.5 (04-16 @ 05:27), 12.8 (04-15 @ 17:38), 13.5 (04-15 @ 05:09), 13.3 (04-14 @ 22:35), 14.1 (04-14 @ 17:33)  Hct: 42.5 (04-17 @ 06:03), 42.0 (04-16 @ 17:51), 40.5 (04-16 @ 05:27), 42.3 (04-15 @ 17:38), 43.4 (04-15 @ 05:09), 44.5 (04-14 @ 22:35), 45.7 (04-14 @ 17:33)  WBC: 14.78 (04-17 @ 06:03), 19.53 (04-16 @ 17:51), 20.71 (04-16 @ 05:27), 24.28 (04-15 @ 17:38), 31.63 (04-15 @ 05:09), 31.75 (04-14 @ 22:35), 26.66 (04-14 @ 17:33)  Plt: 336 (04-17 @ 06:03), 423 (04-16 @ 17:51), 437 (04-16 @ 05:27), 562 (04-15 @ 17:38), 639 (04-15 @ 05:09), 671 (04-14 @ 22:35), 527 (04-14 @ 17:33)    INR: 1.09 04-17-20 @ 06:03, 1.10 04-16-20 @ 05:27, 1.14 04-15-20 @ 05:09, 1.29 04-14-20 @ 17:33  PTT: 34.7 04-17-20 @ 06:03, 33.1 04-16-20 @ 05:27, 32.8 04-15-20 @ 05:09, 28.8 04-14-20 @ 17:33                  Assessment and Plan:   · Assessment	  43 yo M with no significant PMH presenting with fever and cough found to be septic on admission with CXR showing b/l patchy opacities, admitted for acute hypoxic respiratory failure due to COVID19 PNA.    Pulm   Acute respiratory failure with hypoxia. ARDS, COVID +  -His decompensation is less likely due to PE however he is on empiric treatment dose, with the FATOU, will change lovenox to heparin   vent setting changed tp Mode:AC/ CMV (Assist Control/ Continuous Mandatory Ventilation), RR (machine): 35, FiO2: 80, PEEP: 7, ITime: 0.63, MAP: 19, PC: 32, PIP: 41  plateau 35, decrease TV to 320, increase RR to 35, PEEP 5, FiO2 60 %   will get ABG and adjust vent settings accordingly     ·  Problem: Suspected 2019 novel coronavirus infection.  Plan: - COVID PNA ( pos on 3/31)   - s/p 5 d plaquenil, s/p 5 d solumedrol, s/p anakinra taper   - d/c steroids  -inflammatory markers stable   - CXR with increased infiltrates     Hem   ·  Problem: Preventive measure.  Plan: - DVT ppx: Lovenox (1mg/kg bid) watch for FATOU    hypoglycemia, NPH discontinued     GI  NG start vital 50 ml/hr   pantoprazole  no BM, will give mag citrate     neuro  prop 70  , will check TG   versed  continue fentanyl     nephro  FATOU, avoid neprhotoxic medications  IN: 2495.2 mL / OUT: 2090 mL / NET: 405.2 mL  s/p lasix     Card:  on phenylephrine to keep MAP> 65 mmhg hypotension likely from the sedation     ID:  started empirically on cefepime, WBC trending down   d/c vancomycin   hypoglycemia, NPH discontinued   endo: finger sticks q 6 hr, ISS     critical care time 45  min

## 2020-04-18 NOTE — PROGRESS NOTE ADULT - SUBJECTIVE AND OBJECTIVE BOX
s/p plaquenil, anakinra, steroid for covid  proned from 4/17-4/18  supine now, off rocuronium    vitals/labs/meds reviewed    Currently on 35/350/5/50%  repeat ABG now  sedation--prop, fent, and versed  +BM 4/17  lovenox full dose AC  MAP>65 phenylephrine prn  febrile today, pancx'ed  cefepime for empiric pna tx--would complete 5d course  consider ID consult for tocilizumab  euglycemic, d/c FS s/p plaquenil, anakinra, steroid for covid  proned from 4/17-4/18  supine now, off rocuronium    vitals/labs/meds reviewed    Currently on 35/350/5/50%  repeat ABG now  sedation--prop, fent, and versed  +BM 4/17  lovenox full dose AC  MAP>65 phenylephrine prn  febrile today, pancx'ed  cefepime for empiric pna tx--would complete 5d course  consider ID consult for tocilizumab if inflammatory makers continue to updtrend  euglycemic, d/c FS

## 2020-04-18 NOTE — PROGRESS NOTE ADULT - SUBJECTIVE AND OBJECTIVE BOX
· Subjective and Objective:     CHIEF COMPLAINT: COVID-19    Interval Events: unproned   low grade fever   s/p lasix     REVIEW OF SYSTEMS:  Constitutional: [ ] negative [ ] fevers [ ] chills [ ] weight loss [ ] weight gain  HEENT: [ ] negative [ ] dry eyes [ ] eye irritation [ ] postnasal drip [ ] nasal congestion  CV: [ ] negative  [ ] chest pain [ ] orthopnea [ ] palpitations [ ] murmur  Resp: [ ] negative [ ] cough [ ] shortness of breath [ ] dyspnea [ ] wheezing [ ] sputum [ ] hemoptysis  GI: [ ] negative [ ] nausea [ ] vomiting [ ] diarrhea [ ] constipation [ ] abd pain [ ] dysphagia   : [ ] negative [ ] dysuria [ ] nocturia [ ] hematuria [ ] increased urinary frequency  Musculoskeletal: [ ] negative [ ] back pain [ ] myalgias [ ] arthralgias [ ] fracture  Skin: [ ] negative [ ] rash [ ] itch  Neurological: [ ] negative [ ] headache [ ] dizziness [ ] syncope [ ] weakness [ ] numbness  Psychiatric: [ ] negative [ ] anxiety [ ] depression  Endocrine: [ ] negative [ ] diabetes [ ] thyroid problem  Hematologic/Lymphatic: [ ] negative [ ] anemia [ ] bleeding problem  Allergic/Immunologic: [ ] negative [ ] itchy eyes [ ] nasal discharge [ ] hives [ ] angioedema  [ ] All other systems negative  [x ] Unable to assess ROS because __sedated______  T(C): 36.5 (04-18-20 @ 07:00), Max: 38.1 (04-17-20 @ 15:00)  HR: 106 (04-18-20 @ 07:00) (106 - 130)  BP: --  RR: 34 (04-18-20 @ 07:00) (34 - 35)  SpO2: 95% (04-18-20 @ 07:00) (87% - 96%)  04-17-20 @ 07:01  -  04-18-20 @ 07:00  --------------------------------------------------------  IN: 2031.5 mL / OUT: 2220 mL / NET: -188.5 mL    acetaminophen   Tablet .. 650 milliGRAM(s) Oral every 6 hours PRN  ALBUTerol    90 MICROgram(s) HFA Inhaler 2 Puff(s) Inhalation every 6 hours PRN  bisacodyl Suppository 10 milliGRAM(s) Rectal daily PRN  cefepime   IVPB      cefepime   IVPB 2000 milliGRAM(s) IV Intermittent every 8 hours  chlorhexidine 0.12% Liquid 15 milliLiter(s) Oral Mucosa every 12 hours  chlorhexidine 4% Liquid 1 Application(s) Topical <User Schedule>  enoxaparin Injectable 80 milliGRAM(s) SubCutaneous two times a day  famotidine    Tablet 20 milliGRAM(s) Oral two times a day  fentaNYL   Infusion. 0.5 MICROgram(s)/kG/Hr IV Continuous <Continuous>  magnesium oxide 400 milliGRAM(s) Oral daily  midazolam Infusion 0.02 mG/kG/Hr IV Continuous <Continuous>  petrolatum Ophthalmic Ointment 1 Application(s) Both EYES two times a day  phenylephrine    Infusion 0.5 MICROgram(s)/kG/Min IV Continuous <Continuous>  propofol Infusion 75 MICROgram(s)/kG/Min IV Continuous <Continuous>  rocuronium Infusion 8 MICROgram(s)/kG/Min IV Continuous <Continuous>  sodium chloride 0.9% lock flush 10 milliLiter(s) IV Push every 1 hour PRN  Mode: AC/ CMV (Assist Control/ Continuous Mandatory Ventilation), RR (machine): 34, TV (machine): 360, FiO2: 60, PEEP: 5, ITime: 0.55, MAP: 19, PIP: 43  Mode: AC/ CMV (Assist Control/ Continuous Mandatory Ventilation), RR (machine): 33, TV (machine): 360, FiO2: 70, PEEP: 6, ITime: 1, MAP: 15, PC: 32, PIP: 41  PHYSICAL EXAM:  General: NAD  HEENT: atraumatic, normocephalic;  Neck: supple, no LAD, no thyromegaly  Respiratory: decrease air entry bilaterally   Cardiovascular: RRR, S1, S2, no M/R/G  Abdomen: normal BS; soft, non-distended, non-tender; no R/G  Extremities: radial and DP pulses intact b/l; no clubbing, cyanosis;  Skin: no rash appreciated;  Neurological: non-focal  Psychiatry: normal affect        LABS:  Na: 145 (04-18 @ 05:46), 145 (04-17 @ 13:08), 145 (04-17 @ 06:03), 145 (04-16 @ 17:51), 144 (04-16 @ 05:27), 144 (04-15 @ 17:38)  K: 4.2 (04-18 @ 05:46), 4.6 (04-17 @ 13:08), 3.4 (04-17 @ 06:03), 4.5 (04-16 @ 17:51), 3.4 (04-16 @ 05:27), 4.7 (04-15 @ 17:38)  Cl: 112 (04-18 @ 05:46), 109 (04-17 @ 13:08), 109 (04-17 @ 06:03), 110 (04-16 @ 17:51), 106 (04-16 @ 05:27), 108 (04-15 @ 17:38)  CO2: 22 (04-18 @ 05:46), 26 (04-17 @ 13:08), 25 (04-17 @ 06:03), 26 (04-16 @ 17:51), 27 (04-16 @ 05:27), 24 (04-15 @ 17:38)  BUN: 33 (04-18 @ 05:46), 27 (04-17 @ 13:08), 28 (04-17 @ 06:03), 28 (04-16 @ 17:51), 26 (04-16 @ 05:27), 25 (04-15 @ 17:38)  Cr: 0.93 (04-18 @ 05:46), 0.70 (04-17 @ 13:08), 0.74 (04-17 @ 06:03), 0.73 (04-16 @ 17:51), 0.90 (04-16 @ 05:27), 0.95 (04-15 @ 17:38)  Glu: 159(04-18 @ 05:46), 132(04-17 @ 13:08), 125(04-17 @ 06:03), 100(04-16 @ 17:51), 148(04-16 @ 05:27), 166(04-15 @ 17:38)    Hgb: 12.8 (04-17 @ 13:08), 13.0 (04-17 @ 06:03), 12.6 (04-16 @ 17:51), 12.5 (04-16 @ 05:27), 12.8 (04-15 @ 17:38)  Hct: 43.2 (04-17 @ 13:08), 42.5 (04-17 @ 06:03), 42.0 (04-16 @ 17:51), 40.5 (04-16 @ 05:27), 42.3 (04-15 @ 17:38)  WBC: 13.48 (04-17 @ 13:08), 14.78 (04-17 @ 06:03), 19.53 (04-16 @ 17:51), 20.71 (04-16 @ 05:27), 24.28 (04-15 @ 17:38)  Plt: 316 (04-17 @ 13:08), 336 (04-17 @ 06:03), 423 (04-16 @ 17:51), 437 (04-16 @ 05:27), 562 (04-15 @ 17:38)    INR: 0.98 04-18-20 @ 05:46, 1.09 04-17-20 @ 06:03, 1.10 04-16-20 @ 05:27  PTT: 35.1 04-18-20 @ 05:46, 34.7 04-17-20 @ 06:03, 33.1 04-16-20 @ 05:27                      Assessment and Plan:   · Assessment	  45 yo M with no significant PMH presenting with fever and cough found to be septic on admission with CXR showing b/l patchy opacities, admitted for acute hypoxic respiratory failure due to COVID19 PNA.    Pulm   Acute respiratory failure with hypoxia. ARDS, COVID +  -His decompensation is less likely due to PE however he is on empiric treatment dose, with the FATOU, will change lovenox to heparin   vent setting changed tp Mode:AC/ CMV (Assist Control/ Continuous Mandatory Ventilation), RR (machine): 35, FiO2: 80, PEEP: 7, ITime: 0.63, MAP: 19, PC: 32, PIP: 41  plateau 35, decrease TV to 320, increase RR to 35, PEEP 5, FiO2 60 %   will get ABG and adjust vent settings accordingly     ·  Problem: Suspected 2019 novel coronavirus infection.  Plan: - COVID PNA ( pos on 3/31)   - s/p 5 d plaquenil, s/p 5 d solumedrol, s/p anakinra taper   - d/c steroids  -inflammatory markers stable   - CXR with increased infiltrates     Hem   ·  Problem: Preventive measure.  Plan: - DVT ppx: Lovenox (1mg/kg bid) watch for FATOU    hypoglycemia, NPH discontinued     GI  NG start vital 50 ml/hr   pantoprazole  no BM, will give mag citrate     neuro  prop 70  , will check TG   versed  continue fentanyl     nephro  FATOU, avoid neprhotoxic medications  IN: 2495.2 mL / OUT: 2090 mL / NET: 405.2 mL  s/p lasix     Card:  on phenylephrine to keep MAP> 65 mmhg hypotension likely from the sedation     ID:  started empirically on cefepime, WBC trending down   d/c vancomycin   hypoglycemia, NPH discontinued   endo: finger sticks q 6 hr, ISS     critical care time 45  min · Subjective and Objective:     CHIEF COMPLAINT: COVID-19    Interval Events: proned overnight     REVIEW OF SYSTEMS:  Constitutional: [ ] negative [ ] fevers [ ] chills [ ] weight loss [ ] weight gain  HEENT: [ ] negative [ ] dry eyes [ ] eye irritation [ ] postnasal drip [ ] nasal congestion  CV: [ ] negative  [ ] chest pain [ ] orthopnea [ ] palpitations [ ] murmur  Resp: [ ] negative [ ] cough [ ] shortness of breath [ ] dyspnea [ ] wheezing [ ] sputum [ ] hemoptysis  GI: [ ] negative [ ] nausea [ ] vomiting [ ] diarrhea [ ] constipation [ ] abd pain [ ] dysphagia   : [ ] negative [ ] dysuria [ ] nocturia [ ] hematuria [ ] increased urinary frequency  Musculoskeletal: [ ] negative [ ] back pain [ ] myalgias [ ] arthralgias [ ] fracture  Skin: [ ] negative [ ] rash [ ] itch  Neurological: [ ] negative [ ] headache [ ] dizziness [ ] syncope [ ] weakness [ ] numbness  Psychiatric: [ ] negative [ ] anxiety [ ] depression  Endocrine: [ ] negative [ ] diabetes [ ] thyroid problem  Hematologic/Lymphatic: [ ] negative [ ] anemia [ ] bleeding problem  Allergic/Immunologic: [ ] negative [ ] itchy eyes [ ] nasal discharge [ ] hives [ ] angioedema  [ ] All other systems negative  [x ] Unable to assess ROS because __sedated______    T(C): 36.5 (04-18-20 @ 07:00), Max: 38.1 (04-17-20 @ 15:00)  HR: 110 (04-18-20 @ 09:15) (106 - 130)  BP: --  RR: 34 (04-18-20 @ 07:00) (34 - 35)  SpO2: 95% (04-18-20 @ 09:15) (87% - 96%)  04-17-20 @ 07:01  -  04-18-20 @ 07:00  --------------------------------------------------------  IN: 2031.5 mL / OUT: 2220 mL / NET: -188.5 mL    04-18-20 @ 07:01  -  04-18-20 @ 10:23  --------------------------------------------------------  IN: 115.8 mL / OUT: 0 mL / NET: 115.8 mL    acetaminophen   Tablet .. 650 milliGRAM(s) Oral every 6 hours PRN  ALBUTerol    90 MICROgram(s) HFA Inhaler 2 Puff(s) Inhalation every 6 hours PRN  bisacodyl Suppository 10 milliGRAM(s) Rectal daily PRN  cefepime   IVPB      cefepime   IVPB 2000 milliGRAM(s) IV Intermittent every 8 hours  chlorhexidine 0.12% Liquid 15 milliLiter(s) Oral Mucosa every 12 hours  chlorhexidine 4% Liquid 1 Application(s) Topical <User Schedule>  enoxaparin Injectable 80 milliGRAM(s) SubCutaneous two times a day  famotidine    Tablet 20 milliGRAM(s) Oral two times a day  fentaNYL   Infusion. 0.5 MICROgram(s)/kG/Hr IV Continuous <Continuous>  magnesium oxide 400 milliGRAM(s) Oral daily  midazolam Infusion 0.02 mG/kG/Hr IV Continuous <Continuous>  petrolatum Ophthalmic Ointment 1 Application(s) Both EYES two times a day  phenylephrine    Infusion 0.5 MICROgram(s)/kG/Min IV Continuous <Continuous>  propofol Infusion 75 MICROgram(s)/kG/Min IV Continuous <Continuous>  rocuronium Infusion 8 MICROgram(s)/kG/Min IV Continuous <Continuous>  sodium chloride 0.9% lock flush 10 milliLiter(s) IV Push every 1 hour PRN  Mode: AC/ CMV (Assist Control/ Continuous Mandatory Ventilation), RR (machine): 35, TV (machine): 330, FiO2: 50, PEEP: 5, ITime: 0.55, MAP: 15, PIP: 40    PHYSICAL EXAM:  General: NAD  HEENT: atraumatic, normocephalic;  Neck: supple, no LAD, no thyromegaly  Respiratory: decrease air entry bilaterally   Cardiovascular: RRR, S1, S2, no M/R/G  Abdomen: normal BS; soft, non-distended, non-tender; no R/G  Extremities: radial and DP pulses intact b/l; no clubbing, cyanosis;  Skin: no rash appreciated;  Neurological: non-focal  Psychiatry: normal affect        LABS:  Na: 145 (04-18 @ 05:46), 145 (04-17 @ 13:08), 145 (04-17 @ 06:03), 145 (04-16 @ 17:51), 144 (04-16 @ 05:27), 144 (04-15 @ 17:38)  K: 4.2 (04-18 @ 05:46), 4.6 (04-17 @ 13:08), 3.4 (04-17 @ 06:03), 4.5 (04-16 @ 17:51), 3.4 (04-16 @ 05:27), 4.7 (04-15 @ 17:38)  Cl: 112 (04-18 @ 05:46), 109 (04-17 @ 13:08), 109 (04-17 @ 06:03), 110 (04-16 @ 17:51), 106 (04-16 @ 05:27), 108 (04-15 @ 17:38)  CO2: 22 (04-18 @ 05:46), 26 (04-17 @ 13:08), 25 (04-17 @ 06:03), 26 (04-16 @ 17:51), 27 (04-16 @ 05:27), 24 (04-15 @ 17:38)  BUN: 33 (04-18 @ 05:46), 27 (04-17 @ 13:08), 28 (04-17 @ 06:03), 28 (04-16 @ 17:51), 26 (04-16 @ 05:27), 25 (04-15 @ 17:38)  Cr: 0.93 (04-18 @ 05:46), 0.70 (04-17 @ 13:08), 0.74 (04-17 @ 06:03), 0.73 (04-16 @ 17:51), 0.90 (04-16 @ 05:27), 0.95 (04-15 @ 17:38)  Glu: 159(04-18 @ 05:46), 132(04-17 @ 13:08), 125(04-17 @ 06:03), 100(04-16 @ 17:51), 148(04-16 @ 05:27), 166(04-15 @ 17:38)    Hgb: 12.8 (04-17 @ 13:08), 13.0 (04-17 @ 06:03), 12.6 (04-16 @ 17:51), 12.5 (04-16 @ 05:27), 12.8 (04-15 @ 17:38)  Hct: 43.2 (04-17 @ 13:08), 42.5 (04-17 @ 06:03), 42.0 (04-16 @ 17:51), 40.5 (04-16 @ 05:27), 42.3 (04-15 @ 17:38)  WBC: 13.48 (04-17 @ 13:08), 14.78 (04-17 @ 06:03), 19.53 (04-16 @ 17:51), 20.71 (04-16 @ 05:27), 24.28 (04-15 @ 17:38)  Plt: 316 (04-17 @ 13:08), 336 (04-17 @ 06:03), 423 (04-16 @ 17:51), 437 (04-16 @ 05:27), 562 (04-15 @ 17:38)    INR: 0.98 04-18-20 @ 05:46, 1.09 04-17-20 @ 06:03, 1.10 04-16-20 @ 05:27  PTT: 35.1 04-18-20 @ 05:46, 34.7 04-17-20 @ 06:03, 33.1 04-16-20 @ 05:27      43 yo M with no significant PMH presenting with fever and cough found to be septic on admission with CXR showing b/l patchy opacities, admitted for acute hypoxic respiratory failure due to COVID19 PNA.    Pulm   Acute respiratory failure with hypoxia. ARDS, COVID +  -His decompensation is less likely due to PE however he is on empiric treatment  lovenox   vent setting changed tp Mode:AC/ CMV (Assist Control/ Continuous Mandatory Ventilation), RR (machine): 35, FiO2: 50, PEEP: 5,    plateau 36  will get ABG and adjust vent settings accordingly   -proned since midnight, supine     ·  Problem: Suspected 2019 novel coronavirus infection.  Plan: - COVID PNA ( pos on 3/31)   - s/p 5 d plaquenil, s/p 5 d solumedrol, s/p anakinra taper   - d/c steroids  -inflammatory markers stable   - CXR with increased infiltrates     Hem   ·  Problem: Preventive measure.  Plan: - DVT ppx: Lovenox (1mg/kg bid) watch for FATOU    GI  NPO as he is proned  will start feeds when supine   pantoprazole  BM yesterday     neuro  prop 70  , will check TG   versed  continue fentanyl     nephro  FATOU, avoid neprhotoxic medications  IN: 2495.2 mL / OUT: 2090 mL / NET: 405.2 mL  s/p lasix     Card:  on phenylephrine to keep MAP> 65 mmhg hypotension likely from the sedation     ID:  started empirically on cefepime, WBC trending down   follow cx results   hypoglycemia, NPH discontinued   endo: finger sticks q 6 hr, ISS     critical care time 45  min

## 2020-04-19 NOTE — PROGRESS NOTE ADULT - SUBJECTIVE AND OBJECTIVE BOX
s/p plaquenil, anakinra, steroid for covid  proned from 4/17-4/18  supine now, off rocuronium    vitals/labs/meds reviewed    Currently on 35/350/5/50%  repeat ABG now  sedation--prop, fent, and versed  +BM 4/17  lovenox full dose AC  MAP>65 phenylephrine prn  febrile today, pancx'ed  cefepime for empiric pna tx--would complete 5d course  consider ID consult for tocilizumab if inflammatory makers continue to updtrend  euglycemic, d/c FS s/p plaquenil, anakinra, steroid for covid  proned from 4/17-4/18  supine now, off rocuronium    vitals/labs/meds reviewed    Currently on 35/350/5/50%  sedation--prop, fent, and versed  vital bolus feed + free water   +BM 4/17  lovenox full dose AC  MAP>65 phenylephrine prn  febrile yesterday--cultures negative to date  cefepime for empiric pna tx--would complete 5d course  euglycemic, d/c FS

## 2020-04-19 NOTE — PROGRESS NOTE ADULT - SUBJECTIVE AND OBJECTIVE BOX
· Subjective and Objective:     CHIEF COMPLAINT: COVID-19    Interval Events: proned overnight     REVIEW OF SYSTEMS:  Constitutional: [ ] negative [ ] fevers [ ] chills [ ] weight loss [ ] weight gain  HEENT: [ ] negative [ ] dry eyes [ ] eye irritation [ ] postnasal drip [ ] nasal congestion  CV: [ ] negative  [ ] chest pain [ ] orthopnea [ ] palpitations [ ] murmur  Resp: [ ] negative [ ] cough [ ] shortness of breath [ ] dyspnea [ ] wheezing [ ] sputum [ ] hemoptysis  GI: [ ] negative [ ] nausea [ ] vomiting [ ] diarrhea [ ] constipation [ ] abd pain [ ] dysphagia   : [ ] negative [ ] dysuria [ ] nocturia [ ] hematuria [ ] increased urinary frequency  Musculoskeletal: [ ] negative [ ] back pain [ ] myalgias [ ] arthralgias [ ] fracture  Skin: [ ] negative [ ] rash [ ] itch  Neurological: [ ] negative [ ] headache [ ] dizziness [ ] syncope [ ] weakness [ ] numbness  Psychiatric: [ ] negative [ ] anxiety [ ] depression  Endocrine: [ ] negative [ ] diabetes [ ] thyroid problem  Hematologic/Lymphatic: [ ] negative [ ] anemia [ ] bleeding problem  Allergic/Immunologic: [ ] negative [ ] itchy eyes [ ] nasal discharge [ ] hives [ ] angioedema  [ ] All other systems negative  [x ] Unable to assess ROS because __sedated______    T(C): 37.6 (04-19-20 @ 05:00), Max: 37.6 (04-19-20 @ 05:00)  HR: 105 (04-19-20 @ 05:00) (89 - 110)  BP: --  RR: 26 (04-19-20 @ 05:00) (26 - 35)  SpO2: 89% (04-19-20 @ 05:00) (89% - 100%)  04-18-20 @ 07:01  -  04-19-20 @ 07:00  --------------------------------------------------------  IN: 1559.5 mL / OUT: 2030 mL / NET: -470.5 mL    acetaminophen   Tablet .. 650 milliGRAM(s) Oral every 6 hours PRN  ALBUTerol    90 MICROgram(s) HFA Inhaler 2 Puff(s) Inhalation every 6 hours PRN  cefepime   IVPB      cefepime   IVPB 2000 milliGRAM(s) IV Intermittent every 8 hours  chlorhexidine 0.12% Liquid 15 milliLiter(s) Oral Mucosa every 12 hours  chlorhexidine 4% Liquid 1 Application(s) Topical <User Schedule>  enoxaparin Injectable 80 milliGRAM(s) SubCutaneous two times a day  famotidine    Tablet 20 milliGRAM(s) Oral two times a day  fentaNYL   Infusion. 0.5 MICROgram(s)/kG/Hr IV Continuous <Continuous>  magnesium oxide 400 milliGRAM(s) Oral daily  midazolam Infusion 0.02 mG/kG/Hr IV Continuous <Continuous>  petrolatum Ophthalmic Ointment 1 Application(s) Both EYES two times a day  phenylephrine    Infusion 0.5 MICROgram(s)/kG/Min IV Continuous <Continuous>  propofol Infusion 75 MICROgram(s)/kG/Min IV Continuous <Continuous>  rocuronium Infusion 8 MICROgram(s)/kG/Min IV Continuous <Continuous>  sodium chloride 0.9% lock flush 10 milliLiter(s) IV Push every 1 hour PRN  Mode: AC/ CMV (Assist Control/ Continuous Mandatory Ventilation), RR (machine): 35, TV (machine): 350, FiO2: 50, PEEP: 5, ITime: 0.5, MAP: 15, PIP: 38    PHYSICAL EXAM:  General: NAD  HEENT: atraumatic, normocephalic;  Neck: supple, no LAD, no thyromegaly  Respiratory: decrease air entry bilaterally   Cardiovascular: RRR, S1, S2, no M/R/G  Abdomen: normal BS; soft, non-distended, non-tender; no R/G  Extremities: radial and DP pulses intact b/l; no clubbing, cyanosis;  Skin: no rash appreciated;  Neurological: non-focal  Psychiatry: normal affect        LABS:  Na: 148 (04-19 @ 00:43), 145 (04-18 @ 05:46), 145 (04-17 @ 13:08), 145 (04-17 @ 06:03), 145 (04-16 @ 17:51)  K: 4.3 (04-19 @ 00:43), 4.2 (04-18 @ 05:46), 4.6 (04-17 @ 13:08), 3.4 (04-17 @ 06:03), 4.5 (04-16 @ 17:51)  Cl: 116 (04-19 @ 00:43), 112 (04-18 @ 05:46), 109 (04-17 @ 13:08), 109 (04-17 @ 06:03), 110 (04-16 @ 17:51)  CO2: 23 (04-19 @ 00:43), 22 (04-18 @ 05:46), 26 (04-17 @ 13:08), 25 (04-17 @ 06:03), 26 (04-16 @ 17:51)  BUN: 33 (04-19 @ 00:43), 33 (04-18 @ 05:46), 27 (04-17 @ 13:08), 28 (04-17 @ 06:03), 28 (04-16 @ 17:51)  Cr: 0.80 (04-19 @ 00:43), 0.93 (04-18 @ 05:46), 0.70 (04-17 @ 13:08), 0.74 (04-17 @ 06:03), 0.73 (04-16 @ 17:51)  Glu: 115(04-19 @ 00:43), 159(04-18 @ 05:46), 132(04-17 @ 13:08), 125(04-17 @ 06:03), 100(04-16 @ 17:51)    Hgb: 11.5 (04-19 @ 00:43), 12.8 (04-17 @ 13:08), 13.0 (04-17 @ 06:03), 12.6 (04-16 @ 17:51)  Hct: 38.6 (04-19 @ 00:43), 43.2 (04-17 @ 13:08), 42.5 (04-17 @ 06:03), 42.0 (04-16 @ 17:51)  WBC: 13.60 (04-19 @ 00:43), 13.48 (04-17 @ 13:08), 14.78 (04-17 @ 06:03), 19.53 (04-16 @ 17:51)  Plt: 242 (04-19 @ 00:43), 316 (04-17 @ 13:08), 336 (04-17 @ 06:03), 423 (04-16 @ 17:51)    INR: 0.98 04-18-20 @ 05:46, 1.09 04-17-20 @ 06:03  PTT: 35.1 04-18-20 @ 05:46, 34.7 04-17-20 @ 06:03            43 yo M with no significant PMH presenting with fever and cough found to be septic on admission with CXR showing b/l patchy opacities, admitted for acute hypoxic respiratory failure due to COVID19 PNA.    Pulm   Acute respiratory failure with hypoxia. ARDS, COVID +  -His decompensation is less likely due to PE however he is on empiric treatment  lovenox   vent setting changed tp Mode:AC/ CMV (Assist Control/ Continuous Mandatory Ventilation), RR (machine): 35, FiO2: 50, PEEP: 5,    plateau 36  will get ABG and adjust vent settings accordingly   -proned since midnight, supine     ·  Problem: Suspected 2019 novel coronavirus infection.  Plan: - COVID PNA ( pos on 3/31)   - s/p 5 d plaquenil, s/p 5 d solumedrol, s/p anakinra taper   - d/c steroids  -inflammatory markers stable   - CXR with increased infiltrates     Hem   ·  Problem: Preventive measure.  Plan: - DVT ppx: Lovenox (1mg/kg bid) watch for FATOU    GI  NPO as he is proned  will start feeds when supine   pantoprazole  BM yesterday     neuro  prop 70  , will check TG   versed  continue fentanyl     nephro  FATOU, avoid neprhotoxic medications  IN: 2495.2 mL / OUT: 2090 mL / NET: 405.2 mL  s/p lasix     Card:  on phenylephrine to keep MAP> 65 mmhg hypotension likely from the sedation     ID:  started empirically on cefepime, WBC trending down   follow cx results   hypoglycemia, NPH discontinued   endo: finger sticks q 6 hr, ISS     critical care time 45  min · Subjective and Objective:     CHIEF COMPLAINT: COVID-19    Interval Events: supined     REVIEW OF SYSTEMS:  Constitutional: [ ] negative [ ] fevers [ ] chills [ ] weight loss [ ] weight gain  HEENT: [ ] negative [ ] dry eyes [ ] eye irritation [ ] postnasal drip [ ] nasal congestion  CV: [ ] negative  [ ] chest pain [ ] orthopnea [ ] palpitations [ ] murmur  Resp: [ ] negative [ ] cough [ ] shortness of breath [ ] dyspnea [ ] wheezing [ ] sputum [ ] hemoptysis  GI: [ ] negative [ ] nausea [ ] vomiting [ ] diarrhea [ ] constipation [ ] abd pain [ ] dysphagia   : [ ] negative [ ] dysuria [ ] nocturia [ ] hematuria [ ] increased urinary frequency  Musculoskeletal: [ ] negative [ ] back pain [ ] myalgias [ ] arthralgias [ ] fracture  Skin: [ ] negative [ ] rash [ ] itch  Neurological: [ ] negative [ ] headache [ ] dizziness [ ] syncope [ ] weakness [ ] numbness  Psychiatric: [ ] negative [ ] anxiety [ ] depression  Endocrine: [ ] negative [ ] diabetes [ ] thyroid problem  Hematologic/Lymphatic: [ ] negative [ ] anemia [ ] bleeding problem  Allergic/Immunologic: [ ] negative [ ] itchy eyes [ ] nasal discharge [ ] hives [ ] angioedema  [ ] All other systems negative  [x ] Unable to assess ROS because __sedated______    T(C): 37.6 (04-19-20 @ 05:00), Max: 37.6 (04-19-20 @ 05:00)  HR: 105 (04-19-20 @ 05:00) (89 - 110)  BP: --  RR: 26 (04-19-20 @ 05:00) (26 - 35)  SpO2: 89% (04-19-20 @ 05:00) (89% - 100%)  04-18-20 @ 07:01  -  04-19-20 @ 07:00  --------------------------------------------------------  IN: 1559.5 mL / OUT: 2030 mL / NET: -470.5 mL    acetaminophen   Tablet .. 650 milliGRAM(s) Oral every 6 hours PRN  ALBUTerol    90 MICROgram(s) HFA Inhaler 2 Puff(s) Inhalation every 6 hours PRN  cefepime   IVPB      cefepime   IVPB 2000 milliGRAM(s) IV Intermittent every 8 hours  chlorhexidine 0.12% Liquid 15 milliLiter(s) Oral Mucosa every 12 hours  chlorhexidine 4% Liquid 1 Application(s) Topical <User Schedule>  enoxaparin Injectable 80 milliGRAM(s) SubCutaneous two times a day  famotidine    Tablet 20 milliGRAM(s) Oral two times a day  fentaNYL   Infusion. 0.5 MICROgram(s)/kG/Hr IV Continuous <Continuous>  magnesium oxide 400 milliGRAM(s) Oral daily  midazolam Infusion 0.02 mG/kG/Hr IV Continuous <Continuous>  petrolatum Ophthalmic Ointment 1 Application(s) Both EYES two times a day  phenylephrine    Infusion 0.5 MICROgram(s)/kG/Min IV Continuous <Continuous>  propofol Infusion 75 MICROgram(s)/kG/Min IV Continuous <Continuous>  rocuronium Infusion 8 MICROgram(s)/kG/Min IV Continuous <Continuous>  sodium chloride 0.9% lock flush 10 milliLiter(s) IV Push every 1 hour PRN  Mode: AC/ CMV (Assist Control/ Continuous Mandatory Ventilation), RR (machine): 35, TV (machine): 350, FiO2: 50, PEEP: 5, ITime: 0.5, MAP: 15, PIP: 38    PHYSICAL EXAM:  General: NAD  HEENT: atraumatic, normocephalic;  Neck: supple, no LAD, no thyromegaly  Respiratory: decrease air entry bilaterally   Cardiovascular: RRR, S1, S2, no M/R/G  Abdomen: normal BS; soft, non-distended, non-tender; no R/G  Extremities: radial and DP pulses intact b/l; no clubbing, cyanosis;  Skin: no rash appreciated;  Neurological: non-focal  Psychiatry: normal affect        LABS:  Na: 148 (04-19 @ 00:43), 145 (04-18 @ 05:46), 145 (04-17 @ 13:08), 145 (04-17 @ 06:03), 145 (04-16 @ 17:51)  K: 4.3 (04-19 @ 00:43), 4.2 (04-18 @ 05:46), 4.6 (04-17 @ 13:08), 3.4 (04-17 @ 06:03), 4.5 (04-16 @ 17:51)  Cl: 116 (04-19 @ 00:43), 112 (04-18 @ 05:46), 109 (04-17 @ 13:08), 109 (04-17 @ 06:03), 110 (04-16 @ 17:51)  CO2: 23 (04-19 @ 00:43), 22 (04-18 @ 05:46), 26 (04-17 @ 13:08), 25 (04-17 @ 06:03), 26 (04-16 @ 17:51)  BUN: 33 (04-19 @ 00:43), 33 (04-18 @ 05:46), 27 (04-17 @ 13:08), 28 (04-17 @ 06:03), 28 (04-16 @ 17:51)  Cr: 0.80 (04-19 @ 00:43), 0.93 (04-18 @ 05:46), 0.70 (04-17 @ 13:08), 0.74 (04-17 @ 06:03), 0.73 (04-16 @ 17:51)  Glu: 115(04-19 @ 00:43), 159(04-18 @ 05:46), 132(04-17 @ 13:08), 125(04-17 @ 06:03), 100(04-16 @ 17:51)    Hgb: 11.5 (04-19 @ 00:43), 12.8 (04-17 @ 13:08), 13.0 (04-17 @ 06:03), 12.6 (04-16 @ 17:51)  Hct: 38.6 (04-19 @ 00:43), 43.2 (04-17 @ 13:08), 42.5 (04-17 @ 06:03), 42.0 (04-16 @ 17:51)  WBC: 13.60 (04-19 @ 00:43), 13.48 (04-17 @ 13:08), 14.78 (04-17 @ 06:03), 19.53 (04-16 @ 17:51)  Plt: 242 (04-19 @ 00:43), 316 (04-17 @ 13:08), 336 (04-17 @ 06:03), 423 (04-16 @ 17:51)    INR: 0.98 04-18-20 @ 05:46, 1.09 04-17-20 @ 06:03  PTT: 35.1 04-18-20 @ 05:46, 34.7 04-17-20 @ 06:03            45 yo M with no significant PMH presenting with fever and cough found to be septic on admission with CXR showing b/l patchy opacities, admitted for acute hypoxic respiratory failure due to COVID19 PNA.    neuro : sedated on propofol 40, dwayne is discontinued, versed, fentanyl at 4     Pulm   Acute respiratory failure with hypoxia. ARDS, COVID +  -His decompensation is less likely due to PE however he is on empiric treatment  lovenox   vent setting changed tp Mode:AC/ CMV (Assist Control/ Continuous Mandatory Ventilation), RR (machine): 35, FiO2: 50, PEEP: 5,    Blood Gas Profile - Arterial (04.19.20 @ 00:46)    pH, Arterial: 7.28    pCO2, Arterial: 59 mmHg    pO2, Arterial: 76 mmHg    HCO3, Arterial: 27 mmol/L    Base Excess, Arterial: .1 mmol/L    Oxygen Saturation, Arterial: 95 %    Total CO2, Arterial: 29 mmoL/L    FIO2, Arterial: 50    Blood Gas Source Arterial: Arterial  plateau P 36   will get ABG and adjust vent settings accordingly    supine     ·  Problem: Suspected 2019 novel coronavirus infection.  Plan: - COVID PNA ( pos on 3/31)   - s/p 5 d plaquenil, s/p 5 d solumedrol, s/p anakinra taper   - d/c steroids  -inflammatory markers stable   - CXR with increased infiltrates     Hem   ·  Problem: Preventive measure.  Plan: - DVT ppx: Lovenox (1mg/kg bid) watch for FATOU    GI  vital 50 ml/hr   pantoprazole  BM 4/19/2020       nephro  FATOU, avoid neprhotoxic medications  IN: 1559.5 mL / OUT: 2030 mL / NET: -470.5 mL  s/p lasix     Card:  on phenylephrine to keep MAP> 65 mmhg hypotension likely from the sedation     ID:  started empirically on cefepime, WBC trending down , today is day 5, cx neg sp far, can discontinue ABX   endo: finger sticks q 6 hr, ISS     critical care time 45  min

## 2020-04-20 NOTE — PROGRESS NOTE ADULT - SUBJECTIVE AND OBJECTIVE BOX
s/p plaquenil, anakinra, steroid for covid  proned from 4/17-4/18  supine now, off rocuronium    lasix x1  afebrile  vitals/labs/meds reviewed    Currently on 35/350/5/60%  sedation--prop, fent to 3, and versed--wean as able  vital bolus feed + free water   +BM 4/17  lovenox full dose AC  MAP>65 phenylephrine prn  febrile yesterday--cultures negative to date  s/p cefepime for empiric pna tx--completed 5d course  euglycemic, d/c FS

## 2020-04-20 NOTE — PROGRESS NOTE ADULT - SUBJECTIVE AND OBJECTIVE BOX
HPI:  44 M with no significant PMH presenting with nonproductive cough and fevers for 3-4 days. Patient came in today due to worsening cough. He was prescribed antibiotics on Saturday because he wasn't feeling well and it did somewhat help. Patient has also been having myalgias. No chest pain, some mild shortness of breath. No nausea, vomiting, abdominal pain, diarrhea, or constipation. Patient's brother also symptomatic however they don't live together and patient denies any sick contacts. No recent travels.      In the ED, T 102.3, , /79, RR 22 satting 85% on room air. Patient was placed on 15L nonrebreather satting 95-97%. Patient given tylenol, ceftriaxone, and azithro. (31 Mar 2020 20:47)    SURGERY:   PAST MEDICAL HISTORY: No pertinent past medical history    PAST SURGICAL HISTORY: S/P appendectomy  No significant past surgical history    FAMILY HISTORY:  FH: type 2 diabetes mellitus: brother    ALLERGIES: No Known Allergies    **************************************  **************************************    OVERNIGHT EVENTS: [] None  Fio2 increased to 60; 1.2L positive    ROS  Unobtainable due to mental status[x] Negative []  Positives:    ADMISSION SCORES: GCS: HH: MF: NIHSS: RASS: CAM-ICU: ICP:    ICU Vital Signs Last 24 Hrs  T(C): 36.9 (20 Apr 2020 11:00), Max: 37.7 (19 Apr 2020 15:00)  T(F): 98.4 (20 Apr 2020 11:00), Max: 99.9 (19 Apr 2020 15:00)  HR: 87 (20 Apr 2020 11:00) (76 - 109)  BP: --  BP(mean): --  ABP: 120/65 (20 Apr 2020 11:00) (107/52 - 122/63)  ABP(mean): 84 (20 Apr 2020 11:00) (71 - 84)  RR: 28 (20 Apr 2020 11:00) (18 - 35)  SpO2: 96% (20 Apr 2020 11:00) (91% - 97%)     04-19 @ 07:01 - 04-20 @ 07:00  --------------------------------------------------------  IN: 1903.9 mL / OUT: 690 mL / NET: 1213.9 mL    04-20 @ 07:01 - 04-20 @ 13:38  --------------------------------------------------------  IN: 557.5 mL / OUT: 1635 mL / NET: -1077.5 mL       Mode: AC/ CMV (Assist Control/ Continuous Mandatory Ventilation)  RR (machine): 35  TV (machine): 350  FiO2: 60  PEEP: 5  ITime: 0.6  MAP: 17  PIP: 39      DEVICES: [] Restraints [] JAC/HMV []LD [] ET tube [] Trach [] Chest Tube [] A-line [] Max [] NGT [] Rectal Tube [] EVD [] CVL  [] ICP/LiCOx    NEUROIMAGING:     EEG REPORT:     MEDICATIONS:  acetaminophen   Tablet .. 650 milliGRAM(s) Oral every 6 hours PRN  ALBUTerol    90 MICROgram(s) HFA Inhaler 2 Puff(s) Inhalation every 6 hours PRN  chlorhexidine 0.12% Liquid 15 milliLiter(s) Oral Mucosa every 12 hours  chlorhexidine 4% Liquid 1 Application(s) Topical <User Schedule>  enoxaparin Injectable 80 milliGRAM(s) SubCutaneous two times a day  famotidine    Tablet 20 milliGRAM(s) Oral two times a day  fentaNYL   Infusion. 0.5 MICROgram(s)/kG/Hr IV Continuous <Continuous>  magnesium oxide 400 milliGRAM(s) Oral daily  midazolam Infusion 0.02 mG/kG/Hr IV Continuous <Continuous>  petrolatum Ophthalmic Ointment 1 Application(s) Both EYES two times a day  phenylephrine    Infusion 0.5 MICROgram(s)/kG/Min IV Continuous <Continuous>  propofol Infusion 75 MICROgram(s)/kG/Min IV Continuous <Continuous>  sodium chloride 0.9% lock flush 10 milliLiter(s) IV Push every 1 hour PRN      PHYSICAL EXAM:  Intubated/sedated      LABS:                        10.9   14.45 )-----------( 278      ( 20 Apr 2020 04:42 )             36.6    04-20    150<H>  |  114<H>  |  27<H>  ----------------------------<  98  4.3   |  28  |  0.65    Ca    7.9<L>      20 Apr 2020 04:42  Phos  1.8     04-20  Mg     2.7     04-20    TPro  6.2  /  Alb  2.4<L>  /  TBili  0.2  /  DBili  x   /  AST  20  /  ALT  35  /  AlkPhos  109  04-20   ABG - ( 20 Apr 2020 07:44 )  pH, Arterial: 7.32  pH, Blood: x     /  pCO2: 61    /  pO2: 66    / HCO3: 30    / Base Excess: 3.6   /  SaO2: 93               Lipids and LFTs 04-20 @ 04:42  --  --  --  --  --  35  2.4  109  20  --  0.2  --  6.2  Lipids and LFTs 04-19 @ 18:24  --  --  --  --  --  40  2.4  115  23  --  0.2  --  6.5

## 2020-04-20 NOTE — CHART NOTE - NSCHARTNOTEFT_GEN_A_CORE
Nutrition Follow Up Note   Patient seen for: nutrition follow up on COVID ICU     Source: medical record, communication with team. Unable to speak to pt due to current airborne isolation contact precautions related to COVID-19. Pt remains intubated.     Chart reviewed, events noted. Pt is a 43 yo M with no significant PMH. Presented with fever, cough. Found to be septic on admission with CXR showing b/l patchy opacities, admitted for acute hypoxic respiratory failure due to COVID19 pneumonia. S/P 5 days of Plaquenil, S/P 5 days of Solu-Medrol and S/P Anakinra taper. Hospital course significant for rapid response (x2) for patient experiencing hypoxia and severe respiratory distress. Pt placed in prone position, ultimately intubated. NGT placed.     Diet Order: Diet, NPO with Tube Feed:   Tube Feeding Modality: Nasogastric  Vital AF (VITALAFRTH)  Total Volume for 24 Hours (mL): 200  Bolus  Total Volume of Bolus (mL):  50  Total # of Feeds: 4  Tube Feed Frequency: Every 6 hours   Tube Feed Start Time: 20:00  Bolus Feed Rate (mL per Hour): 50   Bolus Feed Duration (in Hours): 1 (04-15-20 @ 19:54)    CURRENT EN ORDER PROVIDES: 200ml, 240kcal and 15g protein. Free water as per medical team.     EN provision (per nursing flow sheet):  100ml (4/20, thus far); 200ml (4/19, 100% of goal); 100ml (4/18, 50% of goal - held for proning).     Nutrition Events:  -Started empirically on antibiotics   -S/P infusion of Lasix 4/20  -NPH discontinued on 4/17 secondary to hypoglycemia   -Proned 4/17-4/18   -Pt receiving propofol at 14ml/hr. If to continue x 24 hrs, will provide 370kcal daily.     GI: Per MD note 4/17, no BM x > 2 weeks; s/p Mg Citrate, fleet enema, lactulose. Last BM 4/17 x 1. Rectal tube placed 4/18 with 2000ml of output. Bowel regimen adjusted.     Anthropometric Measurements:   Height (cm): 167.6 (04-14-20 @ 16:20)  Weight (kg): 77.9 (04-14-20 @ 16:20)  BMI (kg/m2): 27.7 (04-14-20 @ 16:20)  IBW: 142 lbs  %IBW: 121%    Medications: MEDICATIONS  (STANDING):  chlorhexidine 0.12% Liquid 15 milliLiter(s) Oral Mucosa every 12 hours  chlorhexidine 4% Liquid 1 Application(s) Topical <User Schedule>  enoxaparin Injectable 80 milliGRAM(s) SubCutaneous two times a day  famotidine    Tablet 20 milliGRAM(s) Oral two times a day  fentaNYL   Infusion. 0.5 MICROgram(s)/kG/Hr (1.95 mL/Hr) IV Continuous <Continuous>  furosemide   Injectable 40 milliGRAM(s) IV Push once  magnesium oxide 400 milliGRAM(s) Oral daily  midazolam Infusion 0.02 mG/kG/Hr (1.56 mL/Hr) IV Continuous <Continuous>  petrolatum Ophthalmic Ointment 1 Application(s) Both EYES two times a day  phenylephrine    Infusion 0.5 MICROgram(s)/kG/Min (7.3 mL/Hr) IV Continuous <Continuous>  propofol Infusion 75 MICROgram(s)/kG/Min (35.1 mL/Hr) IV Continuous <Continuous>    MEDICATIONS  (PRN):  acetaminophen   Tablet .. 650 milliGRAM(s) Oral every 6 hours PRN Temp greater or equal to 38C (100.4F), Mild Pain (1 - 3), Moderate Pain (4 - 6)  ALBUTerol    90 MICROgram(s) HFA Inhaler 2 Puff(s) Inhalation every 6 hours PRN Shortness of Breath and/or Wheezing  sodium chloride 0.9% lock flush 10 milliLiter(s) IV Push every 1 hour PRN Pre/post blood products, medications, blood draw, and to maintain line patency    Labs: 04-20 @ 04:42: Sodium 150<H>, Potassium 4.3, Calcium 7.9<L>, Magnesium 2.7<H>, Phosphorus 1.8<L>, BUN 27<H>, Creatinine 0.65, Glucose 98, Alk Phos 109, ALT/SGPT 35, AST/SGOT 20, Albumin 2.4<L>, Prealbumin --, Total Bilirubin 0.2, Hemoglobin 10.9<L>, Hematocrit 36.6<L>, Ferritin --, C-Reactive Protein --, Creatine Kinase <<27>  04-19 @ 18:24: Sodium 150<H>, Potassium 4.7, Calcium 7.9<L>, Magnesium 2.8<H>, Phosphorus 2.1<L>, BUN 31<H>, Creatinine 0.72, Glucose 150<H>, Alk Phos 115, ALT/SGPT 40, AST/SGOT 23, Albumin 2.4<L>, Prealbumin --, Total Bilirubin 0.2, Hemoglobin --, Hematocrit --, Ferritin --, C-Reactive Protein --, Creatine Kinase <<27>  04-19 @ 09:30: Sodium --, Potassium --, Calcium --, Magnesium --, Phosphorus --, BUN --, Creatinine --, Glucose --, Alk Phos --, ALT/SGPT --, AST/SGOT --, Albumin --, Prealbumin --, Total Bilirubin --, Hemoglobin --, Hematocrit --, Ferritin 1287<H>, C-Reactive Protein 9.12<H>, Creatine Kinase <<27>      Triglycerides, Serum: 174 mg/dL (04-18-20 @ 05:46)  Triglycerides, Serum: 226 mg/dL (04-17-20 @ 13:08)  Triglycerides, Serum: 182 mg/dL (04-15-20 @ 17:38)    Skin: No pressure injuries noted   Edema: 1+ generalized     Estimated Needs: (based on dosing wt 77.9kg)  Energy: (20-25kcal/kg): 1558-1948kcal  Protein: (1.2-1.4g protein/kg): 93-109g protein    Previous Nutrition Diagnosis: functional (swallowing difficulty)  Nutrition Diagnosis is: remains appropriate, ongoing with enteral nutrition    New Nutrition Diagnosis:  Inadequate energy/protein intake r/t complications r/t COVID19+ as evidenced by pt receiving <50% of estimated energy/protein needs x >/= 5 days.     Recommended Interventions:   1. Recommend increase bolus volume of Vital AF to 100ml q 6 hrs (400ml daily); add additional 50ml q day to goal volume 250ml q 6 hrs. With provision of propofol (+370kcal) and No Carb Prosource 2x daily (+60kcal, +15g protein/serving), will provide (based on dosing wt 77.9kg): 1000ml, 1690kcal (21.7kcal/kg) and 105g protein (1.3g protein/kg).   2. Monitor provision of propofol and GI tolerance. RD to remain available to adjust EN formulary, volume/rate PRN.   3. Free water as per discretion of medical team.   4. Trend BG levels, renal indices, LFT's, electrolytes and triglycerides. Medical team to replete electrolytes PRN.     Monitoring and Evaluation:   Continue to monitor nutrition provision and tolerance, weights, labs, skin integrity.   RD remains available upon request and will follow up per protocol.    Alison Kleiner, RD [facility]; pager number [phone]

## 2020-04-20 NOTE — PROGRESS NOTE ADULT - ASSESSMENT
43 yo M with no significant PMH presenting with fever and cough found to be septic on admission with CXR showing b/l patchy opacities, admitted for acute hypoxic respiratory failure due to COVID19 PNA.    neuro : sedated on propofol 40, dwayne is discontinued, versed, fentanyl at 4-will wean off fentanyl     Pulm   Acute respiratory failure with hypoxia. ARDS, COVID +  -His decompensation is less likely due to PE however he is on empiric treatment  lovenox   ·  Problem: Suspected 2019 novel coronavirus infection.  Plan: - COVID PNA ( pos on 3/31)   - s/p 5 d plaquenil, s/p 5 d solumedrol, s/p anakinra taper   -inflammatory markers stable   - CXR with increased infiltrates     Hem   ·  Problem: Preventive measure.  Plan: - DVT ppx: Lovenox (1mg/kg bid) watch for FATOU    GI  vital 50 ml/hr   pantoprazole  BM 4/19/2020       nephro  FATOU, avoid neprhotoxic medications  IN: 1559.5 mL / OUT: 2030 mL / NET: -470.5 mL  s/p lasix-will continue diursesis-remains over liter positive  Will DC free water    Card:  on phenylephrine to keep MAP> 65 mmhg hypotension likely from the sedation     ID:  started empirically on cefepime, WBC trending down , today is day 5, cx neg sp far, can discontinue ABX   endo: finger sticks q 6 hr, ISS     critical care time 45  min

## 2020-04-21 NOTE — PROGRESS NOTE ADULT - SUBJECTIVE AND OBJECTIVE BOX
HPI:  44 M with no significant PMH presenting with nonproductive cough and fevers for 3-4 days. Patient came in today due to worsening cough. He was prescribed antibiotics on Saturday because he wasn't feeling well and it did somewhat help. Patient has also been having myalgias. No chest pain, some mild shortness of breath. No nausea, vomiting, abdominal pain, diarrhea, or constipation. Patient's brother also symptomatic however they don't live together and patient denies any sick contacts. No recent travels.      In the ED, T 102.3, , /79, RR 22 satting 85% on room air. Patient was placed on 15L nonrebreather satting 95-97%. Patient given tylenol, ceftriaxone, and azithro. (31 Mar 2020 20:47)    SURGERY:   PAST MEDICAL HISTORY: No pertinent past medical history    PAST SURGICAL HISTORY: S/P appendectomy  No significant past surgical history    FAMILY HISTORY:  FH: type 2 diabetes mellitus: brother    ALLERGIES: No Known Allergies    **************************************  **************************************    OVERNIGHT EVENTS: [] None    ROS  Unobtainable due to mental status[x] Negative []  Positives:    ADMISSION SCORES: GCS: HH: MF: NIHSS: RASS: CAM-ICU: ICP:    ICU Vital Signs Last 24 Hrs  T(C): 37.3 (21 Apr 2020 07:00), Max: 37.5 (20 Apr 2020 23:00)  T(F): 99.1 (21 Apr 2020 07:00), Max: 99.5 (20 Apr 2020 23:00)  HR: 76 (21 Apr 2020 09:22) (74 - 108)  BP: --  BP(mean): --  ABP: 134/68 (21 Apr 2020 07:00) (111/58 - 134/68)  ABP(mean): 92 (21 Apr 2020 07:00) (76 - 92)  RR: 35 (21 Apr 2020 07:00) (0 - 35)  SpO2: 95% (21 Apr 2020 09:22) (94% - 97%)     04-20 @ 07:01 - 04-21 @ 07:00  --------------------------------------------------------  IN: 1839 mL / OUT: 2735 mL / NET: -896 mL    04-21 @ 07:01  - 04-21 @ 10:07  --------------------------------------------------------  IN: 166.5 mL / OUT: 550 mL / NET: -383.5 mL       Mode: AC/ CMV (Assist Control/ Continuous Mandatory Ventilation)  RR (machine): 35  TV (machine): 350  FiO2: 60  PEEP: 5  ITime: 0.6  MAP: 18  PIP: 43      DEVICES: [] Restraints [] JAC/HMV []LD [] ET tube [] Trach [] Chest Tube [] A-line [] Max [] NGT [] Rectal Tube [] EVD [] CVL  [] ICP/LiCOx    NEUROIMAGING:     EEG REPORT:     MEDICATIONS:  acetaminophen   Tablet .. 650 milliGRAM(s) Oral every 6 hours PRN  ALBUTerol    90 MICROgram(s) HFA Inhaler 2 Puff(s) Inhalation every 6 hours PRN  chlorhexidine 0.12% Liquid 15 milliLiter(s) Oral Mucosa every 12 hours  chlorhexidine 4% Liquid 1 Application(s) Topical <User Schedule>  enoxaparin Injectable 80 milliGRAM(s) SubCutaneous two times a day  famotidine    Tablet 20 milliGRAM(s) Oral two times a day  fentaNYL   Infusion. 0.5 MICROgram(s)/kG/Hr IV Continuous <Continuous>  magnesium oxide 400 milliGRAM(s) Oral daily  midazolam Infusion 0.02 mG/kG/Hr IV Continuous <Continuous>  petrolatum Ophthalmic Ointment 1 Application(s) Both EYES two times a day  phenylephrine    Infusion 0.1 MICROgram(s)/kG/Min IV Continuous <Continuous>  potassium acid phosphate/sodium acid phosphate tablet (K-PHOS No. 2) 1 Tablet(s) Oral four times a day with meals  propofol Infusion 75 MICROgram(s)/kG/Min IV Continuous <Continuous>  sodium chloride 0.9% lock flush 10 milliLiter(s) IV Push every 1 hour PRN      PHYSICAL EXAM:  intubated/sedated    LABS:                        10.9   11.24 )-----------( 299      ( 21 Apr 2020 04:16 )             36.9    04-21    147<H>  |  107  |  25<H>  ----------------------------<  106<H>  3.7   |  32<H>  |  0.60    Ca    8.0<L>      21 Apr 2020 04:16  Phos  2.2     04-21  Mg     2.3     04-21    TPro  6.6  /  Alb  2.5<L>  /  TBili  0.3  /  DBili  x   /  AST  35  /  ALT  39  /  AlkPhos  108  04-21   ABG - ( 21 Apr 2020 05:14 )  pH, Arterial: 7.36  pH, Blood: x     /  pCO2: 62    /  pO2: 75    / HCO3: 34    / Base Excess: 7.2   /  SaO2: 95               Lipids and LFTs 04-21 @ 04:16  --  --  --  --  --  39  2.5  108  35  --  0.3  --  6.6  Lipids and LFTs 04-20 @ 16:00  --  --  --  --  --  38  2.3  115  28  --  0.3  --  6.2      CARDIAC MARKERS ( 21 Apr 2020 04:16 )  x     / x     / 64 U/L / x     / x

## 2020-04-21 NOTE — PROGRESS NOTE ADULT - ASSESSMENT
Hypoxic respiratory failure due to COVID-19 infection  - Wean FiO2 as tolerated previously did not tolerate higher PEEPs due to high Pplat  - TBB - 500cc to -1L  - Tolerating feeds, having BMs

## 2020-04-22 NOTE — PROGRESS NOTE ADULT - SUBJECTIVE AND OBJECTIVE BOX
FiO2 decreased to 50%. Versed d/c'd. Febrile, cultured.    Propofol, fentanyl, yoavnny    35/350/0.5/5

## 2020-04-22 NOTE — PROGRESS NOTE ADULT - ASSESSMENT
Hypoxic resp failure 2/2 COVID 19 infection  - Wean sedation as tolerated  - Maintain current vent settings  - TBB even  - H/H stable  - Lovenox Hypoxic resp failure 2/2 COVID 19 infection  - Wean sedation as tolerated  - Maintain current vent settings  - TBB even  - H/H stable  - Lovenox  - F/u cultures

## 2020-04-22 NOTE — PROGRESS NOTE ADULT - SUBJECTIVE AND OBJECTIVE BOX
HPI:  44 M with no significant PMH presenting with nonproductive cough and fevers for 3-4 days. Patient came in today due to worsening cough. He was prescribed antibiotics on Saturday because he wasn't feeling well and it did somewhat help. Patient has also been having myalgias. No chest pain, some mild shortness of breath. No nausea, vomiting, abdominal pain, diarrhea, or constipation. Patient's brother also symptomatic however they don't live together and patient denies any sick contacts. No recent travels.      In the ED, T 102.3, , /79, RR 22 satting 85% on room air. Patient was placed on 15L nonrebreather satting 95-97%. Patient given tylenol, ceftriaxone, and azithro. (31 Mar 2020 20:47)    SURGERY:   PAST MEDICAL HISTORY: No pertinent past medical history    PAST SURGICAL HISTORY: S/P appendectomy  No significant past surgical history    FAMILY HISTORY:  FH: type 2 diabetes mellitus: brother    ALLERGIES: No Known Allergies    **************************************  **************************************    OVERNIGHT EVENTS: [x] None    ROS  Unobtainable due to mental status[x] Negative []  Positives:    ADMISSION SCORES: GCS: HH: MF: NIHSS: RASS: CAM-ICU: ICP:    ICU Vital Signs Last 24 Hrs  T(C): 37.8 (22 Apr 2020 07:00), Max: 37.8 (21 Apr 2020 19:00)  T(F): 100 (22 Apr 2020 07:00), Max: 100 (21 Apr 2020 19:00)  HR: 81 (22 Apr 2020 09:34) (77 - 105)  BP: --  BP(mean): --  ABP: 114/61 (22 Apr 2020 07:00) (114/61 - 129/71)  ABP(mean): 80 (22 Apr 2020 07:00) (80 - 91)  RR: 35 (22 Apr 2020 07:00) (35 - 35)  SpO2: 95% (22 Apr 2020 09:34) (93% - 100%)     04-21 @ 07:01 - 04-22 @ 07:00  --------------------------------------------------------  IN: 1893 mL / OUT: 2775 mL / NET: -882 mL    04-22 @ 07:01  - 04-22 @ 11:29  --------------------------------------------------------  IN: 204.8 mL / OUT: 100 mL / NET: 104.8 mL       Mode: AC/ CMV (Assist Control/ Continuous Mandatory Ventilation)  RR (machine): 35  TV (machine): 350  FiO2: 60  PEEP: 5  ITime: 0.6  MAP: 17  PIP: 39      DEVICES: [] Restraints [] JAC/HMV []LD [] ET tube [] Trach [] Chest Tube [] A-line [] Max [] NGT [] Rectal Tube [] EVD [] CVL  [] ICP/LiCOx    NEUROIMAGING:     EEG REPORT:     MEDICATIONS:  acetaminophen   Tablet .. 650 milliGRAM(s) Oral every 6 hours PRN  acetaZOLAMIDE Injectable 250 milliGRAM(s) IV Push once  ALBUTerol    90 MICROgram(s) HFA Inhaler 2 Puff(s) Inhalation every 6 hours PRN  chlorhexidine 0.12% Liquid 15 milliLiter(s) Oral Mucosa every 12 hours  chlorhexidine 4% Liquid 1 Application(s) Topical <User Schedule>  diazepam    Tablet 5 milliGRAM(s) Oral every 8 hours  enoxaparin Injectable 80 milliGRAM(s) SubCutaneous two times a day  famotidine    Tablet 20 milliGRAM(s) Oral two times a day  fentaNYL   Infusion. 0.5 MICROgram(s)/kG/Hr IV Continuous <Continuous>  midazolam Infusion 0.02 mG/kG/Hr IV Continuous <Continuous>  petrolatum Ophthalmic Ointment 1 Application(s) Both EYES two times a day  phenylephrine    Infusion 0.1 MICROgram(s)/kG/Min IV Continuous <Continuous>  propofol Infusion 75 MICROgram(s)/kG/Min IV Continuous <Continuous>  sodium chloride 0.9% lock flush 10 milliLiter(s) IV Push every 1 hour PRN      PHYSICAL EXAM:  intubated/sedated    LABS:                        11.1   10.12 )-----------( 358      ( 22 Apr 2020 05:41 )             37.2    04-22    144  |  99  |  24<H>  ----------------------------<  125<H>  3.7   |  35<H>  |  0.62    Ca    8.3<L>      22 Apr 2020 05:41  Phos  2.5     04-22  Mg     2.0     04-22    TPro  6.6  /  Alb  2.6<L>  /  TBili  0.5  /  DBili  x   /  AST  24  /  ALT  30  /  AlkPhos  142<H>  04-22   ABG - ( 22 Apr 2020 05:37 )  pH, Arterial: 7.42  pH, Blood: x     /  pCO2: 62    /  pO2: 82    / HCO3: 40    / Base Excess: 13.2  /  SaO2: 96               Lipids and LFTs 04-22 @ 05:41  --  --  --  --  274  30  2.6  142  24  --  0.5  --  6.6      CARDIAC MARKERS ( 21 Apr 2020 04:16 )  x     / x     / 64 U/L / x     / x

## 2020-04-22 NOTE — PROGRESS NOTE ADULT - ASSESSMENT
43 yo M with no significant PMH presenting with fever and cough found to be septic on admission with CXR showing b/l patchy opacities, admitted for acute hypoxic respiratory failure due to COVID19 PNA.    neuro : sedated on propofol 40, dwayne is discontinued, versed, fentanyl-will wean off fentanyl   start valium 5q8    Pulm   Acute respiratory failure with hypoxia. ARDS, COVID +  -His decompensation is less likely due to PE however he is on empiric treatment  lovenox   ·  Problem: Suspected 2019 novel coronavirus infection.  Plan: - COVID PNA ( pos on 3/31)   - s/p 5 d plaquenil, s/p 5 d solumedrol, s/p anakinra taper   -inflammatory markers stable   - CXR with increased infiltrates     Hem   ·  Problem: Preventive measure.  Plan: - DVT ppx: Lovenox (1mg/kg bid) watch for FATOU    GI  vital 50 ml/hr   pantoprazole  BM 4/19/2020       nephro  FATOU, avoid neprhotoxic medications  IN: 1559.5 mL / OUT: 2030 mL / NET: -470.5 mL  bicarb increased-will guve diamox today    Card:  on phenylephrine to keep MAP> 65 mmhg hypotension likely from the sedation     ID:  started empirically on cefepime, WBC trending down , today is day 5, cx neg sp far, can discontinue ABX   endo: finger sticks q 6 hr, ISS     critical care time 40  min

## 2020-04-23 NOTE — PROGRESS NOTE ADULT - SUBJECTIVE AND OBJECTIVE BOX
Did not tolerate drop inFiO2 to 40% so increased back to 50%. Low grade fever to 38.2. Blood cultures with 2/4 bottles + -> vancomycin.    35/350/0.5/5    Propofol, fentanyl, neosynephrine

## 2020-04-23 NOTE — PROGRESS NOTE ADULT - ASSESSMENT
Hypoxic respiratory failure 2/2 COVID-19  - Trial of Precedex to wean propofol given elevated triglycerides  - Vancomycin for + blood cultures; f/u sensitivities  - Maintain current vent settings; try to wean FiO2 in AM  - Euglycemia  - Pressor as needed for MAP >65mmHg

## 2020-04-23 NOTE — PROGRESS NOTE ADULT - SUBJECTIVE AND OBJECTIVE BOX
HPI:  44 M with no significant PMH presenting with nonproductive cough and fevers for 3-4 days. Patient came in today due to worsening cough. He was prescribed antibiotics on Saturday because he wasn't feeling well and it did somewhat help. Patient has also been having myalgias. No chest pain, some mild shortness of breath. No nausea, vomiting, abdominal pain, diarrhea, or constipation. Patient's brother also symptomatic however they don't live together and patient denies any sick contacts. No recent travels.      In the ED, T 102.3, , /79, RR 22 satting 85% on room air. Patient was placed on 15L nonrebreather satting 95-97%. Patient given tylenol, ceftriaxone, and azithro. (31 Mar 2020 20:47)    SURGERY:   PAST MEDICAL HISTORY: No pertinent past medical history    PAST SURGICAL HISTORY: S/P appendectomy  No significant past surgical history    FAMILY HISTORY:  FH: type 2 diabetes mellitus: brother    ALLERGIES: No Known Allergies    **************************************  **************************************    OVERNIGHT EVENTS: [x] None    ROS  Unobtainable due to mental status[x] Negative []  Positives:    ADMISSION SCORES: GCS: HH: MF: NIHSS: RASS: CAM-ICU: ICP:    ICU Vital Signs Last 24 Hrs  T(C): 37.4 (23 Apr 2020 07:00), Max: 38.1 (22 Apr 2020 19:00)  T(F): 99.3 (23 Apr 2020 07:00), Max: 100.6 (22 Apr 2020 19:00)  HR: 85 (23 Apr 2020 09:15) (79 - 97)  BP: --  BP(mean): --  ABP: 104/50 (23 Apr 2020 07:00) (102/52 - 110/56)  ABP(mean): 68 (23 Apr 2020 07:00) (68 - 74)  RR: 28 (23 Apr 2020 07:00) (20 - 35)  SpO2: 98% (23 Apr 2020 09:15) (91% - 100%)     04-22 @ 07:01 - 04-23 @ 07:00  --------------------------------------------------------  IN: 1984.4 mL / OUT: 1635 mL / NET: 349.4 mL    04-23 @ 07:01 - 04-23 @ 10:53  --------------------------------------------------------  IN: 191.9 mL / OUT: 120 mL / NET: 71.9 mL       Mode: AC/ CMV (Assist Control/ Continuous Mandatory Ventilation)  RR (machine): 35  TV (machine): 350  FiO2: 50  PEEP: 5  ITime: 1  MAP: 20  PIP: 40      DEVICES: [] Restraints [] JAC/HMV []LD [] ET tube [] Trach [] Chest Tube [] A-line [] Max [] NGT [] Rectal Tube [] EVD [] CVL  [] ICP/LiCOx    NEUROIMAGING:     EEG REPORT:     MEDICATIONS:  acetaminophen   Tablet .. 650 milliGRAM(s) Oral every 6 hours PRN  ALBUTerol    90 MICROgram(s) HFA Inhaler 2 Puff(s) Inhalation every 6 hours PRN  chlorhexidine 0.12% Liquid 15 milliLiter(s) Oral Mucosa every 12 hours  chlorhexidine 4% Liquid 1 Application(s) Topical <User Schedule>  diazepam    Tablet 5 milliGRAM(s) Oral every 8 hours  enoxaparin Injectable 80 milliGRAM(s) SubCutaneous two times a day  famotidine    Tablet 20 milliGRAM(s) Oral two times a day  fentaNYL   Infusion. 0.501 MICROgram(s)/kG/Hr IV Continuous <Continuous>  petrolatum Ophthalmic Ointment 1 Application(s) Both EYES two times a day  phenylephrine    Infusion 0.1 MICROgram(s)/kG/Min IV Continuous <Continuous>  propofol Infusion 75 MICROgram(s)/kG/Min IV Continuous <Continuous>  sodium chloride 0.9% lock flush 10 milliLiter(s) IV Push every 1 hour PRN      PHYSICAL EXAM:  intubated/sedated    LABS:                        11.1   9.83  )-----------( 379      ( 23 Apr 2020 05:15 )             37.3    04-23    145  |  103  |  21  ----------------------------<  93  4.0   |  29  |  0.63    Ca    8.6      23 Apr 2020 05:15  Phos  3.4     04-23  Mg     1.9     04-23    TPro  6.7  /  Alb  2.2<L>  /  TBili  0.7  /  DBili  x   /  AST  27  /  ALT  28  /  AlkPhos  139<H>  04-23   ABG - ( 23 Apr 2020 05:11 )  pH, Arterial: 7.35  pH, Blood: x     /  pCO2: 62    /  pO2: 80    / HCO3: 34    / Base Excess: 6.8   /  SaO2: 95               Lipids and LFTs 04-23 @ 05:15  --  --  --  --  287  28  2.2  139  27  --  0.7  --  6.7      CARDIAC MARKERS ( 23 Apr 2020 05:15 )  x     / x     / 42 U/L / x     / x

## 2020-04-23 NOTE — PROGRESS NOTE ADULT - ASSESSMENT
45 yo M with no significant PMH presenting with fever and cough found to be septic on admission with CXR showing b/l patchy opacities, admitted for acute hypoxic respiratory failure due to COVID19 PNA.    neuro : sedated on propofol 40, dwayne is discontinued, versed, fentanyl-will wean off fentanyl    valium 5q8    Pulm   Acute respiratory failure with hypoxia. ARDS, COVID +  -His decompensation is less likely due to PE however he is on empiric treatment  lovenox   ·  Problem: Suspected 2019 novel coronavirus infection.  Plan: - COVID PNA ( pos on 3/31)   - s/p 5 d plaquenil, s/p 5 d solumedrol, s/p anakinra taper   -inflammatory markers stable   - Check CXR today  will check POCUS-plateau pressures 40    Hem   ·  Problem: Preventive measure.  Plan: - DVT ppx: Lovenox (1mg/kg bid) watch for FATOU    GI  vital 50 ml/hr   pantoprazole  BM 4/19/2020       nephro  FATOU, avoid neprhotoxic medications  IN: 1559.5 mL / OUT: 2030 mL / NET: -470.5 mL  bicarb improved with Diamox will cont with lasix for diuresis-plan negative 1-2L     Card:  on phenylephrine to keep MAP> 65 mmhg hypotension likely from the sedation     ID:  completed cefepime  endo: finger sticks q 6 hr, ISS     critical care time 40  min

## 2020-04-23 NOTE — CHART NOTE - NSCHARTNOTEFT_GEN_A_CORE
Nutrition Follow Up Note   Patient seen for: nutrition follow up on COVID ICU     Source: medical record, communication with team. Unable to speak to pt due to current airborne isolation contact precautions related to COVID-19. Pt remains intubated.     Chart reviewed, events noted. Pt is a 45 yo M with no significant PMH. Presented with fever, cough. Found to be septic on admission with CXR showing b/l patchy opacities, admitted for acute hypoxic respiratory failure due to COVID19 pneumonia. S/P 5 days of Plaquenil, S/P 5 days of Solu-Medrol and S/P Anakinra taper. Hospital course significant for rapid response (x2) for patient experiencing hypoxia and severe respiratory distress. Pt placed in prone position, ultimately intubated. NGT placed.     Diet Order: Diet, NPO with Tube Feed:   Tube Feeding Modality: Nasogastric  Vital AF (VITALAFRTH)  Total Volume for 24 Hours (mL): 400  Bolus  Total Volume of Bolus (mL):  100  Total # of Feeds: 4  Tube Feed Frequency: Every 6 hours   Tube Feed Start Time: 20:00  Bolus Feed Rate (mL per Hour): 50   Bolus Feed Duration (in Hours): 1 (04-21-20 @ 07:03)    CURRENT EN ORDER PROVIDES: 400ml, 480kcal and 30g protein.     EN provision (per nursing flow sheet):   (4/23): 200ml (thus far)  (4/22): 400ml (100% of goal)  (4/21): 300ml (EN formulary was increased from 50ml q 6 hrs to 100ml q 6 hrs midday)  (4/20): 200ml (100% of goal) (Vital AF at 50ml q 6 hrs)    Nutrition Events:   -Pt remains intubated/sedated, on propofol, vesed, fentanyl (plan to wean) and start valium  -Per provider handoff, pt with repletion of phosphorous on 4/20  -Continues to receive diuretic therapy as per discretion of medical team  -Per MD note 4/22, inflammatory markers stable   -CXR with increased infiltrates    GI: Per nursing flow sheet, last BM 4/22 100ml     Anthrpometric Measurements:   Height (cm): 167.6 (04-14-20 @ 16:20)  Weight (kg): 77.9 (04-14-20 @ 16:20)  BMI (kg/m2): 27.7 (04-14-20 @ 16:20)  IBW: 142 lbs  %IBW: 121%     Medications: MEDICATIONS  (STANDING):  chlorhexidine 0.12% Liquid 15 milliLiter(s) Oral Mucosa every 12 hours  chlorhexidine 4% Liquid 1 Application(s) Topical <User Schedule>  diazepam    Tablet 5 milliGRAM(s) Oral every 8 hours  enoxaparin Injectable 80 milliGRAM(s) SubCutaneous two times a day  famotidine    Tablet 20 milliGRAM(s) Oral two times a day  fentaNYL   Infusion. 0.501 MICROgram(s)/kG/Hr (1.95 mL/Hr) IV Continuous <Continuous>  midazolam Infusion 0.02 mG/kG/Hr (1.56 mL/Hr) IV Continuous <Continuous>  petrolatum Ophthalmic Ointment 1 Application(s) Both EYES two times a day  phenylephrine    Infusion 0.1 MICROgram(s)/kG/Min (1.46 mL/Hr) IV Continuous <Continuous>  propofol Infusion 75 MICROgram(s)/kG/Min (35.1 mL/Hr) IV Continuous <Continuous>    MEDICATIONS  (PRN):  acetaminophen   Tablet .. 650 milliGRAM(s) Oral every 6 hours PRN Temp greater or equal to 38C (100.4F), Mild Pain (1 - 3), Moderate Pain (4 - 6)  ALBUTerol    90 MICROgram(s) HFA Inhaler 2 Puff(s) Inhalation every 6 hours PRN Shortness of Breath and/or Wheezing  sodium chloride 0.9% lock flush 10 milliLiter(s) IV Push every 1 hour PRN Pre/post blood products, medications, blood draw, and to maintain line patency    Labs: 04-23 @ 05:15: Sodium 145, Potassium 4.0, Calcium 8.6, Magnesium 1.9, Phosphorus 3.4, BUN 21, Creatinine 0.63, Glucose 93, Alk Phos 139<H>, ALT/SGPT 28, AST/SGOT 27, Albumin 2.2<L>, Prealbumin --, Total Bilirubin 0.7, Hemoglobin 11.1<L>, Hematocrit 37.3<L>, Ferritin --, C-Reactive Protein --, Creatine Kinase <<27>  04-22 @ 07:46: Sodium --, Potassium --, Calcium --, Magnesium --, Phosphorus --, BUN --, Creatinine --, Glucose --, Alk Phos --, ALT/SGPT --, AST/SGOT --, Albumin --, Prealbumin --, Total Bilirubin --, Hemoglobin --, Hematocrit --, Ferritin --, C-Reactive Protein 5.50<H>, Creatine Kinase <<27>  04-22 @ 07:43: Sodium --, Potassium --, Calcium --, Magnesium --, Phosphorus --, BUN --, Creatinine --, Glucose --, Alk Phos --, ALT/SGPT --, AST/SGOT --, Albumin --, Prealbumin --, Total Bilirubin --, Hemoglobin --, Hematocrit --, Ferritin 981<H>, C-Reactive Protein --, Creatine Kinase <<27>      Triglycerides, Serum: 287 mg/dL (04-23-20 @ 05:15)  Triglycerides, Serum: 274 mg/dL (04-22-20 @ 05:41)  Triglycerides, Serum: 174 mg/dL (04-18-20 @ 05:46)  Triglycerides, Serum: 226 mg/dL (04-17-20 @ 13:08)  Triglycerides, Serum: 182 mg/dL (04-15-20 @ 17:38)    Skin: no pressure injuries noted   Edema: 1+ generalized   Estimated Needs: (based on dosing wt 77.9kg)  Energy: (20-25kcal/kg): 1558-1948kcal  Protein: (1.2-1.4g protein/kg): 93-109g protein    Previous Nutrition Diagnosis: functional (swallowing difficulty); inadequate energy/protein intake   Nutrition Diagnosis is:  remains appropriate, ongoing with provision of enteral nutrition     Recommended Interventions:   1. Recommend increase EN feeds to 150ml q 6 hrs at this time, increase as tolerated to goal 225ml q 6 hrs (consider additional +20-50ml q 6 hrs) + No Carb Prosource 2x daily. With provision of propofol (+618kcal), will provide: 900ml, 1818kcal and 98g protein. Based on dosing wt 77.9kg, will provide 23kcal/kg and 1.26g protein/kg.   2. Monitor provision of propofol and  GI tolerance. RD to remain available to adjust EN formulary, volume/rate PRN.   3. Trend BG levels, renal indices, LFT's, electrolytes and triglycerides   4. Free water as per discretion of medical team.     Monitoring and Evaluation:   Continue to monitor nutrition provision and tolerance, weights, labs, skin integrity.   RD remains available upon request and will follow up per protocol.    Alison Kleiner, RD [facility]; pager number [phone]

## 2020-04-24 NOTE — PROGRESS NOTE ADULT - SUBJECTIVE AND OBJECTIVE BOX
HPI:  44 M with no significant PMH presenting with nonproductive cough and fevers for 3-4 days. Patient came in today due to worsening cough. He was prescribed antibiotics on Saturday because he wasn't feeling well and it did somewhat help. Patient has also been having myalgias. No chest pain, some mild shortness of breath. No nausea, vomiting, abdominal pain, diarrhea, or constipation. Patient's brother also symptomatic however they don't live together and patient denies any sick contacts. No recent travels.      In the ED, T 102.3, , /79, RR 22 satting 85% on room air. Patient was placed on 15L nonrebreather satting 95-97%. Patient given tylenol, ceftriaxone, and azithro. (31 Mar 2020 20:47)    SURGERY:   PAST MEDICAL HISTORY: No pertinent past medical history    PAST SURGICAL HISTORY: S/P appendectomy  No significant past surgical history    FAMILY HISTORY:  FH: type 2 diabetes mellitus: brother    ALLERGIES: No Known Allergies    **************************************  **************************************    OVERNIGHT EVENTS: [] None  Vanco started yesterday    ROS  Unobtainable due to mental status[x] Negative []  Positives:    ADMISSION SCORES: GCS: HH: MF: NIHSS: RASS: CAM-ICU: ICP:    ICU Vital Signs Last 24 Hrs  T(C): 2.7 (24 Apr 2020 07:00), Max: 39.4 (24 Apr 2020 03:00)  T(F): 37 (24 Apr 2020 07:00), Max: 102.9 (24 Apr 2020 03:00)  HR: 70 (24 Apr 2020 09:26) (70 - 113)  BP: --  BP(mean): --  ABP: 129/67 (24 Apr 2020 07:00) (107/56 - 135/74)  ABP(mean): 90 (24 Apr 2020 07:00) (73 - 97)  RR: 35 (24 Apr 2020 07:00) (14 - 35)  SpO2: 96% (24 Apr 2020 09:26) (91% - 96%)     04-23 @ 07:01 - 04-24 @ 07:00  --------------------------------------------------------  IN: 2097.2 mL / OUT: 2610 mL / NET: -512.8 mL    04-24 @ 07:01 - 04-24 @ 11:03  --------------------------------------------------------  IN: 221.2 mL / OUT: 230 mL / NET: -8.8 mL       Mode: AC/ CMV (Assist Control/ Continuous Mandatory Ventilation)  RR (machine): 35  TV (machine): 350  FiO2: 50  PEEP: 5  ITime: 1  MAP: 12  PIP: 32      DEVICES: [] Restraints [] JAC/HMV []LD [] ET tube [] Trach [] Chest Tube [] A-line [] Max [] NGT [] Rectal Tube [] EVD [] CVL  [] ICP/LiCOx    NEUROIMAGING:     EEG REPORT:     MEDICATIONS:  acetaminophen   Tablet .. 650 milliGRAM(s) Oral every 6 hours PRN  ALBUTerol    90 MICROgram(s) HFA Inhaler 2 Puff(s) Inhalation every 6 hours PRN  chlorhexidine 0.12% Liquid 15 milliLiter(s) Oral Mucosa every 12 hours  chlorhexidine 4% Liquid 1 Application(s) Topical <User Schedule>  dexMEDEtomidine Infusion 0.257 MICROgram(s)/kG/Hr IV Continuous <Continuous>  diazepam    Tablet 10 milliGRAM(s) Oral every 8 hours  enoxaparin Injectable 80 milliGRAM(s) SubCutaneous two times a day  famotidine    Tablet 20 milliGRAM(s) Oral two times a day  fentaNYL   Infusion. 0.501 MICROgram(s)/kG/Hr IV Continuous <Continuous>  midazolam Infusion 0.02 mG/kG/Hr IV Continuous <Continuous>  petrolatum Ophthalmic Ointment 1 Application(s) Both EYES two times a day  phenylephrine    Infusion 0.1 MICROgram(s)/kG/Min IV Continuous <Continuous>  propofol Infusion 75 MICROgram(s)/kG/Min IV Continuous <Continuous>  sodium chloride 0.9% lock flush 10 milliLiter(s) IV Push every 1 hour PRN  vancomycin  IVPB 1000 milliGRAM(s) IV Intermittent every 12 hours      PHYSICAL EXAM:  intubated/sedated      LABS:                        11.7   11.56 )-----------( 410      ( 24 Apr 2020 02:26 )             38.1    04-24    141  |  99  |  20  ----------------------------<  130<H>  3.8   |  32<H>  |  0.61    Ca    8.7      24 Apr 2020 02:26  Phos  2.5     04-24  Mg     1.7     04-24    TPro  7.3  /  Alb  2.6<L>  /  TBili  0.6  /  DBili  x   /  AST  29  /  ALT  28  /  AlkPhos  135<H>  04-24   ABG - ( 24 Apr 2020 02:51 )  pH, Arterial: 7.36  pH, Blood: x     /  pCO2: 63    /  pO2: 70    / HCO3: 35    / Base Excess: 7.9   /  SaO2: 93               Lipids and LFTs 04-24 @ 02:26  --  --  --  --  --  28  2.6  135  29  --  0.6  --  7.3      CARDIAC MARKERS ( 23 Apr 2020 05:15 )  x     / x     / 42 U/L / x     / x

## 2020-04-24 NOTE — PROGRESS NOTE ADULT - ASSESSMENT
43 yo M with no significant PMH presenting with fever and cough found to be septic on admission with CXR showing b/l patchy opacities, admitted for acute hypoxic respiratory failure due to COVID19 PNA.    neuro : will wean off versed gtt then begin to wean fentanyl; cont propofol    valium 5q8    Pulm   Acute respiratory failure with hypoxia. ARDS, COVID +  -His decompensation is less likely due to PE however he is on empiric treatment  lovenox   ·  Problem: Suspected 2019 novel coronavirus infection.  Plan: - COVID PNA ( pos on 3/31)   - s/p 5 d plaquenil, s/p 5 d solumedrol, s/p anakinra taper   -inflammatory markers stable   ppressure remains elevated at 37    Hem   ·  Problem: Preventive measure.  Plan: - DVT ppx: Lovenox (1mg/kg bid) watch for FATOU    GI  vital 50 ml/hr   pantoprazole  BM 4/19/2020       nephro  FATOU, avoid neprhotoxic medications  IN: 1559.5 mL / OUT: 2030 mL / NET: -470.5 mL  bicarb improved with Diamox will cont with lasix for diuresis-plan negative 1-2L     Card:  on phenylephrine to keep MAP> 65 mmhg hypotension likely from the sedation     ID:  cont vanco; f/u cxs  endo: finger sticks q 6 hr, ISS     critical care time 40  min

## 2020-04-24 NOTE — PROGRESS NOTE ADULT - ASSESSMENT
Hypoxic respiratory failure 2/2 COVID-19 infection  - TBB negative 1.5 to negative 2 liters - diurese as needed to meet goal  - Maintain current vent settings  - Wean versed gtt as tolerated

## 2020-04-24 NOTE — PROGRESS NOTE ADULT - SUBJECTIVE AND OBJECTIVE BOX
35/350/0.5/5    Versed, propofol, fentanyl, Precedex, neosynephrine Versed weaned down.     35/350/0.5/5    Versed, propofol, fentanyl, Precedex, neosynephrine

## 2020-04-25 NOTE — CHART NOTE - NSCHARTNOTEFT_GEN_A_CORE
Pt wife-Ms Alexander contacted at this time via Interpretor phone #[phone].  Al questions and concerns addressed.

## 2020-04-25 NOTE — PROGRESS NOTE ADULT - SUBJECTIVE AND OBJECTIVE BOX
AD HOC COVID ICU NOTE    *** PATIENT SEEN DURING COVID PANDEMIC -- [place] IN STATE OF [facility] -- HOSPITAL RESOURCES CRITICALLY OVERWHELMED -- NORMAL DECISION MAKING PROCESS IS SIGNIFICANTLY ALTERED -- STANDARD CARE PATHWAYS ARE COMPROMISED -- DRUG AND STAFFING SHORTAGES IN EFFECT ***    BRIEF HISTORY:  KIMBERLY SOUTH is a 44y yo Male patient intubated and mechanically ventilated due to respiratory failure 2/2 COVID pneumonia.    PMH:   No pertinent past medical history    MEDS:   acetaminophen   Tablet .. 650 milliGRAM(s) Oral every 6 hours PRN  ALBUTerol    90 MICROgram(s) HFA Inhaler 2 Puff(s) Inhalation every 6 hours PRN  chlorhexidine 0.12% Liquid 15 milliLiter(s) Oral Mucosa every 12 hours  chlorhexidine 4% Liquid 1 Application(s) Topical <User Schedule>  dexMEDEtomidine Infusion 0.257 MICROgram(s)/kG/Hr IV Continuous <Continuous>  diazepam    Tablet 10 milliGRAM(s) Oral every 8 hours  enoxaparin Injectable 80 milliGRAM(s) SubCutaneous two times a day  famotidine    Tablet 20 milliGRAM(s) Oral two times a day  fentaNYL   Infusion. 0.501 MICROgram(s)/kG/Hr IV Continuous <Continuous>  furosemide   Injectable 40 milliGRAM(s) IV Push once  midazolam Infusion 0.02 mG/kG/Hr IV Continuous <Continuous>  petrolatum Ophthalmic Ointment 1 Application(s) Both EYES two times a day  phenylephrine    Infusion 0.1 MICROgram(s)/kG/Min IV Continuous <Continuous>  propofol Infusion 75 MICROgram(s)/kG/Min IV Continuous <Continuous>  sodium chloride 0.9% lock flush 10 milliLiter(s) IV Push every 1 hour PRN    Relevant labs/imaging/vitals reviewed.    EXAM:  Intubated and sedated  NCAT  MMM  Mechanically ventilated  Abdomen nondistended  RRR  Moves all extremities  Skin dry    PLAN:  Neuro --  valium q5; wean versed, fentanyl, propofol as tolerated  Pulm -- continue mechanical ventilation, wean as tolerated; s/p 5 d plaquenil, s/p 5 d solumedrol, s/p anakinra taper; continues to have high plateau pressures and poor compliance  Cards -- target MAP > 65, on yovanny GTT  GI -- tube feeds @ goal  Renal -- FATOU; monitor urine output; PRN diuresis  ID -- COVID +, continue vanco, follow-up cultures  Endo -- monitor blood sugars  Heme -- on full dose SQL empirically    This patient is critically ill with high risk of life-threatening decompensation secondary to respiratory and/or multiorgan failure.  45 minutes spent. AD HOC COVID ICU NOTE    *** PATIENT SEEN DURING COVID PANDEMIC -- [place] IN STATE OF [facility] -- HOSPITAL RESOURCES CRITICALLY OVERWHELMED -- NORMAL DECISION MAKING PROCESS IS SIGNIFICANTLY ALTERED -- STANDARD CARE PATHWAYS ARE COMPROMISED -- DRUG AND STAFFING SHORTAGES IN EFFECT ***    BRIEF HISTORY:  KIMBERLY SOUTH is a 44y yo Male patient intubated and mechanically ventilated due to respiratory failure 2/2 COVID pneumonia.    PMH:   No pertinent past medical history    MEDS:   acetaminophen   Tablet .. 650 milliGRAM(s) Oral every 6 hours PRN  ALBUTerol    90 MICROgram(s) HFA Inhaler 2 Puff(s) Inhalation every 6 hours PRN  chlorhexidine 0.12% Liquid 15 milliLiter(s) Oral Mucosa every 12 hours  chlorhexidine 4% Liquid 1 Application(s) Topical <User Schedule>  dexMEDEtomidine Infusion 0.257 MICROgram(s)/kG/Hr IV Continuous <Continuous>  diazepam    Tablet 10 milliGRAM(s) Oral every 8 hours  enoxaparin Injectable 80 milliGRAM(s) SubCutaneous two times a day  famotidine    Tablet 20 milliGRAM(s) Oral two times a day  fentaNYL   Infusion. 0.501 MICROgram(s)/kG/Hr IV Continuous <Continuous>  furosemide   Injectable 40 milliGRAM(s) IV Push once  midazolam Infusion 0.02 mG/kG/Hr IV Continuous <Continuous>  petrolatum Ophthalmic Ointment 1 Application(s) Both EYES two times a day  phenylephrine    Infusion 0.1 MICROgram(s)/kG/Min IV Continuous <Continuous>  propofol Infusion 75 MICROgram(s)/kG/Min IV Continuous <Continuous>  sodium chloride 0.9% lock flush 10 milliLiter(s) IV Push every 1 hour PRN    Relevant labs/imaging/vitals reviewed.    EXAM:  Intubated and sedated  NCAT  MMM  Mechanically ventilated  Abdomen nondistended  RRR  Moves all extremities  Skin dry    PLAN:  Neuro --  valium q5; wean versed, fentanyl, propofol as tolerated  Pulm -- continue mechanical ventilation, wean as tolerated; s/p 5 d plaquenil, s/p 5 d solumedrol, s/p anakinra taper; continues to have high plateau pressures and poor compliance  Cards -- target MAP > 65, on yovanny GTT  GI -- tube feeds @ goal  Renal -- Cr normal, monitor urine output; PRN diuresis  ID -- COVID +, continue vanco, follow-up cultures  Endo -- monitor blood sugars  Heme -- on full dose SQL empirically    This patient is critically ill with high risk of life-threatening decompensation secondary to respiratory and/or multiorgan failure.  45 minutes spent. AD HOC COVID ICU NOTE    *** PATIENT SEEN DURING COVID PANDEMIC -- [place] IN STATE OF [facility] -- HOSPITAL RESOURCES CRITICALLY OVERWHELMED -- NORMAL DECISION MAKING PROCESS IS SIGNIFICANTLY ALTERED -- STANDARD CARE PATHWAYS ARE COMPROMISED -- DRUG AND STAFFING SHORTAGES IN EFFECT ***    BRIEF HISTORY:  KIMBERLY SOUTH is a 44y yo Male patient intubated and mechanically ventilated due to respiratory failure 2/2 COVID pneumonia.    PMH:   As noted in H&P.    MEDS:   acetaminophen   Tablet .. 650 milliGRAM(s) Oral every 6 hours PRN  ALBUTerol    90 MICROgram(s) HFA Inhaler 2 Puff(s) Inhalation every 6 hours PRN  chlorhexidine 0.12% Liquid 15 milliLiter(s) Oral Mucosa every 12 hours  chlorhexidine 4% Liquid 1 Application(s) Topical <User Schedule>  dexMEDEtomidine Infusion 0.257 MICROgram(s)/kG/Hr IV Continuous <Continuous>  diazepam    Tablet 10 milliGRAM(s) Oral every 8 hours  enoxaparin Injectable 80 milliGRAM(s) SubCutaneous two times a day  famotidine    Tablet 20 milliGRAM(s) Oral two times a day  fentaNYL   Infusion. 0.501 MICROgram(s)/kG/Hr IV Continuous <Continuous>  furosemide   Injectable 40 milliGRAM(s) IV Push once  midazolam Infusion 0.02 mG/kG/Hr IV Continuous <Continuous>  petrolatum Ophthalmic Ointment 1 Application(s) Both EYES two times a day  phenylephrine    Infusion 0.1 MICROgram(s)/kG/Min IV Continuous <Continuous>  propofol Infusion 75 MICROgram(s)/kG/Min IV Continuous <Continuous>  sodium chloride 0.9% lock flush 10 milliLiter(s) IV Push every 1 hour PRN    Relevant labs/imaging/vitals reviewed.    EXAM:  Intubated and sedated  NCAT  MMM  Mechanically ventilated  Abdomen nondistended  RRR  Moves all extremities  Skin dry    PLAN:  Neuro --  valium q5; wean versed, fentanyl, propofol as tolerated  Pulm -- continue mechanical ventilation, wean as tolerated; s/p 5 d plaquenil, s/p 5 d solumedrol, s/p anakinra taper; continues to have high plateau pressures and poor compliance  Cards -- target MAP > 65, on yovanny GTT  GI -- tube feeds @ goal  Renal -- Cr normal, monitor urine output; PRN diuresis  ID -- COVID +, continue vanco, follow-up cultures  Endo -- monitor blood sugars  Heme -- on full dose SQL empirically    This patient is critically ill with high risk of life-threatening decompensation secondary to respiratory and/or multiorgan failure.  45 minutes spent. AD HOC COVID ICU NOTE    *** PATIENT SEEN DURING COVID PANDEMIC -- [place] [facility] -- HOSPITAL RESOURCES CRITICALLY OVERWHELMED -- NORMAL DECISION MAKING PROCESS IS SIGNIFICANTLY ALTERED -- STANDARD CARE PATHWAYS ARE COMPROMISED -- DRUG AND STAFFING SHORTAGES IN EFFECT ***    BRIEF HISTORY:  KIMBERLY SOUTH is a 44y yo Male patient intubated and mechanically ventilated due to respiratory failure 2/2 COVID pneumonia.    PMH:   No significant PMH.    MEDS:   acetaminophen   Tablet .. 650 milliGRAM(s) Oral every 6 hours PRN  ALBUTerol    90 MICROgram(s) HFA Inhaler 2 Puff(s) Inhalation every 6 hours PRN  chlorhexidine 0.12% Liquid 15 milliLiter(s) Oral Mucosa every 12 hours  chlorhexidine 4% Liquid 1 Application(s) Topical <User Schedule>  dexMEDEtomidine Infusion 0.257 MICROgram(s)/kG/Hr IV Continuous <Continuous>  diazepam    Tablet 10 milliGRAM(s) Oral every 8 hours  enoxaparin Injectable 80 milliGRAM(s) SubCutaneous two times a day  famotidine    Tablet 20 milliGRAM(s) Oral two times a day  fentaNYL   Infusion. 0.501 MICROgram(s)/kG/Hr IV Continuous <Continuous>  furosemide   Injectable 40 milliGRAM(s) IV Push once  midazolam Infusion 0.02 mG/kG/Hr IV Continuous <Continuous>  petrolatum Ophthalmic Ointment 1 Application(s) Both EYES two times a day  phenylephrine    Infusion 0.1 MICROgram(s)/kG/Min IV Continuous <Continuous>  propofol Infusion 75 MICROgram(s)/kG/Min IV Continuous <Continuous>  sodium chloride 0.9% lock flush 10 milliLiter(s) IV Push every 1 hour PRN    Relevant labs/imaging/vitals reviewed.    EXAM:  Intubated and sedated  NCAT  MMM  Mechanically ventilated  Abdomen nondistended  RRR  Moves all extremities  Skin dry    PLAN:  Neuro --  valium q5; wean versed, fentanyl, propofol as tolerated  Pulm -- continue mechanical ventilation, wean as tolerated; s/p 5 d plaquenil, s/p 5 d solumedrol, s/p anakinra taper; continues to have high plateau pressures and poor compliance  Cards -- target MAP > 65, on yovanny GTT  GI -- tube feeds @ goal  Renal -- Cr normal, monitor urine output; PRN diuresis  ID -- COVID +, continue vanco, follow-up cultures  Endo -- monitor blood sugars  Heme -- on full dose SQL empirically    This patient is critically ill with high risk of life-threatening decompensation secondary to respiratory and/or multiorgan failure.  45 minutes spent.

## 2020-04-25 NOTE — PROGRESS NOTE ADULT - ASSESSMENT
Hypercarbic, hypoxic respiratory failure 2/2 COVID-19 Hypercarbic, hypoxic respiratory failure 2/2 COVID-19  - Maintain current vent   - Permissive hypercapnia - pH okay  - f/u ABG  - Monitor Pplat as previously high

## 2020-04-26 NOTE — PROGRESS NOTE ADULT - SUBJECTIVE AND OBJECTIVE BOX
AD HOC COVID ICU NOTE    *** PATIENT SEEN DURING COVID PANDEMIC -- [place] [facility] -- HOSPITAL RESOURCES CRITICALLY OVERWHELMED -- NORMAL DECISION MAKING PROCESS IS SIGNIFICANTLY ALTERED -- STANDARD CARE PATHWAYS ARE COMPROMISED -- DRUG AND STAFFING SHORTAGES IN EFFECT ***    BRIEF HISTORY:  KIMBERLY SOUTH is a 44y yo Male patient intubated and mechanically ventilated due to respiratory failure 2/2 COVID pneumonia.    PMH:   No pertinent past medical history    MEDS:   acetaminophen   Tablet .. 650 milliGRAM(s) Oral every 6 hours PRN  chlorhexidine 0.12% Liquid 15 milliLiter(s) Oral Mucosa every 12 hours  chlorhexidine 4% Liquid 1 Application(s) Topical <User Schedule>  dexMEDEtomidine Infusion 0.257 MICROgram(s)/kG/Hr IV Continuous <Continuous>  diazepam    Tablet 10 milliGRAM(s) Oral every 8 hours  famotidine    Tablet 20 milliGRAM(s) Oral two times a day  fentaNYL   Infusion. 0.501 MICROgram(s)/kG/Hr IV Continuous <Continuous>  furosemide   Injectable 40 milliGRAM(s) IV Push once  petrolatum Ophthalmic Ointment 1 Application(s) Both EYES two times a day  phenylephrine    Infusion 0.1 MICROgram(s)/kG/Min IV Continuous <Continuous>  potassium chloride  20 mEq/100 mL IVPB 20 milliEquivalent(s) IV Intermittent every 2 hours  propofol Infusion 75 MICROgram(s)/kG/Min IV Continuous <Continuous>  sodium chloride 0.9% lock flush 10 milliLiter(s) IV Push every 1 hour PRN    Relevant labs/imaging/vitals reviewed.    EXAM:  Intubated and sedated  NCAT  MMM  Mechanically ventilated  Abdomen nondistended  RRR  Moves all extremities  Skin dry      04-25-20 @ 07:01  -  04-26-20 @ 07:00  --------------------------------------------------------  IN: 2210.5 mL / OUT: 1440 mL / NET: 770.5 mL    04-26-20 @ 07:01  -  04-26-20 @ 14:07  --------------------------------------------------------  IN: 679.6 mL / OUT: 275 mL / NET: 404.6 mL      PLAN:  Neuro --  valium q5; wean versed, fentanyl, propofol as tolerated  Pulm -- continue mechanical ventilation, wean as tolerated; s/p 5 d plaquenil, s/p 5 d solumedrol, s/p anakinra taper; continues to have high plateau pressures and poor compliance  Cards -- target MAP > 65, on yovanny GTT  GI -- tube feeds @ goal  Renal -- Cr normal, monitor urine output; PRN diuresis  ID -- COVID +, continue vanco, follow-up cultures  Endo -- monitor blood sugars  Heme -- on full dose SQL empirically    This patient is critically ill with high risk of life-threatening decompensation secondary to respiratory and/or multiorgan failure.  45 minutes spent.

## 2020-04-26 NOTE — PROGRESS NOTE ADULT - SUBJECTIVE AND OBJECTIVE BOX
36/340/0.4/5    propofol, precedex, fentanyl, neosynephrine Oral bleeding noted.     36/340/0.4/5    propofol, precedex, fentanyl, neosynephrine

## 2020-04-26 NOTE — PROGRESS NOTE ADULT - ASSESSMENT
Hypoxic respiratory failure 2/2 COVID-19  f/u ABG, adjust vent settings as needed  Continue sedation  Chemoppx held by day team due to bleeding concerns but H/H stable and no further bleeding seen; would resume

## 2020-04-27 NOTE — PROGRESS NOTE ADULT - ASSESSMENT
Hypoxic respiratory failure 2/2 COVID-19  f/u ABG, adjust vent settings as needed  Continue sedation  Chemoppx held by day team due to bleeding concerns but H/H stable and no further bleeding seen; would resume Hypoxic respiratory failure 2/2 COVID-19  f/u ABG, adjust vent settings as needed  Continue sedation--tachypneic to 40's with attempt to wean fentanyl gtt   seroquel added today, QTc checked  cont tube feeding  last rectal tube output recorded on 4/22; add bowel regimen now  full dose ac resumed; h/h stable

## 2020-04-27 NOTE — PROGRESS NOTE ADULT - ASSESSMENT
Summary:     NEURO:  valium q5; wean versed, fent, prop as tolerated    CARDS: MAP > 65, yovanny gtt    PULM:  continue mechanical ventilation, wean as tolerated; s/p 5 d plaquenil, s/p 5 d solumedrol, s/p anakinra taper; continues to have high plateau pressures and poor compliance    RENAL:  IVL    GASTRO:  tube feeds    HEME:  empiric full dose SQL    ENDO:  euglycemia    ID:  fevers; cultures negative; monitor off abx    Code status:  Full code  Disposition:  ICU    This patient was at high risk of neurologic deterioration and/or death due to: hypoxemic respiratory failure    Time spent:  45 minutes

## 2020-04-27 NOTE — PROGRESS NOTE ADULT - SUBJECTIVE AND OBJECTIVE BOX
Oral bleeding noted.     36/340/0.4/5    propofol, precedex, fentanyl, neosynephrine weaning propofol  febrile, pan-cultured on 4/26    36/340/0.4/5    propofol, precedex, fentanyl, neosynephrine weaning propofol  febrile, pan-cultured on 4/26  intubated since 4/13    36/340/0.4/5    propofol, precedex, fentanyl, neosynephrine

## 2020-04-27 NOTE — PROGRESS NOTE ADULT - SUBJECTIVE AND OBJECTIVE BOX
HPI:  44 M with no significant PMH presenting with nonproductive cough and fevers for 3-4 days. Patient came in today due to worsening cough. He was prescribed antibiotics on Saturday because he wasn't feeling well and it did somewhat help. Patient has also been having myalgias. No chest pain, some mild shortness of breath. No nausea, vomiting, abdominal pain, diarrhea, or constipation. Patient's brother also symptomatic however they don't live together and patient denies any sick contacts. No recent travels.      In the ED, T 102.3, , /79, RR 22 satting 85% on room air. Patient was placed on 15L nonrebreather satting 95-97%. Patient given tylenol, ceftriaxone, and azithro. (31 Mar 2020 20:47)    OVERNIGHT EVENTS:   No acute events overnight.    VITALS:  T(C): , Max: 38.7 (04-26-20 @ 15:00)  HR:  (67 - 98)  BP: --  ABP:  (101/56 - 138/89)  RR:  (0 - 36)  SpO2:  (92% - 100%)  Wt(kg): --  Device: Avea, Mode: AC/ CMV (Assist Control/ Continuous Mandatory Ventilation), RR (machine): 36, TV (machine): 340, FiO2: 40, PEEP: 5, ITime: 1, MAP: 14, PIP: 40    04-26-20 @ 07:01  -  04-27-20 @ 07:00  --------------------------------------------------------  IN: 2239.1 mL / OUT: 3900 mL / NET: -1660.9 mL    04-27-20 @ 07:01  -  04-27-20 @ 14:31  --------------------------------------------------------  IN: 731.2 mL / OUT: 650 mL / NET: 81.2 mL      LABS:  Na: 138 (04-27 @ 05:14), 139 (04-26 @ 18:26), 140 (04-26 @ 05:55), 139 (04-25 @ 08:09), 142 (04-24 @ 16:56)  K: 4.1 (04-27 @ 05:14), 3.0 (04-26 @ 18:26), 3.4 (04-26 @ 05:55), 4.7 (04-25 @ 08:09), 3.4 (04-24 @ 16:56)  Cl: 96 (04-27 @ 05:14), 91 (04-26 @ 18:26), 94 (04-26 @ 05:55), 95 (04-25 @ 08:09), 98 (04-24 @ 16:56)  CO2: 32 (04-27 @ 05:14), 36 (04-26 @ 18:26), 33 (04-26 @ 05:55), 30 (04-25 @ 08:09), 30 (04-24 @ 16:56)  BUN: 17 (04-27 @ 05:14), 14 (04-26 @ 18:26), 16 (04-26 @ 05:55), 18 (04-25 @ 08:09), 17 (04-24 @ 16:56)  Cr: 0.52 (04-27 @ 05:14), 0.61 (04-26 @ 18:26), 0.57 (04-26 @ 05:55), 0.55 (04-25 @ 08:09), 0.50 (04-24 @ 16:56)  Glu: 137(04-27 @ 05:14), 144(04-26 @ 18:26), 161(04-26 @ 05:55), 164(04-25 @ 08:09), 155(04-24 @ 16:56)    Hgb: 12.1 (04-27 @ 05:14), 12.5 (04-26 @ 18:26), 12.5 (04-26 @ 13:12), 11.9 (04-26 @ 05:55), 11.8 (04-25 @ 08:09)  Hct: 38.7 (04-27 @ 05:14), 40.8 (04-26 @ 18:26), 42.1 (04-26 @ 13:12), 38.5 (04-26 @ 05:55), 38.9 (04-25 @ 08:09)  WBC: 18.46 (04-27 @ 05:14), 19.80 (04-26 @ 18:26), 17.18 (04-26 @ 13:12), 14.75 (04-26 @ 05:55), 16.79 (04-25 @ 08:09)  Plt: 590 (04-27 @ 05:14), 623 (04-26 @ 18:26), 592 (04-26 @ 13:12), 544 (04-26 @ 05:55), 493 (04-25 @ 08:09)    INR: 1.05 04-27-20 @ 05:14, 0.97 04-26-20 @ 05:55, 1.08 04-25-20 @ 08:09  PTT: 32.0 04-27-20 @ 05:14, 30.3 04-26-20 @ 05:55, 34.8 04-25-20 @ 08:09    IMAGING:   Recent imaging studies were reviewed.    MEDICATIONS:  acetaminophen   Tablet .. 650 milliGRAM(s) Oral every 6 hours PRN  ALBUTerol    90 MICROgram(s) HFA Inhaler 2 Puff(s) Inhalation every 6 hours  chlorhexidine 0.12% Liquid 15 milliLiter(s) Oral Mucosa every 12 hours  chlorhexidine 4% Liquid 1 Application(s) Topical <User Schedule>  dexMEDEtomidine Infusion 0.257 MICROgram(s)/kG/Hr IV Continuous <Continuous>  diazepam    Tablet 10 milliGRAM(s) Oral every 8 hours  enoxaparin Injectable 80 milliGRAM(s) SubCutaneous two times a day  famotidine    Tablet 20 milliGRAM(s) Oral two times a day  fentaNYL   Infusion. 0.501 MICROgram(s)/kG/Hr IV Continuous <Continuous>  petrolatum Ophthalmic Ointment 1 Application(s) Both EYES two times a day  phenylephrine    Infusion 0.1 MICROgram(s)/kG/Min IV Continuous <Continuous>  propofol Infusion 75 MICROgram(s)/kG/Min IV Continuous <Continuous>  QUEtiapine 25 milliGRAM(s) Oral at bedtime  sodium chloride 0.9% lock flush 10 milliLiter(s) IV Push every 1 hour PRN  sodium chloride 3%  Inhalation 3 milliLiter(s) Inhalation every 6 hours    EXAMINATION:  General:  calm  HEENT:  MMM  Neuro:  sedated  Cards:  RRR  Respiratory:  intubated  Adomen:  soft  Extremities:  no edema  Skin:  warm/dry

## 2020-04-28 NOTE — PROGRESS NOTE ADULT - SUBJECTIVE AND OBJECTIVE BOX
tolerating CPAP/pressure control  febrile, pan-cultured on 4/26  intubated since 4/13    24/400/80%/8/32    propofol, precedex, fentanyl, neosynephrine

## 2020-04-28 NOTE — PROGRESS NOTE ADULT - ASSESSMENT
Summary:     NEURO:  valium q5; wean versed, fent, prop as tolerated    CARDS: MAP > 65, yovanny gtt    PULM:  continue mechanical ventilation, wean as tolerated; s/p 5 d plaquenil, s/p 5 d solumedrol, s/p anakinra taper; continues to have high plateau pressures and poor compliance    RENAL:  IVL    GASTRO:  tube feeds    HEME:  empiric full dose SQL    ENDO:  euglycemia    ID:  fevers; cultures negative; monitor off abx    Code status:  Full code  Disposition:  ICU    This patient was at high risk of neurologic deterioration and/or death due to: hypoxemic respiratory failure    Time spent:  45 minutes ASSESSMENT/PLAN: Hypoxic respiratory failure 2/2 COVID-19 pneurmonia    NEURO: qshift neuro checks, add fentanyl patch, and increase quetiapine if Qtc allows in order to wean IV sedation    CARDS: MAP > 65mmHg, yovanny gtt    PULM:  continue mechanical ventilation, wean as tolerated - currently tolerating pressure support; s/p 5 d plaquenil, s/p 5 d solumedrol, s/p anakinra taper    RENAL: TBB -500cc; diurese PRN    GASTRO:  tube feeds    HEME:  empiric full dose SQL    ENDO:  euglycemia    ID: Fevers, new + blood cultures, on antibiotics - change all lines    Code status:  Full code  Disposition:  ICU    This patient was at high risk of neurologic deterioration and/or death due to: hypoxemic respiratory failure    Time spent:  45 minutes ASSESSMENT/PLAN: Hypoxic respiratory failure 2/2 COVID-19 pneurmonia    NEURO: qshift neuro checks, add fentanyl patch, and increase quetiapine if Qtc allows in order to wean IV sedation    CARDS: MAP > 65mmHg, yovanny gtt    PULM:  continue mechanical ventilation, wean as tolerated - currently tolerating pressure support; s/p 5 d plaquenil, s/p 5 d solumedrol, s/p anakinra taper    RENAL: TBB -500cc; diurese PRN    GASTRO:  tube feeds    HEME:  empiric full dose SQL    ENDO:  euglycemia    ID: Fevers, new + blood cultures, on antibiotics - change all lines    Social: Updated wife by phone    Code status:  Full code  Disposition:  ICU    This patient was at high risk of neurologic deterioration and/or death due to: hypoxemic respiratory failure    Time spent:  45 minutes ASSESSMENT/PLAN: Hypoxic respiratory failure 2/2 COVID-19 pneumonia    NEURO: qshift neuro checks, add fentanyl patch, and increase quetiapine if Qtc allows in order to wean IV sedation    CARDS: MAP > 65mmHg, yovanny gtt    PULM:  continue mechanical ventilation, wean as tolerated - currently tolerating pressure support; s/p 5 d plaquenil, s/p 5 d solumedrol, s/p anakinra taper    RENAL: TBB -500cc; diurese PRN    GASTRO:  tube feeds    HEME:  empiric full dose SQL    ENDO:  euglycemia    ID: Fevers, new + blood cultures, on antibiotics - change all lines    Social: Updated wife by phone    Code status:  Full code  Disposition:  ICU    This patient was at high risk of neurologic deterioration and/or death due to: hypoxemic respiratory failure    Time spent:  45 minutes

## 2020-04-28 NOTE — PROCEDURE NOTE - PROCEDURE DATE TIME, MLM
28-Apr-2020 12:12 Alert and oriented to person, place, time/situation. normal mood and affect. no apparent risk to self or others.

## 2020-04-28 NOTE — PROGRESS NOTE ADULT - SUBJECTIVE AND OBJECTIVE BOX
SUMMARY:   44 M with no significant PMH presenting with nonproductive cough and fevers for 3-4 days. Patient came in today due to worsening cough. He was prescribed antibiotics on Saturday because he wasn't feeling well and it did somewhat help. Patient has also been having myalgias. No chest pain, some mild shortness of breath. No nausea, vomiting, abdominal pain, diarrhea, or constipation. Patient's brother also symptomatic however they don't live together and patient denies any sick contacts. No recent travels.    In the ED, T 102.3, , /79, RR 22 satting 85% on room air. Patient was placed on 15L nonrebreather satting 95-97%. Patient given tylenol, ceftriaxone, and azithro. (31 Mar 2020 20:47)    OVERNIGHT EVENTS:     VITALS/DATA/ORDERS:     EXAMINATION: SUMMARY:   44 M with no significant PMH presented 3/31/20 with nonproductive cough and fevers for 3-4 days associated with myalgias. In the ED, T 102.3, , /79, RR 22 satting 85% on room air. Patient was placed on 15L nonrebreather satting 95-97%. Patient given tylenol, ceftriaxone, and azithro. Intubated on 4/14 for hypoxic respiratory failure.     OVERNIGHT EVENTS:   Spiked fever. Cultures with gram +cocci -> vanco.     VITALS/DATA/ORDERS: [x] Reviewed  PS 5/5/0.5  Propofol, precedex, fentanyl neosynephrine    EXAMINATION:  Breathing comtortably SUMMARY:   44 M with no significant PMH presented 3/31/20 with nonproductive cough and fevers for 3-4 days associated with myalgias. In the ED, T 102.3, , /79, RR 22 satting 85% on room air. Patient was placed on 15L nonrebreather satting 95-97%. Patient given tylenol, ceftriaxone, and azithro. Intubated on 4/14 for hypoxic respiratory failure.     FLOOR COURSE:  Treated with plaquenil, methylprednisolone and ceftriaxone/azithromycin. Anakinra started on 4/3. Had an RRT on 4/14 for hypoxia.     ICU COURSE:   Hypoxia treated with increased sedation and paralysis, proning. Intermittent fevers, treated with antibiotics.     OVERNIGHT EVENTS:   Spiked fever. Cultures with gram +cocci -> vanco.     VITALS/DATA/ORDERS: [x] Reviewed  PS 5/5/0.5  Propofol, precedex, fentanyl neosynephrine    EXAMINATION:  Breathing comtortably

## 2020-04-28 NOTE — PROGRESS NOTE ADULT - ASSESSMENT
Hypoxic respiratory failure 2/2 COVID-19  f/u ABG, adjust vent settings as needed/CPAP as tolerates  Continue sedation/analgesia   seroquel added today, QTc checked  cont tube feeding  last rectal tube output recorded on 4/22; add bowel regimen now  full dose ac resumed; h/h down trending, repeat H/H   febrile, started on vanc last night for GPC in blood  all lines changed today Hypoxic respiratory failure 2/2 COVID-19  f/u ABG, adjust vent settings as needed/CPAP as tolerates  Continue sedation/analgesia   seroquel added today, QTc checked  cont tube feeding  4/27 +BM  full dose ac resumed; h/h down trending, repeat H/H   febrile, started on vanc last night for GPC in blood  all lines changed today

## 2020-04-28 NOTE — CHART NOTE - NSCHARTNOTEFT_GEN_A_CORE
Nutrition Follow Up Note   Patient seen for: nutrition follow up on COVID ICU     Source: medical record, communication with team. Unable to speak to pt due to current airborne isolation contact precautions related to COVID-19. Pt remains intubated.     Chart reviewed, events noted. Pt is a 45 yo M with no significant PMH. Presented with fever, cough. Found to be septic on admission with CXR showing b/l patchy opacities, admitted for acute hypoxic respiratory failure due to COVID19 pneumonia. S/P 5 days of Plaquenil, S/P 5 days of Solu-Medrol and S/P Anakinra taper. Hospital course significant for rapid response (x2) for patient experiencing hypoxia and severe respiratory distress. Pt placed in prone position, ultimately intubated. NGT placed. Pt remains intubated and sedated.    Diet Order: Diet, NPO with Tube Feed:   Tube Feeding Modality: Nasogastric  Vital AF (VITALAFRTH)  Total Volume for 24 Hours (mL): 600  Bolus  Total Volume of Bolus (mL):  150  Total # of Feeds: 4  Tube Feed Frequency: Every 6 hours   Tube Feed Start Time: 20:00  Bolus Feed Rate (mL per Hour): 50   Bolus Feed Duration (in Hours): 1 (04-27-20 @ 15:06)    CURRENT EN ORDER PROVIDES: 720kcals and 45gm protein  Nutrition Events: Pt tolerating EN well at this time.   Continues to receive propofol at varying rates. Has received 250kcals in the past 24hrs. Triglyceride levels increasing     GI: Had no rectal tube out put for several days. tube removed and bowel regimen started.   Pt with 3BM's today, rectal tube now replaced.     Anthrpometric Measurements:   Height (cm): 167.6 (04-14-20 @ 16:20)  Weight (kg): 77.9 (04-14-20 @ 16:20)  BMI (kg/m2): 27.7 (04-14-20 @ 16:20)  IBW:   %IBW:    Medications: MEDICATIONS  (STANDING):  acetaminophen  IVPB .. 1000 milliGRAM(s) IV Intermittent once  ALBUTerol    90 MICROgram(s) HFA Inhaler 2 Puff(s) Inhalation every 6 hours  chlorhexidine 0.12% Liquid 15 milliLiter(s) Oral Mucosa every 12 hours  chlorhexidine 4% Liquid 1 Application(s) Topical <User Schedule>  dexMEDEtomidine Infusion 0.257 MICROgram(s)/kG/Hr (5 mL/Hr) IV Continuous <Continuous>  diazepam    Tablet 10 milliGRAM(s) Oral every 8 hours  enoxaparin Injectable 80 milliGRAM(s) SubCutaneous two times a day  famotidine    Tablet 20 milliGRAM(s) Oral two times a day  fentaNYL   Infusion. 0.501 MICROgram(s)/kG/Hr (1.95 mL/Hr) IV Continuous <Continuous>  fentaNYL   Patch  75 MICROgram(s)/Hr 1 Patch Transdermal every 72 hours  petrolatum Ophthalmic Ointment 1 Application(s) Both EYES two times a day  phenylephrine    Infusion 0.1 MICROgram(s)/kG/Min (1.46 mL/Hr) IV Continuous <Continuous>  polyethylene glycol 3350 17 Gram(s) Oral two times a day  propofol Infusion 75 MICROgram(s)/kG/Min (35.1 mL/Hr) IV Continuous <Continuous>  QUEtiapine 25 milliGRAM(s) Oral at bedtime  QUEtiapine 12.5 milliGRAM(s) Oral <User Schedule>  senna Syrup 10 milliLiter(s) Oral at bedtime  sodium chloride 3%  Inhalation 3 milliLiter(s) Inhalation every 6 hours  vancomycin  IVPB      vancomycin  IVPB 1000 milliGRAM(s) IV Intermittent every 12 hours    MEDICATIONS  (PRN):  acetaminophen   Tablet .. 650 milliGRAM(s) Oral every 6 hours PRN Temp greater or equal to 38C (100.4F), Mild Pain (1 - 3), Moderate Pain (4 - 6)  sodium chloride 0.9% lock flush 10 milliLiter(s) IV Push every 1 hour PRN Pre/post blood products, medications, blood draw, and to maintain line patency    Labs: 04-28 @ 04:48: Sodium 137, Potassium 4.4, Calcium 9.2, Magnesium 2.0, Phosphorus 4.5, BUN 17, Creatinine 0.61, Glucose 175<H>, Alk Phos 176<H>, ALT/SGPT 27, AST/SGOT 38, Albumin 2.9<L>, Prealbumin --, Total Bilirubin 0.7, Hemoglobin 13.3, Hematocrit 41.5, Ferritin --, C-Reactive Protein --, Creatine Kinase <<27>  04-27 @ 17:18: Sodium 138, Potassium 4.0, Calcium 8.4, Magnesium 1.8, Phosphorus 3.1, BUN 16, Creatinine 0.47<L>, Glucose 120<H>, Alk Phos 128<H>, ALT/SGPT 21, AST/SGOT 26, Albumin 2.5<L>, Prealbumin --, Total Bilirubin 0.6, Hemoglobin --, Hematocrit --, Ferritin --, C-Reactive Protein --, Creatine Kinase <<27>      Triglycerides, Serum: 475 mg/dL (04-28-20 @ 04:48)  Triglycerides, Serum: 460 mg/dL (04-27-20 @ 05:14)  Triglycerides, Serum: 395 mg/dL (04-26-20 @ 15:56)  Triglycerides, Serum: 445 mg/dL (04-26-20 @ 05:55)  Triglycerides, Serum: 385 mg/dL (04-25-20 @ 08:09)  Triglycerides, Serum: 287 mg/dL (04-23-20 @ 05:15)  Triglycerides, Serum: 274 mg/dL (04-22-20 @ 05:41)  Triglycerides, Serum: 174 mg/dL (04-18-20 @ 05:46)  Triglycerides, Serum: 226 mg/dL (04-17-20 @ 13:08)  Triglycerides, Serum: 182 mg/dL (04-15-20 @ 17:38)      Skin: intact  Edema: +2 edema       Estimated Needs: (based on dosing wt 77.9kg)  Energy: (20-25kcal/kg): 1558-1948kcal  Protein: (1.2-1.4g protein/kg): 93-109g protein    Previous Nutrition Diagnosis: swallowing difficulty   Nutrition Diagnosis is: addressed with EN     New Nutrition Diagnosis:  None    Recommended Interventions:   1. As medically feasible, would increase bolus rate by 50ml q6hrs to goal rate of Vital AF @ 330ml q6hrs. Goal rate will provide: 1584kcals and 99gm protein (20.5kcals.kg and 1.27gm pro/kg based on Dosing wt: 77.9kg)   2. trend propofol use  3. Trend GI tolerance  4. Trend BG levels, renal indices, LFT's, electrolytes and triglycerides    Monitoring and Evaluation:   Continue to monitor nutrition provision and tolerance, weights, labs, skin integrity.   RD remains available upon request and will follow up per protocol.  Sarah Siegler RD, RDN, [facility] Pager #[phone]

## 2020-04-28 NOTE — CHART NOTE - NSCHARTNOTEFT_GEN_A_CORE
Patient was lying flat and right femoral ellen was removed under standard fashion, right axillary ellen placed this morning. Direct pressure was applied to stop bleeding and tegaderm dressing was applied after hemostasis achieved. Patient tolerated procedure well. Vitals stable during the procedure. Patient's RN was instructed to check dressing frequently. No complications.

## 2020-04-29 NOTE — PROGRESS NOTE ADULT - SUBJECTIVE AND OBJECTIVE BOX
tolerating CPAP/pressure control  febrile, pan-cultured on 4/26  intubated since 4/13    hypoxic/hypercapneic during the day, vent setting adjusted, proned    propofol, precedex, fentanyl, neosynephrine

## 2020-04-29 NOTE — PROGRESS NOTE ADULT - SUBJECTIVE AND OBJECTIVE BOX
SUMMARY:   44 M with no significant PMH presented 3/31/20 with nonproductive cough and fevers for 3-4 days associated with myalgias. In the ED, T 102.3, , /79, RR 22 satting 85% on room air. Patient was placed on 15L nonrebreather satting 95-97%. Patient given tylenol, ceftriaxone, and azithro. Intubated on 4/14 for hypoxic respiratory failure.     FLOOR COURSE:  Treated with plaquenil, methylprednisolone and ceftriaxone/azithromycin. Anakinra started on 4/3. Had an RRT on 4/14 for hypoxia.     ICU COURSE:   Hypoxia treated with increased sedation and paralysis, proning. Intermittent fevers, treated with antibiotics.     OVERNIGHT EVENTS:       VITALS/DATA/ORDERS: [x] Reviewed      EXAMINATION:  Breathing comtortably SUMMARY:   44 M with no significant PMH presented 3/31/20 with nonproductive cough and fevers for 3-4 days associated with myalgias. In the ED, T 102.3, , /79, RR 22 satting 85% on room air. Patient was placed on 15L nonrebreather satting 95-97%. Patient given tylenol, ceftriaxone, and azithro. Intubated on 4/14 for hypoxic respiratory failure.     FLOOR COURSE:  Treated with plaquenil, methylprednisolone and ceftriaxone/azithromycin. Anakinra started on 4/3. Had an RRT on 4/14 for hypoxia.     ICU COURSE:   Hypoxia treated with increased sedation and paralysis, proning. Intermittent fevers, treated with antibiotics.     OVERNIGHT EVENTS:   Central line, a-line replaced due to bacteremia.     VITALS/DATA/ORDERS: [x] Reviewed  PC FiO2 80%  Propofol, Precedex, fentanyl, yovanny    EXAMINATION:  Breathing comfortably

## 2020-04-29 NOTE — PROGRESS NOTE ADULT - ASSESSMENT
Hypoxic respiratory failure 2/2 COVID-19  f/u ABG, adjust vent settings as needed    proned at 6pm, repeat ABG  Continue sedation/analgesia   seroquel added today, QTc checked  cont tube feeding  4/27 +BM  full dose ac resumed; h/h down trending, repeat H/H   febrile, cont vanc, last cultures sent on 4/29--f/u, 4/26 and 28 cultures positive for GPC in blood  all lines changed 4/28

## 2020-04-29 NOTE — PROGRESS NOTE ADULT - ASSESSMENT
ASSESSMENT/PLAN: Hypoxic respiratory failure 2/2 COVID-19 pneumonia    NEURO: qshift neuro checks, add fentanyl patch, and increase quetiapine if Qtc allows in order to wean IV sedation    CARDS: MAP > 65mmHg, yovanny gtt    PULM:  continue mechanical ventilation, wean as tolerated - currently tolerating pressure support; s/p 5 d plaquenil, s/p 5 d solumedrol, s/p anakinra taper    RENAL: TBB -500cc; diurese PRN    GASTRO:  tube feeds    HEME:  empiric full dose SQL    ENDO:  euglycemia    ID: Fevers, new + blood cultures, on antibiotics - change all lines    Social: Updated wife by phone    Code status:  Full code  Disposition:  ICU    This patient was at high risk of neurologic deterioration and/or death due to: hypoxemic respiratory failure    Time spent:  45 minutes ASSESSMENT/PLAN: Hypoxic respiratory failure 2/2 COVID-19 pneumonia    NEURO: qshift neuro checks, add fentanyl patch, and increase quetiapine if Qtc allows in order to wean IV sedation    CARDS: MAP > 65mmHg, yovanny gtt (tachycardia on levophed)    PULM:  continue mechanical ventilation, wean as tolerated - currently tolerating pressure support; s/p 5 d plaquenil, s/p 5 d solumedrol, s/p anakinra taper    RENAL: TBB even     GASTRO:  tube feeds    HEME:  empiric full dose SQL    ENDO:  euglycemia    ID: Fevers, gram positive cocci bacteremia - on vancomycin; clearance blood cultures    Code status:  Full code  Disposition:  ICU    This patient was at high risk of neurologic deterioration and/or death due to: hypoxemic respiratory failure    Time spent:  45 minutes ASSESSMENT/PLAN: Hypoxic respiratory failure 2/2 COVID-19 pneumonia    NEURO: qshift neuro checks, add fentanyl patch, and increase quetiapine if Qtc allows in order to wean IV sedation    CARDS: MAP > 65mmHg, yovanny gtt (tachycardia on levophed)    PULM:  continue mechanical ventilation, wean as tolerated; s/p 5 d plaquenil, s/p 5 d solumedrol, s/p anakinra taper    RENAL: TBB even     GASTRO:  tube feeds    HEME:  empiric full dose SQL    ENDO:  euglycemia    ID: Fevers, gram positive cocci bacteremia - on vancomycin; clearance blood cultures    Code status:  Full code  Disposition:  ICU    This patient was at high risk of neurologic deterioration and/or death due to: hypoxemic respiratory failure    Time spent:  45 minutes

## 2020-04-29 NOTE — CHART NOTE - NSCHARTNOTEFT_GEN_A_CORE
Patient's spouse, Catherine, was contacted at [phone] via [place] phone  #[phone].  Updates provided and all questions answered.

## 2020-04-30 NOTE — PROGRESS NOTE ADULT - SUBJECTIVE AND OBJECTIVE BOX
febrile, pan-cultured on 4/26  intubated since 4/13    supined today    propofol, precedex, fentanyl, neosynephrine febrile, pan-cultured on 4/26  intubated since 4/13    supined today noon  +vomiting, tachycardic    33/380/70%/5    propofol, precedex, fentanyl, neosynephrine

## 2020-04-30 NOTE — PROGRESS NOTE ADULT - SUBJECTIVE AND OBJECTIVE BOX
· Subjective and Objective:   SUMMARY:   44 M with no significant PMH presented 3/31/20 with nonproductive cough and fevers for 3-4 days associated with myalgias. In the ED, T 102.3, , /79, RR 22 satting 85% on room air. Patient was placed on 15L nonrebreather satting 95-97%. Patient given tylenol, ceftriaxone, and azithro. Intubated on 4/14 for hypoxic respiratory failure.     FLOOR COURSE:  Treated with plaquenil, methylprednisolone and ceftriaxone/azithromycin. Anakinra started on 4/3. Had an RRT on 4/14 for hypoxia.     ICU COURSE:   Hypoxia treated with increased sedation and paralysis, proning. Intermittent fevers, treated with antibiotics.     OVERNIGHT EVENTS:   Central line, a-line replaced due to bacteremia.     T(C): 37.3 (04-30-20 @ 03:00), Max: 37.5 (04-29-20 @ 15:00)  HR: 86 (04-30-20 @ 04:34) (86 - 113)  BP: --  RR: 30 (04-30-20 @ 04:34) (18 - 30)  SpO2: 99% (04-30-20 @ 04:34) (87% - 100%)  04-29-20 @ 07:01  -  04-30-20 @ 07:00  --------------------------------------------------------  IN: 4425.9 mL / OUT: 2845 mL / NET: 1580.9 mL    acetaminophen   Tablet .. 650 milliGRAM(s) Oral every 6 hours PRN  ALBUTerol    90 MICROgram(s) HFA Inhaler 2 Puff(s) Inhalation every 6 hours  chlorhexidine 0.12% Liquid 15 milliLiter(s) Oral Mucosa every 12 hours  chlorhexidine 4% Liquid 1 Application(s) Topical <User Schedule>  dexMEDEtomidine Infusion 0.257 MICROgram(s)/kG/Hr IV Continuous <Continuous>  diazepam  Injectable 2 milliGRAM(s) IV Push every 8 hours  enoxaparin Injectable 80 milliGRAM(s) SubCutaneous two times a day  famotidine Injectable 20 milliGRAM(s) IV Push two times a day  fentaNYL   Infusion. 0.501 MICROgram(s)/kG/Hr IV Continuous <Continuous>  fentaNYL   Patch  75 MICROgram(s)/Hr 1 Patch Transdermal every 72 hours  norepinephrine Infusion 0.05 MICROgram(s)/kG/Min IV Continuous <Continuous>  petrolatum Ophthalmic Ointment 1 Application(s) Both EYES two times a day  phenylephrine    Infusion 0.1 MICROgram(s)/kG/Min IV Continuous <Continuous>  polyethylene glycol 3350 17 Gram(s) Oral two times a day  propofol Infusion 75 MICROgram(s)/kG/Min IV Continuous <Continuous>  QUEtiapine 50 milliGRAM(s) Oral every 8 hours  rocuronium Infusion 8 MICROgram(s)/kG/Min IV Continuous <Continuous>  senna Syrup 10 milliLiter(s) Oral at bedtime  sodium chloride 0.9% lock flush 10 milliLiter(s) IV Push every 1 hour PRN  sodium chloride 3%  Inhalation 3 milliLiter(s) Inhalation every 6 hours  vancomycin  IVPB 1500 milliGRAM(s) IV Intermittent every 12 hours  Mode: AC/ CMV (Assist Control/ Continuous Mandatory Ventilation), RR (machine): 24, TV (machine): 450, FiO2: 100, PEEP: 10, ITime: 1, MAP: 18, PC: 34, PIP:     EXAMINATION:  Breathing comfortably    Physical Exam:    Reference Recent Physical Exam:  · In accordance with current standards limiting patient contact please refer to the recent:	Other  · Other Physical Exam Document	RN Flowsheet      LABS:  Na: 141 (04-30 @ 04:13), 138 (04-29 @ 16:33), 136 (04-29 @ 05:53), 138 (04-28 @ 17:57), 137 (04-28 @ 04:48), 138 (04-27 @ 17:18)  K: 3.5 (04-30 @ 04:13), 3.1 (04-29 @ 16:33), 4.2 (04-29 @ 05:53), 3.1 (04-28 @ 17:57), 4.4 (04-28 @ 04:48), 4.0 (04-27 @ 17:18)  Cl: 101 (04-30 @ 04:13), 97 (04-29 @ 16:33), 98 (04-29 @ 05:53), 97 (04-28 @ 17:57), 94 (04-28 @ 04:48), 96 (04-27 @ 17:18)  CO2: 28 (04-30 @ 04:13), 28 (04-29 @ 16:33), 27 (04-29 @ 05:53), 29 (04-28 @ 17:57), 31 (04-28 @ 04:48), 29 (04-27 @ 17:18)  BUN: 7 (04-30 @ 04:13), 8 (04-29 @ 16:33), 10 (04-29 @ 05:53), 11 (04-28 @ 17:57), 17 (04-28 @ 04:48), 16 (04-27 @ 17:18)  Cr: 0.56 (04-30 @ 04:13), 0.57 (04-29 @ 16:33), 0.50 (04-29 @ 05:53), 0.54 (04-28 @ 17:57), 0.61 (04-28 @ 04:48), 0.47 (04-27 @ 17:18)  Glu: 160(04-30 @ 04:13), 190(04-29 @ 16:33), 178(04-29 @ 05:53), 197(04-28 @ 17:57), 175(04-28 @ 04:48), 120(04-27 @ 17:18)    Hgb: 8.8 (04-30 @ 04:13), 9.7 (04-29 @ 12:32), 10.2 (04-28 @ 23:39), 10.3 (04-28 @ 17:57), 13.3 (04-28 @ 04:48)  Hct: 28.6 (04-30 @ 04:13), 30.6 (04-29 @ 12:32), 32.3 (04-28 @ 23:39), 31.9 (04-28 @ 17:57), 41.5 (04-28 @ 04:48)  WBC: 22.40 (04-30 @ 04:13), 37.53 (04-29 @ 12:32), 29.43 (04-28 @ 23:39), 30.52 (04-28 @ 17:57), 23.25 (04-28 @ 04:48)  Plt: 532 (04-30 @ 04:13), 570 (04-29 @ 12:32), 507 (04-28 @ 23:39), 528 (04-28 @ 17:57), 594 (04-28 @ 04:48)    INR: 1.06 04-30-20 @ 04:13  PTT: 32.3 04-30-20 @ 04:13            Assessment and Plan:   · Assessment	  ASSESSMENT/PLAN: Hypoxic respiratory failure 2/2 COVID-19 pneumonia    NEURO: qshift neuro checks, add fentanyl patch, and increase quetiapine if Qtc allows in order to wean IV sedation    CARDS: MAP > 65mmHg, yovanny gtt (tachycardia on levophed)    PULM:  continue mechanical ventilation, wean as tolerated; s/p 5 d plaquenil, s/p 5 d solumedrol, s/p anakinra taper    RENAL: TBB even     GASTRO:  tube feeds    HEME:  empiric full dose SQL    ENDO:  euglycemia    ID: Fevers, gram positive cocci bacteremia - on vancomycin; clearance blood cultures    Code status:  Full code  Disposition:  ICU    This patient was at high risk of neurologic deterioration and/or death due to: hypoxemic respiratory failure    Time spent:  45 minutes Subjective and Objective:   SUMMARY:   44 M with no significant PMH presented 3/31/20 with nonproductive cough and fevers for 3-4 days associated with myalgias. In the ED, T 102.3, , /79, RR 22 satting 85% on room air. Patient was placed on 15L nonrebreather satting 95-97%. Patient given tylenol, ceftriaxone, and azithro. Intubated on 4/14 for hypoxic respiratory failure.     FLOOR COURSE:  Treated with plaquenil, methylprednisolone and ceftriaxone/azithromycin. Anakinra started on 4/3. Had an RRT on 4/14 for hypoxia.     ICU COURSE:   Hypoxia treated with increased sedation and paralysis, proning. Intermittent fevers, treated with antibiotics.     OVERNIGHT EVENTS:   proned at 6 pm,     T(C): 37.3 (04-30-20 @ 03:00), Max: 37.5 (04-29-20 @ 15:00)  HR: 86 (04-30-20 @ 04:34) (86 - 113)  BP: --  RR: 30 (04-30-20 @ 04:34) (18 - 30)  SpO2: 99% (04-30-20 @ 04:34) (87% - 100%)  04-29-20 @ 07:01  -  04-30-20 @ 07:00  --------------------------------------------------------  IN: 4425.9 mL / OUT: 2845 mL / NET: 1580.9 mL    acetaminophen   Tablet .. 650 milliGRAM(s) Oral every 6 hours PRN  ALBUTerol    90 MICROgram(s) HFA Inhaler 2 Puff(s) Inhalation every 6 hours  chlorhexidine 0.12% Liquid 15 milliLiter(s) Oral Mucosa every 12 hours  chlorhexidine 4% Liquid 1 Application(s) Topical <User Schedule>  dexMEDEtomidine Infusion 0.257 MICROgram(s)/kG/Hr IV Continuous <Continuous>  diazepam  Injectable 2 milliGRAM(s) IV Push every 8 hours  enoxaparin Injectable 80 milliGRAM(s) SubCutaneous two times a day  famotidine Injectable 20 milliGRAM(s) IV Push two times a day  fentaNYL   Infusion. 0.501 MICROgram(s)/kG/Hr IV Continuous <Continuous>  fentaNYL   Patch  75 MICROgram(s)/Hr 1 Patch Transdermal every 72 hours  norepinephrine Infusion 0.05 MICROgram(s)/kG/Min IV Continuous <Continuous>  petrolatum Ophthalmic Ointment 1 Application(s) Both EYES two times a day  phenylephrine    Infusion 0.1 MICROgram(s)/kG/Min IV Continuous <Continuous>  polyethylene glycol 3350 17 Gram(s) Oral two times a day  propofol Infusion 75 MICROgram(s)/kG/Min IV Continuous <Continuous>  QUEtiapine 50 milliGRAM(s) Oral every 8 hours  rocuronium Infusion 8 MICROgram(s)/kG/Min IV Continuous <Continuous>  senna Syrup 10 milliLiter(s) Oral at bedtime  sodium chloride 0.9% lock flush 10 milliLiter(s) IV Push every 1 hour PRN  sodium chloride 3%  Inhalation 3 milliLiter(s) Inhalation every 6 hours  vancomycin  IVPB 1500 milliGRAM(s) IV Intermittent every 12 hours  Mode: AC/ CMV (Assist Control/ Continuous Mandatory Ventilation), RR (machine): 24, TV (machine): 450, FiO2: 100, PEEP: 10, ITime: 1, MAP: 18, PC: 34, PIP:     EXAMINATION:  Breathing comfortably  paralyzed     Physical Exam:    Reference Recent Physical Exam:  · In accordance with current standards limiting patient contact please refer to the recent:	Other  · Other Physical Exam Document	RN Flowsheet      LABS:  Na: 141 (04-30 @ 04:13), 138 (04-29 @ 16:33), 136 (04-29 @ 05:53), 138 (04-28 @ 17:57), 137 (04-28 @ 04:48), 138 (04-27 @ 17:18)  K: 3.5 (04-30 @ 04:13), 3.1 (04-29 @ 16:33), 4.2 (04-29 @ 05:53), 3.1 (04-28 @ 17:57), 4.4 (04-28 @ 04:48), 4.0 (04-27 @ 17:18)  Cl: 101 (04-30 @ 04:13), 97 (04-29 @ 16:33), 98 (04-29 @ 05:53), 97 (04-28 @ 17:57), 94 (04-28 @ 04:48), 96 (04-27 @ 17:18)  CO2: 28 (04-30 @ 04:13), 28 (04-29 @ 16:33), 27 (04-29 @ 05:53), 29 (04-28 @ 17:57), 31 (04-28 @ 04:48), 29 (04-27 @ 17:18)  BUN: 7 (04-30 @ 04:13), 8 (04-29 @ 16:33), 10 (04-29 @ 05:53), 11 (04-28 @ 17:57), 17 (04-28 @ 04:48), 16 (04-27 @ 17:18)  Cr: 0.56 (04-30 @ 04:13), 0.57 (04-29 @ 16:33), 0.50 (04-29 @ 05:53), 0.54 (04-28 @ 17:57), 0.61 (04-28 @ 04:48), 0.47 (04-27 @ 17:18)  Glu: 160(04-30 @ 04:13), 190(04-29 @ 16:33), 178(04-29 @ 05:53), 197(04-28 @ 17:57), 175(04-28 @ 04:48), 120(04-27 @ 17:18)    Hgb: 8.8 (04-30 @ 04:13), 9.7 (04-29 @ 12:32), 10.2 (04-28 @ 23:39), 10.3 (04-28 @ 17:57), 13.3 (04-28 @ 04:48)  Hct: 28.6 (04-30 @ 04:13), 30.6 (04-29 @ 12:32), 32.3 (04-28 @ 23:39), 31.9 (04-28 @ 17:57), 41.5 (04-28 @ 04:48)  WBC: 22.40 (04-30 @ 04:13), 37.53 (04-29 @ 12:32), 29.43 (04-28 @ 23:39), 30.52 (04-28 @ 17:57), 23.25 (04-28 @ 04:48)  Plt: 532 (04-30 @ 04:13), 570 (04-29 @ 12:32), 507 (04-28 @ 23:39), 528 (04-28 @ 17:57), 594 (04-28 @ 04:48)    INR: 1.06 04-30-20 @ 04:13  PTT: 32.3 04-30-20 @ 04:13            Assessment and Plan:   · Assessment	  ASSESSMENT/PLAN: Hypoxic respiratory failure 2/2 COVID-19 pneumonia    NEURO: qshift neuro checks, add fentanyl patch, and increase quetiapine if Qtc allows in order to wean IV sedation  precedex, propofol, rocuronium , fentanyl drip,    d/c seroquel, d/c diazepam, d/c fentanyl patch     CARDS: MAP > 65mmHg,  on levophed, off phenylephrine     PULM:  continue mechanical ventilation, wean as tolerated; s/p 5 d plaquenil, s/p 5 d solumedrol, s/p anakinra taper  Mode: AC/ CMV (Assist Control/ Continuous Mandatory Ventilation), RR (machine): 30, TV (machine): 450, FiO2: 80, PEEP: 8, ITime: 1, MAP: 18, PC: 34, PIP:   Will get ABG and wean FIo2  plateau 34   +1.4L will give lasix 40 mg IV   he was proned at 6 pm yesterday     RENAL: TBB even   creat nl   lasix 40 mg x 1    GASTRO:  NPO as he is proned, pepcid     HEME:  empiric full dose SQL, hgb trending down, lovenox 40 sc qhs     ENDO:  euglycemia    ID: Fevers, gram positive cocci bacteremia - on vancomycin; clearance blood cultures  central lines were changed yesterday   vancomycin  increased dosage, repeat levels tomorrow   repeat blood cx     Code status:  Full code  Disposition:  ICU    This patient was at high risk of neurologic deterioration and/or death due to: hypoxemic respiratory failure    Time spent:  45 minutes

## 2020-04-30 NOTE — PROGRESS NOTE ADULT - ASSESSMENT
Hypoxic respiratory failure 2/2 COVID-19  f/u ABG, adjust vent settings as needed  supine now  Continue sedation/analgesia   seroquel added today, QTc checked  cont tube feeding  4/27 +BM  down trending H/H, now only on lovenox ppx; trend H/H, bid protonix  febrile, cont vanc, last cultures sent on 4/29--f/u, 4/26 and 28 cultures positive for GPC in blood  check vanc trough 5/1 am  all lines changed 4/28 Hypoxic respiratory failure 2/2 COVID-19  f/u ABG, adjust vent settings as needed  supine now  Continue sedation/analgesia   cont tube feeding  +rectal tube w/ output  down trending H/H, now only on lovenox ppx; trend H/H, bid protonix  febrile, cont vanc, last cultures sent on 4/29--f/u, 4/26 and 28 cultures positive for GPC in blood  check vanc trough 5/1 pm  all lines changed 4/28

## 2020-05-01 NOTE — PROGRESS NOTE ADULT - SUBJECTIVE AND OBJECTIVE BOX
INTERVAL HPI/OVERNIGHT EVENTS:  44 M with no significant PMH presented 3/31/20 with nonproductive cough and fevers for 3-4 days associated with myalgias. In the ED, T 102.3, , /79, RR 22 satting 85% on room air. Patient was placed on 15L nonrebreather satting 95-97%. Patient given tylenol, ceftriaxone, and azithro. Intubated on  for hypoxic respiratory failure.     FLOOR COURSE:  Treated with plaquenil, methylprednisolone and ceftriaxone/azithromycin. Anakinra started on 4/3. Had an RRT on  for hypoxia.     ICU COURSE:   Hypoxia treated with increased sedation and paralysis, proning. Intermittent fevers, treated with antibiotics.     Overnight- vomiting and ng tube to lws , fever in am   PRESSORS: [ ] YES [ ] NO  WHICH:  DOSE:    ANTIBIOTICS:                  DATE STARTED:  ANTIBIOTICS:                  DATE STARTED:  ANTIBIOTICS:                  DATE STARTED:      Antimicrobial:  cefepime   IVPB 2000 milliGRAM(s) IV Intermittent once  cefepime   IVPB 2000 milliGRAM(s) IV Intermittent every 8 hours  cefepime   IVPB      vancomycin  IVPB 1500 milliGRAM(s) IV Intermittent every 12 hours    Cardiovascular:  furosemide   Injectable 40 milliGRAM(s) IV Push once  phenylephrine    Infusion 0.1 MICROgram(s)/kG/Min IV Continuous <Continuous>    Pulmonary:    Hematalogic:  enoxaparin Injectable 40 milliGRAM(s) SubCutaneous <User Schedule>    Other:  acetaminophen   Tablet .. 650 milliGRAM(s) Oral every 6 hours PRN  chlorhexidine 0.12% Liquid 15 milliLiter(s) Oral Mucosa every 12 hours  chlorhexidine 4% Liquid 1 Application(s) Topical <User Schedule>  fentaNYL   Infusion. 0.501 MICROgram(s)/kG/Hr IV Continuous <Continuous>  ondansetron Injectable 4 milliGRAM(s) IV Push every 8 hours PRN  pantoprazole  Injectable 40 milliGRAM(s) IV Push two times a day  petrolatum Ophthalmic Ointment 1 Application(s) Both EYES two times a day  propofol Infusion 75 MICROgram(s)/kG/Min IV Continuous <Continuous>  sodium chloride 0.9% lock flush 10 milliLiter(s) IV Push every 1 hour PRN    acetaminophen   Tablet .. 650 milliGRAM(s) Oral every 6 hours PRN  cefepime   IVPB 2000 milliGRAM(s) IV Intermittent once  cefepime   IVPB 2000 milliGRAM(s) IV Intermittent every 8 hours  cefepime   IVPB      chlorhexidine 0.12% Liquid 15 milliLiter(s) Oral Mucosa every 12 hours  chlorhexidine 4% Liquid 1 Application(s) Topical <User Schedule>  enoxaparin Injectable 40 milliGRAM(s) SubCutaneous <User Schedule>  fentaNYL   Infusion. 0.501 MICROgram(s)/kG/Hr IV Continuous <Continuous>  furosemide   Injectable 40 milliGRAM(s) IV Push once  ondansetron Injectable 4 milliGRAM(s) IV Push every 8 hours PRN  pantoprazole  Injectable 40 milliGRAM(s) IV Push two times a day  petrolatum Ophthalmic Ointment 1 Application(s) Both EYES two times a day  phenylephrine    Infusion 0.1 MICROgram(s)/kG/Min IV Continuous <Continuous>  propofol Infusion 75 MICROgram(s)/kG/Min IV Continuous <Continuous>  sodium chloride 0.9% lock flush 10 milliLiter(s) IV Push every 1 hour PRN  vancomycin  IVPB 1500 milliGRAM(s) IV Intermittent every 12 hours    Drug Dosing Weight  Height (cm): 167.6 (2020 16:20)  Weight (kg): 77.9 (2020 16:20)  BMI (kg/m2): 27.7 (2020 16:20)  BSA (m2): 1.87 (2020 16:20)    CENTRAL LINE: [ ] YES [ ] NO  LOCATION:   DATE INSERTED:  REMOVE: [ ] YES [ ] NO  EXPLAIN:    SKY: [x ] YES [ ] NO    DATE INSERTED:  REMOVE:  [ ] YES [ ] NO  EXPLAIN:    A-LINE:  [x ] YES [ ] NO  LOCATION:   DATE INSERTED:  REMOVE:  [ ] YES [ ] NO  EXPLAIN:    PAST MEDICAL & SURGICAL HISTORY:  No pertinent past medical history  S/P appendectomy      REVIEW OF SYSTEMS:    CONSTITUTIONAL: No fever, weight loss, or fatigue  EYES: No eye pain, visual disturbances, or discharge  ENMT:  No difficulty hearing, tinnitus, vertigo; No sinus or throat pain  NECK: No pain or stiffness  BREASTS: No pain, masses, or nipple discharge  RESPIRATORY: No cough, wheezing, chills or hemoptysis; No shortness of breath  CARDIOVASCULAR: No chest pain, palpitations, dizziness, or leg swelling  GASTROINTESTINAL: No abdominal or epigastric pain. No nausea, vomiting, or hematemesis; No diarrhea or constipation. No melena or hematochezia.  GENITOURINARY: No dysuria, frequency, hematuria, or incontinence  NEUROLOGICAL: No headaches, memory loss, loss of strength, numbness, or tremors  SKIN: No itching, burning, rashes, or lesions   LYMPH NODES: No enlarged glands  ENDOCRINE: No heat or cold intolerance; No hair loss  MUSCULOSKELETAL: No joint pain or swelling; No muscle, back, or extremity pain  PSYCHIATRIC: No depression, anxiety, mood swings, or difficulty sleeping  HEME/LYMPH: No easy bruising, or bleeding gums  ALLERGY AND IMMUNOLOGIC: No hives or eczema      ICU Vital Signs Last 24 Hrs  T(C): 38 (01 May 2020 07:00), Max: 38 (2020 19:00)  T(F): 100.4 (01 May 2020 07:00), Max: 100.4 (2020 19:00)  HR: 100 (01 May 2020 10:07) (93 - 133)  BP: --  BP(mean): --  ABP: 126/76 (01 May 2020 07:00) (101/62 - 128/77)  ABP(mean): 96 (01 May 2020 07:00) (77 - 98)  RR: 33 (01 May 2020 07:00) (7 - 39)  SpO2: 100% (01 May 2020 10:07) (94% - 100%)      ABG - ( 2020 22:12 )  pH, Arterial: 7.41  pH, Blood: x     /  pCO2: 47    /  pO2: 81    / HCO3: 29    / Base Excess: 4.7   /  SaO2: 96                  I&O's Detail    2020 07:01  -  01 May 2020 07:00  --------------------------------------------------------  IN:    dexmedetomidine Infusion: 87.6 mL    dexmedetomidine Infusion: 101.9 mL    Enteral Tube Flush: 60 mL    fentaNYL Infusion.: 429 mL    IV PiggyBack: 1500 mL    norepinephrine Infusion: 298.6 mL    norepinephrine Infusion: 84 mL    propofol Infusion: 488.7 mL    rocuronium Infusion: 60 mL  Total IN: 3109.8 mL    OUT:    Gastric Aspirate - Discarded: 200 mL    Indwelling Catheter - Urethral: 2725 mL  Total OUT: 2925 mL    Total NET: 184.8 mL      01 May 2020 07:01  -  01 May 2020 10:16  --------------------------------------------------------  IN:    dexmedetomidine Infusion: 22.8 mL    fentaNYL Infusion.: 58.5 mL    norepinephrine Infusion: 43.8 mL    propofol Infusion: 56.1 mL  Total IN: 181.2 mL    OUT:    Indwelling Catheter - Urethral: 100 mL  Total OUT: 100 mL    Total NET: 81.2 mL          Mode: AC/ CMV (Assist Control/ Continuous Mandatory Ventilation)  RR (machine): 33  FiO2: 60  PEEP: 5  ITime: 1  MAP: 16  PC: 35  PIP: 40      PHYSICAL EXAM:    GENERAL: NAD, well-groomed, well-developed  HEAD:  Atraumatic, Normocephalic  EYES: EOMI, PERRLA, conjunctiva and sclera clear  ENMT: No tonsillar erythema, exudates, or enlargement; Moist mucous membranes, Good dentition, No lesions  NECK: Supple, No JVD, Normal thyroid  NERVOUS SYSTEM:  intubated and sedated   CHEST/LUNG: Clear to percussion bilaterally; No rales, rhonchi, wheezing, or rubs  HEART: Regular rate and rhythm; No murmurs, rubs, or gallops  ABDOMEN: Soft, Nontender, Nondistended; Bowel sounds present  EXTREMITIES:  2+ Peripheral Pulses, No clubbing, cyanosis, or edema  LYMPH: No lymphadenopathy noted  SKIN: No rashes or lesions      LABS:  CBC Full  -  ( 01 May 2020 03:41 )  WBC Count : 21.69 K/uL  RBC Count : 3.29 M/uL  Hemoglobin : 8.3 g/dL  Hematocrit : 27.0 %  Platelet Count - Automated : 544 K/uL  Mean Cell Volume : 82.1 fl  Mean Cell Hemoglobin : 25.2 pg  Mean Cell Hemoglobin Concentration : 30.7 gm/dL  Auto Neutrophil # : 16.31 K/uL  Auto Lymphocyte # : 1.93 K/uL  Auto Monocyte # : 1.93 K/uL  Auto Eosinophil # : 0.50 K/uL  Auto Basophil # : 0.06 K/uL  Auto Neutrophil % : 75.2 %  Auto Lymphocyte % : 8.9 %  Auto Monocyte % : 8.9 %  Auto Eosinophil % : 2.3 %  Auto Basophil % : 0.3 %        142  |  100  |  8   ----------------------------<  129<H>  3.5   |  27  |  0.56    Ca    8.1<L>      01 May 2020 03:41  Phos  2.5       Mg     1.7         TPro  6.4  /  Alb  2.4<L>  /  TBili  0.5  /  DBili  x   /  AST  18  /  ALT  14  /  AlkPhos  106      PT/INR - ( 01 May 2020 03:41 )   PT: 11.3 sec;   INR: 0.98 ratio         PTT - ( 01 May 2020 03:41 )  PTT:29.0 sec  Urinalysis Basic - ( 01 May 2020 07:19 )    Color: Light Yellow / Appearance: Slightly Turbid / S.021 / pH: x  Gluc: x / Ketone: Moderate  / Bili: Negative / Urobili: Negative   Blood: x / Protein: 30 mg/dL / Nitrite: Negative   Leuk Esterase: Negative / RBC: 3 /hpf / WBC 2 /HPF   Sq Epi: x / Non Sq Epi: 2 / Bacteria: Negative      Culture Results:   No growth to date. ( @ 15:06)  Culture Results:   No growth to date. ( @ 15:06)      RADIOLOGY & ADDITIONAL STUDIES:    CRITICAL CARE TIME SPENT:

## 2020-05-01 NOTE — PROGRESS NOTE ADULT - ASSESSMENT
hypoxic respiratory failure 2/2 COVID-19  - Maintain mechanical ventilation- empiric antibiotics - f/u cultures and tailor as appropriate  - NPO; NGT to low intermittent suction  - Diurese with goal TBB - 1.5 to -2L  - Full anticoagulation due to presumed hypercoagulable state from COVID-19 given elevated d-dimers

## 2020-05-01 NOTE — PROGRESS NOTE ADULT - ASSESSMENT
ASSESSMENT/PLAN: Hypoxic respiratory failure 2/2 COVID-19 pneumonia    NEURO: continue propofol and fentanyle dc precedex   CARDS: MAP > 65mmHg,  on phenylephrine     PULM:  continue mechanical ventilation, wean as tolerated;   Mode: pressure control   RENAL: lasix 40 iv push today   creat nl       GASTRO:  NPO since vomiting started on zofran, abdominal xray  continue rectal tube   HEME:  sql 40 daily     ENDO:  euglycemia    ID: Fevers, gram positive cocci bacteremia - on vancomycin;   febrile pan cx :p , will broaden coverage with cefepime  id consult   Code status:  Full code  Disposition:  ICU    This patient was at high risk of neurologic deterioration and/or death due to: hypoxemic respiratory failure    Time spent:  45 minutes

## 2020-05-01 NOTE — PROGRESS NOTE ADULT - SUBJECTIVE AND OBJECTIVE BOX
Given furosemide for diuresis. Febrile, cefepime added. Maintained on low intermittent wall suction as vomited overnight. Given furosemide for diuresis. Febrile, cefepime added. Maintained on low intermittent wall suction as vomited overnight.    Pressure control Pip 40 PEEP 5 RR 33 FiO2 60    propofol, fentanyl, neosynephrine

## 2020-05-02 NOTE — PROGRESS NOTE ADULT - SUBJECTIVE AND OBJECTIVE BOX
INTERVAL HPI/OVERNIGHT EVENTS:  44 M with no significant PMH presented 3/31/20 with nonproductive cough and fevers for 3-4 days associated with myalgias. In the ED, T 102.3, , /79, RR 22 satting 85% on room air. Patient was placed on 15L nonrebreather satting 95-97%. Patient given tylenol, ceftriaxone, and azithro. Intubated on  for hypoxic respiratory failure.     FLOOR COURSE:  Treated with plaquenil, methylprednisolone and ceftriaxone/azithromycin. Anakinra started on 4/3. Had an RRT on  for hypoxia.     ICU COURSE:   Hypoxia treated with increased sedation and paralysis, proning. Intermittent fevers, treated with antibiotics.   overnight -no new events   PRESSORS: [x ] YES [ ] NO  WHICH:  DOSE:    ANTIBIOTICS:                  DATE STARTED:  ANTIBIOTICS:                  DATE STARTED:  ANTIBIOTICS:                  DATE STARTED:      Antimicrobial:  ceFAZolin   IVPB 1000 milliGRAM(s) IV Intermittent every 8 hours    Cardiovascular:  furosemide   Injectable 20 milliGRAM(s) IV Push every 8 hours  phenylephrine    Infusion 0.1 MICROgram(s)/kG/Min IV Continuous <Continuous>    Pulmonary:    Hematalogic:  enoxaparin Injectable 40 milliGRAM(s) SubCutaneous <User Schedule>    Other:  acetaminophen   Tablet .. 650 milliGRAM(s) Oral every 6 hours PRN  chlorhexidine 0.12% Liquid 15 milliLiter(s) Oral Mucosa every 12 hours  chlorhexidine 4% Liquid 1 Application(s) Topical <User Schedule>  famotidine Injectable 20 milliGRAM(s) IV Push every 12 hours  fentaNYL   Infusion. 0.501 MICROgram(s)/kG/Hr IV Continuous <Continuous>  ondansetron Injectable 4 milliGRAM(s) IV Push every 8 hours PRN  petrolatum Ophthalmic Ointment 1 Application(s) Both EYES two times a day  potassium chloride   Solution 40 milliEquivalent(s) Oral four times a day  propofol Infusion 75 MICROgram(s)/kG/Min IV Continuous <Continuous>  sodium chloride 0.9% lock flush 10 milliLiter(s) IV Push every 1 hour PRN    acetaminophen   Tablet .. 650 milliGRAM(s) Oral every 6 hours PRN  ceFAZolin   IVPB 1000 milliGRAM(s) IV Intermittent every 8 hours  chlorhexidine 0.12% Liquid 15 milliLiter(s) Oral Mucosa every 12 hours  chlorhexidine 4% Liquid 1 Application(s) Topical <User Schedule>  enoxaparin Injectable 40 milliGRAM(s) SubCutaneous <User Schedule>  famotidine Injectable 20 milliGRAM(s) IV Push every 12 hours  fentaNYL   Infusion. 0.501 MICROgram(s)/kG/Hr IV Continuous <Continuous>  furosemide   Injectable 20 milliGRAM(s) IV Push every 8 hours  ondansetron Injectable 4 milliGRAM(s) IV Push every 8 hours PRN  petrolatum Ophthalmic Ointment 1 Application(s) Both EYES two times a day  phenylephrine    Infusion 0.1 MICROgram(s)/kG/Min IV Continuous <Continuous>  potassium chloride   Solution 40 milliEquivalent(s) Oral four times a day  propofol Infusion 75 MICROgram(s)/kG/Min IV Continuous <Continuous>  sodium chloride 0.9% lock flush 10 milliLiter(s) IV Push every 1 hour PRN    Drug Dosing Weight  Height (cm): 167.6 (2020 16:20)  Weight (kg): 77.9 (2020 16:20)  BMI (kg/m2): 27.7 (2020 16:20)  BSA (m2): 1.87 (2020 16:20)    CENTRAL LINE: [ x] YES [ ] NO  LOCATION:   DATE INSERTED:  REMOVE: [ ] YES [ ] NO  EXPLAIN:    SKY: [x ] YES [ ] NO    DATE INSERTED:  REMOVE:  [ ] YES [ ] NO  EXPLAIN:    A-LINE:  [x ] YES [ ] NO  LOCATION:   DATE INSERTED:  REMOVE:  [ ] YES [ ] NO  EXPLAIN:    PAST MEDICAL & SURGICAL HISTORY:  No pertinent past medical history  S/P appendectomy      REVIEW OF SYSTEMS:    CONSTITUTIONAL: No fever, weight loss, or fatigue  EYES: No eye pain, visual disturbances, or discharge  ENMT:  No difficulty hearing, tinnitus, vertigo; No sinus or throat pain  NECK: No pain or stiffness  BREASTS: No pain, masses, or nipple discharge  RESPIRATORY: No cough, wheezing, chills or hemoptysis; No shortness of breath  CARDIOVASCULAR: No chest pain, palpitations, dizziness, or leg swelling  GASTROINTESTINAL: No abdominal or epigastric pain. No nausea, vomiting, or hematemesis; No diarrhea or constipation. No melena or hematochezia.  GENITOURINARY: No dysuria, frequency, hematuria, or incontinence  NEUROLOGICAL: No headaches, memory loss, loss of strength, numbness, or tremors  SKIN: No itching, burning, rashes, or lesions   LYMPH NODES: No enlarged glands  ENDOCRINE: No heat or cold intolerance; No hair loss  MUSCULOSKELETAL: No joint pain or swelling; No muscle, back, or extremity pain  PSYCHIATRIC: No depression, anxiety, mood swings, or difficulty sleeping  HEME/LYMPH: No easy bruising, or bleeding gums  ALLERGY AND IMMUNOLOGIC: No hives or eczema      ICU Vital Signs Last 24 Hrs  T(C): 36.9 (02 May 2020 07:00), Max: 38.5 (01 May 2020 15:00)  T(F): 98.4 (02 May 2020 07:00), Max: 101.3 (01 May 2020 15:00)  HR: 91 (02 May 2020 10:02) (91 - 116)  BP: --  BP(mean): --  ABP: 104/67 (02 May 2020 10:00) (97/60 - 110/67)  ABP(mean): 84 (02 May 2020 10:00) (76 - 84)  RR: 33 (02 May 2020 10:00) (29 - 33)  SpO2: 100% (02 May 2020 10:02) (98% - 100%)      ABG - ( 02 May 2020 04:35 )  pH, Arterial: 7.45  pH, Blood: x     /  pCO2: 48    /  pO2: 80    / HCO3: 33    / Base Excess: 8.2   /  SaO2: 96                  I&O's Detail    01 May 2020 07:01  -  02 May 2020 07:00  --------------------------------------------------------  IN:    dexmedetomidine Infusion: 22.8 mL    Enteral Tube Flush: 30 mL    fentaNYL Infusion.: 468 mL    IV PiggyBack: 450 mL    norepinephrine Infusion: 17.8 mL    norepinephrine Infusion: 58.4 mL    phenylephrine   Infusion: 465.6 mL    propofol Infusion: 528.7 mL  Total IN: 2041.3 mL    OUT:    Indwelling Catheter - Urethral: 5040 mL    Rectal Tube: 100 mL  Total OUT: 5140 mL    Total NET: -3098.7 mL      02 May 2020 07:01  -  02 May 2020 11:16  --------------------------------------------------------  IN:    fentaNYL Infusion.: 38.9 mL    phenylephrine   Infusion: 87.6 mL    propofol Infusion: 70.2 mL  Total IN: 196.7 mL    OUT:    Indwelling Catheter - Urethral: 60 mL  Total OUT: 60 mL    Total NET: 136.7 mL          Mode: AC/ CMV (Assist Control/ Continuous Mandatory Ventilation)  RR (machine): 33  FiO2: 50  PEEP: 5  ITime: 1  MAP: 18  PC: 35  PIP: 40      PHYSICAL EXAM:    GENERAL: NAD, well-groomed, well-developed  HEAD:  Atraumatic, Normocephalic  EYES: EOMI, PERRLA, conjunctiva and sclera clear  ENMT: No tonsillar erythema, exudates, or enlargement; Moist mucous membranes, Good dentition, No lesions  NECK: Supple, No JVD, Normal thyroid  NERVOUS SYSTEM: intubated and sedated   CHEST/LUNG: Clear to percussion bilaterally; No rales, rhonchi, wheezing, or rubs  HEART: Regular rate and rhythm; No murmurs, rubs, or gallops  ABDOMEN: Soft, Nontender, Nondistended; Bowel sounds present  EXTREMITIES:  2+ Peripheral Pulses, No clubbing, cyanosis, or edema  LYMPH: No lymphadenopathy noted  SKIN: No rashes or lesions      LABS:  CBC Full  -  ( 02 May 2020 04:37 )  WBC Count : 21.86 K/uL  RBC Count : 3.64 M/uL  Hemoglobin : 9.0 g/dL  Hematocrit : 29.6 %  Platelet Count - Automated : 698 K/uL  Mean Cell Volume : 81.3 fl  Mean Cell Hemoglobin : 24.7 pg  Mean Cell Hemoglobin Concentration : 30.4 gm/dL  Auto Neutrophil # : x  Auto Lymphocyte # : x  Auto Monocyte # : x  Auto Eosinophil # : x  Auto Basophil # : x  Auto Neutrophil % : x  Auto Lymphocyte % : x  Auto Monocyte % : x  Auto Eosinophil % : x  Auto Basophil % : x        138  |  95<L>  |  11  ----------------------------<  89  3.1<L>   |  29  |  0.59    Ca    8.3<L>      02 May 2020 04:37  Phos  3.5     05-  Mg     1.7     -    TPro  7.2  /  Alb  2.8<L>  /  TBili  0.5  /  DBili  x   /  AST  19  /  ALT  14  /  AlkPhos  115  05-02    PT/INR - ( 01 May 2020 03:41 )   PT: 11.3 sec;   INR: 0.98 ratio         PTT - ( 01 May 2020 03:41 )  PTT:29.0 sec  Urinalysis Basic - ( 01 May 2020 07:19 )    Color: Light Yellow / Appearance: Slightly Turbid / S.021 / pH: x  Gluc: x / Ketone: Moderate  / Bili: Negative / Urobili: Negative   Blood: x / Protein: 30 mg/dL / Nitrite: Negative   Leuk Esterase: Negative / RBC: 3 /hpf / WBC 2 /HPF   Sq Epi: x / Non Sq Epi: 2 / Bacteria: Negative      Culture Results:   No growth to date. ( @ 08:42)  Culture Results:   No growth to date. ( @ 08:42)  Culture Results:   No growth to date. ( @ 15:06)  Culture Results:   No growth to date. ( @ 15:06)      RADIOLOGY & ADDITIONAL STUDIES:    CRITICAL CARE TIME SPENT:

## 2020-05-02 NOTE — PROGRESS NOTE ADULT - ATTENDING COMMENTS
patient seen and examined  no splinter hemorrhages  1+ edema of extremities  Lines were changed and blood cultures since 4/28  are NGTD    suggest  Ancef 2 gms ivss every 8 hours  echocardiogram if feasible

## 2020-05-02 NOTE — PROGRESS NOTE ADULT - SUBJECTIVE AND OBJECTIVE BOX
Fio2 decreased to 50%. Lasix given. Tube feeds restarted. Vanco switched to Ancef. Fio2 decreased to 50%. Lasix given. Tube feeds restarted. Vanco switched to Ancef.     PIP 40 PEEP 5 FiO2 50% RR 3o  Propofol, fentanyl, yovanny

## 2020-05-02 NOTE — PROGRESS NOTE ADULT - SUBJECTIVE AND OBJECTIVE BOX
Follow Up:  bacteremia, covid    Interval History: Having fevers. BCx are positive. Started on vanco.      Allergies  No Known Allergies        ANTIMICROBIALS:  ceFAZolin   IVPB 2000 every 8 hours      OTHER MEDS:  acetaminophen   Tablet .. 650 milliGRAM(s) Oral every 6 hours PRN  chlorhexidine 0.12% Liquid 15 milliLiter(s) Oral Mucosa every 12 hours  chlorhexidine 4% Liquid 1 Application(s) Topical <User Schedule>  enoxaparin Injectable 40 milliGRAM(s) SubCutaneous <User Schedule>  famotidine Injectable 20 milliGRAM(s) IV Push every 12 hours  fentaNYL   Infusion. 0.501 MICROgram(s)/kG/Hr IV Continuous <Continuous>  furosemide   Injectable 20 milliGRAM(s) IV Push every 8 hours  ondansetron Injectable 4 milliGRAM(s) IV Push every 8 hours PRN  petrolatum Ophthalmic Ointment 1 Application(s) Both EYES two times a day  phenylephrine    Infusion 0.1 MICROgram(s)/kG/Min IV Continuous <Continuous>  propofol Infusion 75 MICROgram(s)/kG/Min IV Continuous <Continuous>  sodium chloride 0.9% lock flush 10 milliLiter(s) IV Push every 1 hour PRN      Vital Signs Last 24 Hrs  T(C): 36.6 (02 May 2020 11:00), Max: 38.5 (01 May 2020 15:00)  T(F): 97.9 (02 May 2020 11:00), Max: 101.3 (01 May 2020 15:00)  HR: 99 (02 May 2020 12:00) (91 - 116)  BP: --  BP(mean): --  RR: 33 (02 May 2020 12:00) (29 - 33)  SpO2: 100% (02 May 2020 12:00) (98% - 100%)    Physical Exam:  General:   sedated  HEENT: atraumatic, normocephalic  Cardio:     regular S1, S2  Respiratory:   intubated  abd:     soft,   BS +,   no tenderness,   rectal tube  :   cruz  Musculoskeletal:  no edema  Skin:    no rash  Neurologic:   sedated                            9.0    21.86 )-----------( 698      ( 02 May 2020 04:37 )             29.6       05-02    138  |  95<L>  |  11  ----------------------------<  89  3.1<L>   |  29  |  0.59    Ca    8.3<L>      02 May 2020 04:37  Phos  3.5       Mg     1.7         TPro  7.2  /  Alb  2.8<L>  /  TBili  0.5  /  DBili  x   /  AST  19  /  ALT  14  /  AlkPhos  115        Urinalysis Basic - ( 01 May 2020 07:19 )    Color: Light Yellow / Appearance: Slightly Turbid / S.021 / pH: x  Gluc: x / Ketone: Moderate  / Bili: Negative / Urobili: Negative   Blood: x / Protein: 30 mg/dL / Nitrite: Negative   Leuk Esterase: Negative / RBC: 3 /hpf / WBC 2 /HPF   Sq Epi: x / Non Sq Epi: 2 / Bacteria: Negative        MICROBIOLOGY:  Vancomycin Level, Trough: 14.2 ug/mL (20 @ 16:14)  v  .Sputum Sputum  20 --  --    No polymorphonuclear leukocytes per low power field  No Squamous epithelial cells per low power field  No organisms seen per oil power field      .Blood Blood-Peripheral  20   No growth to date.  --  --      .Blood Blood-Peripheral  20   No growth to date.  --  --      .Blood Blood  20   Growth in aerobic and anaerobic bottles: Staphylococcus aureus  --  Staphylococcus aureus      .Blood Blood  20   Growth in anaerobic bottle: Staphylococcus epidermidis  Coag Negative Staphylococcus  Single set isolate, possible contaminant. Contact  Microbiology if susceptibility testing clinically  indicated.  Growth in anaerobic bottle: Lactobacillus gasseri "Susceptibilities not  performed"  --    Growth in anaerobic bottle: Gram Positive Cocci in Clusters      .Blood Blood-Peripheral  20   No Growth Final  --  --      .Urine Catheterized  20   No growth  --  --      .Blood Blood  20   Growth in aerobic and anaerobic bottles: Staphylococcus epidermidis  Coag Negative Staphylococcus  Single set isolate, possible contaminant. Contact  Microbiology if susceptibility testing clinically  indicated.  "Due to technical problems, Proteussp. will Not be reported as part of  the BCID panel until further notice"  ***Blood Panel PCR results on this specimen are available  approximately 3 hours after the Gram stain result.***  Gram stain, PCR, and/or culture results may not always  correspond due to difference in methodologies.  ************************************************************  This PCR assay was performed using [employer].  The following targets are tested for: Enterococcus,  vancomycin resistant enterococci, Listeria monocytogenes,  coagulase negative staphylococci, S. aureus,  methicillin resistant S. aureus, Streptococcus agalactiae  (Group B), S. pneumoniae, S. pyogenes (Group A),  Acinetobacter baumannii, Enterobacter cloacae, E. coli,  Klebsiella oxytoca, K. pneumoniae, Proteus sp.,  Serratia marcescens, Haemophilus influenzae,  Neisseria meningitidis, Pseudomonas aeruginosa, Candida  albicans, C. glabrata, C krusei, C parapsilosis,  C. tropicalis and the KPC resistance gene.  --  Blood Culture PCR      .Blood Blood  20   No Growth Final  --  --      .Blood Blood  20   No Growth Final  --  --                RADIOLOGY: Follow Up:  bacteremia, covid    Interval History: Having fevers. BCx are positive. Started on vanco.      Allergies  No Known Allergies        ANTIMICROBIALS:  ceFAZolin   IVPB 2000 every 8 hours      OTHER MEDS:  acetaminophen   Tablet .. 650 milliGRAM(s) Oral every 6 hours PRN  chlorhexidine 0.12% Liquid 15 milliLiter(s) Oral Mucosa every 12 hours  chlorhexidine 4% Liquid 1 Application(s) Topical <User Schedule>  enoxaparin Injectable 40 milliGRAM(s) SubCutaneous <User Schedule>  famotidine Injectable 20 milliGRAM(s) IV Push every 12 hours  fentaNYL   Infusion. 0.501 MICROgram(s)/kG/Hr IV Continuous <Continuous>  furosemide   Injectable 20 milliGRAM(s) IV Push every 8 hours  ondansetron Injectable 4 milliGRAM(s) IV Push every 8 hours PRN  petrolatum Ophthalmic Ointment 1 Application(s) Both EYES two times a day  phenylephrine    Infusion 0.1 MICROgram(s)/kG/Min IV Continuous <Continuous>  propofol Infusion 75 MICROgram(s)/kG/Min IV Continuous <Continuous>  sodium chloride 0.9% lock flush 10 milliLiter(s) IV Push every 1 hour PRN      Vital Signs Last 24 Hrs  T(C): 36.6 (02 May 2020 11:00), Max: 38.5 (01 May 2020 15:00)  T(F): 97.9 (02 May 2020 11:00), Max: 101.3 (01 May 2020 15:00)  HR: 99 (02 May 2020 12:00) (91 - 116)  BP: --  BP(mean): --  RR: 33 (02 May 2020 12:00) (29 - 33)  SpO2: 100% (02 May 2020 12:00) (98% - 100%)    Physical Exam:  General:   sedated  HEENT: atraumatic, normocephalic  Cardio:     regular S1, S2  Respiratory:   intubated  abd:     soft,   BS +,   no tenderness,   rectal tube  :   cruz  Musculoskeletal:  no edema  Skin:    no rash  Neurologic:   sedated                        9.0    21.86 )-----------( 698      ( 02 May 2020 04:37 )             29.6   WBC Count: 21.86 ( @ 04:37)  WBC Count: 21.69 ( @ 03:41)  WBC Count: 20.67 ( @ 20:46)  WBC Count: 22.40 ( @ 04:13)  WBC Count: 37.53 ( @ 12:32)  WBC Count: 29.43 ( @ 23:39)  WBC Count: 30.52 ( @ 17:57)  WBC Count: 23.25 ( @ 04:48)          138  |  95<L>  |  11  ----------------------------<  89  3.1<L>   |  29  |  0.59    Ca    8.3<L>      02 May 2020 04:37  Phos  3.5       Mg     1.7         TPro  7.2  /  Alb  2.8<L>  /  TBili  0.5  /  DBili  x   /  AST  19  /  ALT  14  /  AlkPhos  115        Urinalysis Basic - ( 01 May 2020 07:19 )    Color: Light Yellow / Appearance: Slightly Turbid / S.021 / pH: x  Gluc: x / Ketone: Moderate  / Bili: Negative / Urobili: Negative   Blood: x / Protein: 30 mg/dL / Nitrite: Negative   Leuk Esterase: Negative / RBC: 3 /hpf / WBC 2 /HPF   Sq Epi: x / Non Sq Epi: 2 / Bacteria: Negative        MICROBIOLOGY:  Vancomycin Level, Trough: 14.2 ug/mL (20 @ 16:14)  v  .Sputum Sputum  20 --  --    No polymorphonuclear leukocytes per low power field  No Squamous epithelial cells per low power field  No organisms seen per oil power field      .Blood Blood-Peripheral  20   No growth to date.  --  --      .Blood Blood-Peripheral  20   No growth to date.  --  --      .Blood Blood  20   Growth in aerobic and anaerobic bottles: Staphylococcus aureus  --  Staphylococcus aureus  MSSA      .Blood Blood  20   Growth in anaerobic bottle: Staphylococcus epidermidis  Coag Negative Staphylococcus  Single set isolate, possible contaminant. Contact  Microbiology if susceptibility testing clinically  indicated.  Growth in anaerobic bottle: Lactobacillus gasseri "Susceptibilities not  performed"  --    Growth in anaerobic bottle: Gram Positive Cocci in Clusters      .Blood Blood-Peripheral  20   No Growth Final  --  --      .Urine Catheterized  20   No growth  --  --      .Blood Blood  20   Growth in aerobic and anaerobic bottles: Staphylococcus epidermidis  Coag Negative Staphylococcus  Single set isolate, possible contaminant.     .Blood Blood  20   No Growth Final  --  --      .Blood Blood  20   No Growth Final  --  --    RADIOLOGY:

## 2020-05-02 NOTE — PROGRESS NOTE ADULT - ASSESSMENT
44 M with no significant PMH presenting with nonproductive cough and fevers for 3-4 days. DX w/ covid19 pneumonitis--s/p initial anakinra protocol on tapering rx. Also status post Plaquenil and solumedrol.  Currently receiving colchicine, lovenox 40 BID, famotidine and albuterol as needed. Desaturated overnight and CTPA ordered to rule out PE and also to evaluate for hospital acquired pneumonia.    Patient required intubation 4/14- received zosyn  Received course cefepime 4/15 -->4/19 for HAP    1) COVID-19  - s/p plaquenil, solumedrol, anakinra  - supportive care  - vent support    2) MSSA bacteremia- likely line infection  - change to ancef 2g IV q8h  - BCx positive 4/28, line switched 4/28, BCx cleared 4/29  - check tte    d/w primary team

## 2020-05-02 NOTE — PROGRESS NOTE ADULT - ASSESSMENT
hypoxic respiratory failure 2/2 COVID-19  - maintain pressure control  - sedation for vent synchrony  - tube feeds as tolerate  - antibiotics  - TBB goal -1L, diurese as needed hypoxic respiratory failure 2/2 COVID-19  - maintain pressure control  - sedation for vent synchrony; elevated triglycerides - switch propofol to versed  - tube feeds as tolerate  - antibiotics  - TBB goal -1L, diurese as needed

## 2020-05-02 NOTE — PROGRESS NOTE ADULT - ASSESSMENT
ASSESSMENT/PLAN: Hypoxic respiratory failure 2/2 COVID-19 pneumonia    NEURO: continue propofol and fentanyle  CARDS: MAP > 65mmHg,  on phenylephrine     PULM:  continue mechanical ventilation, wean as tolerated; fio2 to 50 percent   Mode: pressure control   RENAL: 20 X2 today  creat wnl       GASTRO:  restart tube feeds vital af   continue rectal tube   dc protonix ans start pepcid   HEME:  sql 40 daily     ENDO:  euglycemia    ID: Fevers, gram positive cocci bacteremia - change to ancef   f/u cx from 5/1   id consult called   Code status:  Full code  Disposition:  ICU    This patient was at high risk of neurologic deterioration and/or death due to: hypoxemic respiratory failure    Time spent:  45 minutes        Critical Care Attestation:    Critical Care Attestation:  Attending with resident/fellow:  I have personally provided 45 minutes of critical care time concurrently with the resident/fellow. This time excludes time spent on separate procedures and time spent teaching. I have reviewed the resident/fellow’s documentation and I agree with the assessment and plan of care.

## 2020-05-03 NOTE — PROGRESS NOTE ADULT - ASSESSMENT
44 M with no significant PMH presenting with nonproductive cough and fevers for 3-4 days. DX w/ covid19 pneumonitis--s/p initial anakinra protocol on tapering rx. Also status post Plaquenil and solumedrol.  Currently receiving colchicine, lovenox 40 BID, famotidine and albuterol as needed. Desaturated overnight and CTPA ordered to rule out PE and also to evaluate for hospital acquired pneumonia.    Patient required intubation 4/14- received zosyn  Received course cefepime 4/15 -->4/19 for HAP    1) COVID-19  - s/p plaquenil, solumedrol, anakinra  - supportive care  - vent support    2) MSSA bacteremia- likely line infection  - change to ancef 2g IV q8h  - BCx positive 4/28, line switched 4/28,   Positive BCx g+C clusters 4/29 5/1 BC Ng To date    discussed with primary team:  asked to obtain echocardiogram to assess for seeding of heart valves

## 2020-05-03 NOTE — PROGRESS NOTE ADULT - SUBJECTIVE AND OBJECTIVE BOX
Persistent bacteremia -> coag neg staph. Persistent bacteremia -> coag neg staph.     Pip 40 PEEP 5 FiO2 50% RR 30  propofol, fentanyl, neosyneprine, versed

## 2020-05-03 NOTE — PROGRESS NOTE ADULT - SUBJECTIVE AND OBJECTIVE BOX
Follow Up:  MSSA bacteremia    Interval History/ROS: sedated    Allergies  No Known Allergies    ANTIMICROBIALS:  ceFAZolin   IVPB 2000 every 8 hours      OTHER MEDS:  MEDICATIONS  (STANDING):  acetaminophen   Tablet .. 650 every 6 hours PRN  enoxaparin Injectable 40 <User Schedule>  famotidine Injectable 20 every 12 hours  fentaNYL   Infusion. 0.501 <Continuous>  midazolam Infusion 0.02 <Continuous>  ondansetron Injectable 4 every 8 hours PRN  phenylephrine    Infusion 0.1 <Continuous>  propofol Infusion 75 <Continuous>      Vital Signs Last 24 Hrs  T(C): 37.4 (03 May 2020 11:00), Max: 37.5 (03 May 2020 07:00)  T(F): 99.3 (03 May 2020 11:00), Max: 99.5 (03 May 2020 07:00)  HR: 107 (03 May 2020 13:00) (95 - 122)  BP: --  BP(mean): --  RR: 30 (03 May 2020 13:00) (30 - 30)  SpO2: 95% (03 May 2020 13:00) (94% - 100%)    PHYSICAL EXAM:  General: \ NAD  Neurology: sedated  fullness in neck  bland eschar left cheek  Respiratory: Oral ET, GT in place on Vent FiO2 = 0.5; rapid resp rate   Abdominal: Soft, Non-tender, non-distended, normal bowel sounds  Extremities: No edema, no splinter hemorrhages  Line Sites: Clear  Skin: No rash                        9.3    15.72 )-----------( 666      ( 03 May 2020 05:01 )             29.8   WBC Count: 15.72 (05-03 @ 05:01)  WBC Count: 21.86 (05-02 @ 04:37)  WBC Count: 21.69 (05-01 @ 03:41)  WBC Count: 20.67 (04-30 @ 20:46)  WBC Count: 22.40 (04-30 @ 04:13)  WBC Count: 37.53 (04-29 @ 12:32)  WBC Count: 29.43 (04-28 @ 23:39)  WBC Count: 30.52 (04-28 @ 17:57)      05-03    140  |  97  |  12  ----------------------------<  92  3.8   |  27  |  0.60    Ca    8.6      03 May 2020 05:01  Phos  4.0     05-03  Mg     1.6     05-03    TPro  7.3  /  Alb  2.7<L>  /  TBili  0.4  /  DBili  x   /  AST  18  /  ALT  11  /  AlkPhos  109  05-03    MICROBIOLOGY:  .Sputum Sputum  05-01-20   Normal Respiratory Natalia present  --    No polymorphonuclear leukocytes per low power field  No Squamous epithelial cells per low power field  No organisms seen per oil power field      .Blood Blood-Peripheral  05-01-20   No growth to date.  --  --      .Blood Blood-Peripheral  04-29-20   Growth in anaerobic bottle: Gram Positive Cocci in Clusters  --    Growth in anaerobic bottle: Gram Positive Cocci in Clusters    Culture - Blood (04.28.20 @ 08:51)    Gram Stain:   Growth in anaerobic bottle: Gram Positive Cocci in Clusters  Growth in aerobic bottle: Gram Positive Cocci in Clusters    -  Ampicillin/Sulbactam: S <=8/4    -  Cefazolin: S <=4    -  Clindamycin: S <=0.25    -  Erythromycin: S <=0.25    -  Gentamicin: S <=1 Should not be used as monotherapy    -  Oxacillin: S <=0.25    -  Penicillin: R >8    -  RIF- Rifampin: S <=1 Should not be used as monotherapy    -  Tetra/Doxy: S <=1    -  Trimethoprim/Sulfamethoxazole: S <=0.5/9.5    -  Vancomycin: S 1    Specimen Source: .Blood Blood    Organism: Staphylococcus aureus    Culture Results:   Growth in aerobic and anaerobic bottles: Staphylococcus aureus    Organism Identification: Staphylococcus aureus    Method Type: MI      .Blood Blood  04-26-20   Growth in anaerobic bottle: Staphylococcus epidermidis  Coag Negative Staphylococcus  Single set isolate, possible contaminant. Contact  Microbiology if susceptibility testing clinically  indicated.  Growth in anaerobic bottle: Lactobacillus gasseri "Susceptibilities not  performed"  --    Growth in anaerobic bottle: Gram Positive Cocci in Clusters      .Blood Blood-Peripheral  04-24-20   No Growth Final  --  --      .Urine Catheterized  04-22-20   No growth  --  --      .Blood Blood  04-22-20   Growth in aerobic and anaerobic bottles: Staphylococcus epidermidis  Coag Negative Staphylococcus  Single set isolate, possible contaminant. Contact  Microbiology if susceptibility testing clinically  indicated.  "Due to technical problems, Proteussp. will Not be reported as part of  the BCID panel until further notice"  ***Blood Panel PCR results on this specimen are available  approximately 3 hours after the Gram stain result.***  Gram stain, PCR, and/or culture results may not always  correspond due to difference in methodologies.  ************************************************************  This PCR assay was performed using [employer].  The following targets are tested for: Enterococcus,  vancomycin resistant enterococci, Listeria monocytogenes,  coagulase negative staphylococci, S. aureus,  methicillin resistant S. aureus, Streptococcus agalactiae  (Group B), S. pneumoniae, S. pyogenes (Group A),  Acinetobacter baumannii, Enterobacter cloacae, E. coli,  Klebsiella oxytoca, K. pneumoniae, Proteus sp.,  Serratia marcescens, Haemophilus influenzae,  Neisseria meningitidis, Pseudomonas aeruginosa, Candida  albicans, C. glabrata, C krusei, C parapsilosis,  C. tropicalis and the KPC resistance gene.  --  Blood Culture PCR      .Blood Blood  04-17-20   No Growth Final  --  --      .Blood Blood  04-14-20   No Growth Final  --  --        .Sputum Sputum  .Blood Blood-Peripheral    v          RADIOLOGY:    Montrell Alvarado MD; Division of Infectious Disease; Pager: [phone]; nights and weekends: [phone]

## 2020-05-03 NOTE — PROGRESS NOTE ADULT - ASSESSMENT
Acute hypoxic respiratory failure 2/2 COVID-19  - maintain mechanical ventilation  - sedation for vent synchrony  - TTE r/o endocarditis given persistent bacteremia  - continue antibiotics

## 2020-05-03 NOTE — PROGRESS NOTE ADULT - ASSESSMENT
ASSESSMENT/PLAN: Hypoxic respiratory failure 2/2 COVID-19 pneumonia    NEURO: try to wean propofol and fentanyl  CARDS: MAP > 65mmHg,  on phenylephrine     PULM:  continue mechanical ventilation, wean as tolerated; fio2 to 50 percent   Mode: pressure control   RENAL: 20 X2 today  creat wnl       GASTRO:  restart tube feeds vital af   continue rectal tube   dc protonix ans start pepcid   HEME:  sql 40 daily     ENDO:  euglycemia    ID: Fevers, gram positive cocci bacteremia - change to ancef   cx from 5/1 growing gr+ cocci/cluster  id consult called   Code status:  Full code  Disposition:  ICU    This patient was at high risk of neurologic deterioration and/or death due to: hypoxemic respiratory failure ASSESSMENT/PLAN: Hypoxic respiratory failure 2/2 COVID-19 pneumonia    NEURO: try to wean propofol and fentanyl  CARDS: MAP > 65mmHg,  on phenylephrine     PULM:  continue mechanical ventilation, wean as tolerated; fio2 to 50 percent   Mode: pressure control   RENAL: 20 X2 today  creat wnl       GASTRO:  restart tube feeds vital af   continue rectal tube   dc protonix ans start pepcid   HEME:  sql 40 daily     ENDO:  euglycemia  mag and k and ionized calcium supplemented    ID: Fevers, gram positive cocci bacteremia - changed to ancef , ? endocarditis  cx from 5/1 growing gr+ cocci/cluster  id f/u called   echo ordered   Code status:  Full code  Disposition:  ICU    This patient was at high risk of neurologic deterioration and/or death due to: hypoxemic respiratory failure

## 2020-05-03 NOTE — CHART NOTE - NSCHARTNOTEFT_GEN_A_CORE
5/3/20  mrs aguillon  wife of patient [phone] is upsdated with his current condition and all her questions answered.

## 2020-05-03 NOTE — PROGRESS NOTE ADULT - SUBJECTIVE AND OBJECTIVE BOX
· Subjective and Objective: 	  INTERVAL HPI/OVERNIGHT EVENTS:  44 M with no significant PMH presented 3/31/20 with nonproductive cough and fevers for 3-4 days associated with myalgias. In the ED, T 102.3, , /79, RR 22 satting 85% on room air. Patient was placed on 15L nonrebreather satting 95-97%. Patient given tylenol, ceftriaxone, and azithro. Intubated on 4/14 for hypoxic respiratory failure.  overnite try to wean propofol bcz of high   pt became  tachycardic, got bolus 250 cc of NS and bld culture growing gr+ cocci/cluster on ancef 2 gm q8h and ID is following.     ICU Vital Signs Last 24 Hrs  T(C): 37.3 (03 May 2020 03:00), Max: 37.4 (02 May 2020 19:00)  T(F): 99.1 (03 May 2020 03:00), Max: 99.3 (02 May 2020 19:00)  HR: 121 (03 May 2020 05:37) (91 - 121)  BP: --  BP(mean): --  ABP: 100/61 (03 May 2020 03:00) (90/55 - 106/69)  ABP(mean): 75 (03 May 2020 03:00) (70 - 85)  RR: 30 (03 May 2020 03:00) (30 - 33)  SpO2: 96% (03 May 2020 05:37) (95% - 100%)      05-02-20 @ 07:01  -  05-03-20 @ 07:00  --------------------------------------------------------  IN: 2075.5 mL / OUT: 3450 mL / NET: -1374.5 mL      Mode: AC/ CMV (Assist Control/ Continuous Mandatory Ventilation), RR (machine): 30, FiO2: 50, PEEP: 5, ITime: 1, MAP: 17, PC: 35, PIP: 43  acetaminophen   Tablet .. 650 milliGRAM(s) Oral every 6 hours PRN  ceFAZolin   IVPB 2000 milliGRAM(s) IV Intermittent every 8 hours  chlorhexidine 0.12% Liquid 15 milliLiter(s) Oral Mucosa every 12 hours  chlorhexidine 4% Liquid 1 Application(s) Topical <User Schedule>  enoxaparin Injectable 40 milliGRAM(s) SubCutaneous <User Schedule>  famotidine Injectable 20 milliGRAM(s) IV Push every 12 hours  fentaNYL   Infusion. 0.501 MICROgram(s)/kG/Hr (1.95 mL/Hr) IV Continuous <Continuous>  furosemide   Injectable 20 milliGRAM(s) IV Push every 8 hours  midazolam Infusion 0.02 mG/kG/Hr (1.56 mL/Hr) IV Continuous <Continuous>  ondansetron Injectable 4 milliGRAM(s) IV Push every 8 hours PRN  petrolatum Ophthalmic Ointment 1 Application(s) Both EYES two times a day  phenylephrine    Infusion 0.1 MICROgram(s)/kG/Min (1.46 mL/Hr) IV Continuous <Continuous>  propofol Infusion 75 MICROgram(s)/kG/Min (35.1 mL/Hr) IV Continuous <Continuous>  sodium chloride 0.9% lock flush 10 milliLiter(s) IV Push every 1 hour PRN                          9.3    15.72 )-----------( 666      ( 03 May 2020 05:01 )             29.8     05-03    140  |  97  |  12  ----------------------------<  92  3.8   |  27  |  0.60    Ca    8.6      03 May 2020 05:01  Phos  4.0     05-03  Mg     1.6     05-03    TPro  7.3  /  Alb  2.7<L>  /  TBili  0.4  /  DBili  x   /  AST  18  /  ALT  11  /  AlkPhos  109  05-03    LIVER FUNCTIONS - ( 03 May 2020 05:01 )  Alb: 2.7 g/dL / Pro: 7.3 g/dL / ALK PHOS: 109 U/L / ALT: 11 U/L / AST: 18 U/L / GGT: x           ABG - ( 03 May 2020 04:48 )  pH, Arterial: 7.44  pH, Blood: x     /  pCO2: 48    /  pO2: 73    / HCO3: 32    / Base Excess: 7.1   /  SaO2: 95

## 2020-05-04 NOTE — CHART NOTE - NSCHARTNOTEFT_GEN_A_CORE
Upon Nutritional Assessment by the Registered Dietitian your patient was determined to meet criteria / has evidence of the following diagnosis/diagnoses:          [ ]  Mild Protein Calorie Malnutrition        [x]  Moderate Protein Calorie Malnutrition        [ ] Severe Protein Calorie Malnutrition        [ ] Unspecified Protein Calorie Malnutrition        [ ] Underweight / BMI <19        [ ] Morbid Obesity / BMI > 40      Findings as based on:  [x] Comprehensive nutrition assessment: Inadequate energy/protein intake x >/= 7 days  [ ] Nutrition Focused Physical Exam  [x] Other: 2+ generalized edema      Nutrition Plan/Recommendations:    1. As able, consider increasing bolus volume goal rate: Vital AF at 250ml q 6 hrs (4x daily, 1000ml total) + No Carb Prosource 2x daily (+60kcal, +15g protein/serving). With provision of propofol (+618kcal), will provide: 1000ml, 1938kcal and 105g protein. Based on dosing wt 77.9kg, provides: 25kcal/kg and 1.3g protein/kg.   2. Monitor GI tolerance. RD to remain available to adjust EN formulary, volume/rate PRN.   3. Malnutrition sticker placed   4. Trend propofol provision and triglycerides.   5. Trend BG levels, renal indices, LFT's, electrolytes.    PROVIDER Section:     By signing this assessment you are acknowledging and agree with the diagnosis/diagnoses assigned by the Registered Dietitian    Comments:

## 2020-05-04 NOTE — PROGRESS NOTE ADULT - ASSESSMENT
44 M with no significant PMH presenting with nonproductive cough and fevers for 3-4 days. DX w/ covid19 pneumonitis--s/p initial anakinra protocol on tapering rx. Also status post Plaquenil and solumedrol.  Currently receiving colchicine, lovenox 40 BID, famotidine and albuterol as needed. Desaturated overnight and CTPA ordered to rule out PE and also to evaluate for hospital acquired pneumonia.    Patient required intubation 4/14- received zosyn  Received course cefepime 4/15 -->4/19 for HAP    1) COVID-19  - s/p plaquenil, solumedrol, anakinra  - supportive care  - vent support    2) MSSA bacteremia- likely line infection  - changed to ancef 2g IV q8h  - BCx positive 4/28, line switched 4/28,   Positive BCx g+C clusters 4/29 5/1 BC Ng To date  please repeat one additional set of blood cultures  TTE without vegetations seen    Tono Olmos MD  [phone]  After 5pm/weekends [phone]

## 2020-05-04 NOTE — CHART NOTE - NSCHARTNOTEFT_GEN_A_CORE
Nutrition Follow Up Note   Patient seen for: nutrition follow up on COVID ICU     Source: medical record, communication with team. Unable to speak to pt due to current airborne isolation contact precautions related to COVID-19. Pt remains intubated.     Chart reviewed, events noted.     Diet Order: Diet, NPO with Tube Feed:   Tube Feeding Modality: Nasogastric  Vital AF (VITALAFRTH)  Total Volume for 24 Hours (mL): 200  Bolus  Total Volume of Bolus (mL):  50  Total # of Feeds: 4  Tube Feed Frequency: Every 6 hours   Tube Feed Start Time: 10:00  Bolus Feed Rate (mL per Hour): 50   Bolus Feed Duration (in Hours): 1 (05-02-20 @ 09:18)    CURRENT EN ORDER PROVIDES: 200ml, 240kcal and 15g protein    EN provision: (per nursing flow sheet): (5/4): 100ml (thus far); (5/3): 150ml (75% of goal); (5/2): 100ml (50% of goal; EN feeds resumed). Pt did not receive EN between 4/30-5/1 in context of prone position and episode of vomiting.     Nutrition Events:   -Central line, A-line replaced 4/28 due to bacteremia. On antibiotics as ordered. TERRELL r/o endocarditis pending (?).   -Proned 4/29; returned to supine position 4/30.   -Continues to receive diuretic therapy as per discretion of medical team.   -Noted with +vomiting, tachycardia 4/30. Started on Zofran, abdominal X-Ray with low intermittent wall suction.   -Feeds resumed 5/2. See above.   -Per MD note 5/3, Mg, K and ionized calcium supplemented.   -Pt currently prescribed propofol - infusing at 23.4ml/hr. If to continue x 24 hrs, will provide 618kcal daily. Triglycerides (5/4): 584mg/dL.     GI: Rectal tube remains in place. Noted with output of 150ml (5/3); 100ml (5/2); 600ml (4/29). Noted with low wall suction (4/30): 200ml.     Anthropometric Measurements:   Height (cm): 167.6 (04-14-20 @ 16:20)  Weight (kg): 77.9 (04-14-20 @ 16:20)  BMI (kg/m2): 27.7 (04-14-20 @ 16:20)    Medications: MEDICATIONS  (STANDING):  ceFAZolin   IVPB 2000 milliGRAM(s) IV Intermittent every 8 hours  chlorhexidine 0.12% Liquid 15 milliLiter(s) Oral Mucosa every 12 hours  chlorhexidine 4% Liquid 1 Application(s) Topical <User Schedule>  enoxaparin Injectable 40 milliGRAM(s) SubCutaneous <User Schedule>  famotidine Injectable 20 milliGRAM(s) IV Push every 12 hours  fentaNYL   Infusion. 0.501 MICROgram(s)/kG/Hr (1.95 mL/Hr) IV Continuous <Continuous>  midazolam Infusion 0.02 mG/kG/Hr (1.56 mL/Hr) IV Continuous <Continuous>  petrolatum Ophthalmic Ointment 1 Application(s) Both EYES two times a day  phenylephrine    Infusion 0.1 MICROgram(s)/kG/Min (1.46 mL/Hr) IV Continuous <Continuous>  propofol Infusion 75 MICROgram(s)/kG/Min (35.1 mL/Hr) IV Continuous <Continuous>    MEDICATIONS  (PRN):  acetaminophen   Tablet .. 650 milliGRAM(s) Oral every 6 hours PRN Temp greater or equal to 38C (100.4F), Mild Pain (1 - 3), Moderate Pain (4 - 6)  ondansetron Injectable 4 milliGRAM(s) IV Push every 8 hours PRN Nausea and/or Vomiting  sodium chloride 0.9% lock flush 10 milliLiter(s) IV Push every 1 hour PRN Pre/post blood products, medications, blood draw, and to maintain line patency    Labs: 05-04 @ 03:59: Sodium 141, Potassium 4.1, Calcium 8.2<L>, Magnesium 2.0, Phosphorus 3.1, BUN 12, Creatinine 0.54, Glucose 104<H>, Alk Phos 114, ALT/SGPT 9<L>, AST/SGOT 24, Albumin 2.4<L>, Prealbumin --, Total Bilirubin 0.4, Hemoglobin 8.4<L>, Hematocrit 28.1<L>, Ferritin --, C-Reactive Protein --, Creatine Kinase <<27>    Triglycerides, Serum: 584 mg/dL (05-04-20 @ 03:59)  Triglycerides, Serum: 467 mg/dL (05-03-20 @ 05:01)  Triglycerides, Serum: 470 mg/dL (05-02-20 @ 14:22)  Triglycerides, Serum: 329 mg/dL (05-01-20 @ 03:41)  Triglycerides, Serum: 355 mg/dL (04-30-20 @ 04:13)  Triglycerides, Serum: 325 mg/dL (04-29-20 @ 05:53)  Triglycerides, Serum: 475 mg/dL (04-28-20 @ 04:48)    Skin: no pressure injuries noted  Edema: 2+ generalized     Estimated Needs: (based on dosing wt 77.9kg)  Energy: (20-25kcal/kg): 1558-1948kcal  Protein: (1.2-1.4g protein/kg): 93-109g protein    Previous Nutrition Diagnosis: swallowing difficulty (addressed with EN)    New Nutrition Diagnosis: Acute moderate protein-calorie malnutrition in context of altered GI function r/t complications of COVID19+ and inadequate energy/protein intake as evidenced by pt receiving <75% of estimated nutrient need x >/= 7 days, 2+ generalized edema.     Recommended Interventions:   1. As able, consider increasing bolus volume goal rate: Vital AF at 250ml q 6 hrs (4x daily, 1000ml total) + No Carb Prosource 2x daily (+60kcal, +15g protein/serving). With provision of propofol (+618kcal), will provide: 1000ml, 1938kcal and 105g protein. Based on dosing wt 77.9kg, provides: 25kcal/kg and 1.3g protein/kg.   2. Monitor GI tolerance. RD to remain available to adjust EN formulary, volume/rate PRN.   3. Malnutrition sticker placed   4. Trend propofol provision and triglycerides.   5. Trend BG levels, renal indices, LFT's, electrolytes.    Monitoring and Evaluation:   Continue to monitor nutrition provision and tolerance, weights, labs, skin integrity.   RD remains available upon request and will follow up per protocol.    Alison Kleiner, RD [facility]; pager number [phone]

## 2020-05-04 NOTE — PROGRESS NOTE ADULT - SUBJECTIVE AND OBJECTIVE BOX
INFECTIOUS DISEASES FOLLOW UP-- Ingrid Olmos  [phone]    This is a follow up note for this  44yMale with  Other general symptom or sign      ROS:  CONSTITUTIONAL:  intubated, sedated    Allergies    No Known Allergies    Intolerances        ANTIBIOTICS/RELEVANT:  antimicrobials  ceFAZolin   IVPB 2000 milliGRAM(s) IV Intermittent every 8 hours    immunologic:    OTHER:  acetaminophen   Tablet .. 650 milliGRAM(s) Oral every 6 hours PRN  chlorhexidine 0.12% Liquid 15 milliLiter(s) Oral Mucosa every 12 hours  chlorhexidine 4% Liquid 1 Application(s) Topical <User Schedule>  enoxaparin Injectable 40 milliGRAM(s) SubCutaneous <User Schedule>  famotidine Injectable 20 milliGRAM(s) IV Push every 12 hours  fentaNYL   Infusion... 5 MICROgram(s)/kG/Hr IV Continuous <Continuous>  midazolam Infusion 0.02 mG/kG/Hr IV Continuous <Continuous>  ondansetron Injectable 4 milliGRAM(s) IV Push every 8 hours PRN  petrolatum Ophthalmic Ointment 1 Application(s) Both EYES two times a day  PHENobarbital Injectable 65 milliGRAM(s) IV Push every 1 hour PRN  PHENobarbital IVPB 80 milliGRAM(s) IV Intermittent every 12 hours  phenylephrine    Infusion 0.1 MICROgram(s)/kG/Min IV Continuous <Continuous>  propofol Infusion 75 MICROgram(s)/kG/Min IV Continuous <Continuous>  sodium chloride 0.9% lock flush 10 milliLiter(s) IV Push every 1 hour PRN      Objective:  Vital Signs Last 24 Hrs  T(C): 37.8 (04 May 2020 19:00), Max: 37.8 (04 May 2020 19:00)  T(F): 100 (04 May 2020 19:00), Max: 100 (04 May 2020 19:00)  HR: 95 (04 May 2020 20:00) (86 - 98)  BP: --  BP(mean): --  RR: 30 (04 May 2020 20:00) (30 - 30)  SpO2: 96% (04 May 2020 20:00) (94% - 100%)    PHYSICAL EXAM:  Constitutional:no acute distress  Eyes:MARIA FERNANDA,   Ear/Nose/Throat: no oral lesions, 	  Respiratory: diminished BS bilat  Cardiovascular: S1S2 tachy  Gastrointestinal:soft,  Extremities:no e/e/c  No Lymphadenopathy  IV sites not inflammed.    LABS:                        8.4    15.16 )-----------( 651      ( 04 May 2020 03:59 )             28.1     05-04    141  |  103  |  12  ----------------------------<  104<H>  4.1   |  26  |  0.54    Ca    8.2<L>      04 May 2020 03:59  Phos  3.1     05-04  Mg     2.0     05-04    TPro  6.8  /  Alb  2.4<L>  /  TBili  0.4  /  DBili  x   /  AST  24  /  ALT  9<L>  /  AlkPhos  114  05-04    PT/INR - ( 04 May 2020 03:59 )   PT: 11.5 sec;   INR: 1.01 ratio         PTT - ( 04 May 2020 03:59 )  PTT:31.5 sec      MICROBIOLOGY:            RECENT CULTURES:  05-01 @ 19:24  .Sputum Sputum  --  --  --    Normal Respiratory Natalia present  --  05-01 @ 08:42  .Blood Blood-Peripheral  --  --  --    No growth to date.  --  04-29 @ 15:06  .Blood Blood-Peripheral  --  --  --    Growth in anaerobic bottle: Staphylococcus aureus  See previous culture [id number]  --  04-28 @ 08:51  .Blood Blood  Staphylococcus aureus  Staphylococcus aureus  EVER    Growth in aerobic and anaerobic bottles: Staphylococcus aureus  --      RADIOLOGY & ADDITIONAL STUDIES:    < from: Xray Chest 1 View- PORTABLE-Routine (05.03.20 @ 03:26) >  Impression:  1.  Redemonstrated bilateral airspace opacities    < end of copied text >

## 2020-05-04 NOTE — PROGRESS NOTE ADULT - SUBJECTIVE AND OBJECTIVE BOX
echo without vegetation. started on phenobarbital to wean off propofol due to elevated triglycerides. fentanyl weaned. echo without vegetation. started on phenobarbital to wean off propofol due to elevated triglycerides. fentanyl weaned.     PIP 40 PEEP 5 FiO2 45 RR 30    Propofol, fentanyl, yovanny, versed

## 2020-05-04 NOTE — PROGRESS NOTE ADULT - ASSESSMENT
ASSESSMENT/PLAN: Hypoxic respiratory failure 2/2 COVID-19 pneumonia    NEURO: try to wean propofol and fentanyl  CARDS: MAP > 65mmHg,  on phenylephrine     PULM:  continue mechanical ventilation, wean as tolerated; fio2 to 45%   Mode: pressure control   RENAL: 20 X2 today  creat wnl       GASTRO:  restart tube feeds vital af   continue rectal tube   dc protonix ans start pepcid   HEME:  sql 40 daily     ENDO:  euglycemia  mag and k and ionized calcium supplemented    ID: Fevers, gram positive cocci bacteremia - changed to ancef , ? endocarditis  cx from 5/1 growing gr+ cocci/cluster  id f/u called   echo ordered   Code status:  Full code  Disposition:  ICU    This patient was at high risk of neurologic deterioration and/or death due to: hypoxemic respiratory failure ASSESSMENT/PLAN: Hypoxic respiratory failure 2/2 COVID-19 pneumonia    NEURO: try to wean propofol and fentanyl  CARDS: MAP > 65mmHg,  on phenylephrine   phenobarbital 50 mg ivp x1 and 60 bid to wean off propofol/     PULM:  continue mechanical ventilation, wean as tolerated; fio2 to 45%   Mode: pressure control   RENAL: 20 X2 today  creat wnl       GASTRO:  restart tube feeds vital af   continue rectal tube   dc protonix ans start pepcid   HEME:  sql 40 daily     ENDO:  euglycemia  mag and k and ionized calcium supplemented    ID: Fevers, gram positive cocci bacteremia - changed to ancef , ? endocarditis  cx from 5/1 growing gr+ cocci/cluster  id f/u called   echo ordered   Code status:  Full code  Disposition:  ICU    This patient was at high risk of neurologic deterioration and/or death due to: hypoxemic respiratory failure

## 2020-05-04 NOTE — PROGRESS NOTE ADULT - SUBJECTIVE AND OBJECTIVE BOX
· Subjective and Objective: 	  INTERVAL HPI/OVERNIGHT EVENTS:  44 M with no significant PMH presented 3/31/20 with nonproductive cough and fevers for 3-4 days associated with myalgias. In the ED, T 102.3, , /79, RR 22 satting 85% on room air. Patient was placed on 15L nonrebreather satting 95-97%. Patient given tylenol, ceftriaxone, and azithro. Intubated on 4/14 for hypoxic respiratory failure.  overnite try to wean propofol bcz of high   pt became  tachycardic, got bolus 250 cc of NS and bld culture growing gr+ cocci/cluster on ancef 2 gm q8h and ID is following and recommended echo to r/o vegetation.      ICU Vital Signs Last 24 Hrs  T(C): 37.4 (04 May 2020 03:00), Max: 37.4 (03 May 2020 11:00)  T(F): 99.3 (04 May 2020 03:00), Max: 99.3 (03 May 2020 11:00)  HR: 96 (04 May 2020 06:27) (88 - 118)  BP: --  BP(mean): --  ABP: 110/66 (04 May 2020 03:00) (89/61 - 112/68)  ABP(mean): 86 (04 May 2020 03:00) (73 - 88)  RR: 30 (04 May 2020 03:00) (30 - 30)  SpO2: 96% (04 May 2020 06:27) (95% - 100%)      05-03-20 @ 07:01  -  05-04-20 @ 07:00  --------------------------------------------------------  IN: 2714.3 mL / OUT: 1100 mL / NET: 1614.3 mL      Mode: AC/ CMV (Assist Control/ Continuous Mandatory Ventilation), RR (machine): 30, FiO2: 50, PEEP: 5, ITime: 1, MAP: 17, PC: 35, PIP: 42  acetaminophen   Tablet .. 650 milliGRAM(s) Oral every 6 hours PRN  ceFAZolin   IVPB 2000 milliGRAM(s) IV Intermittent every 8 hours  chlorhexidine 0.12% Liquid 15 milliLiter(s) Oral Mucosa every 12 hours  chlorhexidine 4% Liquid 1 Application(s) Topical <User Schedule>  enoxaparin Injectable 40 milliGRAM(s) SubCutaneous <User Schedule>  famotidine Injectable 20 milliGRAM(s) IV Push every 12 hours  fentaNYL   Infusion. 0.501 MICROgram(s)/kG/Hr (1.95 mL/Hr) IV Continuous <Continuous>  midazolam Infusion 0.02 mG/kG/Hr (1.56 mL/Hr) IV Continuous <Continuous>  ondansetron Injectable 4 milliGRAM(s) IV Push every 8 hours PRN  petrolatum Ophthalmic Ointment 1 Application(s) Both EYES two times a day  phenylephrine    Infusion 0.1 MICROgram(s)/kG/Min (1.46 mL/Hr) IV Continuous <Continuous>  propofol Infusion 75 MICROgram(s)/kG/Min (35.1 mL/Hr) IV Continuous <Continuous>  sodium chloride 0.9% lock flush 10 milliLiter(s) IV Push every 1 hour PRN                          8.4    15.16 )-----------( 651      ( 04 May 2020 03:59 )             28.1     05-04    141  |  103  |  12  ----------------------------<  104<H>  4.1   |  26  |  0.54    Ca    8.2<L>      04 May 2020 03:59  Phos  3.1     05-04  Mg     2.0     05-04    TPro  6.8  /  Alb  2.4<L>  /  TBili  0.4  /  DBili  x   /  AST  24  /  ALT  9<L>  /  AlkPhos  114  05-04    LIVER FUNCTIONS - ( 04 May 2020 03:59 )  Alb: 2.4 g/dL / Pro: 6.8 g/dL / ALK PHOS: 114 U/L / ALT: 9 U/L / AST: 24 U/L / GGT: x           ABG - ( 04 May 2020 04:48 )  pH, Arterial: 7.34  pH, Blood: x     /  pCO2: 56    /  pO2: 69    / HCO3: 29    / Base Excess: 3.1   /  SaO2: 93

## 2020-05-04 NOTE — CHART NOTE - NSCHARTNOTEFT_GEN_A_CORE
5/4/20  mrs aguillon  wife of patient [phone] is upsdated with his current condition and all her questions answered.

## 2020-05-04 NOTE — PROGRESS NOTE ADULT - ASSESSMENT
Acute hypoxic respiratory failure 2/2 COVID-19  - maintain mechanical ventilation  - sedation for vent synchrony; wean of propofol as able  - continue antibiotics  - MAP >65mmHg

## 2020-05-05 NOTE — PROGRESS NOTE ADULT - ASSESSMENT
Acute hypoxic respiratory failure 2/2 COVID-19  - maintain mechanical ventilation  - sedation for vent synchrony; wean fentanyl as able; hypoxic episodes off versed this evening  - continue phenobarb  - continue antibiotics  - MAP >65mmHg  - tube feeding/rectal tube

## 2020-05-05 NOTE — PROGRESS NOTE ADULT - SUBJECTIVE AND OBJECTIVE BOX
· Subjective and Objective: 	  INTERVAL HPI/OVERNIGHT EVENTS:  44 M with no significant PMH presented 3/31/20 with nonproductive cough and fevers for 3-4 days associated with myalgias. In the ED, T 102.3, , /79, RR 22 satting 85% on room air. Patient was placed on 15L nonrebreather satting 95-97%. Patient given tylenol, ceftriaxone, and azithro. Intubated on 4/14 for hypoxic respiratory failure.  overnite try to wean propofol bcz of high   pt became  tachycardic, got bolus 250 cc of NS and bld culture growing gr+ cocci/cluster on ancef 2 gm q8h and ID is following.

## 2020-05-05 NOTE — PROGRESS NOTE ADULT - ASSESSMENT
- RD precautions reviewed and strict call back instructions reviewed with patient in detail ASSESSMENT/PLAN: Hypoxic respiratory failure 2/2 COVID-19 pneumonia    NEURO: started on phenobarbital  try to wean propofol and fentanyl  CARDS: MAP > 65mmHg,  on phenylephrine     PULM:  continue mechanical ventilation, wean as tolerated; fio2 to 50 percent   Mode: pressure control   RENAL: 20 X2 today  creat wnl       GASTRO:  restart tube feeds vital af   continue rectal tube   dc protonix ans start pepcid   HEME:  sql 40 daily     ENDO:  euglycemia  mag and k and ionized calcium supplemented    ID: Fevers, gram positive cocci bacteremia , lines changed,on ancef ,   cx from 5/1 growing gr+ cocci/cluster  id f/u appreciated     Code status:  Full code  Disposition:  ICU    This patient was at high risk of neurologic deterioration and/or death due to: hypoxemic respiratory failure

## 2020-05-06 NOTE — PROGRESS NOTE ADULT - SUBJECTIVE AND OBJECTIVE BOX
44 M with no significant PMH presenting with nonproductive cough and fevers for 3-4 days. DX w/ covid19 pneumonitis--s/p initial anakinra protocol on tapering rx. Also status post Plaquenil and solumedrol.      Patient required intubation 4/14- received zosyn  Received course cefepime 4/15 -->4/19 for HAP      ICU VITALS SIGNS:  T(C): 37.8 (06 May 2020 07:00), Max: 38 (05 May 2020 19:00)  T(F): 100 (06 May 2020 07:00), Max: 100.4 (05 May 2020 19:00)  HR: 111 (06 May 2020 07:00) (93 - 120)  BP: --  BP(mean): --  ABP: 113/72 (06 May 2020 07:00) (102/66 - 133/87)  ABP(mean): 89 (06 May 2020 07:00) (83 - 106)  RR: 30 (06 May 2020 07:00) (24 - 34)  SpO2: 96% (06 May 2020 07:00) (91% - 100%)    ALLERGIES:  No Known Allergies        LABS:                           8.4    16.47 )-----------( 590      ( 06 May 2020 04:55 )             27.7     05-06    135  |  98  |  6<L>  ----------------------------<  118<H>  3.7   |  24  |  0.39<L>    Ca    8.1<L>      06 May 2020 04:55  Phos  2.2     05-06  Mg     1.8     05-06    TPro  6.6  /  Alb  2.5<L>  /  TBili  0.5  /  DBili  x   /  AST  38  /  ALT  13  /  AlkPhos  167<H>  05-06    ABG - ( 06 May 2020 04:49 )  pH, Arterial: 7.41  pH, Blood: x     /  pCO2: 42    /  pO2: 65    / HCO3: 26    / Base Excess: 1.9   /  SaO2: 93        LIVER FUNCTIONS - ( 06 May 2020 04:55 )  Alb: 2.5 g/dL / Pro: 6.6 g/dL / ALK PHOS: 167 U/L / ALT: 13 U/L / AST: 38 U/L / GGT: x           Culture - Blood (05.05.20 @ 00:37)    Specimen Source: .Blood Blood    Culture Results:   No growth to date.    Culture - Sputum . (05.01.20 @ 19:24)    Gram Stain:   No polymorphonuclear leukocytes per low power field  No Squamous epithelial cells per low power field  No organisms seen per oil power field    Specimen Source: .Sputum Sputum    Culture Results:   Normal Respiratory Natalia present    Culture - Blood (05.01.20 @ 08:42)    Specimen Source: .Blood Blood-Catheter    Culture Results:   No Growth Final      Culture - Blood (04.29.20 @ 15:06)    Gram Stain:   Growth in anaerobic bottle: Gram Positive Cocci in Clusters    Specimen Source: .Blood Blood-Peripheral    Culture Results:   Growth in anaerobic bottle: Staphylococcus aureus  See previous culture [id number]      Culture - Blood (04.22.20 @ 19:05)    Gram Stain:   Growth in aerobic and anaerobic bottles: Gram Positive Cocci in Clusters    -  Coagulase negative Staphylococcus: Detec    Specimen Source: .Blood Blood    Organism: Blood Culture PCR    Culture Results:   Growth in aerobic and anaerobic bottles: Staphylococcus epidermidis  Coag Negative Staphylococcus    COVID-19 PCR (03.31.20 @ 14:56)    COVID-19 PCR: Detected: This test has been validated by [employer] to be accurate;  though it has not been FDA cleared/approved by the usual pathway.  As with all laboratory tests, results should be correlated with clinical  findings.      MEDICATIONS:   MEDICATIONS  (STANDING):  ceFAZolin   IVPB 2000 milliGRAM(s) IV Intermittent every 8 hours  chlorhexidine 0.12% Liquid 15 milliLiter(s) Oral Mucosa every 12 hours  chlorhexidine 4% Liquid 1 Application(s) Topical <User Schedule>  clonazePAM  Tablet 0.5 milliGRAM(s) Oral every 8 hours  enoxaparin Injectable 40 milliGRAM(s) SubCutaneous <User Schedule>  famotidine    Tablet 20 milliGRAM(s) Oral two times a day  fentaNYL   Infusion... 5 MICROgram(s)/kG/Hr (19.5 mL/Hr) IV Continuous <Continuous>  fentaNYL   Patch 100 MICROgram(s)/Hr. 1 Patch Transdermal every 48 hours  petrolatum Ophthalmic Ointment 1 Application(s) Both EYES two times a day  PHENobarbital IVPB 80 milliGRAM(s) IV Intermittent every 12 hours  phenylephrine    Infusion 0.1 MICROgram(s)/kG/Min (1.46 mL/Hr) IV Continuous <Continuous>  polyethylene glycol 3350 17 Gram(s) Oral every 12 hours    MEDICATIONS  (PRN):  acetaminophen   Tablet .. 650 milliGRAM(s) Oral every 6 hours PRN Temp greater or equal to 38C (100.4F), Mild Pain (1 - 3), Moderate Pain (4 - 6)  ondansetron Injectable 4 milliGRAM(s) IV Push every 8 hours PRN Nausea and/or Vomiting  PHENobarbital Injectable 65 milliGRAM(s) IV Push every 1 hour PRN Agitation  sodium chloride 0.9% lock flush 10 milliLiter(s) IV Push every 1 hour PRN Pre/post blood products, medications, blood draw, and to maintain line patency      RADIOLOGY:   Xray Chest 1 View- PORTABLE-Routine (05.03.20 @ 03:26)   Percent of LEFT lung opacification: %  Percent of RIGHT lung opacification: %  Change in lung opacification from most recent x-ray (<=3 days): Stable  Change from prior dated 3 or more days (same admission): Stable      Xray Abdomen 1 View PORTABLE -Routine (05.01.20 @ 09:30)   No radiographic evidence of bowel obstruction.      Xray Chest 1 View- PORTABLE-Urgent (04.28.20 @ 14:44)   The heart is normal in size. Diffuse airspace opacities are seen throughout both lungs which remain unchanged when compared to previous study done April 23, 2020. NG tube is in the stomach. Endotracheal tube is in good position. A  Was placed in the right and the tip is in the superior vena cava. No pneumothorax. An additional line is seen on the left and the tip is in the superior vena cava as well. 44 M with no significant PMH presenting with nonproductive cough and fevers for 3-4 days. DX w/ covid19 pneumonitis--s/p initial anakinra protocol on tapering rx. Also status post Plaquenil and solumedrol.      FLOOR COURSE:  Treated with plaquenil, methylprednisolone and ceftriaxone/azithromycin. Anakinra started on 4/3. Had an RRT on 4/14 for hypoxia.     ICU COURSE:   Hypoxia treated with increased sedation and paralysis, proning. Intermittent fevers, treated with antibiotics.   Patient required intubation 4/14- received zosyn  Received course cefepime 4/15 -->4/19 for HAP      ICU VITALS SIGNS:  T(C): 37.8 (06 May 2020 07:00), Max: 38 (05 May 2020 19:00)  T(F): 100 (06 May 2020 07:00), Max: 100.4 (05 May 2020 19:00)  HR: 111 (06 May 2020 07:00) (93 - 120)  BP: --  BP(mean): --  ABP: 113/72 (06 May 2020 07:00) (102/66 - 133/87)  ABP(mean): 89 (06 May 2020 07:00) (83 - 106)  RR: 30 (06 May 2020 07:00) (24 - 34)  SpO2: 96% (06 May 2020 07:00) (91% - 100%)    ALLERGIES:  No Known Allergies      DEVICES:   [] Restraints [] ET tube [X] Rt IJ TLC central line [X] Rt Axillary arterial line [X] cruz [X] NGT/OGT [] EVD [] [] LD [] JAC/HMV [] Trach [] PEG [] Chest Tube [X] other: rectal tube      LABS:                           8.4    16.47 )-----------( 590      ( 06 May 2020 04:55 )             27.7     05-06    135  |  98  |  6<L>  ----------------------------<  118<H>  3.7   |  24  |  0.39<L>    Ca    8.1<L>      06 May 2020 04:55  Phos  2.2     05-06  Mg     1.8     05-06    TPro  6.6  /  Alb  2.5<L>  /  TBili  0.5  /  DBili  x   /  AST  38  /  ALT  13  /  AlkPhos  167<H>  05-06    ABG - ( 06 May 2020 04:49 )  pH, Arterial: 7.41  pH, Blood: x     /  pCO2: 42    /  pO2: 65    / HCO3: 26    / Base Excess: 1.9   /  SaO2: 93        LIVER FUNCTIONS - ( 06 May 2020 04:55 )  Alb: 2.5 g/dL / Pro: 6.6 g/dL / ALK PHOS: 167 U/L / ALT: 13 U/L / AST: 38 U/L / GGT: x           Culture - Blood (05.05.20 @ 00:37)    Specimen Source: .Blood Blood    Culture Results:   No growth to date.    Culture - Sputum . (05.01.20 @ 19:24)    Gram Stain:   No polymorphonuclear leukocytes per low power field  No Squamous epithelial cells per low power field  No organisms seen per oil power field    Specimen Source: .Sputum Sputum    Culture Results:   Normal Respiratory Natalia present    Culture - Blood (05.01.20 @ 08:42)    Specimen Source: .Blood Blood-Catheter    Culture Results:   No Growth Final      Culture - Blood (04.29.20 @ 15:06)    Gram Stain:   Growth in anaerobic bottle: Gram Positive Cocci in Clusters    Specimen Source: .Blood Blood-Peripheral    Culture Results:   Growth in anaerobic bottle: Staphylococcus aureus  See previous culture [id number]      Culture - Blood (04.22.20 @ 19:05)    Gram Stain:   Growth in aerobic and anaerobic bottles: Gram Positive Cocci in Clusters    -  Coagulase negative Staphylococcus: Detec    Specimen Source: .Blood Blood    Organism: Blood Culture PCR    Culture Results:   Growth in aerobic and anaerobic bottles: Staphylococcus epidermidis  Coag Negative Staphylococcus    COVID-19 PCR (03.31.20 @ 14:56)    COVID-19 PCR: Detected: This test has been validated by [employer] to be accurate;  though it has not been FDA cleared/approved by the usual pathway.  As with all laboratory tests, results should be correlated with clinical  findings.      I&O's:    05 May 2020 07:01  -  06 May 2020 07:00  --------------------------------------------------------  IN:    Enteral Tube Flush: 330 mL    fentaNYL   Infusion: 448.5 mL    IV PiggyBack: 100 mL    midazolam Infusion: 36.1 mL    ns in tub fed vital15: 400 mL    phenylephrine   Infusion: 889 mL    Solution: 100 mL    Solution: 166 mL  Total IN: 2469.6 mL    OUT:    Indwelling Catheter - Urethral: 2500 mL    Rectal Tube: 1 mL  Total OUT: 2501 mL    Total NET: -31.4 mL      06 May 2020 07:01  -  06 May 2020 11:28  --------------------------------------------------------  IN:    fentaNYL   Infusion: 78 mL    phenylephrine   Infusion: 114.7 mL    Solution: 333.2 mL  Total IN: 525.9 mL    OUT:    Indwelling Catheter - Urethral: 395 mL  Total OUT: 395 mL    Total NET: 130.9 mL      MEDICATIONS:   MEDICATIONS  (STANDING):  ceFAZolin   IVPB 2000 milliGRAM(s) IV Intermittent every 8 hours  chlorhexidine 0.12% Liquid 15 milliLiter(s) Oral Mucosa every 12 hours  chlorhexidine 4% Liquid 1 Application(s) Topical <User Schedule>  clonazePAM  Tablet 0.5 milliGRAM(s) Oral every 8 hours  enoxaparin Injectable 40 milliGRAM(s) SubCutaneous <User Schedule>  famotidine    Tablet 20 milliGRAM(s) Oral two times a day  fentaNYL   Infusion... 5 MICROgram(s)/kG/Hr (19.5 mL/Hr) IV Continuous <Continuous>  fentaNYL   Patch 100 MICROgram(s)/Hr. 1 Patch Transdermal every 48 hours  petrolatum Ophthalmic Ointment 1 Application(s) Both EYES two times a day  PHENobarbital IVPB 80 milliGRAM(s) IV Intermittent every 12 hours  phenylephrine    Infusion 0.1 MICROgram(s)/kG/Min (1.46 mL/Hr) IV Continuous <Continuous>  polyethylene glycol 3350 17 Gram(s) Oral every 12 hours    MEDICATIONS  (PRN):  acetaminophen   Tablet .. 650 milliGRAM(s) Oral every 6 hours PRN Temp greater or equal to 38C (100.4F), Mild Pain (1 - 3), Moderate Pain (4 - 6)  ondansetron Injectable 4 milliGRAM(s) IV Push every 8 hours PRN Nausea and/or Vomiting  PHENobarbital Injectable 65 milliGRAM(s) IV Push every 1 hour PRN Agitation  sodium chloride 0.9% lock flush 10 milliLiter(s) IV Push every 1 hour PRN Pre/post blood products, medications, blood draw, and to maintain line patency      RADIOLOGY:   Xray Chest 1 View- PORTABLE-Routine (05.03.20 @ 03:26)   Percent of LEFT lung opacification: %  Percent of RIGHT lung opacification: %  Change in lung opacification from most recent x-ray (<=3 days): Stable  Change from prior dated 3 or more days (same admission): Stable      Xray Abdomen 1 View PORTABLE -Routine (05.01.20 @ 09:30)   No radiographic evidence of bowel obstruction.      Xray Chest 1 View- PORTABLE-Urgent (04.28.20 @ 14:44)   The heart is normal in size. Diffuse airspace opacities are seen throughout both lungs which remain unchanged when compared to previous study done April 23, 2020. NG tube is in the stomach. Endotracheal tube is in good position. A  Was placed in the right and the tip is in the superior vena cava. No pneumothorax. An additional line is seen on the left and the tip is in the superior vena cava as well. SUBJECTIVE:   44 M with no significant PMH presenting with nonproductive cough and fevers for 3-4 days. DX w/ covid19 pneumonitis--s/p initial anakinra protocol on tapering rx. Also status post Plaquenil and solumedrol.      FLOOR COURSE:  Treated with plaquenil, methylprednisolone and ceftriaxone/azithromycin. Anakinra started on 4/3. Had an RRT on 4/14 for hypoxia.     ICU COURSE:   Hypoxia treated with increased sedation and paralysis, proning. Intermittent fevers, treated with antibiotics.   Patient required intubation 4/14- received zosyn  Received course cefepime 4/15 -->4/19 for HAP      ICU VITALS SIGNS:  T(C): 37.8 (06 May 2020 07:00), Max: 38 (05 May 2020 19:00)  T(F): 100 (06 May 2020 07:00), Max: 100.4 (05 May 2020 19:00)  HR: 111 (06 May 2020 07:00) (93 - 120)  BP: --  BP(mean): --  ABP: 113/72 (06 May 2020 07:00) (102/66 - 133/87)  ABP(mean): 89 (06 May 2020 07:00) (83 - 106)  RR: 30 (06 May 2020 07:00) (24 - 34)  SpO2: 96% (06 May 2020 07:00) (91% - 100%)    ALLERGIES:  No Known Allergies      DEVICES:   [] Restraints [] ET tube [X] Rt IJ TLC central line [X] Rt Axillary arterial line [X] cruz [X] NGT/OGT [] EVD [] [] LD [] JAC/HMV [] Trach [] PEG [] Chest Tube [X] other: rectal tube      LABS:                           8.4    16.47 )-----------( 590      ( 06 May 2020 04:55 )             27.7     05-06    135  |  98  |  6<L>  ----------------------------<  118<H>  3.7   |  24  |  0.39<L>    Ca    8.1<L>      06 May 2020 04:55  Phos  2.2     05-06  Mg     1.8     05-06    TPro  6.6  /  Alb  2.5<L>  /  TBili  0.5  /  DBili  x   /  AST  38  /  ALT  13  /  AlkPhos  167<H>  05-06    ABG - ( 06 May 2020 04:49 )  pH, Arterial: 7.41  pH, Blood: x     /  pCO2: 42    /  pO2: 65    / HCO3: 26    / Base Excess: 1.9   /  SaO2: 93        LIVER FUNCTIONS - ( 06 May 2020 04:55 )  Alb: 2.5 g/dL / Pro: 6.6 g/dL / ALK PHOS: 167 U/L / ALT: 13 U/L / AST: 38 U/L / GGT: x           Culture - Blood (05.05.20 @ 00:37)    Specimen Source: .Blood Blood    Culture Results:   No growth to date.    Culture - Sputum . (05.01.20 @ 19:24)    Gram Stain:   No polymorphonuclear leukocytes per low power field  No Squamous epithelial cells per low power field  No organisms seen per oil power field    Specimen Source: .Sputum Sputum    Culture Results:   Normal Respiratory Natalia present    Culture - Blood (05.01.20 @ 08:42)    Specimen Source: .Blood Blood-Catheter    Culture Results:   No Growth Final      Culture - Blood (04.29.20 @ 15:06)    Gram Stain:   Growth in anaerobic bottle: Gram Positive Cocci in Clusters    Specimen Source: .Blood Blood-Peripheral    Culture Results:   Growth in anaerobic bottle: Staphylococcus aureus  See previous culture [id number]      Culture - Blood (04.22.20 @ 19:05)    Gram Stain:   Growth in aerobic and anaerobic bottles: Gram Positive Cocci in Clusters    -  Coagulase negative Staphylococcus: Detec    Specimen Source: .Blood Blood    Organism: Blood Culture PCR    Culture Results:   Growth in aerobic and anaerobic bottles: Staphylococcus epidermidis  Coag Negative Staphylococcus    COVID-19 PCR (03.31.20 @ 14:56)    COVID-19 PCR: Detected: This test has been validated by [employer] to be accurate;  though it has not been FDA cleared/approved by the usual pathway.  As with all laboratory tests, results should be correlated with clinical  findings.      I&O's:    05 May 2020 07:01  -  06 May 2020 07:00  --------------------------------------------------------  IN:    Enteral Tube Flush: 330 mL    fentaNYL   Infusion: 448.5 mL    IV PiggyBack: 100 mL    midazolam Infusion: 36.1 mL    ns in tub fed vital15: 400 mL    phenylephrine   Infusion: 889 mL    Solution: 100 mL    Solution: 166 mL  Total IN: 2469.6 mL    OUT:    Indwelling Catheter - Urethral: 2500 mL    Rectal Tube: 1 mL  Total OUT: 2501 mL    Total NET: -31.4 mL      06 May 2020 07:01  -  06 May 2020 11:28  --------------------------------------------------------  IN:    fentaNYL   Infusion: 78 mL    phenylephrine   Infusion: 114.7 mL    Solution: 333.2 mL  Total IN: 525.9 mL    OUT:    Indwelling Catheter - Urethral: 395 mL  Total OUT: 395 mL    Total NET: 130.9 mL      MEDICATIONS:   MEDICATIONS  (STANDING):  ceFAZolin   IVPB 2000 milliGRAM(s) IV Intermittent every 8 hours  chlorhexidine 0.12% Liquid 15 milliLiter(s) Oral Mucosa every 12 hours  chlorhexidine 4% Liquid 1 Application(s) Topical <User Schedule>  clonazePAM  Tablet 0.5 milliGRAM(s) Oral every 8 hours  enoxaparin Injectable 40 milliGRAM(s) SubCutaneous <User Schedule>  famotidine    Tablet 20 milliGRAM(s) Oral two times a day  fentaNYL   Infusion... 5 MICROgram(s)/kG/Hr (19.5 mL/Hr) IV Continuous <Continuous>  fentaNYL   Patch 100 MICROgram(s)/Hr. 1 Patch Transdermal every 48 hours  petrolatum Ophthalmic Ointment 1 Application(s) Both EYES two times a day  PHENobarbital IVPB 80 milliGRAM(s) IV Intermittent every 12 hours  phenylephrine    Infusion 0.1 MICROgram(s)/kG/Min (1.46 mL/Hr) IV Continuous <Continuous>  polyethylene glycol 3350 17 Gram(s) Oral every 12 hours    MEDICATIONS  (PRN):  acetaminophen   Tablet .. 650 milliGRAM(s) Oral every 6 hours PRN Temp greater or equal to 38C (100.4F), Mild Pain (1 - 3), Moderate Pain (4 - 6)  ondansetron Injectable 4 milliGRAM(s) IV Push every 8 hours PRN Nausea and/or Vomiting  PHENobarbital Injectable 65 milliGRAM(s) IV Push every 1 hour PRN Agitation  sodium chloride 0.9% lock flush 10 milliLiter(s) IV Push every 1 hour PRN Pre/post blood products, medications, blood draw, and to maintain line patency      RADIOLOGY:   Xray Chest 1 View- PORTABLE-Routine (05.03.20 @ 03:26)   Percent of LEFT lung opacification: %  Percent of RIGHT lung opacification: %  Change in lung opacification from most recent x-ray (<=3 days): Stable  Change from prior dated 3 or more days (same admission): Stable      Xray Abdomen 1 View PORTABLE -Routine (05.01.20 @ 09:30)   No radiographic evidence of bowel obstruction.      Xray Chest 1 View- PORTABLE-Urgent (04.28.20 @ 14:44)   The heart is normal in size. Diffuse airspace opacities are seen throughout both lungs which remain unchanged when compared to previous study done April 23, 2020. NG tube is in the stomach. Endotracheal tube is in good position. A  Was placed in the right and the tip is in the superior vena cava. No pneumothorax. An additional line is seen on the left and the tip is in the superior vena cava as well.

## 2020-05-06 NOTE — PROGRESS NOTE ADULT - ASSESSMENT
Acute hypoxic respiratory failure 2/2 COVID-19  - maintain mechanical ventilation  - sedation for vent synchrony; wean fentanyl as able  - continue phenobarb  - continue antibiotics  - MAP >65mmHg  - tube feeding/rectal tube

## 2020-05-06 NOTE — PROGRESS NOTE ADULT - ASSESSMENT
ASSESSMENT/PLAN:   44 M with no significant PMH presenting with nonproductive cough and fevers for 3-4 days. DX w/ covid19 pneumonitis--s/p initial anakinra protocol on tapering rx. Also status post Plaquenil and solumedrol.      NEURO:   -try to wean fentanyl off, add Fentanyl Patch  -continue phenobarbitol and clonazepam  -if QTC ok, may consider adding methadone    CARDS:  -MAP > 65mmHg,  on phenylephrine       PULM:    -continue mechanical ventilation, wean as tolerated; fio2 to 45%   -Mode: pressure control ,   RR (machine): 30  FiO2: 45  PEEP: 5  ITime: 1  MAP: 18  PC: 35  PIP: 42      RENAL:   - I's/O's even      GASTRO:    -tolerating bolus tube feeds Vital AF   -continue rectal tube   -continue pepcid, miralax      HEME:    -continue SQL 40 daily for DVT ppx    ENDO:    -euglycemia      ID:   -COVID +, s/p plaquenil, solumedrol and anakinra  -ID follow up appreciated  -MSSA bacteremia- likely line infection  -continue Cefazolin 2g IV q8h  -rpt blood cx neg      SOCIAL/FAMILY:  [X] updated via phone call [] family meeting    CODE STATUS:  [X] Full Code [] DNR [] DNI [] Palliative/Comfort Care    DISPOSITION:  [X] ICU [] Stroke Unit [] Floor [] EMU [] RCU [] PCU    [] Patient is at high risk of neurologic deterioration/death due to:     Time seen:  Time spent: _45__ [X] critical care minutes ASSESSMENT/PLAN:   44 M with no significant PMH presenting with nonproductive cough and fevers for 3-4 days. DX w/ covid19 pneumonitis--s/p initial anakinra protocol on tapering rx. Also status post Plaquenil and solumedrol.      NEURO:   -try to wean fentanyl off, add fentanyl IVP prn  -continue phenobarbitol 80 q 12 (and prn) and clonazepam 0.5 q 8  -QTC wnl, will add methadone 10 q 6    CARDS:  -MAP > 65mmHg,  on phenylephrine       PULM:    -continue mechanical ventilation, wean as tolerated; fio2 to 45%   -Mode: pressure control ,   RR (machine): 30  FiO2: 45  PEEP: 5  ITime: 1  MAP: 18  PC: 35  PIP: 42      RENAL:   - I's/O's even      GASTRO:    -tolerating bolus tube feeds Vital AF   -continue rectal tube   -continue pepcid, miralax      HEME:    -continue SQL 40 daily for DVT ppx    ENDO:    -euglycemia      ID:   -COVID +, s/p plaquenil, solumedrol and anakinra  -ID follow up appreciated  -MSSA bacteremia- likely line infection  -continue Cefazolin 2g IV q8h  -rpt blood cx neg      SOCIAL/FAMILY:  [X] updated via phone call [] family meeting    CODE STATUS:  [X] Full Code [] DNR [] DNI [] Palliative/Comfort Care    DISPOSITION:  [X] ICU [] Stroke Unit [] Floor [] EMU [] RCU [] PCU    [] Patient is at high risk of neurologic deterioration/death due to:     Time seen:  Time spent: _45__ [X] critical care minutes ASSESSMENT/PLAN:   44 M with no significant PMH presenting with nonproductive cough and fevers for 3-4 days. DX w/ covid19 pneumonitis--s/p initial anakinra protocol on tapering rx. Also status post Plaquenil and solumedrol.      NEURO:   -try to wean fentanyl off, add fentanyl IVP prn  -continue phenobarbitol 80 q 12 (and prn) and clonazepam 0.5 q 8  -QTC wnl, will add methadone 10 q 6    CARDS:  -MAP > 65mmHg,  on phenylephrine     PULM:    -continue mechanical ventilation, wean as tolerated; fio2 to 45%   -Mode: pressure control ,   RR (machine): 30  FiO2: 45  PEEP: 5  ITime: 1  MAP: 18  PC: 35  PIP: 42      RENAL:   - I's/O's even      GASTRO:    -tolerating bolus tube feeds Vital AF   -continue rectal tube   -continue pepcid, miralax      HEME:    -continue SQL 40 daily for DVT ppx    ENDO:    -euglycemia      ID:   -COVID +, s/p plaquenil, solumedrol and anakinra  -ID follow up appreciated  -MSSA bacteremia- likely line infection  -continue Cefazolin 2g IV q8h  -rpt blood cx neg      SOCIAL/FAMILY:  [X] updated via phone call [] family meeting    CODE STATUS:  [X] Full Code [] DNR [] DNI [] Palliative/Comfort Care    DISPOSITION:  [X] ICU [] Stroke Unit [] Floor [] EMU [] RCU [] PCU    [x] Patient is at high risk of deterioration/death due to: acute respiratory failure     Time seen:  Time spent: _45__ [X] critical care minutes

## 2020-05-07 NOTE — PROGRESS NOTE ADULT - ASSESSMENT
Acute hypoxic respiratory failure 2/2 COVID-19  - maintain mechanical ventilation  - sedation for vent synchrony; wean fentanyl as able  - continue phenobarb, methadone, klonopin--would wean off   - fentanyl prn  - continue antibiotics  - MAP >65mmHg  - tube feeding/rectal tube

## 2020-05-07 NOTE — PROGRESS NOTE ADULT - SUBJECTIVE AND OBJECTIVE BOX
SUBJECTIVE:   44 M with no significant PMH presenting with nonproductive cough and fevers for 3-4 days. DX w/ covid19 pneumonitis--s/p initial anakinra protocol on tapering rx. Also status post Plaquenil and solumedrol.      FLOOR COURSE:  Treated with plaquenil, methylprednisolone and ceftriaxone/azithromycin. Anakinra started on 4/3. Had an RRT on 4/14 for hypoxia.     ICU COURSE:   Hypoxia treated with increased sedation and paralysis, proning. Intermittent fevers, treated with antibiotics.   Patient required intubation 4/14- received zosyn  Received course cefepime 4/15 -->4/19 for HAP    OVERNIGHT EVENTS: Fio2 weaned down to 40 %; Precedex added to wean off Propofol      ICU VITALS SIGNS:  ICU Vital Signs Last 24 Hrs  T(C): 37.6 (07 May 2020 07:00), Max: 38.1 (06 May 2020 23:00)  T(F): 99.7 (07 May 2020 07:00), Max: 100.6 (06 May 2020 23:00)  HR: 86 (07 May 2020 08:00) (86 - 123)  BP: --  BP(mean): --  ABP: 88/54 (07 May 2020 08:00) (86/55 - 124/80)  ABP(mean): 67 (07 May 2020 08:00) (66 - 99)  RR: 30 (07 May 2020 08:00) (5 - 30)  SpO2: 93% (07 May 2020 08:00) (93% - 100%)      ALLERGIES:  No Known Allergies      DEVICES:   [] Restraints [] ET tube [X] Rt IJ TLC central line [X] Rt Axillary arterial line [X] cruz [X] NGT/OGT [] EVD [] [] LD [] JAC/HMV [] Trach [] PEG [] Chest Tube [X] other: rectal tube      VENT SETTINGS:  Mode: AC/ CMV (Assist Control/ Continuous Mandatory Ventilation)  RR (machine): 30  FiO2: 40  PEEP: 5  ITime: 1  MAP: 17  PC: 35  PIP: 42        LABS:                                      7.3    12.02 )-----------( 510      ( 07 May 2020 05:28 )             24.1     05-07    133<L>  |  95<L>  |  6<L>  ----------------------------<  147<H>  3.4<L>   |  25  |  0.38<L>    Ca    7.9<L>      07 May 2020 05:28  Phos  2.3     05-07  Mg     1.7     05-07    TPro  6.3  /  Alb  2.4<L>  /  TBili  0.3  /  DBili  x   /  AST  28  /  ALT  10  /  AlkPhos  151<H>  05-07      ABG - ( 07 May 2020 06:42 )  pH, Arterial: 7.44  pH, Blood: x     /  pCO2: 41    /  pO2: 73    / HCO3: 28    / Base Excess: 3.6   /  SaO2: 95             LIVER FUNCTIONS - ( 07 May 2020 05:28 )  Alb: 2.4 g/dL / Pro: 6.3 g/dL / ALK PHOS: 151 U/L / ALT: 10 U/L / AST: 28 U/L / GGT: x               Culture - Blood (05.05.20 @ 00:37)    Specimen Source: .Blood Blood    Culture Results:   No growth to date.    Culture - Sputum . (05.01.20 @ 19:24)    Gram Stain:   No polymorphonuclear leukocytes per low power field  No Squamous epithelial cells per low power field  No organisms seen per oil power field    Specimen Source: .Sputum Sputum    Culture Results:   Normal Respiratory Natalia present    Culture - Blood (05.01.20 @ 08:42)    Specimen Source: .Blood Blood-Catheter    Culture Results:   No Growth Final      Culture - Blood (04.29.20 @ 15:06)    Gram Stain:   Growth in anaerobic bottle: Gram Positive Cocci in Clusters    Specimen Source: .Blood Blood-Peripheral    Culture Results:   Growth in anaerobic bottle: Staphylococcus aureus  See previous culture [id number]      Culture - Blood (04.22.20 @ 19:05)    Gram Stain:   Growth in aerobic and anaerobic bottles: Gram Positive Cocci in Clusters    -  Coagulase negative Staphylococcus: Detec    Specimen Source: .Blood Blood    Organism: Blood Culture PCR    Culture Results:   Growth in aerobic and anaerobic bottles: Staphylococcus epidermidis  Coag Negative Staphylococcus    COVID-19 PCR (03.31.20 @ 14:56)    COVID-19 PCR: Detected: This test has been validated by [employer] to be accurate;  though it has not been FDA cleared/approved by the usual pathway.  As with all laboratory tests, results should be correlated with clinical  findings.      I&O's:    06 May 2020 07:01  -  07 May 2020 07:00  --------------------------------------------------------  IN:    Enteral Tube Flush: 160 mL    fentaNYL   Infusion: 387.7 mL    IV PiggyBack: 200 mL    ns in tub fed vital15: 100 mL    phenylephrine   Infusion: 462.4 mL    Solution: 333.2 mL    Solution: 100 mL  Total IN: 1743.3 mL    OUT:    Indwelling Catheter - Urethral: 1740 mL    Rectal Tube: 50 mL  Total OUT: 1790 mL    Total NET: -46.7 mL      MEDICATIONS:   MEDICATIONS  (STANDING):  ceFAZolin   IVPB 2000 milliGRAM(s) IV Intermittent every 8 hours  chlorhexidine 0.12% Liquid 15 milliLiter(s) Oral Mucosa every 12 hours  chlorhexidine 4% Liquid 1 Application(s) Topical <User Schedule>  clonazePAM  Tablet 0.5 milliGRAM(s) Oral every 6 hours  dexMEDEtomidine Infusion 1 MICROgram(s)/kG/Hr (19.5 mL/Hr) IV Continuous <Continuous>  enoxaparin Injectable 40 milliGRAM(s) SubCutaneous <User Schedule>  famotidine    Tablet 20 milliGRAM(s) Oral two times a day  fentaNYL   Infusion... 5 MICROgram(s)/kG/Hr (19.5 mL/Hr) IV Continuous <Continuous>  methadone   Solution 10 milliGRAM(s) Oral every 6 hours  petrolatum Ophthalmic Ointment 1 Application(s) Both EYES two times a day  PHENobarbital IVPB 80 milliGRAM(s) IV Intermittent every 12 hours  phenylephrine    Infusion 0.1 MICROgram(s)/kG/Min (1.46 mL/Hr) IV Continuous <Continuous>  polyethylene glycol 3350 17 Gram(s) Oral every 12 hours  potassium chloride   Solution 40 milliEquivalent(s) Oral every 4 hours  sodium phosphate IVPB 15 milliMole(s) IV Intermittent once    MEDICATIONS  (PRN):  acetaminophen   Tablet .. 650 milliGRAM(s) Oral every 6 hours PRN Temp greater or equal to 38C (100.4F), Mild Pain (1 - 3), Moderate Pain (4 - 6)  fentaNYL    Injectable 100 MICROGram(s) IV Push every 4 hours PRN agitation  ondansetron Injectable 4 milliGRAM(s) IV Push every 8 hours PRN Nausea and/or Vomiting  PHENobarbital Injectable 65 milliGRAM(s) IV Push every 1 hour PRN Agitation  sodium chloride 0.9% lock flush 10 milliLiter(s) IV Push every 1 hour PRN Pre/post blood products, medications, blood draw, and to maintain line patency        RADIOLOGY:   Xray Chest 1 View- PORTABLE-Routine (05.03.20 @ 03:26)   Percent of LEFT lung opacification: %  Percent of RIGHT lung opacification: %  Change in lung opacification from most recent x-ray (<=3 days): Stable  Change from prior dated 3 or more days (same admission): Stable      Xray Abdomen 1 View PORTABLE -Routine (05.01.20 @ 09:30)   No radiographic evidence of bowel obstruction.      Xray Chest 1 View- PORTABLE-Urgent (04.28.20 @ 14:44)   The heart is normal in size. Diffuse airspace opacities are seen throughout both lungs which remain unchanged when compared to previous study done April 23, 2020. NG tube is in the stomach. Endotracheal tube is in good position. A  Was placed in the right and the tip is in the superior vena cava. No pneumothorax. An additional line is seen on the left and the tip is in the superior vena cava as well.

## 2020-05-07 NOTE — PROGRESS NOTE ADULT - ASSESSMENT
44 M with no significant PMH presenting with nonproductive cough and fevers for 3-4 days. DX w/ covid19 pneumonitis--s/p initial anakinra protocol on tapering rx. Also status post Plaquenil and solumedrol.  Currently receiving colchicine, lovenox 40 BID, famotidine and albuterol as needed. Desaturated overnight and CTPA ordered to rule out PE and also to evaluate for hospital acquired pneumonia.    Patient required intubation 4/14- received zosyn  Received course cefepime 4/15 -->4/19 for HAP    1) COVID-19  - s/p plaquenil, solumedrol, anakinra  - supportive care  - vent support    2) MSSA bacteremia- likely line infection  - changed to ancef 2g IV q8h  - BCx positive 4/28, line switched 4/28,   Positive BCx g+C clusters 4/29 MSSA  5/1 BC Negative  TTE negative for vegetations    Will plan to continue the cefazolin until 5/29/20    Tono Olmos MD  [phone]  After 5pm/weekends [phone]

## 2020-05-07 NOTE — PROGRESS NOTE ADULT - ASSESSMENT
ASSESSMENT/PLAN:   44 M with no significant PMH presenting with nonproductive cough and fevers for 3-4 days. DX w/ covid19 pneumonitis--s/p initial anakinra protocol on tapering rx. Also status post Plaquenil and solumedrol.      NEURO:   -cont Precedex, try to wean fentanyl off, can use fentanyl IVP prn  -continue phenobarbitol 80 q 12 (and prn) and clonazepam 0.5 q 8  -QTC wnl,  methadone 10 q 6 added    CARDS:  -MAP > 65mmHg,  on phenylephrine     PULM:    -continue mechanical ventilation, wean as tolerated; fio2 to 40%   Mode: AC/ CMV (Assist Control/ Continuous Mandatory Ventilation)  RR (machine): 30  FiO2: 40  PEEP: 5  ITime: 1  MAP: 17  PC: 35  PIP: 42        RENAL:   - I's/O's even  -hypokalemia, potassium repleted  -monitor hyponatremia      GASTRO:    -tolerating bolus tube feeds Vital AF   -continue rectal tube   -continue pepcid, miralax      HEME:    -continue SQL 40 daily for DVT ppx    ENDO:    -euglycemia      ID:   -COVID +, s/p plaquenil, solumedrol and anakinra  -ID follow up appreciated  -MSSA bacteremia- likely line infection  -continue Cefazolin 2g IV q8h  -rpt blood cx neg      SOCIAL/FAMILY:  [X] updated via phone call [] family meeting    CODE STATUS:  [X] Full Code [] DNR [] DNI [] Palliative/Comfort Care    DISPOSITION:  [X] ICU [] Stroke Unit [] Floor [] EMU [] RCU [] PCU    [x] Patient is at high risk of deterioration/death due to: acute respiratory failure     Time seen:  Time spent: _45__ [X] critical care minutes ASSESSMENT/PLAN:   44 M with no significant PMH presenting with nonproductive cough and fevers for 3-4 days. DX w/ covid19 pneumonitis--s/p initial anakinra protocol on tapering rx. Also status post Plaquenil and solumedrol.      NEURO:   -cont Precedex, try to wean fentanyl off, can use fentanyl IVP prn  -continue phenobarbitol 80 q 12 (and prn) and clonazepam 0.5 q 8  -QTC wnl, on methadone 10 q 6     CARDS:  -MAP > 65mmHg,  on phenylephrine     PULM:    -continue mechanical ventilation, wean as tolerated; fio2 to 40%   Mode: AC/ CMV (Assist Control/ Continuous Mandatory Ventilation)  RR (machine): 30  FiO2: 40  PEEP: 5  ITime: 1  MAP: 17  PC: 35  PIP: 42        RENAL:   - I's/O's even  -hypokalemia, potassium repleted  -monitor hyponatremia      GASTRO:    -tolerating bolus tube feeds Vital AF   -continue rectal tube   -continue pepcid, miralax      HEME:    -continue SQL 40 daily for DVT ppx    ENDO:    -euglycemia      ID:   -COVID +, s/p plaquenil, solumedrol and anakinra  -ID follow up appreciated  -MSSA bacteremia- likely line infection  -continue Cefazolin 2g IV q8h  -rpt blood cx neg  -pancx if inc temp      SOCIAL/FAMILY:  [X] updated via phone call [] family meeting    CODE STATUS:  [X] Full Code [] DNR [] DNI [] Palliative/Comfort Care    DISPOSITION:  [X] ICU [] Stroke Unit [] Floor [] EMU [] RCU [] PCU    [x] Patient is at high risk of deterioration/death due to: acute respiratory failure     Time seen:  Time spent: _45__ [X] critical care minutes ASSESSMENT/PLAN:   44 M with no significant PMH presenting with nonproductive cough and fevers for 3-4 days. DX w/ covid19 pneumonitis--s/p initial anakinra protocol on tapering rx. Also status post Plaquenil and solumedrol.      NEURO:   -cont Precedex, try to wean fentanyl off, can use fentanyl IVP prn  -continue phenobarbitol 80 q 12 (and prn) and clonazepam 0.5 q 8  -QTC wnl, on methadone 10 q 6     CARDS:  -MAP > 65mmHg,  on phenylephrine     PULM:    -continue mechanical ventilation, wean as tolerated; fio2 to 40%   Mode: AC/ CMV (Assist Control/ Continuous Mandatory Ventilation)  RR (machine): 30  FiO2: 40  PEEP: 5  ITime: 1  MAP: 17  PC: 35  PIP: 42        RENAL:   - I's/O's even  -hypokalemia, potassium repleted  -monitor hyponatremia      GASTRO:    -tolerating bolus tube feeds Vital AF   -continue rectal tube   -continue pepcid, miralax      HEME:    -continue SQL 40 daily for DVT ppx    ENDO:    -euglycemia      ID:   -COVID +, s/p plaquenil, solumedrol and anakinra  -ID follow up appreciated  -MSSA bacteremia- likely line infection  -continue Cefazolin 2g IV q8h  -rpt blood cx neg  -pancx if inc temp      SOCIAL/FAMILY:  [X] updated via phone call [] family meeting    CODE STATUS:  [X] Full Code [] DNR [] DNI [] Palliative/Comfort Care    DISPOSITION:  [X] ICU [] Stroke Unit [] Floor [] EMU [] RCU [] PCU    [x] Patient is at high risk of deterioration/death due to: acute respiratory failure     Time spent: _45__ [X] critical care minutes

## 2020-05-07 NOTE — PROGRESS NOTE ADULT - SUBJECTIVE AND OBJECTIVE BOX
INFECTIOUS DISEASES FOLLOW UP-- Ingrid Olmos  [phone]    This is a follow up note for this  44yMale with  Other general symptom or sign  COVID+ pneumonia with vent. failure and course complicated by MSSA bacteremia    ROS:  CONSTITUTIONAL:  intubated/sedated/ slightly lower fi02    Allergies    No Known Allergies    Intolerances        ANTIBIOTICS/RELEVANT:  antimicrobials  ceFAZolin   IVPB 2000 milliGRAM(s) IV Intermittent every 8 hours    immunologic:    OTHER:  acetaminophen   Tablet .. 650 milliGRAM(s) Oral every 6 hours PRN  chlorhexidine 0.12% Liquid 15 milliLiter(s) Oral Mucosa every 12 hours  chlorhexidine 4% Liquid 1 Application(s) Topical <User Schedule>  clonazePAM  Tablet 0.5 milliGRAM(s) Oral every 6 hours  dexMEDEtomidine Infusion 1 MICROgram(s)/kG/Hr IV Continuous <Continuous>  enoxaparin Injectable 40 milliGRAM(s) SubCutaneous <User Schedule>  famotidine    Tablet 20 milliGRAM(s) Oral two times a day  fentaNYL    Injectable 100 MICROGram(s) IV Push every 4 hours PRN  fentaNYL   Infusion... 5 MICROgram(s)/kG/Hr IV Continuous <Continuous>  methadone    Tablet 10 milliGRAM(s) Oral every 6 hours  ondansetron Injectable 4 milliGRAM(s) IV Push every 8 hours PRN  petrolatum Ophthalmic Ointment 1 Application(s) Both EYES two times a day  PHENobarbital Injectable 65 milliGRAM(s) IV Push every 1 hour PRN  PHENobarbital IVPB 80 milliGRAM(s) IV Intermittent every 12 hours  phenylephrine    Infusion 0.1 MICROgram(s)/kG/Min IV Continuous <Continuous>  polyethylene glycol 3350 17 Gram(s) Oral every 12 hours  sodium chloride 0.9% lock flush 10 milliLiter(s) IV Push every 1 hour PRN      Objective:  Vital Signs Last 24 Hrs  T(C): 37.6 (07 May 2020 14:00), Max: 38.1 (06 May 2020 23:00)  T(F): 99.7 (07 May 2020 14:00), Max: 100.6 (06 May 2020 23:00)  HR: 86 (07 May 2020 14:00) (81 - 123)  BP: --  BP(mean): --  RR: 30 (07 May 2020 14:00) (5 - 30)  SpO2: 96% (07 May 2020 14:00) (92% - 100%)    PHYSICAL EXAM:  Constitutional:no acute distress, low grade fever, intubated sedated  Eyes:MARIA FERNANDA,   Ear/Nose/Throat: no oral lesions, RIJ	  Respiratory: diminished BS bilat  Cardiovascular: S1S2 tachy  Gastrointestinal:soft, (+) BS, no tenderness  Extremities:no e/e/c  No Lymphadenopathy  IV sites not inflammed.    LABS:                        7.3    12.99 )-----------( 511      ( 07 May 2020 15:13 )             23.7     05-07    133<L>  |  95<L>  |  6<L>  ----------------------------<  147<H>  3.4<L>   |  25  |  0.38<L>    Ca    7.9<L>      07 May 2020 05:28  Phos  2.3     05-07  Mg     1.7     05-07    TPro  6.3  /  Alb  2.4<L>  /  TBili  0.3  /  DBili  x   /  AST  28  /  ALT  10  /  AlkPhos  151<H>  05-07    PT/INR - ( 06 May 2020 04:55 )   PT: 11.1 sec;   INR: 0.97 ratio         PTT - ( 06 May 2020 04:55 )  PTT:36.6 sec      MICROBIOLOGY:            RECENT CULTURES:  05-05 @ 00:37  .Blood Blood  --  --  --    No growth to date.  --  05-01 @ 19:24  .Sputum Sputum  --  --  --    Normal Respiratory Natalia present  --  05-01 @ 08:42  .Blood Blood-Peripheral  --  --  --    No Growth Final  --      RADIOLOGY & ADDITIONAL STUDIES:    < from: Xray Chest 1 View- PORTABLE-Routine (05.03.20 @ 03:26) >    Impression:  1.  Redemonstrated bilateral airspace opacities    < end of copied text >

## 2020-05-07 NOTE — CHART NOTE - NSCHARTNOTEFT_GEN_A_CORE
Nutrition Follow Up Note   Patient seen for: nutrition follow up on COVID ICU / malnutrition follow up    Source: medical record, communication with team. Unable to speak to pt due to current airborne isolation contact precautions related to COVID-19. Pt remains intubated.     Chart reviewed, events noted.     Diet Order: Diet, NPO with Tube Feed:   Tube Feeding Modality: Nasogastric  Vital AF (VITALAFRTH)  Total Volume for 24 Hours (mL): 400  Bolus  Total Volume of Bolus (mL):  100  Total # of Feeds: 4  Tube Feed Frequency: Every 6 hours   Tube Feed Start Time: 10:00  Bolus Feed Rate (mL per Hour): 50   Bolus Feed Duration (in Hours): 1 (05-05-20 @ 09:35)    CURRENT EN ORDER PROVIDES: 400ml, 480kcal and 30g protein.     Nutrition Events:   -Weaned off propofol due to elevated triglycerides (was 434mg/dL on 5/5).   -Continues on antibiotics as ordered 2/2 MSSA bacteremia (likely line infection). Followed by infectious disease.   -Continues to receive electrolyte supplementations PRN as per discretion of medical team; received K Phos via IV on 5/6.   -EN provision (per nursing flow sheet): (5/6): 300ml (75% of goal); (5/5): 300ml (75% of goal; EN goal increased from 200ml to 400ml); (5/4): 200ml (100% of goal; goal was 200ml daily).     GI: Via rectal tube: (5/6): 50ml; (5/5): 25ml     Anthropometric Measurements:   Height (cm): 167.6 (04-14-20 @ 16:20)  Weight (kg): 77.9 (04-14-20 @ 16:20)  BMI (kg/m2): 27.7 (04-14-20 @ 16:20)    Medications: MEDICATIONS  (STANDING):  ceFAZolin   IVPB 2000 milliGRAM(s) IV Intermittent every 8 hours  chlorhexidine 0.12% Liquid 15 milliLiter(s) Oral Mucosa every 12 hours  chlorhexidine 4% Liquid 1 Application(s) Topical <User Schedule>  clonazePAM  Tablet 0.5 milliGRAM(s) Oral every 6 hours  dexMEDEtomidine Infusion 1 MICROgram(s)/kG/Hr (19.5 mL/Hr) IV Continuous <Continuous>  enoxaparin Injectable 40 milliGRAM(s) SubCutaneous <User Schedule>  famotidine    Tablet 20 milliGRAM(s) Oral two times a day  fentaNYL   Infusion... 5 MICROgram(s)/kG/Hr (19.5 mL/Hr) IV Continuous <Continuous>  methadone   Solution 10 milliGRAM(s) Oral every 6 hours  petrolatum Ophthalmic Ointment 1 Application(s) Both EYES two times a day  PHENobarbital IVPB 80 milliGRAM(s) IV Intermittent every 12 hours  phenylephrine    Infusion 0.1 MICROgram(s)/kG/Min (1.46 mL/Hr) IV Continuous <Continuous>  polyethylene glycol 3350 17 Gram(s) Oral every 12 hours  potassium chloride   Solution 40 milliEquivalent(s) Oral every 4 hours  sodium phosphate IVPB 15 milliMole(s) IV Intermittent once    MEDICATIONS  (PRN):  acetaminophen   Tablet .. 650 milliGRAM(s) Oral every 6 hours PRN Temp greater or equal to 38C (100.4F), Mild Pain (1 - 3), Moderate Pain (4 - 6)  fentaNYL    Injectable 100 MICROGram(s) IV Push every 4 hours PRN agitation  ondansetron Injectable 4 milliGRAM(s) IV Push every 8 hours PRN Nausea and/or Vomiting  PHENobarbital Injectable 65 milliGRAM(s) IV Push every 1 hour PRN Agitation  sodium chloride 0.9% lock flush 10 milliLiter(s) IV Push every 1 hour PRN Pre/post blood products, medications, blood draw, and to maintain line patency    Labs: 05-07 @ 05:28: Sodium 133<L>, Potassium 3.4<L>, Calcium 7.9<L>, Magnesium 1.7, Phosphorus 2.3<L>, BUN 6<L>, Creatinine 0.38<L>, Glucose 147<H>, Alk Phos 151<H>, ALT/SGPT 10, AST/SGOT 28, Albumin 2.4<L>, Prealbumin --, Total Bilirubin 0.3, Hemoglobin 7.3<L>, Hematocrit 24.1<L>, Ferritin --, C-Reactive Protein --, Creatine Kinase <<27>  05-06 @ 08:26: Sodium --, Potassium --, Calcium --, Magnesium --, Phosphorus --, BUN --, Creatinine --, Glucose --, Alk Phos --, ALT/SGPT --, AST/SGOT --, Albumin --, Prealbumin --, Total Bilirubin --, Hemoglobin --, Hematocrit --, Ferritin --, C-Reactive Protein 3.05<H>, Creatine Kinase <<27>  05-06 @ 07:57: Sodium --, Potassium --, Calcium --, Magnesium --, Phosphorus --, BUN --, Creatinine --, Glucose --, Alk Phos --, ALT/SGPT --, AST/SGOT --, Albumin --, Prealbumin --, Total Bilirubin --, Hemoglobin --, Hematocrit --, Ferritin 770<H>, C-Reactive Protein --, Creatine Kinase <<27>    Triglycerides, Serum: 434 mg/dL (05-05-20 @ 04:45)  Triglycerides, Serum: 584 mg/dL (05-04-20 @ 03:59)  Triglycerides, Serum: 467 mg/dL (05-03-20 @ 05:01)  Triglycerides, Serum: 470 mg/dL (05-02-20 @ 14:22)  Triglycerides, Serum: 329 mg/dL (05-01-20 @ 03:41)  Triglycerides, Serum: 355 mg/dL (04-30-20 @ 04:13)  Triglycerides, Serum: 325 mg/dL (04-29-20 @ 05:53)  Triglycerides, Serum: 475 mg/dL (04-28-20 @ 04:48)    Skin: no pressure injuries noted  Edema: 2+ generalized     Estimated Needs: (based on dosing wt 77.9kg)  Energy: (20-25kcal/kg): 1558-1948kcal  Protein: (1.2-1.4g protein/kg): 93-109g protein    Previous Nutrition Diagnosis: swallowing difficulty; acute moderate protein-calorie malnutrition   Nutrition Diagnosis is: ongoing, with provision of enteral nutrition     New Nutrition Diagnosis: none    Recommended Interventions:   1. As tolerated, change formulary and increase EN bolus feed volume to GOAL: Vital 1.5 at 300ml q 6 hrs with No Carb Prosource 1x daily (+60kcal, +15g protein).  To provide (based on dosing wt 77.9kg): 1200ml, 1860kcal (24kcal/kg) and 96g protein (1.2g protein/kg).   2. Monitor GI tolerance. RD to remain available to adjust EN formulary, volume/rate PRN.   3. Trend BG levels, renal indices, LFT's, electrolytes and triglycerides. Monitor provision of propofol.   4. Obtain and document current weight.     Monitoring and Evaluation:   Continue to monitor nutrition provision and tolerance, weights, labs, skin integrity.   RD remains available upon request and will follow up per protocol.    Alison Kleiner, RD [facility]; pager number [phone]

## 2020-05-08 NOTE — PROGRESS NOTE ADULT - SUBJECTIVE AND OBJECTIVE BOX
hyponatremia  fentanyl/precedex/yovanny    PIP 40 PEEP 5 FiO2 50 RR 30 hyponatremia  fentanyl/precedex/yovanny    PIP 40 PEEP 5 FiO2 50 RR 30  pplat 36

## 2020-05-08 NOTE — PROGRESS NOTE ADULT - ASSESSMENT
ASSESSMENT/PLAN:   44 M with no significant PMH presenting with nonproductive cough and fevers for 3-4 days. DX w/ covid19 pneumonitis--s/p initial anakinra protocol on tapering rx. Also status post Plaquenil and solumedrol.      NEURO:   -cont Precedex, try to wean fentanyl off, can use fentanyl IVP prn  -continue phenobarbitol 80 q 12 (and prn) and clonazepam 0.5 q 8  -QTC wnl, on methadone 10 q 6     CARDS:  -MAP > 65mmHg,  on phenylephrine     PULM:    -continue mechanical ventilation, wean as tolerated; fio2 to 40%   Mode: AC/ CMV (Assist Control/ Continuous Mandatory Ventilation)  RR (machine): 30  FiO2: 50  PEEP: 5  ITime: 1  MAP: 18  PC: 35  PIP: 43      RENAL:   - I's/O's : negative ~ 200cc  -monitor hyponatremia      GASTRO:    -tolerating bolus tube feeds Vital AF   -continue rectal tube   -continue pepcid, miralax      HEME:    -continue SQL 40 daily for DVT ppx  -monitor H/H    ENDO:    -euglycemia      ID:   -COVID +, s/p plaquenil, solumedrol and anakinra  -rpt COVID from 5/7 +  -ID follow up appreciated  -MSSA bacteremia- likely line infection  -continue Cefazolin 2g IV q8h- end date 5/29  -rpt blood cx neg  -pancx if inc temp      SOCIAL/FAMILY:  [X] updated via phone call [] family meeting    CODE STATUS:  [X] Full Code [] DNR [] DNI [] Palliative/Comfort Care    DISPOSITION:  [X] ICU [] Stroke Unit [] Floor [] EMU [] RCU [] PCU    [x] Patient is at high risk of deterioration/death due to: acute respiratory failure     Time spent: _45__ [X] critical care minutes

## 2020-05-08 NOTE — PROGRESS NOTE ADULT - ASSESSMENT
Acute hypoxic respiratory failure 2/2 COVID-19  - maintain mechanical ventilation; pressure support as tolerated  - sedation for vent synchrony; wean fentanyl as able  - continue phenobarb, methadone, klonopin--would wean off   - fentanyl prn  - continue antibiotics  - MAP >65mmHg  - tube feeding/rectal tube  - hyponatremia--salt tabs, monitor; ?2/2 klonopin--weaning off

## 2020-05-08 NOTE — PROGRESS NOTE ADULT - SUBJECTIVE AND OBJECTIVE BOX
SUBJECTIVE:   44 M with no significant PMH presenting with nonproductive cough and fevers for 3-4 days. DX w/ covid19 pneumonitis--s/p initial anakinra protocol on tapering rx. Also status post Plaquenil and solumedrol.      FLOOR COURSE:  Treated with plaquenil, methylprednisolone and ceftriaxone/azithromycin. Anakinra started on 4/3. Had an RRT on 4/14 for hypoxia.     ICU COURSE:   Hypoxia treated with increased sedation and paralysis, proning. Intermittent fevers, treated with antibiotics.   Patient required intubation 4/14- received zosyn  Received course cefepime 4/15 -->4/19 for HAP    OVERNIGHT EVENTS: episodes of desaturation and tachycardia, received Versed 4mg x 1 with improvement      ICU VITALS SIGNS:  T(C): 37.8 (08 May 2020 07:00), Max: 37.9 (08 May 2020 05:00)  T(F): 100 (08 May 2020 07:00), Max: 100.2 (08 May 2020 05:00)  HR: 82 (08 May 2020 07:00) (81 - 119)  BP: --  BP(mean): --  ABP: 95/58 (08 May 2020 07:00) (90/55 - 119/76)  ABP(mean): 74 (08 May 2020 07:00) (69 - 97)  RR: 30 (08 May 2020 07:00) (25 - 33)  SpO2: 98% (08 May 2020 07:00) (87% - 99%)        ALLERGIES:  No Known Allergies      DEVICES:   [] Restraints [] ET tube [X] Rt IJ TLC central line [X] Rt Axillary arterial line [X] cruz [X] NGT/OGT [] EVD [] [] LD [] JAC/HMV [] Trach [] PEG [] Chest Tube [X] other: rectal tube      VENT SETTINGS:  Mode: AC/ CMV (Assist Control/ Continuous Mandatory Ventilation)  RR (machine): 30  FiO2: 50  PEEP: 5  ITime: 1  MAP: 18  PC: 35  PIP: 43          LABS:                                                 7.4    12.21 )-----------( 501      ( 07 May 2020 22:37 )             23.9       05-07    05-07    133<L>  |  99  |  6<L>  ----------------------------<  147<H>  3.8   |  23  |  0.37<L>    Ca    8.3<L>      07 May 2020 22:37  Phos  2.3     05-07  Mg     1.6     05-07    TPro  6.2  /  Alb  2.3<L>  /  TBili  0.2  /  DBili  x   /  AST  25  /  ALT  8<L>  /  AlkPhos  135<H>  05-07        ABG - ( 07 May 2020 22:32 )  pH, Arterial: 7.43  pH, Blood: x     /  pCO2: 44    /  pO2: 81    / HCO3: 28    / Base Excess: 4.2   /  SaO2: 97          LIVER FUNCTIONS - ( 07 May 2020 22:37 )  Alb: 2.3 g/dL / Pro: 6.2 g/dL / ALK PHOS: 135 U/L / ALT: 8 U/L / AST: 25 U/L / GGT: x                 Culture - Blood (05.05.20 @ 00:37)    Specimen Source: .Blood Blood    Culture Results:   No growth to date.    Culture - Sputum . (05.01.20 @ 19:24)    Gram Stain:   No polymorphonuclear leukocytes per low power field  No Squamous epithelial cells per low power field  No organisms seen per oil power field    Specimen Source: .Sputum Sputum    Culture Results:   Normal Respiratory Natalia present    Culture - Blood (05.01.20 @ 08:42)    Specimen Source: .Blood Blood-Catheter    Culture Results:   No Growth Final      Culture - Blood (04.29.20 @ 15:06)    Gram Stain:   Growth in anaerobic bottle: Gram Positive Cocci in Clusters    Specimen Source: .Blood Blood-Peripheral    Culture Results:   Growth in anaerobic bottle: Staphylococcus aureus  See previous culture [id number]      Culture - Blood (04.22.20 @ 19:05)    Gram Stain:   Growth in aerobic and anaerobic bottles: Gram Positive Cocci in Clusters    -  Coagulase negative Staphylococcus: Detec    Specimen Source: .Blood Blood    Organism: Blood Culture PCR    Culture Results:   Growth in aerobic and anaerobic bottles: Staphylococcus epidermidis  Coag Negative Staphylococcus    COVID-19 PCR (03.31.20 @ 14:56)    COVID-19 PCR: Detected: This test has been validated by [facility] to be accurate;  though it has not been FDA cleared/approved by the usual pathway.  As with all laboratory tests, results should be correlated with clinical  findings.    COVID-19 PCR . (05.07.20 @ 15:46)    COVID-19 PCR: Detected: This test has been validated by [facility] to be accurate;  though it has not been FDA cleared/approved by the usual pathway.  As with all laboratory tests, results should be correlated with clinical  findings.            I&O's:    I&O's Detail    07 May 2020 07:01  -  08 May 2020 07:00  --------------------------------------------------------  IN:    dexmedetomidine Infusion: 481.4 mL    fentaNYL   Infusion: 245.7 mL    IV PiggyBack: 600 mL    ns in tub fed vital15: 800 mL    phenylephrine   Infusion: 110 mL    Solution: 50 mL  Total IN: 2287.1 mL    OUT:    Indwelling Catheter - Urethral: 1680 mL    Rectal Tube: 800 mL  Total OUT: 2480 mL    Total NET: -192.9 mL      08 May 2020 07:01  -  08 May 2020 08:32  --------------------------------------------------------  IN:    dexmedetomidine Infusion: 25.3 mL    fentaNYL   Infusion: 7.8 mL    phenylephrine   Infusion: 5 mL  Total IN: 38.1 mL    OUT:  Total OUT: 0 mL    Total NET: 38.1 mL      MEDICATIONS:   MEDICATIONS  (STANDING):  ceFAZolin   IVPB 2000 milliGRAM(s) IV Intermittent every 8 hours  chlorhexidine 0.12% Liquid 15 milliLiter(s) Oral Mucosa every 12 hours  chlorhexidine 4% Liquid 1 Application(s) Topical <User Schedule>  clonazePAM  Tablet 0.5 milliGRAM(s) Oral every 12 hours  dexMEDEtomidine Infusion 1 MICROgram(s)/kG/Hr (19.5 mL/Hr) IV Continuous <Continuous>  enoxaparin Injectable 40 milliGRAM(s) SubCutaneous <User Schedule>  famotidine    Tablet 20 milliGRAM(s) Oral two times a day  fentaNYL   Infusion... 5 MICROgram(s)/kG/Hr (19.5 mL/Hr) IV Continuous <Continuous>  methadone    Tablet 10 milliGRAM(s) Oral every 6 hours  petrolatum Ophthalmic Ointment 1 Application(s) Both EYES two times a day  PHENobarbital IVPB 80 milliGRAM(s) IV Intermittent every 12 hours  phenylephrine    Infusion 0.1 MICROgram(s)/kG/Min (1.46 mL/Hr) IV Continuous <Continuous>  polyethylene glycol 3350 17 Gram(s) Oral every 12 hours    MEDICATIONS  (PRN):  acetaminophen   Tablet .. 650 milliGRAM(s) Oral every 6 hours PRN Temp greater or equal to 38C (100.4F), Mild Pain (1 - 3), Moderate Pain (4 - 6)  fentaNYL    Injectable 100 MICROGram(s) IV Push every 4 hours PRN agitation  ondansetron Injectable 4 milliGRAM(s) IV Push every 8 hours PRN Nausea and/or Vomiting  PHENobarbital Injectable 65 milliGRAM(s) IV Push every 1 hour PRN Agitation  sodium chloride 0.9% lock flush 10 milliLiter(s) IV Push every 1 hour PRN Pre/post blood products, medications, blood draw, and to maintain line patency          RADIOLOGY:   Xray Chest 1 View- PORTABLE-Routine (05.03.20 @ 03:26)   Percent of LEFT lung opacification: %  Percent of RIGHT lung opacification: %  Change in lung opacification from most recent x-ray (<=3 days): Stable  Change from prior dated 3 or more days (same admission): Stable      Xray Abdomen 1 View PORTABLE -Routine (05.01.20 @ 09:30)   No radiographic evidence of bowel obstruction.      Xray Chest 1 View- PORTABLE-Urgent (04.28.20 @ 14:44)   The heart is normal in size. Diffuse airspace opacities are seen throughout both lungs which remain unchanged when compared to previous study done April 23, 2020. NG tube is in the stomach. Endotracheal tube is in good position. A  Was placed in the right and the tip is in the superior vena cava. No pneumothorax. An additional line is seen on the left and the tip is in the superior vena cava as well.

## 2020-05-09 NOTE — PROGRESS NOTE ADULT - ASSESSMENT
HPI:  Patient is a 44 year old male with no significant past medical history who presented with cough and fever for 3-4 days.  Found to be covid positive.  Admitted for further management.    PLAN:  Neuro:   -precedex, methadone, klonopin, fentanyl, and phenobarb for sedation  -wean off versed    Respiratory:   -continue current vent settings    CV:  -on yovanny and levophed to keep MAP > 65  -wean off yovanny    Endocrine:   -maintain euglycemia    Heme/Onc:   -lovenox and SCDs for DVT prophylaxis    Renal:   -negative 200cc, monitor ins and outs  -creatinine stable    ID:   -afebrile  -ancef for MSSA until 5/29 as per ID  -5/5 blood cx NGTD  -5/7 covid negative x1    GI:   -tube feeds, Vital 200 q6 hours, via OG tube  -pepcid for gi prophylaxis  -miralax for bowel regimen    Social/Family:   -patient's spouse, Catherine, called via Polish phone  #[phone].  updates provided and questions answered. HPI:  Patient is a 44 year old male with no significant past medical history who presented with cough and fever for 3-4 days.  Found to be covid positive.  Admitted for further management.    PLAN:  Neuro:   -precedex, methadone, klonopin, fentanyl, and phenobarb for sedation  -wean off versed    Respiratory:   -continue current vent settings    CV:  -on yovanny and levophed to keep MAP > 65  -wean off yovanny    Endocrine:   -maintain euglycemia    Heme/Onc:   -lovenox and SCDs for DVT prophylaxis    Renal:   -negative 200cc, monitor ins and outs  -creatinine stable    ID:   -afebrile  -ancef for MSSA until 5/29 as per ID  -5/5 blood cx NGTD  -5/7 covid negative x1    GI:   -tube feeds, Vital 200 q6 hours, via OG tube  -pepcid for gi prophylaxis  -miralax for bowel regimen    Social/Family:   -patient's spouse, Catherine, called via [place] phone  #[phone].  updates provided and questions answered.    CODE STATUS:  [X] Full Code [] DNR [] DNI [] Palliative/Comfort Care    DISPOSITION:  [X] ICU [] Stroke Unit [] Floor [] EMU [] RCU [] PCU    [x] Patient is at high risk of deterioration/death due to: acute respiratory failure     Time spent: _45__ [X] critical care minutes

## 2020-05-09 NOTE — PROGRESS NOTE ADULT - ASSESSMENT
Acute hypoxic respiratory failure 2/2 COVID-19  - maintain mechanical ventilation; pressure support as tolerated  - sedation for vent synchrony; wean fentanyl as able  - continue phenobarb, methadone  - fentanyl prn  - continue antibiotics  - MAP >65mmHg  - tube feeding/rectal tube  - hyponatremia--salt tabs, monitor Acute hypoxic respiratory failure 2/2 COVID-19  - maintain mechanical ventilation; pressure support as tolerated  - sedation for vent synchrony; wean fentanyl as able  - continue phenobarb, methadone; will try 1x seroquel this pm, versed gtt d/c  - fentanyl prn  - continue antibiotics  - MAP >65mmHg  - tube feeding/rectal tube  - hyponatremia--salt tabs, monitor

## 2020-05-09 NOTE — PROGRESS NOTE ADULT - SUBJECTIVE AND OBJECTIVE BOX
hyponatremia  fentanyl/precedex/yovanny    PIP 40 PEEP 5 FiO2 50 RR 30  pplat 36 hyponatremia  fentanyl/precedex/yovanny    PIP 30 PEEP 5 FiO2 55 RR 28 hyponatremia  fentanyl/precedex/yovanny  EO spont, grimaces  +oral secretions    PIP 30 PEEP 5 FiO2 55 RR 28

## 2020-05-09 NOTE — PROGRESS NOTE ADULT - SUBJECTIVE AND OBJECTIVE BOX
HPI:  Patient is a 44 year old male with no significant past medical history who presented with cough and fever for 3-4 days.  Found to be covid positive.  Admitted for further management.    OVERNIGHT EVENTS:  Patient was hypotensive on yovanny overnight, levophed was added.  Versed also required to sedate patient.  Has been afebrile, on antibiotics, ID following.  Tolerating tube feeds.    Vital Signs Last 24 Hrs  T(C): 37.7 (09 May 2020 07:00), Max: 37.9 (08 May 2020 23:00)  T(F): 99.9 (09 May 2020 07:00), Max: 100.2 (08 May 2020 23:00)  HR: 78 (09 May 2020 11:14) (70 - 149)  BP: --  BP(mean): --  RR: 30 (09 May 2020 09:00) (16 - 33)  SpO2: 100% (09 May 2020 11:14) (84% - 100%)    I&O's Detail    08 May 2020 07:01  -  09 May 2020 07:00  --------------------------------------------------------  IN:    dexmedetomidine Infusion: 589.9 mL    fentaNYL   Infusion: 530.4 mL    IV PiggyBack: 100 mL    norepinephrine Infusion: 7.3 mL    ns in tub fed vital15: 600 mL    phenylephrine   Infusion: 137.9 mL    Solution: 100 mL    Solution: 100 mL  Total IN: 2165.5 mL    OUT:    Indwelling Catheter - Urethral: 2300 mL  Total OUT: 2300 mL    Total NET: -134.5 mL      09 May 2020 07:01  -  09 May 2020 11:58  --------------------------------------------------------  IN:    dexmedetomidine Infusion: 87.6 mL    fentaNYL   Infusion: 72.2 mL    norepinephrine Infusion: 21.9 mL    phenylephrine   Infusion: 8.7 mL  Total IN: 190.4 mL    OUT:  Total OUT: 0 mL    Total NET: 190.4 mL        I&O's Summary    08 May 2020 07:01  -  09 May 2020 07:00  --------------------------------------------------------  IN: 2165.5 mL / OUT: 2300 mL / NET: -134.5 mL    09 May 2020 07:01  -  09 May 2020 11:58  --------------------------------------------------------  IN: 190.4 mL / OUT: 0 mL / NET: 190.4 mL        Ventilator  Pressure Control  RR: 30  PEEP: 5  FiO2: 60%  PIP: 40        PHYSICAL EXAM:  Neurological: sedated  Cardiovascular: +s1, s2  Respiratory: intubated, clear to auscultation b/l  Gastrointestinal: soft, non-distended, non-tender  Genitourinary: +cruz    TUBES/LINES:  [x] right IJ TLC  [x] right axillary a-line  [x] cruz    DIET:  [x] Vital 200q6 via OG tube    LABS:                        7.7    12.81 )-----------( 534      ( 09 May 2020 01:51 )             25.6     05-09    134<L>  |  97  |  5<L>  ----------------------------<  131<H>  3.5   |  26  |  0.37<L>    Ca    8.0<L>      09 May 2020 01:51  Phos  3.1     05-09  Mg     1.7     05-09    TPro  6.2  /  Alb  2.5<L>  /  TBili  0.2  /  DBili  x   /  AST  29  /  ALT  10  /  AlkPhos  127<H>  05-09        Allergies    No Known Allergies        MEDICATIONS:  Antibiotics:  ceFAZolin   IVPB 2000 milliGRAM(s) IV Intermittent every 8 hours    Neuro:  acetaminophen   Tablet .. 650 milliGRAM(s) Oral every 6 hours PRN  clonazePAM  Tablet 0.5 milliGRAM(s) Oral every 12 hours  dexMEDEtomidine Infusion 1 MICROgram(s)/kG/Hr IV Continuous <Continuous>  fentaNYL    Injectable 100 MICROGram(s) IV Push every 4 hours PRN  fentaNYL   Infusion... 5 MICROgram(s)/kG/Hr IV Continuous <Continuous>  methadone    Tablet 10 milliGRAM(s) Oral every 6 hours  midazolam Infusion 0.02 mG/kG/Hr IV Continuous <Continuous>  ondansetron Injectable 4 milliGRAM(s) IV Push every 8 hours PRN  PHENobarbital Injectable 65 milliGRAM(s) IV Push every 1 hour PRN  PHENobarbital IVPB 80 milliGRAM(s) IV Intermittent every 12 hours    Anticoagulation:  enoxaparin Injectable 40 milliGRAM(s) SubCutaneous <User Schedule>    OTHER:  chlorhexidine 0.12% Liquid 15 milliLiter(s) Oral Mucosa every 12 hours  chlorhexidine 4% Liquid 1 Application(s) Topical <User Schedule>  famotidine    Tablet 20 milliGRAM(s) Oral two times a day  norepinephrine Infusion 0.05 MICROgram(s)/kG/Min IV Continuous <Continuous>  petrolatum Ophthalmic Ointment 1 Application(s) Both EYES two times a day  phenylephrine    Infusion 0.1 MICROgram(s)/kG/Min IV Continuous <Continuous>  polyethylene glycol 3350 17 Gram(s) Oral every 12 hours    IVF:  sodium chloride 2 Gram(s) Oral every 6 hours    CULTURES:  Culture Results:   No growth to date. (05-05 @ 00:37)  Culture Results:   Normal Respiratory Natalia present (05-01 @ 19:24)    RADIOLOGY & ADDITIONAL TESTS:  no new imaging

## 2020-05-10 NOTE — PROGRESS NOTE ADULT - SUBJECTIVE AND OBJECTIVE BOX
HPI:  Patient is a 44 year old male with no significant past medical history who presented with cough and fever for 3-4 days.  Found to be covid positive.  Admitted for further management.    OVERNIGHT EVENTS:  Patient was desaturated, febrile, tachycardia, hypotensive, on yovanny, levophed was added.  Versed also required to sedate patient had abn u/a started on meropenem 1 gm q8hs and, on antibiotics ancef as per ID .    ICU Vital Signs Last 24 Hrs  T(C): 37.6 (10 May 2020 07:00), Max: 38.4 (10 May 2020 03:00)  T(F): 99.7 (10 May 2020 07:00), Max: 101.1 (10 May 2020 03:00)  HR: 109 (10 May 2020 07:00) (78 - 135)  BP: --  BP(mean): --  ABP: 104/63 (10 May 2020 07:00) (103/80 - 136/83)  ABP(mean): 80 (10 May 2020 07:00) (80 - 107)  RR: 23 (10 May 2020 07:00) (20 - 48)  SpO2: 100% (10 May 2020 07:00) (85% - 100%)      05-09-20 @ 07:01  -  05-10-20 @ 07:00  --------------------------------------------------------  IN: 3069.7 mL / OUT: 2225 mL / NET: 844.7 mL    05-10-20 @ 07:01  -  05-10-20 @ 08:51  --------------------------------------------------------  IN: 60.4 mL / OUT: 0 mL / NET: 60.4 mL      Mode: AC/ CMV (Assist Control/ Continuous Mandatory Ventilation), RR (machine): 32, FiO2: 60, PEEP: 5, ITime: 0.7, MAP: 10, PC: 30, PIP: 40  acetaminophen   Tablet .. 650 milliGRAM(s) Oral every 6 hours PRN  ceFAZolin   IVPB 2000 milliGRAM(s) IV Intermittent every 8 hours  chlorhexidine 0.12% Liquid 15 milliLiter(s) Oral Mucosa every 12 hours  chlorhexidine 4% Liquid 1 Application(s) Topical <User Schedule>  clonazePAM  Tablet 1 milliGRAM(s) Oral every 6 hours  dexMEDEtomidine Infusion 1 MICROgram(s)/kG/Hr (19.5 mL/Hr) IV Continuous <Continuous>  enoxaparin Injectable 40 milliGRAM(s) SubCutaneous <User Schedule>  famotidine    Tablet 20 milliGRAM(s) Oral two times a day  fentaNYL    Injectable 100 MICROGram(s) IV Push every 4 hours PRN  fentaNYL   Infusion... 5 MICROgram(s)/kG/Hr (19.5 mL/Hr) IV Continuous <Continuous>  meropenem  IVPB 1000 milliGRAM(s) IV Intermittent every 8 hours  methadone    Tablet 10 milliGRAM(s) Oral every 6 hours  midazolam Infusion 0.02 mG/kG/Hr (1.56 mL/Hr) IV Continuous <Continuous>  norepinephrine Infusion 0.05 MICROgram(s)/kG/Min (3.65 mL/Hr) IV Continuous <Continuous>  ondansetron Injectable 4 milliGRAM(s) IV Push every 8 hours PRN  petrolatum Ophthalmic Ointment 1 Application(s) Both EYES two times a day  PHENobarbital Injectable 65 milliGRAM(s) IV Push every 1 hour PRN  PHENobarbital IVPB 80 milliGRAM(s) IV Intermittent every 12 hours  phenylephrine    Infusion 0.1 MICROgram(s)/kG/Min (1.46 mL/Hr) IV Continuous <Continuous>  polyethylene glycol 3350 17 Gram(s) Oral every 12 hours  sodium chloride 2 Gram(s) Oral every 6 hours  sodium chloride 0.9% lock flush 10 milliLiter(s) IV Push every 1 hour PRN                          7.7    12.81 )-----------( 534      ( 09 May 2020 01:51 )             25.6     05-10    137  |  99  |  4<L>  ----------------------------<  144<H>  3.8   |  29  |  0.39<L>    Ca    7.7<L>      10 May 2020 01:12  Phos  2.9     05-10  Mg     1.7     05-10    TPro  6.3  /  Alb  2.4<L>  /  TBili  0.2  /  DBili  x   /  AST  34  /  ALT  11  /  AlkPhos  130<H>  05-10    LIVER FUNCTIONS - ( 10 May 2020 01:12 )  Alb: 2.4 g/dL / Pro: 6.3 g/dL / ALK PHOS: 130 U/L / ALT: 11 U/L / AST: 34 U/L / GGT: x           ABG - ( 10 May 2020 06:24 )  pH, Arterial: 7.32  pH, Blood: x     /  pCO2: 60    /  pO2: 84    / HCO3: 30    / Base Excess: 3.6   /  SaO2: 95        Ventilator  Pressure Control  RR: 32  PEEP: 7  FiO2: 100%  PIP: 35    PHYSICAL EXAM:  Neurological: sedated  Cardiovascular: +s1, s2  Respiratory: intubated, clear to auscultation b/l  Gastrointestinal: soft, non-distended, non-tender  Genitourinary: +cruz    TUBES/LINES:  [x] right IJ TLC  [x] right axillary a-line  [x] cruz    DIET:  [x] Vital 200q6 via OG tube    Allergies  No Known Allergies    IVF:  sodium chloride 2 Gram(s) Oral every 6 hours    CULTURES:  Culture Results:   No growth to date. (05-05 @ 00:37)  Culture Results:   Normal Respiratory Natalia present (05-01 @ 19:24)    RADIOLOGY & ADDITIONAL TESTS:  no new imaging

## 2020-05-10 NOTE — PROGRESS NOTE ADULT - ASSESSMENT
HPI:  Patient is a 44 year old male with no significant past medical history who presented with cough and fever for 3-4 days.  Found to be covid positive.  Admitted for further management.    PLAN:  Neuro:   -precedex, methadone, klonopin, fentanyl, and phenobarb for sedation  -wean off versed    Respiratory:   -continue current vent settings,  - c-pap trial,  - repeat abg in 1 hr    CV:  -on yovanny and levophed to keep MAP > 65  -wean off yovanny    Endocrine:   -maintain euglycemia    Heme/Onc:   -lovenox and SCDs for DVT prophylaxis    Renal:   -negative 200cc, monitor ins and outs  -creatinine stable    ID:   - febrile  -ancef for MSSA until 5/29 as per ID  -5/5 blood cx NGTD  -5/7 covid negative x1  - meropenem started last night for abn u/a  -called ID f/u today    GI:   -tube feeds, Vital 200 q6 hours, via OG tube  -pepcid for gi prophylaxis  -miralax for bowel regimen    Social/Family:   -patient's spouse, Catherine, called via [place] phone  #[phone].  updates provided and questions answered.    CODE STATUS:  [X] Full Code [] DNR [] DNI [] Palliative/Comfort Care    DISPOSITION:  [X] ICU [] Stroke Unit [] Floor [] EMU [] RCU [] PCU    [x] Patient is at high risk of deterioration/death due to: acute respiratory failure

## 2020-05-10 NOTE — PROGRESS NOTE ADULT - ASSESSMENT
44 M with no significant PMH presenting with nonproductive cough and fevers for 3-4 days. DX w/ covid19 pneumonitis--s/p initial anakinra protocol on tapering rx. Also status post Plaquenil and solumedrol.  Currently receiving colchicine, lovenox 40 BID, famotidine and albuterol as needed. Desaturated overnight and CTPA ordered to rule out PE and also to evaluate for hospital acquired pneumonia.    Patient required intubation 4/14- received zosyn  Received course cefepime 4/15 -->4/19 for HAP  then 4/28- MSSA bacteremia   line changed 4/28  repeat blood cx negative   TTE negative     1) COVID-19  - s/p plaquenil, solumedrol, anakinra  - supportive care  - vent support    2) MSSA bacteremia-   - c/w ancef 2g IV q8h  - plan  is to continue the cefazolin until 5/29/20    3) increasing pressor requirements overnight   -?? from change in vent setting/ increase in sedation vs new infection   - agree with empiric meropenem   - f/u final sputum cx   - f/u final urine cx   - if diarrhea persists, check C.dif     Above was discussed with team

## 2020-05-10 NOTE — PROGRESS NOTE ADULT - ASSESSMENT
Acute hypoxic respiratory failure 2/2 COVID-19  - maintain mechanical ventilation; pressure support as tolerated  - sedation for vent synchrony; wean fentanyl as able  - continue phenobarb, methadone; will try 1x seroquel this pm, versed gtt d/c  - fentanyl prn  - continue antibiotics  - MAP >65mmHg  - tube feeding/rectal tube  - hyponatremia--salt tabs, monitor Acute hypoxic respiratory failure 2/2 COVID-19  - febrile; started on meropenem for +UA on 5/9, f/u cultures  - maintain mechanical ventilation; pressure support as tolerated  - sedation for vent synchrony; wean fentanyl as able  - continue phenobarb, methadone; back on fentanyl/versed drips  - fentanyl prn  - continue antibiotics  - MAP >65mmHg  - tube feeding/rectal tube  - hyponatremia-- cont. salt tabs, resolved

## 2020-05-10 NOTE — PROGRESS NOTE ADULT - SUBJECTIVE AND OBJECTIVE BOX
hyponatremia  fentanyl/precedex/yovanny  EO spont, grimaces  +oral secretions    PIP 30 PEEP 5 FiO2 55 RR 28 tachycardic, gets agitated intermittently    fentanyl, versed, levo  PIP 35 PEEP 7 FiO2 70 from 90% RR 32

## 2020-05-10 NOTE — PROGRESS NOTE ADULT - SUBJECTIVE AND OBJECTIVE BOX
Follow Up:  Patient is seen for follow up of MSSA bacteremia     Interval History/ROS: patient had increasing pressor requirement today     Allergies  No Known Allergies    ANTIMICROBIALS:  ceFAZolin   IVPB 2000 every 8 hours  meropenem  IVPB 1000 every 8 hours      OTHER MEDS:  MEDICATIONS  (STANDING):  acetaminophen   Tablet .. 650 every 6 hours PRN  alteplase for catheter clearance 2 once  clonazePAM  Tablet 1 every 6 hours  dexMEDEtomidine Infusion 1 <Continuous>  enoxaparin Injectable 40 <User Schedule>  famotidine    Tablet 20 two times a day  fentaNYL    Injectable 100 every 4 hours PRN  fentaNYL   Infusion... 5 <Continuous>  methadone    Tablet 10 every 6 hours  midazolam Infusion 0.02 <Continuous>  norepinephrine Infusion 0.05 <Continuous>  ondansetron Injectable 4 every 8 hours PRN  PHENobarbital Injectable 65 every 1 hour PRN  PHENobarbital IVPB 80 every 12 hours  polyethylene glycol 3350 17 every 12 hours      Vital Signs Last 24 Hrs  T(C): 36.5 (10 May 2020 15:00), Max: 38.4 (10 May 2020 03:00)  T(F): 97.7 (10 May 2020 15:00), Max: 101.1 (10 May 2020 03:00)  HR: 82 (10 May 2020 16:06) (76 - 135)  BP: --  BP(mean): --  RR: 19 (10 May 2020 15:00) (19 - 48)  SpO2: 100% (10 May 2020 16:06) (85% - 100%)    PHYSICAL EXAM:  Constitutional: intubated, sedated, on levophed  central line, A line 5/8   HEAD/EYES: anicteric, no conjunctival injection  ENT:  supple, no thrush  Cardiovascular:   normal S1, S2, no murmur, no edema  Respiratory:  clear BS bilaterally, no wheezes, no rales  GI:  soft, non-tender, normal bowel sounds, rectal tube present   : cruz present, no CVA tenderness  Musculoskeletal:  no synovitis, normal ROM  Neurologic: unable to assess  Skin:  no rash, no erythema, no phlebitis  Heme/Onc: no lymphadenopathy   Psychiatric: unable to assess                          7.7    12.81 )-----------( 534      ( 09 May 2020 01:51 )             25.6       05-10    137  |  99  |  4<L>  ----------------------------<  144<H>  3.8   |  29  |  0.39<L>    Ca    7.7<L>      10 May 2020 01:12  Phos  2.9     05-10  Mg     1.7     05-10    TPro  6.3  /  Alb  2.4<L>  /  TBili  0.2  /  DBili  x   /  AST  34  /  ALT  11  /  AlkPhos  130<H>  05-10      Urinalysis Basic - ( 09 May 2020 22:51 )    Color: Light Yellow Test Performed on Clinitek and Refractometer / Appearance: Turbid / S.014 / pH: x  Gluc: x / Ketone: Negative  / Bili: Negative / Urobili: Negative   Blood: x / Protein: 100 mg/dL / Nitrite: Negative   Leuk Esterase: Large / RBC: 10 /hpf / WBC >50   Sq Epi: x / Non Sq Epi: 0 / Bacteria: Many        MICROBIOLOGY:  v  .Sputum Sputum  05-10-20 --  --    Numerous polymorphonuclear leukocytes per low power field  Numerous Squamous epithelial cells per low power field  Moderate Gram Negative Rods seen per oil power field  Moderate Gram positive cocci in pairs seen per oil power field  Few Gram Positive Rods seen per oil power field      .Blood Blood  20   No Growth Final  --  --      .Sputum Sputum  20   Normal Respiratory Natalia present  --    No polymorphonuclear leukocytes per low power field  No Squamous epithelial cells per low power field  No organisms seen per oil power field      .Blood Blood-Peripheral  20   No Growth Final  --  --      .Blood Blood-Peripheral  20   Growth in anaerobic bottle: Staphylococcus aureus  See previous culture [id number]  --    Growth in anaerobic bottle: Gram Positive Cocci in Clusters      .Blood Blood  20   Growth in aerobic and anaerobic bottles: Staphylococcus aureus  --  Staphylococcus aureus      .Blood Blood  20   Growth in anaerobic bottle: Staphylococcus epidermidis  Coag Negative Staphylococcus  Single set isolate, possible contaminant. Contact  Microbiology if susceptibility testing clinically  indicated.  Growth in anaerobic bottle: Lactobacillus gasseri "Susceptibilities not  performed"  --    Growth in anaerobic bottle: Gram Positive Cocci in Clusters      .Blood Blood-Peripheral  20   No Growth Final  --  --      .Urine Catheterized  20   No growth  --  --      .Blood Blood  20   Growth in aerobic and anaerobic bottles: Staphylococcus epidermidis  Coag Negative Staphylococcus  Single set isolate, possible contaminant. Contact  Microbiology if susceptibility testing clinically  indicated.  "Due to technical problems, Proteussp. will Not be reported as part of  the BCID panel until further notice"  ***Blood Panel PCR results on this specimen are available  approximately 3 hours after the Gram stain result.***  Gram stain, PCR, and/or culture results may not always  correspond due to difference in methodologies.  ************************************************************  This PCR assay was performed using [employer].  The following targets are tested for: Enterococcus,  vancomycin resistant enterococci, Listeria monocytogenes,  coagulase negative staphylococci, S. aureus,  methicillin resistant S. aureus, Streptococcus agalactiae  (Group B), S. pneumoniae, S. pyogenes (Group A),  Acinetobacter baumannii, Enterobacter cloacae, E. coli,  Klebsiella oxytoca, K. pneumoniae, Proteus sp.,  Serratia marcescens, Haemophilus influenzae,  Neisseria meningitidis, Pseudomonas aeruginosa, Candida  albicans, C. glabrata, C krusei, C parapsilosis,  C. tropicalis and the KPC resistance gene.  --  Blood Culture PCR      .Blood Blood  20   No Growth Final  --  --      .Blood Blood  20   No Growth Final  --  --      RADIOLOGY:  < from: Xray Chest 1 View- PORTABLE-Urgent (20 @ 04:51) >  IMPRESSION:    The mediastinal cardiac silhouette is unremarkable.  Endotracheal tube between clavicles and umang.  Enteric tube in stomach.  Right IJ line in superior vena cava    Patchy and reticular opacities throughout both lungs are unchanged.     No acute osseous finding.

## 2020-05-11 NOTE — PROGRESS NOTE ADULT - ASSESSMENT
HPI:  Patient is a 44 year old male with no significant past medical history who presented with cough and fever for 3-4 days.  Found to be covid positive.  Admitted for further management.    PLAN:  Neuro:   -precedex, methadone, klonopin, fentanyl, and phenobarb for sedation  -wean off versed    Respiratory:   -continue current vent settings,  - c-pap trial,  - repeat abg in 1 hr    CV:  -on yovanny and levophed to keep MAP > 65  -wean off yovanny    Endocrine:   -maintain euglycemia    Heme/Onc:   -lovenox and SCDs for DVT prophylaxis    Renal:   -negative 200cc, monitor ins and outs  -creatinine stable    ID:   - febrile  -ancef for MSSA until 5/29 as per ID  -5/5 blood cx NGTD  -5/7 covid negative x1  - meropenem started last night for abn u/a  -called ID f/u today    GI:   -tube feeds, Vital 200 q6 hours, via OG tube  -pepcid for gi prophylaxis  -miralax for bowel regimen    Social/Family:   -patient's spouse, Catherine, called via [place] phone  #[phone].  updates provided and questions answered.    CODE STATUS:  [X] Full Code [] DNR [] DNI [] Palliative/Comfort Care    DISPOSITION:  [X] ICU [] Stroke Unit [] Floor [] EMU [] RCU [] PCU    [x] Patient is at high risk of deterioration/death due to: acute respiratory failure HPI:  Patient is a 44 year old male with no significant past medical history who presented with cough and fever for 3-4 days.  Found to be covid positive.  Admitted for further management.    PLAN:  Neuro:   -precedex, methadone, klonopin, fentanyl, and phenobarb for sedation  -wean off versed    Respiratory:   - increasing vent requirements/dyssynchrony; sedation increased, start paralytics again  - titrate vent settings as needed    CV:  -on yovanny and levophed to keep MAP > 65  -wean off yovanny    Endocrine:   -maintain euglycemia    Heme/Onc:   -lovenox and SCDs for DVT prophylaxis    Renal:   -negative 200cc, monitor ins and outs  -creatinine stable    ID:   - febrile  -ancef for MSSA until 5/29 as per ID  -5/5 blood cx NGTD  -5/7 covid negative x1  - meropenem started last night for abn u/a  -called ID f/u today    GI:   -tube feeds, Vital 200 q6 hours, via OG tube  -pepcid for gi prophylaxis  -miralax for bowel regimen    Social/Family:   -patient's spouse, Catherine, called via [place] phone  #[phone].  updates provided and questions answered.    CODE STATUS:  [X] Full Code [] DNR [] DNI [] Palliative/Comfort Care    DISPOSITION:  [X] ICU [] Stroke Unit [] Floor [] EMU [] RCU [] PCU    [x] Patient is at high risk of deterioration/death due to: acute respiratory failure

## 2020-05-11 NOTE — CHART NOTE - NSCHARTNOTEFT_GEN_A_CORE
Nutrition Follow Up Note   Patient seen for: nutrition follow up on COVID ICU     Source: medical record, communication with team. Unable to speak to pt due to current airborne isolation contact precautions related to COVID-19. Pt remains intubated. Per MD note 5/11, overnight, pt was desaturated, febrile, tachycardic and hypotensive. Levophed changed to yovanny; versed, rocuronium, Dilaudid, propofol and Seroquel given for sedation.     Chart reviewed, events noted.     Diet Order: Diet, NPO with Tube Feed:   Tube Feeding Modality: Nasogastric  Vital 1.5 Jayson (VITAL1.5RTH)  Total Volume for 24 Hours (mL): 1200  Bolus  Total Volume of Bolus (mL):  300  Total # of Feeds: 4  Tube Feed Frequency: Every 6 hours   Tube Feed Start Time: 10:00  Bolus Feed Rate (mL per Hour): 300   Bolus Feed Duration (in Hours): 1 (05-10-20 @ 09:29)    CURRENT EN ORDER PROVIDES: 1200ml, 1800kcal and 81g protein    EN provision (per nursing flow sheet): EN feeds increase daily to meet goal rate of 300ml q 6 hrs  (5/11): 900ml (thus far)  (5/10): 1000ml (EN volume increased from 200ml q 6 hrs to 300ml q 6 hrs)  (5/9): 600ml (75% of goal; 200ml q 6 hrs)  (5/8): 600ml (75% of goal; 200ml q 6 hrs)    Nutrition Events:   -Started on antibiotics for +UA on 5/9.  -Noted with hyponatremia - given salt tabs, now resolved.  -Pt receiving propofol at 11.7ml/hr. If to continue x 24 hrs, will provide +309kcal daily.     GI: stool output via rectal tube: (5/10): 50ml; (5/9): 500ml; (5/8): 450ml. On bowel regimen as ordered (Miralax).     Anthropometric Measurements:   Height (cm): 167.6 (04-14-20 @ 16:20)  Weight (kg): 77.9 (04-14-20 @ 16:20)  BMI (kg/m2): 27.7 (04-14-20 @ 16:20)    Medications: MEDICATIONS  (STANDING):  ceFAZolin   IVPB 2000 milliGRAM(s) IV Intermittent every 8 hours  chlorhexidine 0.12% Liquid 15 milliLiter(s) Oral Mucosa every 12 hours  chlorhexidine 4% Liquid 1 Application(s) Topical <User Schedule>  clonazePAM  Tablet 1 milliGRAM(s) Oral every 6 hours  dexMEDEtomidine Infusion 1 MICROgram(s)/kG/Hr (19.5 mL/Hr) IV Continuous <Continuous>  enoxaparin Injectable 40 milliGRAM(s) SubCutaneous <User Schedule>  famotidine    Tablet 20 milliGRAM(s) Oral two times a day  fentaNYL   Infusion... 5 MICROgram(s)/kG/Hr (19.5 mL/Hr) IV Continuous <Continuous>  meropenem  IVPB 1000 milliGRAM(s) IV Intermittent every 8 hours  methadone    Tablet 10 milliGRAM(s) Oral every 6 hours  midazolam Infusion 0.02 mG/kG/Hr (1.56 mL/Hr) IV Continuous <Continuous>  petrolatum Ophthalmic Ointment 1 Application(s) Both EYES two times a day  PHENobarbital Injectable 50 milliGRAM(s) IV Push every 6 hours  phenylephrine    Infusion 0.1 MICROgram(s)/kG/Min (1.46 mL/Hr) IV Continuous <Continuous>  polyethylene glycol 3350 17 Gram(s) Oral every 12 hours  propofol Infusion 25 MICROgram(s)/kG/Min (11.7 mL/Hr) IV Continuous <Continuous>  QUEtiapine 25 milliGRAM(s) Oral every 8 hours  sodium chloride 2 Gram(s) Oral every 6 hours    MEDICATIONS  (PRN):  acetaminophen   Tablet .. 650 milliGRAM(s) Oral every 6 hours PRN Temp greater or equal to 38C (100.4F), Mild Pain (1 - 3), Moderate Pain (4 - 6)  fentaNYL    Injectable 100 MICROGram(s) IV Push every 4 hours PRN agitation  ondansetron Injectable 4 milliGRAM(s) IV Push every 8 hours PRN Nausea and/or Vomiting  PHENobarbital Injectable 65 milliGRAM(s) IV Push every 1 hour PRN agitation  sodium chloride 0.9% lock flush 10 milliLiter(s) IV Push every 1 hour PRN Pre/post blood products, medications, blood draw, and to maintain line patency    Labs: 05-11 @ 08:38: Sodium --, Potassium --, Calcium --, Magnesium --, Phosphorus --, BUN --, Creatinine --, Glucose --, Alk Phos --, ALT/SGPT --, AST/SGOT --, Albumin --, Prealbumin --, Total Bilirubin --, Hemoglobin --, Hematocrit --, Ferritin 562<H>, C-Reactive Protein --, Creatine Kinase <<27>  05-11 @ 06:19: Sodium 139, Potassium 3.8, Calcium 7.8<L>, Magnesium --, Phosphorus --, BUN 5<L>, Creatinine 0.33<L>, Glucose 179<H>, Alk Phos --, ALT/SGPT --, AST/SGOT --, Albumin --, Prealbumin --, Total Bilirubin --, Hemoglobin --, Hematocrit --, Ferritin --, C-Reactive Protein --, Creatine Kinase <<27>  05-11 @ 04:35: Sodium 138, Potassium 3.9, Calcium 7.7<L>, Magnesium 2.0, Phosphorus 2.9, BUN 5<L>, Creatinine 0.32<L>, Glucose 153<H>, Alk Phos 122<H>, ALT/SGPT 12, AST/SGOT 28, Albumin 2.3<L>, Prealbumin --, Total Bilirubin 0.3, Hemoglobin 7.6<L>, Hematocrit 24.6<L>, Ferritin --, C-Reactive Protein 10.44, Creatine Kinase <<27>  05-11 @ 01:03: Sodium 137, Potassium 3.8, Calcium 7.8<L>, Magnesium --, Phosphorus --, BUN 5<L>, Creatinine 0.31<L>, Glucose 153<H>, Alk Phos --, ALT/SGPT --, AST/SGOT --, Albumin --, Prealbumin --, Total Bilirubin --, Hemoglobin --, Hematocrit --, Ferritin --, C-Reactive Protein --, Creatine Kinase <<27>  05-10 @ 16:59: Sodium 139, Potassium 3.9, Calcium 7.7<L>, Magnesium 1.8, Phosphorus 2.5, BUN 5<L>, Creatinine 0.30<L>, Glucose 159<H>, Alk Phos 129<H>, ALT/SGPT 14, AST/SGOT 40, Albumin 2.2<L>, Prealbumin --, Total Bilirubin 0.2, Hemoglobin --, Hematocrit --, Ferritin --, C-Reactive Protein --, Creatine Kinase <<27>    Triglycerides, Serum: 434 mg/dL (05-05-20 @ 04:45)  Triglycerides, Serum: 584 mg/dL (05-04-20 @ 03:59)  Triglycerides, Serum: 467 mg/dL (05-03-20 @ 05:01)  Triglycerides, Serum: 470 mg/dL (05-02-20 @ 14:22)  Triglycerides, Serum: 329 mg/dL (05-01-20 @ 03:41)  Triglycerides, Serum: 355 mg/dL (04-30-20 @ 04:13)  Triglycerides, Serum: 325 mg/dL (04-29-20 @ 05:53)    Skin: no pressure injuries noted  Edema: 2+ generalized     Estimated Needs: (based on dosing wt 77.9kg)  Energy: (20-25kcal/kg): 1558-1948kcal  Protein: (1.2-1.4g protein/kg): 93-109g protein    Previous Nutrition Diagnosis: swallowing difficulty; acute moderate protein-calorie malnutrition   Nutrition Diagnosis is: ongoing, with provision of enteral nutrition     New Nutrition Diagnosis:      Recommended Interventions:   1. Recommend change EN feeds: Vital AF (1.2) at 300ml q 6 hrs + No Carb Prosource 1x daily (+60kcal, +15g protein). With provision of propofol (+309kcal), to provide (based on dosing wt 77.9kg) 1200ml, 1809kcal (23kcal/kg) and 105g protein (1.3g protein/kg).   2. Monitor provision of propofol and GI tolerance. RD to remain available to adjust EN formulary, volume/rate PRN.   3. Trend BG levels, renal indices, LFT's, electrolytes and triglycerides     Monitoring and Evaluation:   Continue to monitor nutrition provision and tolerance, weights, labs, skin integrity.   RD remains available upon request and will follow up per protocol.    Alison Kleiner, RD [facility]; pager number [phone]

## 2020-05-11 NOTE — PROGRESS NOTE ADULT - SUBJECTIVE AND OBJECTIVE BOX
INFECTIOUS DISEASES FOLLOW UP-- Ingrid Olmos  [phone]    This is a follow up note for this  44yMale with  Other general symptom or sign  remains intubated, on pressors,      ROS:  CONSTITUTIONAL:  Non responsive on vent, tachycardic    Allergies    No Known Allergies    Intolerances        ANTIBIOTICS/RELEVANT:  antimicrobials  ceFAZolin   IVPB 2000 milliGRAM(s) IV Intermittent every 8 hours    immunologic:    OTHER:  acetaminophen   Tablet .. 650 milliGRAM(s) Oral every 6 hours PRN  chlorhexidine 0.12% Liquid 15 milliLiter(s) Oral Mucosa every 12 hours  chlorhexidine 4% Liquid 1 Application(s) Topical <User Schedule>  clonazePAM  Tablet 1 milliGRAM(s) Oral every 6 hours  dexMEDEtomidine Infusion 1 MICROgram(s)/kG/Hr IV Continuous <Continuous>  enoxaparin Injectable 40 milliGRAM(s) SubCutaneous <User Schedule>  famotidine    Tablet 20 milliGRAM(s) Oral two times a day  fentaNYL    Injectable 100 MICROGram(s) IV Push every 4 hours PRN  fentaNYL   Infusion... 5 MICROgram(s)/kG/Hr IV Continuous <Continuous>  methadone    Tablet 10 milliGRAM(s) Oral every 6 hours  midazolam Infusion 0.02 mG/kG/Hr IV Continuous <Continuous>  norepinephrine Infusion 0.05 MICROgram(s)/kG/Min IV Continuous <Continuous>  ondansetron Injectable 4 milliGRAM(s) IV Push every 8 hours PRN  petrolatum Ophthalmic Ointment 1 Application(s) Both EYES two times a day  PHENobarbital Injectable 65 milliGRAM(s) IV Push every 1 hour PRN  PHENobarbital Injectable 50 milliGRAM(s) IV Push every 6 hours  polyethylene glycol 3350 17 Gram(s) Oral every 12 hours  propofol Infusion 25 MICROgram(s)/kG/Min IV Continuous <Continuous>  QUEtiapine 25 milliGRAM(s) Oral every 8 hours  rocuronium Infusion 8 MICROgram(s)/kG/Min IV Continuous <Continuous>  sodium chloride 2 Gram(s) Oral every 6 hours  sodium chloride 0.9% lock flush 10 milliLiter(s) IV Push every 1 hour PRN  vasopressin Infusion 0.02 Unit(s)/Min IV Continuous <Continuous>      Objective:  Vital Signs Last 24 Hrs  T(C): 37.7 (11 May 2020 15:00), Max: 38.4 (10 May 2020 20:00)  T(F): 99.9 (11 May 2020 15:00), Max: 101.1 (10 May 2020 20:00)  HR: 155 (11 May 2020 15:00) (75 - 155)  BP: --  BP(mean): --  RR: 32 (11 May 2020 15:00) (15 - 32)  SpO2: 95% (11 May 2020 15:00) (88% - 100%)    PHYSICAL EXAM:  Constitutional:non responsive, vent, pressors  Eyes:MARIA FERNANDA,  Ear/Nose/Throat: no oral lesions, bloody ET tube secretions	  Respiratory: coarse bilat Bs  Cardiovascular: S1S2 tachy  Gastrointestinal:soft,   Extremities:no e/e/c  No Lymphadenopathy  IV sites not inflammed.    LABS:                        7.6    16.01 )-----------( 466      ( 11 May 2020 04:35 )             24.6     05-11    139  |  102  |  5<L>  ----------------------------<  179<H>  3.8   |  28  |  0.33<L>    Ca    7.8<L>      11 May 2020 06:19  Phos  2.9       Mg     2.0         TPro  5.8<L>  /  Alb  2.3<L>  /  TBili  0.3  /  DBili  x   /  AST  28  /  ALT  12  /  AlkPhos  122<H>        Urinalysis Basic - ( 09 May 2020 22:51 )    Color: Light Yellow Test Performed on Clinitek and Refractometer / Appearance: Turbid / S.014 / pH: x  Gluc: x / Ketone: Negative  / Bili: Negative / Urobili: Negative   Blood: x / Protein: 100 mg/dL / Nitrite: Negative   Leuk Esterase: Large / RBC: 10 /hpf / WBC >50   Sq Epi: x / Non Sq Epi: 0 / Bacteria: Many        MICROBIOLOGY:  Culture - Blood (05.10.20 @ 04:23)    Specimen Source: .Blood Blood    Culture Results:   No growth to date.              RECENT CULTURES:  05-10 @ 04:23  .Blood Blood  --  --  --    No growth to date.  --  05-10 @ 04:12  .Sputum Sputum  --  --  --    Moderate Staphylococcus aureus  Normal Respiratory Natalia present  --  05-05 @ 00:37  .Blood Blood  --  --  --    No Growth Final  --      RADIOLOGY & ADDITIONAL STUDIES:    < from: Xray Chest 1 View- PORTABLE-Urgent (20 @ 15:11) >  Impression:    The heart is slightly enlarged. Diffuse airspace opacities are seen throughout both lungs which remain unchanged when compared to previous study done May 8, 2020. All life supporting devices in good position and unchanged. No pleural effusion. No pneumothorax.    < end of copied text >

## 2020-05-11 NOTE — PROGRESS NOTE ADULT - SUBJECTIVE AND OBJECTIVE BOX
HPI:  Patient is a 44 year old male with no significant past medical history who presented with cough and fever for 3-4 days.  Found to be covid positive.  Admitted for further management.    OVERNIGHT EVENTS:  Patient was desaturated, febrile, tachycardia, hypotensive,  levophed was added.  Versed also required to sedate patient had abn u/a started on meropenem 1 gm q8hs and, on antibiotics ancef as per ID , called ID for f/u.    ICU Vital Signs Last 24 Hrs  T(C): 36.8 (11 May 2020 11:00), Max: 38.4 (10 May 2020 20:00)  T(F): 98.2 (11 May 2020 11:00), Max: 101.1 (10 May 2020 20:00)  HR: 125 (11 May 2020 12:00) (75 - 133)  BP: --  BP(mean): --  ABP: 99/59 (11 May 2020 12:00) (90/56 - 122/69)  ABP(mean): 76 (11 May 2020 12:00) (71 - 92)  RR: 16 (11 May 2020 12:00) (16 - 32)  SpO2: 94% (11 May 2020 12:00) (88% - 100%)      05-10-20 @ 07:01  -  05-11-20 @ 07:00  --------------------------------------------------------  IN: 2817.1 mL / OUT: 2220 mL / NET: 597.1 mL    05-11-20 @ 07:01  -  05-11-20 @ 12:29  --------------------------------------------------------  IN: 877.5 mL / OUT: 425 mL / NET: 452.5 mL      Mode: AC/ CMV (Assist Control/ Continuous Mandatory Ventilation), RR (machine): 32, FiO2: 100, PEEP: 10, ITime: 0.5, MAP: 21, PC: 35, PIP: 45  acetaminophen   Tablet .. 650 milliGRAM(s) Oral every 6 hours PRN  ceFAZolin   IVPB 2000 milliGRAM(s) IV Intermittent every 8 hours  chlorhexidine 0.12% Liquid 15 milliLiter(s) Oral Mucosa every 12 hours  chlorhexidine 4% Liquid 1 Application(s) Topical <User Schedule>  clonazePAM  Tablet 1 milliGRAM(s) Oral every 6 hours  dexMEDEtomidine Infusion 1 MICROgram(s)/kG/Hr (19.5 mL/Hr) IV Continuous <Continuous>  enoxaparin Injectable 40 milliGRAM(s) SubCutaneous <User Schedule>  famotidine    Tablet 20 milliGRAM(s) Oral two times a day  fentaNYL    Injectable 100 MICROGram(s) IV Push every 4 hours PRN  fentaNYL   Infusion... 5 MICROgram(s)/kG/Hr (19.5 mL/Hr) IV Continuous <Continuous>  meropenem  IVPB 1000 milliGRAM(s) IV Intermittent every 8 hours  methadone    Tablet 10 milliGRAM(s) Oral every 6 hours  midazolam Infusion 0.02 mG/kG/Hr (1.56 mL/Hr) IV Continuous <Continuous>  ondansetron Injectable 4 milliGRAM(s) IV Push every 8 hours PRN  petrolatum Ophthalmic Ointment 1 Application(s) Both EYES two times a day  PHENobarbital Injectable 65 milliGRAM(s) IV Push every 1 hour PRN  PHENobarbital IVPB 50 milliGRAM(s) IV Intermittent every 6 hours  phenylephrine    Infusion 0.1 MICROgram(s)/kG/Min (1.46 mL/Hr) IV Continuous <Continuous>  polyethylene glycol 3350 17 Gram(s) Oral every 12 hours  propofol Infusion 25 MICROgram(s)/kG/Min (11.7 mL/Hr) IV Continuous <Continuous>  QUEtiapine 25 milliGRAM(s) Oral every 8 hours  sodium chloride 2 Gram(s) Oral every 6 hours  sodium chloride 0.9% lock flush 10 milliLiter(s) IV Push every 1 hour PRN                          7.6    16.01 )-----------( 466      ( 11 May 2020 04:35 )             24.6     05-11    139  |  102  |  5<L>  ----------------------------<  179<H>  3.8   |  28  |  0.33<L>    Ca    7.8<L>      11 May 2020 06:19  Phos  2.9     05-11  Mg     2.0     05-11    TPro  5.8<L>  /  Alb  2.3<L>  /  TBili  0.3  /  DBili  x   /  AST  28  /  ALT  12  /  AlkPhos  122<H>  05-11    LIVER FUNCTIONS - ( 11 May 2020 04:35 )  Alb: 2.3 g/dL / Pro: 5.8 g/dL / ALK PHOS: 122 U/L / ALT: 12 U/L / AST: 28 U/L / GGT: x           ABG - ( 11 May 2020 09:25 )  pH, Arterial: 7.33  pH, Blood: x     /  pCO2: 60    /  pO2: 62    / HCO3: 30    / Base Excess: 4.1   /  SaO2: 91            Ventilator  Pressure Control  RR: 32  PEEP: 7  FiO2: 100%  PIP: 35    PHYSICAL EXAM:  Neurological: sedated  Cardiovascular: +s1, s2  Respiratory: intubated, clear to auscultation b/l  Gastrointestinal: soft, non-distended, non-tender  Genitourinary: +cruz    TUBES/LINES:  [x] right IJ TLC  [x] right axillary a-line  [x] cruz    DIET:  [x] Vital 200q6 via OG tube    Allergies  No Known Allergies    IVF:  sodium chloride 2 Gram(s) Oral every 6 hours    CULTURES:  Culture Results:   No growth to date. (05-05 @ 00:37)  Culture Results:   Normal Respiratory Natalia present (05-01 @ 19:24)    RADIOLOGY & ADDITIONAL TESTS:  no new imaging HPI:  Patient is a 44 year old male with no significant past medical history who presented with cough and fever for 3-4 days.  Found to be covid positive.  Admitted for further management.    OVERNIGHT EVENTS:  Patient was desaturated, febrile, tachycardia, hypotensive,  levophed changed to yovanny.  Versed and rocuronium and dilaudid and propofol and seroquel  given to sedate him     ICU Vital Signs Last 24 Hrs  T(C): 36.8 (11 May 2020 11:00), Max: 38.4 (10 May 2020 20:00)  T(F): 98.2 (11 May 2020 11:00), Max: 101.1 (10 May 2020 20:00)  HR: 125 (11 May 2020 12:00) (75 - 133)  BP: --  BP(mean): --  ABP: 99/59 (11 May 2020 12:00) (90/56 - 122/69)  ABP(mean): 76 (11 May 2020 12:00) (71 - 92)  RR: 16 (11 May 2020 12:00) (16 - 32)  SpO2: 94% (11 May 2020 12:00) (88% - 100%)      05-10-20 @ 07:01  -  05-11-20 @ 07:00  --------------------------------------------------------  IN: 2817.1 mL / OUT: 2220 mL / NET: 597.1 mL    05-11-20 @ 07:01 - 05-11-20 @ 12:29  --------------------------------------------------------  IN: 877.5 mL / OUT: 425 mL / NET: 452.5 mL      Mode: AC/ CMV (Assist Control/ Continuous Mandatory Ventilation), RR (machine): 32, FiO2: 100, PEEP: 10, ITime: 0.5, MAP: 21, PC: 35, PIP: 45  acetaminophen   Tablet .. 650 milliGRAM(s) Oral every 6 hours PRN  ceFAZolin   IVPB 2000 milliGRAM(s) IV Intermittent every 8 hours  chlorhexidine 0.12% Liquid 15 milliLiter(s) Oral Mucosa every 12 hours  chlorhexidine 4% Liquid 1 Application(s) Topical <User Schedule>  clonazePAM  Tablet 1 milliGRAM(s) Oral every 6 hours  dexMEDEtomidine Infusion 1 MICROgram(s)/kG/Hr (19.5 mL/Hr) IV Continuous <Continuous>  enoxaparin Injectable 40 milliGRAM(s) SubCutaneous <User Schedule>  famotidine    Tablet 20 milliGRAM(s) Oral two times a day  fentaNYL    Injectable 100 MICROGram(s) IV Push every 4 hours PRN  fentaNYL   Infusion... 5 MICROgram(s)/kG/Hr (19.5 mL/Hr) IV Continuous <Continuous>  meropenem  IVPB 1000 milliGRAM(s) IV Intermittent every 8 hours  methadone    Tablet 10 milliGRAM(s) Oral every 6 hours  midazolam Infusion 0.02 mG/kG/Hr (1.56 mL/Hr) IV Continuous <Continuous>  ondansetron Injectable 4 milliGRAM(s) IV Push every 8 hours PRN  petrolatum Ophthalmic Ointment 1 Application(s) Both EYES two times a day  PHENobarbital Injectable 65 milliGRAM(s) IV Push every 1 hour PRN  PHENobarbital IVPB 50 milliGRAM(s) IV Intermittent every 6 hours  phenylephrine    Infusion 0.1 MICROgram(s)/kG/Min (1.46 mL/Hr) IV Continuous <Continuous>  polyethylene glycol 3350 17 Gram(s) Oral every 12 hours  propofol Infusion 25 MICROgram(s)/kG/Min (11.7 mL/Hr) IV Continuous <Continuous>  QUEtiapine 25 milliGRAM(s) Oral every 8 hours  sodium chloride 2 Gram(s) Oral every 6 hours  sodium chloride 0.9% lock flush 10 milliLiter(s) IV Push every 1 hour PRN                          7.6    16.01 )-----------( 466      ( 11 May 2020 04:35 )             24.6     05-11    139  |  102  |  5<L>  ----------------------------<  179<H>  3.8   |  28  |  0.33<L>    Ca    7.8<L>      11 May 2020 06:19  Phos  2.9     05-11  Mg     2.0     05-11    TPro  5.8<L>  /  Alb  2.3<L>  /  TBili  0.3  /  DBili  x   /  AST  28  /  ALT  12  /  AlkPhos  122<H>  05-11    LIVER FUNCTIONS - ( 11 May 2020 04:35 )  Alb: 2.3 g/dL / Pro: 5.8 g/dL / ALK PHOS: 122 U/L / ALT: 12 U/L / AST: 28 U/L / GGT: x           ABG - ( 11 May 2020 09:25 )  pH, Arterial: 7.33  pH, Blood: x     /  pCO2: 60    /  pO2: 62    / HCO3: 30    / Base Excess: 4.1   /  SaO2: 91        PHYSICAL EXAM:  Neurological: sedated, seroquel strated to wean off versed and propofol, inc phenobarbital 50 q6h  Cardiovascular: +s1, s2, ekg / qtc 421  Respiratory: intubated, clear to auscultation b/l  Gastrointestinal: soft, non-distended, non-tender  Genitourinary: +cruz    TUBES/LINES:  [x] right IJ TLC  [x] right axillary a-line  [x] cruz    DIET:  [x] Vital 300q6 via OG tube    Allergies  No Known Allergies    IVF:  sodium chloride 2 Gram(s) Oral every 6 hours    ID:  .patient had abn u/a started on meropenem 1 gm q8hs and, ancef , called ID for f/u.    CULTURES:  Culture Results:   No growth to date. (05-05 @ 00:37)  Culture Results:   Normal Respiratory Natalia present (05-01 @ 19:24)    RADIOLOGY & ADDITIONAL TESTS:  no new imaging  cxr ordered for tomorrow

## 2020-05-11 NOTE — PROGRESS NOTE ADULT - SUBJECTIVE AND OBJECTIVE BOX
increased sedation/paralyzed for hypoxic to 70-80's    fentanyl, versed, levo  IP 35 PEEP 10  FiO2 100 from 90% RR 32

## 2020-05-11 NOTE — PROGRESS NOTE ADULT - ASSESSMENT
Acute hypoxic respiratory failure 2/2 COVID-19  - febrile; started on meropenem for +UA on 5/9, f/u cultures  - maintain mechanical ventilation  - sedation for vent synchrony; propofol, versed, fentanyl, dwayne  - continue phenobarb, methadone; back on fentanyl/versed drips  - fentanyl prn  - continue antibiotics  - MAP >65mmHg; yovanny, vaso, levo  - tube feeding/rectal tube  - hyponatremia-- cont. salt tabs, resolved  - candida in urine--added fluconazole, cont ancef, ID following

## 2020-05-12 NOTE — CONSULT NOTE ADULT - PROBLEM SELECTOR RECOMMENDATION 2
family interested in all ongoing medical management  they understand medical team feels prognosis is poor and that patient is dying  however, as patient is young with no prior medical issues, they are not able to accept "withdrawal of care" as an option. They are considering transfer to an alternative hospital where multiple family members have been admitted. SW has discussed process with family.

## 2020-05-12 NOTE — CONSULT NOTE ADULT - PROBLEM SELECTOR RECOMMENDATION 3
lengthy conversation with patients wife and brother today in common shared language. narrative above, emotional support provided. [facility]  [phone] if acute issues

## 2020-05-12 NOTE — PROGRESS NOTE ADULT - ASSESSMENT
HPI:  Patient is a 44 year old male with no significant past medical history who presented with cough and fever for 3-4 days.  Found to be covid positive.  Admitted for further management.    PLAN:  Neuro:   -precedex, methadone, klonopin, fentanyl, and phenobarb for sedation  -wean off versed    Respiratory:   - increasing vent requirements/dyssynchrony; sedation increased, start paralytics again  - titrate vent settings as needed    CV:  -on yovanny and levophed to keep MAP > 65  -wean off yovanny    Endocrine:   -maintain euglycemia    Heme/Onc:   -lovenox and SCDs for DVT prophylaxis    Renal:   -negative 200cc, monitor ins and outs  -creatinine stable    ID:   - febrile  -ancef for MSSA until 5/29 as per ID  -5/5 blood cx NGTD  -5/7 covid negative x1  - fluconazole started last night for abn u/a  -called ID f/u today    GI:   -tube feeds, Vital 300 q6 hours, via OG tube  -pepcid for gi prophylaxis  -miralax for bowel regimen    Social/Family:   -patient's spouse, Catherine, updated    CODE STATUS:  [X] Full Code [] DNR [] DNI [] Palliative/Comfort Care    DISPOSITION:  [X] ICU [] Stroke Unit [] Floor [] EMU [] RCU [] PCU    [x] Patient is at high risk of deterioration/death due to: acute respiratory failure

## 2020-05-12 NOTE — PROGRESS NOTE ADULT - ASSESSMENT
Acute hypoxic respiratory failure 2/2 COVID-19  - febrile; fluconazole for candida in urine; ancef for MSSA bacteremia; ID following  - oliguric to anuric --> give lasix 80 now, get check urine lytes  - maintain mechanical ventilation; repeat ABG in an hour; decreased IP and PEEP as Pplat in 40's  - sedation for vent synchrony, rocuronium  - d/c oral meds for now, would especially hold phenobarb? causing hepatotoxicity?  - fentanyl prn  - MAP >65mmHg, requiring three pressors  - tube feeding/rectal tube  - hyponatremia resolved

## 2020-05-12 NOTE — PROGRESS NOTE ADULT - SUBJECTIVE AND OBJECTIVE BOX
HPI:  Patient is a 44 year old male with no significant past medical history who presented with cough and fever for 3-4 days.  Found to be covid positive.  Admitted for further management.    OVERNIGHT EVENTS:  Patient was desaturated, febrile, tachycardia, shock liver, hypotensive ,on levo and  yovanny and vasopressin.  Versed and rocuronium and dilaudid and propofol and seroquel for sedation, acidotic pip inc 42, RR 35, fio2 80%, peep 7.      ICU Vital Signs Last 24 Hrs  T(C): 36 (12 May 2020 04:00), Max: 38 (11 May 2020 08:04)  T(F): 96.8 (12 May 2020 04:00), Max: 100.4 (11 May 2020 08:04)  HR: 116 (12 May 2020 06:33) (112 - 155)  BP: --  BP(mean): --  ABP: 83/54 (12 May 2020 06:00) (83/54 - 138/91)  ABP(mean): 65 (12 May 2020 06:00) (65 - 113)  RR: 17 (12 May 2020 06:00) (15 - 35)  SpO2: 99% (12 May 2020 06:33) (91% - 100%)      05-10-20 @ 07:01  -  05-11-20 @ 07:00  --------------------------------------------------------  IN: 2817.1 mL / OUT: 2220 mL / NET: 597.1 mL    05-11-20 @ 07:01  -  05-12-20 @ 06:59  --------------------------------------------------------  IN: 5621.1 mL / OUT: 1780 mL / NET: 3841.1 mL      Mode: AC/ CMV (Assist Control/ Continuous Mandatory Ventilation), RR (machine): 35, FiO2: 80, PEEP: 7, ITime: 1, MAP: 21, PC: 42, PIP: 44  acetaminophen   Tablet .. 650 milliGRAM(s) Oral every 6 hours PRN  ceFAZolin   IVPB 2000 milliGRAM(s) IV Intermittent every 8 hours  chlorhexidine 0.12% Liquid 15 milliLiter(s) Oral Mucosa every 12 hours  chlorhexidine 4% Liquid 1 Application(s) Topical <User Schedule>  clonazePAM  Tablet 1 milliGRAM(s) Oral every 6 hours  enoxaparin Injectable 40 milliGRAM(s) SubCutaneous <User Schedule>  famotidine    Tablet 20 milliGRAM(s) Oral two times a day  fentaNYL    Injectable 100 MICROGram(s) IV Push every 4 hours PRN  fentaNYL   Infusion... 5.006 MICROgram(s)/kG/Hr (19.5 mL/Hr) IV Continuous <Continuous>  fluconAZOLE IVPB 400 milliGRAM(s) IV Intermittent every 24 hours  fluconAZOLE IVPB      furosemide   Injectable 40 milliGRAM(s) IV Push two times a day  midazolam Infusion 0.02 mG/kG/Hr (1.56 mL/Hr) IV Continuous <Continuous>  norepinephrine Infusion 0.05 MICROgram(s)/kG/Min (3.65 mL/Hr) IV Continuous <Continuous>  ondansetron Injectable 4 milliGRAM(s) IV Push every 8 hours PRN  petrolatum Ophthalmic Ointment 1 Application(s) Both EYES two times a day  PHENobarbital Injectable 65 milliGRAM(s) IV Push every 1 hour PRN  PHENobarbital Injectable 50 milliGRAM(s) IV Push every 6 hours  phenylephrine    Infusion 0.1 MICROgram(s)/kG/Min (1.46 mL/Hr) IV Continuous <Continuous>  polyethylene glycol 3350 17 Gram(s) Oral every 12 hours  propofol Infusion 25 MICROgram(s)/kG/Min (11.7 mL/Hr) IV Continuous <Continuous>  QUEtiapine 25 milliGRAM(s) Oral every 8 hours  rocuronium Infusion 8 MICROgram(s)/kG/Min (7.48 mL/Hr) IV Continuous <Continuous>  sodium chloride 2 Gram(s) Oral every 6 hours  sodium chloride 0.9% lock flush 10 milliLiter(s) IV Push every 1 hour PRN  vasopressin Infusion 0.02 Unit(s)/Min (1.2 mL/Hr) IV Continuous <Continuous>                          7.7    21.54 )-----------( 613      ( 12 May 2020 04:14 )             27.5     05-12    143  |  106  |  10  ----------------------------<  147<H>  5.3   |  26  |  0.65    Ca    7.7<L>      12 May 2020 04:13  Phos  5.1     05-12  Mg     2.0     05-12    TPro  6.2  /  Alb  2.3<L>  /  TBili  0.2  /  DBili  x   /  AST  466<H>  /  ALT  200<H>  /  AlkPhos  169<H>  05-12    LIVER FUNCTIONS - ( 12 May 2020 04:13 )  Alb: 2.3 g/dL / Pro: 6.2 g/dL / ALK PHOS: 169 U/L / ALT: 200 U/L / AST: 466 U/L / GGT: x           ABG - ( 12 May 2020 06:26 )  pH, Arterial: 7.23  pH, Blood: x     /  pCO2: 65    /  pO2: 86    / HCO3: 26    / Base Excess: -1.2  /  SaO2: 96        PHYSICAL EXAM:  Neurological: sedated, seroquel strated to wean off versed and propofol, inc phenobarbital 50 q6h  Cardiovascular: +s1, s2, ekg / qtc 421, on levo / yovanny / vasopressin  Respiratory: intubated, clear to auscultation b/l  Gastrointestinal: soft, non-distended, non-tender  Genitourinary: +cruz    TUBES/LINES:  [x] right IJ TLC  [x] right axillary a-line  [x] cruz    DIET:  [x] Vital 300q6 via OG tube    Allergies  No Known Allergies    IVF:  sodium chloride 2 Gram(s) Oral every 6 hours    ID:  .patient had yeast in u/a started on fluconazole and continue ancef ,dc meroas per ID .    CULTURES:  Culture Results:   No growth to date. (05-05 @ 00:37)  Culture Results:   Normal Respiratory Natalia present (05-01 @ 19:24)    RADIOLOGY & ADDITIONAL TESTS:  no new imaging  cxr ordered

## 2020-05-12 NOTE — CONSULT NOTE ADULT - SUBJECTIVE AND OBJECTIVE BOX
HPI:  44 M with no significant PMH presenting with nonproductive cough and fevers for 3-4 days. Patient came in today due to worsening cough. He was prescribed antibiotics on Saturday because he wasn't feeling well and it did somewhat help. Patient has also been having myalgias. No chest pain, some mild shortness of breath. No nausea, vomiting, abdominal pain, diarrhea, or constipation. Patient's brother also symptomatic however they don't live together and patient denies any sick contacts. No recent travels.      In the ED, T 102.3, , /79, RR 22 satting 85% on room air. Patient was placed on 15L nonrebreather satting 95-97%. Patient given tylenol, ceftriaxone, and azithro. (31 Mar 2020 20:47)    PERTINENT PM/SXH:   No pertinent past medical history    S/P appendectomy  No significant past surgical history    FAMILY HISTORY:  FH: type 2 diabetes mellitus: brother    ITEMS NOT CHECKED ARE NOT PRESENT    SOCIAL HISTORY:   Significant other/partner[ ]  Children[ ]  Judaism/Spirituality:  Substance hx:  [ ]   Tobacco hx:  [ ]   Alcohol hx: [ ]   Home Opioid hx:  [ ] I-Stop Reference No:  Living Situation: [ ]Home  [ ]Long term care  [ ]Rehab [ ]Other    ADVANCE DIRECTIVES:    DNR  MOLST  [ ]  Living Will  [ ]   DECISION MAKER(s):  [ ] Health Care Proxy(s)  [ ] Surrogate(s)  [ ] Guardian           Name(s): Phone Number(s):    BASELINE (I)ADL(s) (prior to admission):  [place]: [ ]Total  [ ] Moderate [ ]Dependent    Allergies    No Known Allergies    Intolerances    MEDICATIONS  (STANDING):  ceFAZolin   IVPB 2000 milliGRAM(s) IV Intermittent every 8 hours  chlorhexidine 0.12% Liquid 15 milliLiter(s) Oral Mucosa every 12 hours  chlorhexidine 4% Liquid 1 Application(s) Topical <User Schedule>  clonazePAM  Tablet 1 milliGRAM(s) Oral every 6 hours  enoxaparin Injectable 40 milliGRAM(s) SubCutaneous <User Schedule>  famotidine    Tablet 20 milliGRAM(s) Oral two times a day  fentaNYL   Infusion... 5.006 MICROgram(s)/kG/Hr (19.5 mL/Hr) IV Continuous <Continuous>  fluconAZOLE IVPB 400 milliGRAM(s) IV Intermittent every 24 hours  fluconAZOLE IVPB      furosemide   Injectable 40 milliGRAM(s) IV Push two times a day  midazolam Infusion 0.02 mG/kG/Hr (1.56 mL/Hr) IV Continuous <Continuous>  norepinephrine Infusion 0.05 MICROgram(s)/kG/Min (3.65 mL/Hr) IV Continuous <Continuous>  petrolatum Ophthalmic Ointment 1 Application(s) Both EYES two times a day  PHENobarbital Injectable 50 milliGRAM(s) IV Push every 6 hours  phenylephrine    Infusion 0.1 MICROgram(s)/kG/Min (1.46 mL/Hr) IV Continuous <Continuous>  polyethylene glycol 3350 17 Gram(s) Oral every 12 hours  propofol Infusion 25 MICROgram(s)/kG/Min (11.7 mL/Hr) IV Continuous <Continuous>  QUEtiapine 25 milliGRAM(s) Oral every 8 hours  rocuronium Infusion 8 MICROgram(s)/kG/Min (7.48 mL/Hr) IV Continuous <Continuous>  vasopressin Infusion 0.02 Unit(s)/Min (1.2 mL/Hr) IV Continuous <Continuous>    MEDICATIONS  (PRN):  acetaminophen   Tablet .. 650 milliGRAM(s) Oral every 6 hours PRN Temp greater or equal to 38C (100.4F), Mild Pain (1 - 3), Moderate Pain (4 - 6)  fentaNYL    Injectable 100 MICROGram(s) IV Push every 4 hours PRN agitation  ondansetron Injectable 4 milliGRAM(s) IV Push every 8 hours PRN Nausea and/or Vomiting  PHENobarbital Injectable 65 milliGRAM(s) IV Push every 1 hour PRN agitation  sodium chloride 0.9% lock flush 10 milliLiter(s) IV Push every 1 hour PRN Pre/post blood products, medications, blood draw, and to maintain line patency    PRESENT SYMPTOMS: [ ]Unable to obtain due to poor mentation   Source if other than patient:  [ ]Family   [ ]Team     Pain: [ ]yes [ ]no  QOL impact -   Location -                    Aggravating factors -  Quality -  Radiation -  Timing-  Severity (0-10 scale):  Minimal acceptable level (0-10 scale):     PAIN AD Score:     http://geriatrictoolkit.[place].[email or web address]/cog/painad.pdf (press ctrl +  left click to view)    Dyspnea:                           [ ]Mild [ ]Moderate [ ]Severe  Anxiety:                             [ ]Mild [ ]Moderate [ ]Severe  Fatigue:                             [ ]Mild [ ]Moderate [ ]Severe  Nausea:                             [ ]Mild [ ]Moderate [ ]Severe  Loss of appetite:              [ ]Mild [ ]Moderate [ ]Severe  Constipation:                    [ ]Mild [ ]Moderate [ ]Severe    Other Symptoms:  [ ]All other review of systems negative     Palliative Performance Status Version 2:         %    http://[facility].org/files/news/palliative_performance_scale_ppsv2.pdf  PHYSICAL EXAM:  Vital Signs Last 24 Hrs  T(C): 36.4 (12 May 2020 15:15), Max: 37.4 (11 May 2020 20:00)  T(F): 97.5 (12 May 2020 15:15), Max: 99.3 (11 May 2020 20:00)  HR: 119 (12 May 2020 15:15) (108 - 151)  BP: --  BP(mean): --  RR: 35 (12 May 2020 15:15) (17 - 35)  SpO2: 92% (12 May 2020 15:15) (92% - 100%) I&O's Summary    11 May 2020 07:  -  12 May 2020 07:00  --------------------------------------------------------  IN: 5621.1 mL / OUT: 1780 mL / NET: 3841.1 mL    12 May 2020 07:01  -  12 May 2020 15:52  --------------------------------------------------------  IN: 1748.3 mL / OUT: 235 mL / NET: 1513.3 mL      GENERAL:  [ ]Alert  [ ]Oriented x   [ ]Lethargic  [ ]Cachexia  [ ]Unarousable  [ ]Verbal  [ ]Non-Verbal  Behavioral:   [ ] Anxiety  [ ] Delirium [ ] Agitation [ ] Other  HEENT:  [ ]Normal   [ ]Dry mouth   [ ]ET Tube/Trach  [ ]Oral lesions  PULMONARY:   [ ]Clear [ ]Tachypnea  [ ]Audible excessive secretions   [ ]Rhonchi        [ ]Right [ ]Left [ ]Bilateral  [ ]Crackles        [ ]Right [ ]Left [ ]Bilateral  [ ]Wheezing     [ ]Right [ ]Left [ ]Bilatera  [ ]Diminished breath sounds [ ]right [ ]left [ ]bilateral  CARDIOVASCULAR:    [ ]Regular [ ]Irregular [ ]Tachy  [ ]Boyd [ ]Murmur [ ]Other  GASTROINTESTINAL:  [ ]Soft  [ ]Distended   [ ]+BS  [ ]Non tender [ ]Tender  [ ]PEG [ ]OGT/ NGT  Last BM:   20 @ 07:  -  20 @ 07:00  --------------------------------------------------------  OUT: 1 mL    20 @ 07:  -  20 @ 07:00  --------------------------------------------------------  OUT: 200 mL    20 @ 07:01  -  20 @ 07:00  --------------------------------------------------------  OUT: 800 mL    20 @ 07:01  -  05-10-20 @ 07:00  --------------------------------------------------------  OUT: 50 mL      GENITOURINARY:  [ ]Normal [ ] Incontinent   [ ]Oliguria/Anuria   [ ]Max  MUSCULOSKELETAL:   [ ]Normal   [ ]Weakness  [ ]Bed/Wheelchair bound [ ]Edema  NEUROLOGIC:   [ ]No focal deficits  [ ]Cognitive impairment  [ ]Dysphagia [ ]Dysarthria [ ]Paresis [ ]Other   SKIN:   [ ]Normal    [ ]Rash  [ ]Pressure ulcer(s)       Present on admission [ ]y [ ]n    CRITICAL CARE:  [ ] Shock Present  [ ]Septic [ ]Cardiogenic [ ]Neurologic [ ]Hypovolemic  [ ]  Vasopressors [ ]  Inotropes   [ ]Respiratory failure present [ ]Mechanical ventilation [ ]Non-invasive ventilatory support [ ]High flow  [ ]Acute  [ ]Chronic [ ]Hypoxic  [ ]Hypercarbic [ ]Other  [ ]Other organ failure     LABS:                        7.7    21.54 )-----------( 613      ( 12 May 2020 04:14 )             27.5   05-12    143  |  106  |  10  ----------------------------<  147<H>  5.3   |  26  |  0.65    Ca    7.7<L>      12 May 2020 04:13  Phos  5.1       Mg     2.0         TPro  6.2  /  Alb  2.3<L>  /  TBili  0.2  /  DBili  x   /  AST  466<H>  /  ALT  200<H>  /  AlkPhos  169<H>        Urinalysis Basic - ( 11 May 2020 18:27 )    Color: Yellow / Appearance: Slightly Turbid / S.020 / pH: x  Gluc: x / Ketone: Negative  / Bili: Negative / Urobili: Negative   Blood: x / Protein: 100 / Nitrite: Negative   Leuk Esterase: Large / RBC: 2 /hpf /  /HPF   Sq Epi: x / Non Sq Epi: 2 / Bacteria: Occasional      RADIOLOGY & ADDITIONAL STUDIES:    PROTEIN CALORIE MALNUTRITION PRESENT: [ ]mild [ ]moderate [ ]severe [ ]underweight [ ]morbid obesity  https://www.andeal.org/vault/2440/web/files/ONC/Table_Clinical%20Characteristics%20to%20Document%20Malnutrition-White%20JV%20et%20al%2020.pdf    Height (cm): 167.6 (20 @ 16:20)  Weight (kg): 77.9 (20 @ 16:20)  BMI (kg/m2): 27.7 (20 @ 16:20)    [ ]PPSV2 < or = to 30% [ ]significant weight loss  [ ]poor nutritional intake  [ ]anasarca     Albumin, Serum: 2.3 g/dL (20 @ 04:13)   [ ]Artificial Nutrition      REFERRALS:   [ ]Chaplaincy  [ ]Hospice  [ ]Child Life  [ ]Social Work  [ ]Case management [ ]Holistic Therapy     Goals of Care Document: HPI:  44 M with no significant PMH presenting with nonproductive cough and fevers for 3-4 days. Patient came in today due to worsening cough. He was prescribed antibiotics on Saturday because he wasn't feeling well and it did somewhat help. Patient has also been having myalgias. No chest pain, some mild shortness of breath. No nausea, vomiting, abdominal pain, diarrhea, or constipation. Patient's brother also symptomatic however they don't live together and patient denies any sick contacts. No recent travels.      In the ED, T 102.3, , /79, RR 22 satting 85% on room air. Patient was placed on 15L nonrebreather satting 95-97%. Patient given tylenol, ceftriaxone, and azithro. (31 Mar 2020 20:47)    Palliative consulted on this date to assist with goals of care. Extensive conversation held with patients wife, by this provider, who shares common language, Cymro. Wife expresses overview of current condition- explaining that everyday she continues to get "bad updates" from medical team, that her  is doing poor. We discussed what that means in terms of his needs from the ventilator and inability of the medical team to "wean" the patient, as well as requiring multiple medications to support his blood pressure and to keep him comfortable/tolerating the ventilator. Wife very tearful, pleading with this provider about not disconnecting patient from ventilator, that she doesn't want to believe the news though she comprehends the message. Explained that medical team would not disconnect the patient from the ventilator without family consent. Discussed overall concern that patient unlikely to survive current medical situation. Family interested in transfer- expained that patient unlikely to survive transfer given his critical state. care coordination notes read and appreciated.    Patients brother expressed on multiple occasions his concern overall with the way conversations are being held with family and wife, "abrupt" and psychologically distressing. He was very upset about the tone of providers surrounding phone calls.     PERTINENT PM/SXH:   No pertinent past medical history    S/P appendectomy  No significant past surgical history    FAMILY HISTORY:  FH: type 2 diabetes mellitus: brother    ITEMS NOT CHECKED ARE NOT PRESENT    SOCIAL HISTORY:   Significant other/partner[ x]  Children[ x]  Moravian/Spirituality: Denominational  Substance hx:  [ ]   Tobacco hx:  [ ]   Alcohol hx: [ ]   Home Opioid hx:  [ ] I-Stop Reference No:  Living Situation: [x ]Home  [ ]Long term care  [ ]Rehab [ ]Other    ADVANCE DIRECTIVES:    DNR  MOLST  [ ]  Living Will  [ ]   DECISION MAKER(s):  [ ] Health Care Proxy(s)  [ x] Surrogate(s)  [ ] Guardian           Name(s): Phone Number(s): Wife, Catherine, number per EMR    BASELINE (I)ADL(s) (prior to admission):  [place]: [ x]Total  [ ] Moderate [ ]Dependent    Allergies    No Known Allergies    Intolerances    MEDICATIONS  (STANDING):  ceFAZolin   IVPB 2000 milliGRAM(s) IV Intermittent every 8 hours  chlorhexidine 0.12% Liquid 15 milliLiter(s) Oral Mucosa every 12 hours  chlorhexidine 4% Liquid 1 Application(s) Topical <User Schedule>  clonazePAM  Tablet 1 milliGRAM(s) Oral every 6 hours  enoxaparin Injectable 40 milliGRAM(s) SubCutaneous <User Schedule>  famotidine    Tablet 20 milliGRAM(s) Oral two times a day  fentaNYL   Infusion... 5.006 MICROgram(s)/kG/Hr (19.5 mL/Hr) IV Continuous <Continuous>  fluconAZOLE IVPB 400 milliGRAM(s) IV Intermittent every 24 hours  fluconAZOLE IVPB      furosemide   Injectable 40 milliGRAM(s) IV Push two times a day  midazolam Infusion 0.02 mG/kG/Hr (1.56 mL/Hr) IV Continuous <Continuous>  norepinephrine Infusion 0.05 MICROgram(s)/kG/Min (3.65 mL/Hr) IV Continuous <Continuous>  petrolatum Ophthalmic Ointment 1 Application(s) Both EYES two times a day  PHENobarbital Injectable 50 milliGRAM(s) IV Push every 6 hours  phenylephrine    Infusion 0.1 MICROgram(s)/kG/Min (1.46 mL/Hr) IV Continuous <Continuous>  polyethylene glycol 3350 17 Gram(s) Oral every 12 hours  propofol Infusion 25 MICROgram(s)/kG/Min (11.7 mL/Hr) IV Continuous <Continuous>  QUEtiapine 25 milliGRAM(s) Oral every 8 hours  rocuronium Infusion 8 MICROgram(s)/kG/Min (7.48 mL/Hr) IV Continuous <Continuous>  vasopressin Infusion 0.02 Unit(s)/Min (1.2 mL/Hr) IV Continuous <Continuous>    MEDICATIONS  (PRN):  acetaminophen   Tablet .. 650 milliGRAM(s) Oral every 6 hours PRN Temp greater or equal to 38C (100.4F), Mild Pain (1 - 3), Moderate Pain (4 - 6)  fentaNYL    Injectable 100 MICROGram(s) IV Push every 4 hours PRN agitation  ondansetron Injectable 4 milliGRAM(s) IV Push every 8 hours PRN Nausea and/or Vomiting  PHENobarbital Injectable 65 milliGRAM(s) IV Push every 1 hour PRN agitation  sodium chloride 0.9% lock flush 10 milliLiter(s) IV Push every 1 hour PRN Pre/post blood products, medications, blood draw, and to maintain line patency    PRESENT SYMPTOMS: [ ]Unable to obtain due to poor mentation   Source if other than patient:  [ ]Family   [ ]Team - ROS DEFERRED DUE TO COVID    Pain: [ ]yes [ ]no  QOL impact -   Location -                    Aggravating factors -  Quality -  Radiation -  Timing-  Severity (0-10 scale):  Minimal acceptable level (0-10 scale):     PAIN AD Score:     [email or web address]/painad.pdf (press ctrl +  left click to view)    Dyspnea:                           [ ]Mild [ ]Moderate [ ]Severe  Anxiety:                             [ ]Mild [ ]Moderate [ ]Severe  Fatigue:                             [ ]Mild [ ]Moderate [ ]Severe  Nausea:                             [ ]Mild [ ]Moderate [ ]Severe  Loss of appetite:              [ ]Mild [ ]Moderate [ ]Severe  Constipation:                    [ ]Mild [ ]Moderate [ ]Severe    Other Symptoms:  [ ]All other review of systems negative     Palliative Performance Status Version 2:       10  %    http://npcrc.org/files/news/palliative_performance_scale_ppsv2.pdf  PHYSICAL EXAM:  Vital Signs Last 24 Hrs  T(C): 36.4 (12 May 2020 15:15), Max: 37.4 (11 May 2020 20:00)  T(F): 97.5 (12 May 2020 15:15), Max: 99.3 (11 May 2020 20:00)  HR: 119 (12 May 2020 15:15) (108 - 151)  BP: --  BP(mean): --  RR: 35 (12 May 2020 15:15) (17 - 35)  SpO2: 92% (12 May 2020 15:15) (92% - 100%) I&O's Summary    11 May 2020 07:01  -  12 May 2020 07:00  --------------------------------------------------------  IN: 5621.1 mL / OUT: 1780 mL / NET: 3841.1 mL    12 May 2020 07:01  -  12 May 2020 15:52  --------------------------------------------------------  IN: 1748.3 mL / OUT: 235 mL / NET: 1513.3 mL      GENERAL: EXAM DEFERRED DUE TO COVID  [ ]Alert  [ ]Oriented x   [ ]Lethargic  [ ]Cachexia  [ ]Unarousable  [ ]Verbal  [ ]Non-Verbal  Behavioral: EXAM DEFERRED DUE TO COVID  [ ] Anxiety  [ ] Delirium [ ] Agitation [ ] Other  HEENT:EXAM DEFERRED DUE TO COVID  [ ]Normal   [ ]Dry mouth   [ ]ET Tube/Trach  [ ]Oral lesions  PULMONARY: EXAM DEFERRED DUE TO COVID  [ ]Clear [ ]Tachypnea  [ ]Audible excessive secretions   [ ]Rhonchi        [ ]Right [ ]Left [ ]Bilateral  [ ]Crackles        [ ]Right [ ]Left [ ]Bilateral  [ ]Wheezing     [ ]Right [ ]Left [ ]Bilatera  [ ]Diminished breath sounds [ ]right [ ]left [ ]bilateral  CARDIOVASCULAR:  EXAM DEFERRED DUE TO COVID  [ ]Regular [ ]Irregular [ ]Tachy  [ ]Boyd [ ]Murmur [ ]Other  GASTROINTESTINAL:EXAM DEFERRED DUE TO COVID  [ ]Soft  [ ]Distended   [ ]+BS  [ ]Non tender [ ]Tender  [ ]PEG [ ]OGT/ NGT  Last BM:   GENITOURINARY:EXAM DEFERRED DUE TO COVID  [ ]Normal [ ] Incontinent   [ ]Oliguria/Anuria   [ ]Max  MUSCULOSKELETAL: EXAM DEFERRED DUE TO COVID  [ ]Normal   [ ]Weakness  [ ]Bed/Wheelchair bound [ ]Edema  NEUROLOGIC: EXAM DEFERRED DUE TO COVID  [ ]No focal deficits  [ ]Cognitive impairment  [ ]Dysphagia [ ]Dysarthria [ ]Paresis [ ]Other   SKIN: EXAM DEFERRED DUE TO COVID  [ ]Normal    [ ]Rash  [ ]Pressure ulcer(s)       Present on admission [ ]y [ ]n    CRITICAL CARE:  [X ] Shock Present  [ X]Septic [ ]Cardiogenic [ ]Neurologic [ ]Hypovolemic  [X ]  Vasopressors [ ]  Inotropes   [ X]Respiratory failure present [X ]Mechanical ventilation [ ]Non-invasive ventilatory support [ ]High flow  [X ]Acute  [ ]Chronic [X ]Hypoxic  [ ]Hypercarbic [ ]Other  [ ]Other organ failure     LABS:                        7.7    21.54 )-----------( 613      ( 12 May 2020 04:14 )             27.5   05-12    143  |  106  |  10  ----------------------------<  147<H>  5.3   |  26  |  0.65    Ca    7.7<L>      12 May 2020 04:13  Phos  5.1       Mg     2.0         TPro  6.2  /  Alb  2.3<L>  /  TBili  0.2  /  DBili  x   /  AST  466<H>  /  ALT  200<H>  /  AlkPhos  169<H>  05-12      Urinalysis Basic - ( 11 May 2020 18:27 )    Color: Yellow / Appearance: Slightly Turbid / S.020 / pH: x  Gluc: x / Ketone: Negative  / Bili: Negative / Urobili: Negative   Blood: x / Protein: 100 / Nitrite: Negative   Leuk Esterase: Large / RBC: 2 /hpf /  /HPF   Sq Epi: x / Non Sq Epi: 2 / Bacteria: Occasional      RADIOLOGY & ADDITIONAL STUDIES:  < from: Xray Chest 1 View- PORTABLE-Routine (20 @ 04:53) >    EXAM:  XR CHEST PORTABLE ROUTINE 1V                            PROCEDURE DATE:  2020            INTERPRETATION:  A single chest x-ray was obtained on May 12, 2020.    Indication: Fever. Cough.    Impression:    The heart is slightly enlarged.Diffuse airspace opacities are seen throughout both lungs which remain unchanged when compared to previous study done May 11, 2020. At 2:39 PM. Endotracheal tube and NG tube are in good position. A central line seen on the right and the tip is in superior vena cava. No pneumothorax. No pleural effusion.      NELIDA LANGE M.D., ATTENDING RADIOLOGIST  This document has been electronically signed. May 12 2020  7:50AM      < end of copied text >      PROTEIN CALORIE MALNUTRITION PRESENT: [ ]mild [ x]moderate [ ]severe [ ]underweight [ ]morbid obesity  https://www.andeal.org/vault/2440/web/files/ONC/Table_Clinical%20Characteristics%20to%20Document%20Malnutrition-White%20JV%20et%20al%2020.pdf    Height (cm): 167.6 (20 @ 16:20)  Weight (kg): 77.9 (20 @ 16:20)  BMI (kg/m2): 27.7 (20 @ 16:20)    [x ]PPSV2 < or = to 30% [ ]significant weight loss  [x ]poor nutritional intake  [ ]anasarca     Albumin, Serum: 2.3 g/dL (20 @ 04:13)   [x ]Artificial Nutrition      REFERRALS:   [ ]Chaplaincy  [ ]Hospice  [ ]Child Life  [x ]Social Work  [ ]Case management [ ]Holistic Therapy     Goals of Care Document:

## 2020-05-13 NOTE — PROCEDURE NOTE - NSPOSTCAREGUIDE_GEN_A_CORE
Care for catheter as per unit/ICU protocols/Keep the cast/splint/dressing clean and dry
Care for catheter as per unit/ICU protocols
Care for catheter as per unit/ICU protocols
Instructed patient/caregiver to follow-up with primary care physician/Instructed patient/caregiver regarding signs and symptoms of infection/Keep the cast/splint/dressing clean and dry/Care for catheter as per unit/ICU protocols/Verbal/written post procedure instructions were given to patient/caregiver

## 2020-05-13 NOTE — PROGRESS NOTE ADULT - SUBJECTIVE AND OBJECTIVE BOX
HPI:  Patient is a 44 year old male with no significant past medical history who presented with cough and fever for 3-4 days.  Found to be covid positive.  Admitted for further management.    OVERNIGHT EVENTS:  Patient developed increasing pressor requirements overnight.  Also with elevated lactate and transaminitis.  Patient with minimal urine output despite 80mg IV lasix overnight.  Renal consulted and wanted to place patient on CRRT, however patient's wife did not initially consent to dialysis (despite being informed that he could die).  WBC rising, antibiotics changed to vancomycin and meropenem.      Vital Signs Last 24 Hrs  T(C): 36.3 (13 May 2020 12:00), Max: 38.7 (13 May 2020 04:00)  T(F): 97.3 (13 May 2020 12:00), Max: 101.7 (13 May 2020 04:00)  HR: 131 (13 May 2020 11:00) (111 - 138)  BP: --  BP(mean): --  RR: 0 (13 May 2020 11:00) (0 - 35)  SpO2: 97% (13 May 2020 11:00) (87% - 99%)    I&O's Detail    12 May 2020 07:01  -  13 May 2020 07:00  --------------------------------------------------------  IN:    Enteral Tube Flush: 90 mL    fentaNYL   Infusion: 447.4 mL    heparin Infusion: 84 mL    IV PiggyBack: 150 mL    midazolam Infusion: 268 mL    norepinephrine Infusion: 434.5 mL    ns in tub fed vital15: 600 mL    phenylephrine   Infusion: 2929 mL    propofol Infusion: 268 mL    rocuronium Infusion: 85.1 mL    vasopressin Infusion: 55.2 mL  Total IN: 5411.2 mL    OUT:    Gastric Aspirate - Discarded: 1300 mL    Indwelling Catheter - Urethral: 265 mL    Rectal Tube: 400 mL  Total OUT: 1965 mL    Total NET: 3446.2 mL      13 May 2020 07:01  -  13 May 2020 12:12  --------------------------------------------------------  IN:    fentaNYL   Infusion: 58.5 mL    heparin Infusion: 70 mL    midazolam Infusion: 58.5 mL    norepinephrine Infusion: 423.7 mL    phenylephrine   Infusion: 613 mL    rocuronium Infusion: 18.2 mL    sodium bicarbonate  Infusion: 300 mL    vasopressin Infusion: 12 mL  Total IN: 1553.9 mL    OUT:    Indwelling Catheter - Urethral: 30 mL  Total OUT: 30 mL    Total NET: 1523.9 mL        I&O's Summary    12 May 2020 07:  -  13 May 2020 07:00  --------------------------------------------------------  IN: 5411.2 mL / OUT: 1965 mL / NET: 3446.2 mL    13 May 2020 07:  -  13 May 2020 12:12  --------------------------------------------------------  IN: 1553.9 mL / OUT: 30 mL / NET: 1523.9 mL        Ventilator  Pressure Control  RR: 35  FiO2: 70  PIP: 35  PEEP: 5        PHYSICAL EXAM:  Neurological: sedated  Cardiovascular: +s1, s2  Respiratory: clear to auscultation b/l  Gastrointestinal: soft, non-distended, non-tender  Genitourinary: +cruz    TUBES/LINES:  [x] right IJ TLC  [x] right axillary a-line  [x] cruz    DIET:  [x] npo/ivf (multiple drips)    LABS:                        7.6    32.73 )-----------( 689      ( 13 May 2020 00:34 )             27.5     05-13    141  |  104  |  18  ----------------------------<  72  6.6<HH>   |  14<L>  |  1.88<H>    Ca    8.4      13 May 2020 08:22  Phos  7.4     05-13  Mg     2.5     05-13    TPro  6.0  /  Alb  2.2<L>  /  TBili  0.3  /  DBili  x   /  AST  4461<H>  /  ALT  602<H>  /  AlkPhos  413<H>  05-13    PT/INR - ( 13 May 2020 00:34 )   PT: 16.4 sec;   INR: 1.41 ratio         PTT - ( 13 May 2020 08:22 )  PTT:53.6 sec  Urinalysis Basic - ( 11 May 2020 18:27 )    Color: Yellow / Appearance: Slightly Turbid / S.020 / pH: x  Gluc: x / Ketone: Negative  / Bili: Negative / Urobili: Negative   Blood: x / Protein: 100 / Nitrite: Negative   Leuk Esterase: Large / RBC: 2 /hpf /  /HPF   Sq Epi: x / Non Sq Epi: 2 / Bacteria: Occasional      Allergies    No Known Allergies        MEDICATIONS:  Antibiotics:  fluconAZOLE IVPB 400 milliGRAM(s) IV Intermittent every 24 hours  fluconAZOLE IVPB      meropenem  IVPB 1000 milliGRAM(s) IV Intermittent every 12 hours  meropenem  IVPB      vancomycin  IVPB 1000 milliGRAM(s) IV Intermittent once    Neuro:  acetaminophen   Tablet .. 650 milliGRAM(s) Oral every 6 hours PRN  fentaNYL    Injectable 100 MICROGram(s) IV Push every 4 hours PRN  fentaNYL   Infusion... 5.006 MICROgram(s)/kG/Hr IV Continuous <Continuous>  midazolam Infusion 0.02 mG/kG/Hr IV Continuous <Continuous>  ondansetron Injectable 4 milliGRAM(s) IV Push every 8 hours PRN  rocuronium Infusion 8 MICROgram(s)/kG/Min IV Continuous <Continuous>    Anticoagulation:  heparin  Infusion 1400 Unit(s)/Hr IV Continuous <Continuous>    OTHER:  chlorhexidine 0.12% Liquid 15 milliLiter(s) Oral Mucosa every 12 hours  chlorhexidine 4% Liquid 1 Application(s) Topical <User Schedule>  famotidine Injectable 20 milliGRAM(s) IV Push daily  norepinephrine Infusion 0.05 MICROgram(s)/kG/Min IV Continuous <Continuous>  petrolatum Ophthalmic Ointment 1 Application(s) Both EYES two times a day  phenylephrine    Infusion 0.1 MICROgram(s)/kG/Min IV Continuous <Continuous>  polyethylene glycol 3350 17 Gram(s) Oral every 12 hours  sodium zirconium cyclosilicate 10 Gram(s) Oral once  vasopressin Infusion 0.02 Unit(s)/Min IV Continuous <Continuous>    IVF:  sodium bicarbonate  Infusion 0.193 mEq/kG/Hr IV Continuous <Continuous>    CULTURES:  Culture Results:   50,000 - 99,000 CFU/mL Presumptive Candida albicans "Susceptibilities not  performed" ( @ 22:25)  Culture Results:   No growth to date. ( @ 22:20)    RADIOLOGY & ADDITIONAL TESTS:  no new imaging

## 2020-05-13 NOTE — CONSULT NOTE ADULT - SUBJECTIVE AND OBJECTIVE BOX
[facility]  [phone]  NEPHROLOGY      INITIAL CONSULT NOTE  --------------------------------------------------------------------------------  HPI:    44 year old male with no significant past medical history who presented with cough and fever for 3-4 days.  Found to be covid positive. s/p initial anakinra protocol on tapering rx. Also status post Plaquenil and solumedrol.  Currently receiving colchicine, lovenox 40 BID, famotidine and albuterol as needed. Patient required intubation 4/14- received zosyn. Received course cefepime 4/15 -->4/19 for HAP. then 4/28- MSSA bacteremia . line changed 4/28  repeat blood cx negative.         PAST HISTORY  --------------------------------------------------------------------------------  PAST MEDICAL & SURGICAL HISTORY:  No pertinent past medical history  S/P appendectomy    FAMILY HISTORY:  FH: type 2 diabetes mellitus: brother    PAST SOCIAL HISTORY:    ALLERGIES & MEDICATIONS  --------------------------------------------------------------------------------  Allergies    No Known Allergies    Intolerances      Standing Inpatient Medications  chlorhexidine 0.12% Liquid 15 milliLiter(s) Oral Mucosa every 12 hours  chlorhexidine 4% Liquid 1 Application(s) Topical <User Schedule>  famotidine    Tablet 20 milliGRAM(s) Oral two times a day  fentaNYL   Infusion... 5.006 MICROgram(s)/kG/Hr IV Continuous <Continuous>  fluconAZOLE IVPB 400 milliGRAM(s) IV Intermittent every 24 hours  fluconAZOLE IVPB      heparin  Infusion 1400 Unit(s)/Hr IV Continuous <Continuous>  meropenem  IVPB 1000 milliGRAM(s) IV Intermittent once  meropenem  IVPB 1000 milliGRAM(s) IV Intermittent every 12 hours  meropenem  IVPB      midazolam Infusion 0.02 mG/kG/Hr IV Continuous <Continuous>  norepinephrine Infusion 0.05 MICROgram(s)/kG/Min IV Continuous <Continuous>  petrolatum Ophthalmic Ointment 1 Application(s) Both EYES two times a day  phenylephrine    Infusion 0.1 MICROgram(s)/kG/Min IV Continuous <Continuous>  polyethylene glycol 3350 17 Gram(s) Oral every 12 hours  rocuronium Infusion 8 MICROgram(s)/kG/Min IV Continuous <Continuous>  sodium bicarbonate  Infusion 0.193 mEq/kG/Hr IV Continuous <Continuous>  vancomycin  IVPB 1000 milliGRAM(s) IV Intermittent once  vasopressin Infusion 0.02 Unit(s)/Min IV Continuous <Continuous>    PRN Inpatient Medications  acetaminophen   Tablet .. 650 milliGRAM(s) Oral every 6 hours PRN  fentaNYL    Injectable 100 MICROGram(s) IV Push every 4 hours PRN  ondansetron Injectable 4 milliGRAM(s) IV Push every 8 hours PRN  sodium chloride 0.9% lock flush 10 milliLiter(s) IV Push every 1 hour PRN      REVIEW OF SYSTEMS  --------------------------------------------------------------------------------  intubated      VITALS/PHYSICAL EXAM  --------------------------------------------------------------------------------  T(C): 37.5 (05-13-20 @ 08:00), Max: 38.7 (05-13-20 @ 04:00)  HR: 138 (05-13-20 @ 09:00) (109 - 138)  BP: --  RR: 35 (05-13-20 @ 09:00) (0 - 35)  SpO2: 91% (05-13-20 @ 09:00) (87% - 100%)  Wt(kg): --        05-12-20 @ 07:01  -  05-13-20 @ 07:00  --------------------------------------------------------  IN: 5411.2 mL / OUT: 1965 mL / NET: 3446.2 mL    05-13-20 @ 07:01  -  05-13-20 @ 10:34  --------------------------------------------------------  IN: 554.8 mL / OUT: 5 mL / NET: 549.8 mL      Physical Exam:  	Gen: intubated   	no jvd   	Pulm: decrease bs  no rales or ronchi or wheezing  	CV: RRR, S1S2; no rub  	Abd:  no BS, soft, nontender + distended  	UE: Warm, no cyanosis  no clubbing,  2+  edema;   	LE: Warm, no cyanosis  no clubbing, 2+  edema  	Skin: Warm, no decrease skin turgor   	    LABS/STUDIES  --------------------------------------------------------------------------------              7.6    32.73 >-----------<  689      [05-13-20 @ 00:34]              27.5     141  |  104  |  18  ----------------------------<  72      [05-13-20 @ 08:22]  6.6   |  14  |  1.88        Ca     8.4     [05-13-20 @ 08:22]      Mg     2.5     [05-13-20 @ 08:22]      Phos  7.4     [05-13-20 @ 00:34]    TPro  6.0  /  Alb  2.2  /  TBili  0.3  /  DBili  x   /  AST  4461  /  ALT  602  /  AlkPhos  413  [05-13-20 @ 08:22]    PT/INR: PT 16.4 , INR 1.41       [05-13-20 @ 00:34]  PTT: 53.6       [05-13-20 @ 08:22]      Creatinine Trend:  SCr 1.88 [05-13 @ 08:22]  SCr 1.47 [05-13 @ 00:34]  SCr 1.22 [05-12 @ 20:00]  SCr 0.65 [05-12 @ 04:13]  SCr 0.33 [05-11 @ 06:19]    Urinalysis - [05-11-20 @ 18:27]      Color Yellow / Appearance Slightly Turbid / SG 1.020 / pH 6.0      Gluc Negative / Ketone Negative  / Bili Negative / Urobili Negative       Blood Trace / Protein 100 / Leuk Est Large / Nitrite Negative      RBC 2 /  / Hyaline 17 / Gran 3 / Sq Epi  / Non Sq Epi 2 / Bacteria Occasional      Ferritin 562      [05-11-20 @ 08:38]  Lipid: chol --, , HDL --, LDL --      [05-12-20 @ 04:13]

## 2020-05-13 NOTE — PROGRESS NOTE ADULT - ASSESSMENT
HPI:  Patient is a 44 year old male with no significant past medical history who presented with cough and fever for 3-4 days.  Found to be covid positive.  Admitted for further management.    PLAN:  Neuro:   -fentanyl and versed for sedation  -rocuronium for vent synchrony    Respiratory:   -continue current vent settings    CV:  -continue phenylephrine, levophed, and vasopressin, titrate to keep MAP > 65    Endocrine:   -maintain euglycemia    Heme/Onc:  -heparin gtt started overnight for rising d-dimer, goal aptt 50-90    Renal:   -worsening kidney function with increased lactate  -renal consulted for CRRT, wife now agreeable  -consent for jaron obtained via [place] phone  #[phone]  -hyperkalemia, insulin/d50/bicarb given, follow up repeat bmp  -continue bicarb gtt for now    ID:   -Tmax 101.7  -vancomycin and meropenem for empiric coverage  -trend WBC count  -follow up ID recs    GI:   -npo (high residuals overnight)  -pepcid for gi prophylaxis  -miralax for bowel regimen    Social/Family:   -family updated by attending.      CODE STATUS:  [X] Full Code [] DNR [] DNI [] Palliative/Comfort Care    DISPOSITION:  [X] ICU [] Stroke Unit [] Floor [] EMU [] RCU [] PCU    [x] Patient is at high risk of deterioration/death due to: acute respiratory failure     Time spent: _45__ [X] critical care minutes

## 2020-05-13 NOTE — PROGRESS NOTE ADULT - SUBJECTIVE AND OBJECTIVE BOX
NSCU ATTENDING ADDENDUM    Patient critically ill, multiorgan system failure, renal failure, shock liver, cardiovascular collapse on three pressors, respiratory failure.  Wife will not consent for RRT (see nephro note).  Discussed with wife and  to this effect.  Still refusing, even when advised he will die without RRT.  Wife requesting transfer to OSH; patient is not stable for transfer in my opinion.  They request I discuss this with "Dr. Shelley" at [facility].  They do not know his first name.  They do not know his department.  They have given me six different phone numbers, some of which are not connected.  OSH main  () reports "more than 20" doctors by the name Karsten and cannot help me further.  Patient's brother next asked me to call the ED at  who report no one named Dr. Shelley is responding to overhead paging.  Discussed with family again, who will not consent to RRT.  Brother and wife will speak to each other and call unit back.    HUSSEIN NSCU ATTENDING ADDENDUM    Patient critically ill, multiorgan system failure, renal failure, shock liver, cardiovascular collapse on three pressors, respiratory failure.  Bicarb GTT added.  Renal consulted.  Wife will not consent for RRT (see nephro note).  Discussed with wife and  to this effect.  Still refusing, even when advised he will die without RRT.  Wife requesting transfer to OSH; patient is not stable for transfer in my opinion.  They request I discuss this with "Dr. Shelley" at [facility].  They do not know his first name.  They do not know his department.  They have given me six different phone numbers, some of which are not connected.  OSH main  () reports "more than 20" doctors by the name Karsten and cannot help me further.  Patient's brother next asked me to call the ED at  who report no one named Dr. Shelley is responding to overhead paging.  Discussed with family again, who will not consent to RRT.  Brother and wife will speak to each other and call unit back.    Additional 30 minutes critical care time.    HUSSEIN

## 2020-05-13 NOTE — PROCEDURE NOTE - NSSITEPREP_SKIN_A_CORE
chlorhexidine
chlorhexidine
alcohol/Adherence to aseptic technique: hand hygiene prior to donning barriers (gown, gloves), don cap and mask, sterile drape over patient
chlorhexidine
chlorhexidine

## 2020-05-13 NOTE — CONSULT NOTE ADULT - ASSESSMENT
44 M with no significant PMH presenting with nonproductive cough and fevers for 3-4 days. Patient came in today due to worsening cough. He was prescribed antibiotics on Saturday because he wasn't feeling well and it did somewhat help. Patient has also been having myalgias. No chest pain, some mild shortness of breath. No nausea, vomiting, abdominal pain, diarrhea, or constipation. Patient's brother also symptomatic however they don't live together and patient denies any sick contacts. No recent travels.  Since admission patient has remained requiring supplemental oxygen-remains on a non rebreather face mask but he reports slow byut steady improvement in his respiratory xymptoms    COVID-19+ pneumonia  Continue supplemental oxygen and supportive care  Wean oxygen as tolerated  Inflammatory markers remain elevated with ferritin=88  CRP= 10.17  but with downtrending WBC and only slightly elevate pro calcitonin 0.11    He has received a course of Plaquenil   he is currently on a tapering dose of Anakinra  Would check quantiferon- (only helpful if +)  No evidence at this point of a hospital acquired secondary bacterial infection    Tono Olmos MD  [phone]  After 5pm/weekends [phone]
44 year old male with no significant past medical history who presented with cough and fever for 3-4 days.  Found to be covid positive. s/p initial anakinra protocol on tapering rx. Also status post Plaquenil and solumedrol.  Currently receiving colchicine, lovenox 40 BID, famotidine and albuterol as needed. Patient required intubation 4/14- received zosyn. Received course cefepime 4/15 -->4/19 for HAP. then 4/28- MSSA bacteremia . line changed 4/28  repeat blood cx negative.  now w/ FATOU acute liver injury, lactic acidosis, hypotension and shock       1- fatou  2- lactic acidosis  3- hypotension  4- hyperkalemia  5- shock    very difficult situation  pt on 3 pressors no pmhx prior to admission otherwise, he is on 3 pressors   spoke to pt wife via  [phone]  phone and explained he is very sick he need renal replacement therapy and despite attempt for renal replacement therapy his condition remains very critical. with these pressors onboard pt is also high risk for complications including death.   he is hyperkalemia and with lactic acidosis.  she wants him to be transferred to outside hosp.   d/w her he is critical with acidosis and hyperkalemia shock etc and is not stable for transfer at this time  she has not consented to dialysis. she has been specifically informed he can die without renal replacement therapy.  translated this and she understands death can occur without it.   Dr. Rico attempting at pt wife request to d/w team at [facility]   cont pressor support  insulin/glucose/ calcium gluconate as needed  prognosis grave
44 M with no significant PMH presenting with nonproductive cough and fevers for 3-4 days, now in ICU 2/2 complications from COVID. Palliative consulted to assist with goals of care

## 2020-05-13 NOTE — PROCEDURE NOTE - NSPOSTPRCRAD_GEN_A_CORE
post procedure radiography not performed
post-procedure radiography performed/central line located in the

## 2020-05-13 NOTE — PROGRESS NOTE ADULT - ASSESSMENT
44 M with no significant PMH presenting with nonproductive cough and fevers for 3-4 days. DX w/ covid19 pneumonitis--s/p initial anakinra protocol on tapering rx. Also status post Plaquenil and solumedrol.  Currently receiving colchicine, lovenox 40 BID, famotidine and albuterol as needed. Desaturated overnight and CTPA ordered to rule out PE and also to evaluate for hospital acquired pneumonia.    Patient required intubation 4/14- received zosyn  Received course cefepime 4/15 -->4/19 for HAP  then 4/28- MSSA bacteremia   line changed 4/28  repeat blood cx negative   TTE negative     1) COVID-19  - s/p plaquenil, solumedrol, anakinra  - supportive care  - vent support    2) MSSA bacteremia-   - changed from Cefazolin to Vancomycin in the setting of fevers and increasing leukocytosis    3) fevers increasing leukocytosis  blood cultures sent  lines to be changed if feasible  receiving CVVHD  antibiotics broadened to Vancomycin/Meropenem/fluconazole  pending blood culture results      Tono Olmos MD  [phone]  After 5pm/weekends [phone]

## 2020-05-13 NOTE — PROCEDURE NOTE - NSINDICATIONS_GEN_A_CORE
critical illness/dialysis/CRRT
critical illness/emergency venous access
cannulation purposes/monitoring purposes/critical patient
critical illness/emergency venous access
critical patient/arterial puncture to obtain ABG's/monitoring purposes

## 2020-05-13 NOTE — PROGRESS NOTE ADULT - SUBJECTIVE AND OBJECTIVE BOX
called pt wife back re: hd consent and to proceed. she agrees to have pt undergo hd. consent obtained witnessed and placed in chart.   hd catheter placement and crrt orders written

## 2020-05-13 NOTE — PROGRESS NOTE ADULT - SUBJECTIVE AND OBJECTIVE BOX
increasing pressor requirement, worsening transaminitis, became anuric--5cc urine output after 80mg IV lasix  Ddimer in 2000's from 700  ?multiple thrombi and end organ damage?  start heparin gtt for goal PTT 60-80  max'ed out on three pressors, wouldn't be stable for renal replacement therapy, but consider renal consult in am  called wife through  #[phone], informed of developing multiorgan failure and critical condition, she asked us to save him as they have four children, tearful, and hung up the phone  called brother at [phone], offered family visit and/or facetime, he stated he understands the patient's condition and will call in the morning.

## 2020-05-13 NOTE — PROGRESS NOTE ADULT - SUBJECTIVE AND OBJECTIVE BOX
INFECTIOUS DISEASES FOLLOW UP-- Ingrid Olmos  [phone]    This is a follow up note for this  44yMale with  Other general symptom or sign  COVID pneumonia- prolonged intubation and ventilation requirements      ROS:  CONSTITUTIONAL:  Non responsive on vent    Allergies    No Known Allergies    Intolerances        ANTIBIOTICS/RELEVANT:  antimicrobials  fluconAZOLE IVPB 400 milliGRAM(s) IV Intermittent every 24 hours  meropenem  IVPB 1000 milliGRAM(s) IV Intermittent every 12 hours  meropenem  IVPB      vancomycin  IVPB 1000 milliGRAM(s) IV Intermittent once    immunologic:    OTHER:  acetaminophen   Tablet .. 650 milliGRAM(s) Oral every 6 hours PRN  chlorhexidine 0.12% Liquid 15 milliLiter(s) Oral Mucosa every 12 hours  chlorhexidine 4% Liquid 1 Application(s) Topical <User Schedule>  CRRT Treatment    <Continuous>  famotidine Injectable 20 milliGRAM(s) IV Push daily  fentaNYL    Injectable 100 MICROGram(s) IV Push every 4 hours PRN  fentaNYL   Infusion... 5.006 MICROgram(s)/kG/Hr IV Continuous <Continuous>  heparin  Infusion 1400 Unit(s)/Hr IV Continuous <Continuous>  midazolam Infusion 0.02 mG/kG/Hr IV Continuous <Continuous>  norepinephrine Infusion 0.05 MICROgram(s)/kG/Min IV Continuous <Continuous>  ondansetron Injectable 4 milliGRAM(s) IV Push every 8 hours PRN  petrolatum Ophthalmic Ointment 1 Application(s) Both EYES two times a day  phenylephrine    Infusion 0.1 MICROgram(s)/kG/Min IV Continuous <Continuous>  Phoxillum Filtration BK 4 / 2.5 5000 milliLiter(s) CRRT <Continuous>  polyethylene glycol 3350 17 Gram(s) Oral every 12 hours  PrismaSOL Filtration BGK 0 / 2.5 5000 milliLiter(s) CRRT <Continuous>  PrismaSOL Filtration BGK 0 / 2.5 5000 milliLiter(s) CRRT <Continuous>  rocuronium Infusion 8 MICROgram(s)/kG/Min IV Continuous <Continuous>  sodium bicarbonate  Infusion 0.193 mEq/kG/Hr IV Continuous <Continuous>  sodium chloride 0.9% lock flush 10 milliLiter(s) IV Push every 1 hour PRN  sodium zirconium cyclosilicate 10 Gram(s) Oral once  vasopressin Infusion 0.02 Unit(s)/Min IV Continuous <Continuous>      Objective:  Vital Signs Last 24 Hrs  T(C): 36.3 (13 May 2020 12:00), Max: 38.7 (13 May 2020 04:00)  T(F): 97.3 (13 May 2020 12:00), Max: 101.7 (13 May 2020 04:00)  HR: 131 (13 May 2020 16:00) (118 - 138)  BP: --  BP(mean): --  RR: 0 (13 May 2020 16:00) (0 - 35)  SpO2: 99% (13 May 2020 16:00) (87% - 99%)    PHYSICAL EXAM:  Constitutional:no acute distress, febrile  Eyes:MARIA FERNANDA  Ear/Nose/Throat: no oral lesions, ET tube with few secretions	  Respiratory: coarse Bas left>right  Cardiovascular: S1S2 tachy  Gastrointestinal:soft,   Extremities:no e/e/c  No Lymphadenopathy  IV sites not inflammed.    LABS:                        7.6    32.73 )-----------( 689      ( 13 May 2020 00:34 )             27.5     05-13    141  |  104  |  18  ----------------------------<  72  6.6<HH>   |  14<L>  |  1.88<H>    Ca    8.4      13 May 2020 08:22  Phos  7.4     05-  Mg     2.5         TPro  6.0  /  Alb  2.2<L>  /  TBili  0.3  /  DBili  x   /  AST  4461<H>  /  ALT  602<H>  /  AlkPhos  413<H>  05-13    PT/INR - ( 13 May 2020 00:34 )   PT: 16.4 sec;   INR: 1.41 ratio         PTT - ( 13 May 2020 13:16 )  PTT:50.9 sec  Urinalysis Basic - ( 11 May 2020 18:27 )    Color: Yellow / Appearance: Slightly Turbid / S.020 / pH: x  Gluc: x / Ketone: Negative  / Bili: Negative / Urobili: Negative   Blood: x / Protein: 100 / Nitrite: Negative   Leuk Esterase: Large / RBC: 2 /hpf /  /HPF   Sq Epi: x / Non Sq Epi: 2 / Bacteria: Occasional        MICROBIOLOGY:            RECENT CULTURES:   @ 22:25  .Urine Catheterized  --  --  --    50,000 - 99,000 CFU/mL Presumptive Candida albicans "Susceptibilities not  performed"  --   @ 22:20  .Blood Blood-Peripheral  --  --  --    No growth to date.  --  05-10 @ 18:14  .Urine Catheterized  --  --  --    50,000 - 99,000 CFU/mL Presumptive Candida albicans "Susceptibilities not  performed"  --  05-10 @ 04:23  .Blood Blood  --  --  --    No growth to date.  --  05-10 @ 04:12  .Sputum Sputum  Staphylococcus aureus  Staphylococcus aureus  EVER    Moderate Staphylococcus aureus  Normal Respiratory Natalia present  --      RADIOLOGY & ADDITIONAL STUDIES:      < from: Xray Chest 1 View- PORTABLE-Routine (20 @ 04:53) >  Impression:    The heart is slightly enlarged.Diffuse airspace opacities are seen throughout both lungs which remain unchanged when compared to previous study done May 11, 2020. At 2:39 PM. Endotracheal tube and NG tube are in good position. A central line seen on the right and the tip is in superior vena cava. No pneumothorax. No pleural effusion.    < end of copied text >

## 2020-05-13 NOTE — PROCEDURE NOTE - NSPROCDETAILS_GEN_ALL_CORE
lumen(s) aspirated and flushed/ultrasound guidance/guidewire recovered/sterile technique, catheter placed/sterile dressing applied
lumen(s) aspirated and flushed/ultrasound guidance/guidewire recovered/sterile dressing applied/sterile technique, catheter placed
Seldinger technique/sutured in place/positive blood return obtained via catheter
lumen(s) aspirated and flushed/guidewire recovered/sterile dressing applied/ultrasound guidance/sterile technique, catheter placed
connected to a pressurized flush line/location identified, draped/prepped, sterile technique used, needle inserted/introduced/all materials/supplies accounted for at end of procedure/positive blood return obtained via catheter/sutured in place/hemostasis with direct pressure, dressing applied

## 2020-05-14 NOTE — PROGRESS NOTE ADULT - SUBJECTIVE AND OBJECTIVE BOX
[facility]   [phone]  RENAL FOLLOW UP NOTE  --------------------------------------------------------------------------------  Chief Complaint:    24 hour events/subjective:      seen earlier.   on pressors  intubated  on cvvhdf    PAST HISTORY  --------------------------------------------------------------------------------  No significant changes to PMH, PSH, FHx, SHx, unless otherwise noted    ALLERGIES & MEDICATIONS  --------------------------------------------------------------------------------  Allergies    No Known Allergies    Intolerances      Standing Inpatient Medications  chlorhexidine 0.12% Liquid 15 milliLiter(s) Oral Mucosa every 12 hours  chlorhexidine 4% Liquid 1 Application(s) Topical <User Schedule>  CRRT Treatment    <Continuous>  famotidine Injectable 20 milliGRAM(s) IV Push daily  fentaNYL   Infusion... 5.006 MICROgram(s)/kG/Hr IV Continuous <Continuous>  fluconAZOLE IVPB 400 milliGRAM(s) IV Intermittent daily  heparin  Infusion 1400 Unit(s)/Hr IV Continuous <Continuous>  meropenem  IVPB 1000 milliGRAM(s) IV Intermittent every 12 hours  meropenem  IVPB      metoclopramide Injectable 5 milliGRAM(s) IV Push every 8 hours  midazolam Infusion 0.02 mG/kG/Hr IV Continuous <Continuous>  norepinephrine Infusion 0.05 MICROgram(s)/kG/Min IV Continuous <Continuous>  petrolatum Ophthalmic Ointment 1 Application(s) Both EYES two times a day  phenylephrine    Infusion 0.1 MICROgram(s)/kG/Min IV Continuous <Continuous>  Phoxillum Filtration BK 4 / 2.5 5000 milliLiter(s) CRRT <Continuous>  PrismaSOL Filtration BGK 0 / 2.5 5000 milliLiter(s) CRRT <Continuous>  PrismaSOL Filtration BGK 0 / 2.5 5000 milliLiter(s) CRRT <Continuous>  rocuronium Infusion 8 MICROgram(s)/kG/Min IV Continuous <Continuous>  vancomycin  IVPB 750 milliGRAM(s) IV Intermittent <User Schedule>  vasopressin Infusion 0.02 Unit(s)/Min IV Continuous <Continuous>    PRN Inpatient Medications  ondansetron Injectable 4 milliGRAM(s) IV Push every 8 hours PRN  sodium chloride 0.9% lock flush 10 milliLiter(s) IV Push every 1 hour PRN      REVIEW OF SYSTEMS  --------------------------------------------------------------------------------    \    VITALS/PHYSICAL EXAM  --------------------------------------------------------------------------------  T(C): 34.2 (05-14-20 @ 16:00), Max: 43 (05-14-20 @ 12:00)  HR: 124 (05-14-20 @ 19:00) (112 - 144)  BP: --  RR: 35 (05-14-20 @ 19:00) (35 - 35)  SpO2: 98% (05-14-20 @ 19:00) (94% - 100%)  Wt(kg): --        05-13-20 @ 07:01  -  05-14-20 @ 07:00  --------------------------------------------------------  IN: 7436.3 mL / OUT: 521 mL / NET: 6915.3 mL    05-14-20 @ 07:01  -  05-14-20 @ 20:06  --------------------------------------------------------  IN: 2460.8 mL / OUT: 3041 mL / NET: -580.2 mL      Physical Exam:  	  	Gen: intubated   	no jvd   	Pulm: decrease bs  no rales or ronchi or wheezing  	CV: RRR, S1S2; no rub  	Abd:  no BS, soft, nontender + distended  	UE: Warm, no cyanosis  no clubbing,  2+  edema;   	LE: Warm, no cyanosis  no clubbing, 2+  edema  	Skin: Warm, no decrease skin turgor       	  LABS/STUDIES  --------------------------------------------------------------------------------              7.5    50.87 >-----------<  542      [05-14-20 @ 01:32]              25.3     142  |  104  |  18  ----------------------------<  56      [05-14-20 @ 01:32]  5.4   |  13  |  1.75        Ca     7.8     [05-14-20 @ 01:32]      Mg     2.1     [05-14-20 @ 01:32]      Phos  6.5     [05-14-20 @ 01:32]    TPro  5.8  /  Alb  2.2  /  TBili  0.7  /  DBili  x   /  AST  85387  /  ALT  772  /  AlkPhos  457  [05-14-20 @ 01:32]    PT/INR: PT 24.0 , INR 2.06       [05-14-20 @ 01:32]  PTT: 52.6       [05-14-20 @ 17:47]      Creatinine Trend:  SCr 1.75 [05-14 @ 01:32]  SCr 2.09 [05-13 @ 17:49]  SCr 1.88 [05-13 @ 08:22]  SCr 1.47 [05-13 @ 00:34]  SCr 1.22 [05-12 @ 20:00]              Urinalysis - [05-11-20 @ 18:27]      Color Yellow / Appearance Slightly Turbid / SG 1.020 / pH 6.0      Gluc Negative / Ketone Negative  / Bili Negative / Urobili Negative       Blood Trace / Protein 100 / Leuk Est Large / Nitrite Negative      RBC 2 /  / Hyaline 17 / Gran 3 / Sq Epi  / Non Sq Epi 2 / Bacteria Occasional      Ferritin 28333      [05-14-20 @ 04:56]  Lipid: chol --, , HDL --, LDL --      [05-12-20 @ 04:13]

## 2020-05-14 NOTE — PROGRESS NOTE ADULT - SUBJECTIVE AND OBJECTIVE BOX
O: Remains on phenylephrine, levophed and vasopressin     T(C): 34.2 (05-14-20 @ 16:00), Max: 43 (05-14-20 @ 12:00)  HR: 124 (05-14-20 @ 19:00) (112 - 144)  BP: --  RR: 35 (05-14-20 @ 19:00) (35 - 35)  SpO2: 98% (05-14-20 @ 19:00) (94% - 100%)  05-13-20 @ 07:01  -  05-14-20 @ 07:00  --------------------------------------------------------  IN: 7436.3 mL / OUT: 521 mL / NET: 6915.3 mL    05-14-20 @ 07:01  -  05-14-20 @ 20:07  --------------------------------------------------------  IN: 2460.8 mL / OUT: 3041 mL / NET: -580.2 mL    chlorhexidine 0.12% Liquid 15 milliLiter(s) Oral Mucosa every 12 hours  chlorhexidine 4% Liquid 1 Application(s) Topical <User Schedule>  CRRT Treatment    <Continuous>  famotidine Injectable 20 milliGRAM(s) IV Push daily  fentaNYL   Infusion... 5.006 MICROgram(s)/kG/Hr IV Continuous <Continuous>  fluconAZOLE IVPB 400 milliGRAM(s) IV Intermittent daily  heparin  Infusion 1400 Unit(s)/Hr IV Continuous <Continuous>  meropenem  IVPB 1000 milliGRAM(s) IV Intermittent every 12 hours  meropenem  IVPB      metoclopramide Injectable 5 milliGRAM(s) IV Push every 8 hours  midazolam Infusion 0.02 mG/kG/Hr IV Continuous <Continuous>  norepinephrine Infusion 0.05 MICROgram(s)/kG/Min IV Continuous <Continuous>  ondansetron Injectable 4 milliGRAM(s) IV Push every 8 hours PRN  petrolatum Ophthalmic Ointment 1 Application(s) Both EYES two times a day  phenylephrine    Infusion 0.1 MICROgram(s)/kG/Min IV Continuous <Continuous>  Phoxillum Filtration BK 4 / 2.5 5000 milliLiter(s) CRRT <Continuous>  PrismaSOL Filtration BGK 0 / 2.5 5000 milliLiter(s) CRRT <Continuous>  PrismaSOL Filtration BGK 0 / 2.5 5000 milliLiter(s) CRRT <Continuous>  rocuronium Infusion 8 MICROgram(s)/kG/Min IV Continuous <Continuous>  sodium chloride 0.9% lock flush 10 milliLiter(s) IV Push every 1 hour PRN  vancomycin  IVPB 750 milliGRAM(s) IV Intermittent <User Schedule>  vasopressin Infusion 0.02 Unit(s)/Min IV Continuous <Continuous>  Mode: AC/ CMV (Assist Control/ Continuous Mandatory Ventilation), RR (machine): 35, FiO2: 70, PEEP: 5, ITime: 0.7, MAP: 17, PC: 30, PIP: 36    PHYSICAL EXAM: LIMITED TO AVOID COVID-19 EXPOSURE   Neurological: sedated, paralyzed  Cardiovascular: Regular rhtyhm. Tachycardic.   Respiratory: No respiratory distress.   Gastrointestinal: soft, non-distended  Genitourinary: +cruz    TUBES/LINES:  [x] right IJ TLC  [x] right axillary a-line  [x] cruz      LABS:  Na: 142 (05-14 @ 01:32), 142 (05-13 @ 17:49), 141 (05-13 @ 08:22), 142 (05-13 @ 00:34), 142 (05-12 @ 20:00), 143 (05-12 @ 04:13)  K: 5.4 (05-14 @ 01:32), 6.4 (05-13 @ 17:49), 6.6 (05-13 @ 08:22), 6.0 (05-13 @ 00:34), 5.6 (05-12 @ 20:00), 5.3 (05-12 @ 04:13)  Cl: 104 (05-14 @ 01:32), 103 (05-13 @ 17:49), 104 (05-13 @ 08:22), 105 (05-13 @ 00:34), 105 (05-12 @ 20:00), 106 (05-12 @ 04:13)  CO2: 13 (05-14 @ 01:32), 12 (05-13 @ 17:49), 14 (05-13 @ 08:22), 19 (05-13 @ 00:34), 17 (05-12 @ 20:00), 26 (05-12 @ 04:13)  BUN: 18 (05-14 @ 01:32), 22 (05-13 @ 17:49), 18 (05-13 @ 08:22), 16 (05-13 @ 00:34), 15 (05-12 @ 20:00), 10 (05-12 @ 04:13)  Cr: 1.75 (05-14 @ 01:32), 2.09 (05-13 @ 17:49), 1.88 (05-13 @ 08:22), 1.47 (05-13 @ 00:34), 1.22 (05-12 @ 20:00), 0.65 (05-12 @ 04:13)  Glu: 56(05-14 @ 01:32), 109(05-13 @ 17:49), 72(05-13 @ 08:22), 149(05-13 @ 00:34), 173(05-12 @ 20:00), 147(05-12 @ 04:13)    Hgb: 7.5 (05-14 @ 01:32), 7.5 (05-13 @ 17:49), 7.6 (05-13 @ 00:34), 7.7 (05-12 @ 04:14)  Hct: 25.3 (05-14 @ 01:32), 26.2 (05-13 @ 17:49), 27.5 (05-13 @ 00:34), 27.5 (05-12 @ 04:14)  WBC: 50.87 (05-14 @ 01:32), 54.45 (05-13 @ 17:49), 32.73 (05-13 @ 00:34), 21.54 (05-12 @ 04:14)  Plt: 542 (05-14 @ 01:32), 582 (05-13 @ 17:49), 689 (05-13 @ 00:34), 613 (05-12 @ 04:14)    INR: 2.06 05-14-20 @ 01:32, 1.41 05-13-20 @ 00:34  PTT: 52.6 05-14-20 @ 17:47, 44.9 05-14-20 @ 09:54, 38.8 05-14-20 @ 01:32, 35.7 05-13-20 @ 17:49, 50.9 05-13-20 @ 13:16, 53.6 05-13-20 @ 08:22, 33.4 05-13-20 @ 00:34          Assessment and Plan:   · Assessment	  ASSESSMENT: 44 year old male with no significant past medical history who presented with cough and fever, found to be COVID +ve. Now in multi-organ failure.  Started on CVVHD on 5/13 given anuria  Poor prognosis     PLAN:  Neuro:   - Q shift neurochecks  - Fentanyl and versed for sedation, would decreased versed dose by half, consider d/c dwayne tomorrow  - Rocuronium for vent synchrony    Respiratory:   ARDS  - RR 35 | Pip 40| PEEP 5| FiO2 75%  Blood Gas Profile - Arterial (05.14.20 @ 16:01)    pH, Arterial: 7.20    pCO2, Arterial: 56 mmHg    pO2, Arterial: 78 mmHg    HCO3, Arterial: 21 mmoL/L    Base Excess, Arterial: -6.2 mmol/L    Oxygen Saturation, Arterial: 92 %    Total CO2, Arterial: 23 mmoL/L    FIO2, Arterial: 70    Blood Gas Source Arterial: Arterial  -decerase FiO2 to 60%  - lactic acidosis, continue to trend lactic acid     CV:  septic shock   - MAP > 65  - d/c vasopressin     GI:   - Did not tolerate trickle feeds 5/14  - NPO  - Reglan 5TID  - GI ppx: Pepcid  - Rectal tube; off bowel regimen    Renal:   - CVVHD started 5/13 for anuria  - Recommendations appreciated    Endocrine:   -maintain euglycemia    Heme/Onc:  -Hep gtt started 5/13 for increasing d-dimer    ID:   - Empiric coverage with Vanc/Elin/Flucanazole for septic shock   - ID following      CODE STATUS:  [X] Full Code [] DNR [] DNI [] Palliative/Comfort Care    DISPOSITION:  [X] ICU [] Stroke Unit [] Floor [] EMU [] RCU [] PCU    [x] Patient is at high risk of deterioration/death due to: cerebral edema 2/2 acute liver failure, septic shock, multi-organ failure.  Poor prognosis.     Time spent: _45__ [X] critical care minutes O: Remains on phenylephrine, levophed and vasopressin     T(C): 34.2 (05-14-20 @ 16:00), Max: 43 (05-14-20 @ 12:00)  HR: 124 (05-14-20 @ 19:00) (112 - 144)  BP: --  RR: 35 (05-14-20 @ 19:00) (35 - 35)  SpO2: 98% (05-14-20 @ 19:00) (94% - 100%)  05-13-20 @ 07:01  -  05-14-20 @ 07:00  --------------------------------------------------------  IN: 7436.3 mL / OUT: 521 mL / NET: 6915.3 mL    05-14-20 @ 07:01  -  05-14-20 @ 20:07  --------------------------------------------------------  IN: 2460.8 mL / OUT: 3041 mL / NET: -580.2 mL    chlorhexidine 0.12% Liquid 15 milliLiter(s) Oral Mucosa every 12 hours  chlorhexidine 4% Liquid 1 Application(s) Topical <User Schedule>  CRRT Treatment    <Continuous>  famotidine Injectable 20 milliGRAM(s) IV Push daily  fentaNYL   Infusion... 5.006 MICROgram(s)/kG/Hr IV Continuous <Continuous>  fluconAZOLE IVPB 400 milliGRAM(s) IV Intermittent daily  heparin  Infusion 1400 Unit(s)/Hr IV Continuous <Continuous>  meropenem  IVPB 1000 milliGRAM(s) IV Intermittent every 12 hours  meropenem  IVPB      metoclopramide Injectable 5 milliGRAM(s) IV Push every 8 hours  midazolam Infusion 0.02 mG/kG/Hr IV Continuous <Continuous>  norepinephrine Infusion 0.05 MICROgram(s)/kG/Min IV Continuous <Continuous>  ondansetron Injectable 4 milliGRAM(s) IV Push every 8 hours PRN  petrolatum Ophthalmic Ointment 1 Application(s) Both EYES two times a day  phenylephrine    Infusion 0.1 MICROgram(s)/kG/Min IV Continuous <Continuous>  Phoxillum Filtration BK 4 / 2.5 5000 milliLiter(s) CRRT <Continuous>  PrismaSOL Filtration BGK 0 / 2.5 5000 milliLiter(s) CRRT <Continuous>  PrismaSOL Filtration BGK 0 / 2.5 5000 milliLiter(s) CRRT <Continuous>  rocuronium Infusion 8 MICROgram(s)/kG/Min IV Continuous <Continuous>  sodium chloride 0.9% lock flush 10 milliLiter(s) IV Push every 1 hour PRN  vancomycin  IVPB 750 milliGRAM(s) IV Intermittent <User Schedule>  vasopressin Infusion 0.02 Unit(s)/Min IV Continuous <Continuous>  Mode: AC/ CMV (Assist Control/ Continuous Mandatory Ventilation), RR (machine): 35, FiO2: 70, PEEP: 5, ITime: 0.7, MAP: 17, PC: 30, PIP: 36    PHYSICAL EXAM: LIMITED TO AVOID COVID-19 EXPOSURE   Neurological: sedated, paralyzed  Cardiovascular: Regular rhtyhm. Tachycardic.   Respiratory: No respiratory distress.   Gastrointestinal: soft, non-distended  Genitourinary: +cruz    TUBES/LINES:  [x] right IJ TLC  [x] right axillary a-line  [x] cruz      LABS:  Na: 142 (05-14 @ 01:32), 142 (05-13 @ 17:49), 141 (05-13 @ 08:22), 142 (05-13 @ 00:34), 142 (05-12 @ 20:00), 143 (05-12 @ 04:13)  K: 5.4 (05-14 @ 01:32), 6.4 (05-13 @ 17:49), 6.6 (05-13 @ 08:22), 6.0 (05-13 @ 00:34), 5.6 (05-12 @ 20:00), 5.3 (05-12 @ 04:13)  Cl: 104 (05-14 @ 01:32), 103 (05-13 @ 17:49), 104 (05-13 @ 08:22), 105 (05-13 @ 00:34), 105 (05-12 @ 20:00), 106 (05-12 @ 04:13)  CO2: 13 (05-14 @ 01:32), 12 (05-13 @ 17:49), 14 (05-13 @ 08:22), 19 (05-13 @ 00:34), 17 (05-12 @ 20:00), 26 (05-12 @ 04:13)  BUN: 18 (05-14 @ 01:32), 22 (05-13 @ 17:49), 18 (05-13 @ 08:22), 16 (05-13 @ 00:34), 15 (05-12 @ 20:00), 10 (05-12 @ 04:13)  Cr: 1.75 (05-14 @ 01:32), 2.09 (05-13 @ 17:49), 1.88 (05-13 @ 08:22), 1.47 (05-13 @ 00:34), 1.22 (05-12 @ 20:00), 0.65 (05-12 @ 04:13)  Glu: 56(05-14 @ 01:32), 109(05-13 @ 17:49), 72(05-13 @ 08:22), 149(05-13 @ 00:34), 173(05-12 @ 20:00), 147(05-12 @ 04:13)    Hgb: 7.5 (05-14 @ 01:32), 7.5 (05-13 @ 17:49), 7.6 (05-13 @ 00:34), 7.7 (05-12 @ 04:14)  Hct: 25.3 (05-14 @ 01:32), 26.2 (05-13 @ 17:49), 27.5 (05-13 @ 00:34), 27.5 (05-12 @ 04:14)  WBC: 50.87 (05-14 @ 01:32), 54.45 (05-13 @ 17:49), 32.73 (05-13 @ 00:34), 21.54 (05-12 @ 04:14)  Plt: 542 (05-14 @ 01:32), 582 (05-13 @ 17:49), 689 (05-13 @ 00:34), 613 (05-12 @ 04:14)    INR: 2.06 05-14-20 @ 01:32, 1.41 05-13-20 @ 00:34  PTT: 52.6 05-14-20 @ 17:47, 44.9 05-14-20 @ 09:54, 38.8 05-14-20 @ 01:32, 35.7 05-13-20 @ 17:49, 50.9 05-13-20 @ 13:16, 53.6 05-13-20 @ 08:22, 33.4 05-13-20 @ 00:34          Assessment and Plan:   · Assessment	  ASSESSMENT: 44 year old male with no significant past medical history who presented with cough and fever, found to be COVID +ve. Now in multi-organ failure.  Started on CVVHD on 5/13 given anuria  Poor prognosis     PLAN:  Neuro:   - Q shift neurochecks  - Fentanyl and versed for sedation, would decreased versed dose by half, consider d/c dwayne tomorrow  - Rocuronium for vent synchrony    Respiratory:   ARDS  - RR 35 | Pip 40| PEEP 5| FiO2 75%  Blood Gas Profile - Arterial (05.14.20 @ 16:01)    pH, Arterial: 7.20    pCO2, Arterial: 56 mmHg    pO2, Arterial: 78 mmHg    HCO3, Arterial: 21 mmoL/L    Base Excess, Arterial: -6.2 mmol/L    Oxygen Saturation, Arterial: 92 %    Total CO2, Arterial: 23 mmoL/L    FIO2, Arterial: 70    Blood Gas Source Arterial: Arterial  -decerase FiO2 to 60%  - lactic acidosis improving , continue to trend lactic acid   respiratory acidosis     CV:  septic shock   - MAP > 65  - d/c vasopressin     GI:   - Did not tolerate trickle feeds 5/14  - NPO  -transaminitis, likely shock liver   - Reglan 5TID  - GI ppx: Pepcid  - Rectal tube; off bowel regimen    Renal:   - CVVHD started 5/13 for anuria  trying to keep him negative balance   - Recommendations appreciated    Endocrine:   -maintain euglycemia    Heme/Onc:  -Hep gtt started 5/13 for increasing d-dimer    ID:   - Empiric coverage with Vanc/Elin/Flucanazole for septic shock   - ID following      CODE STATUS:  [X] Full Code [] DNR [] DNI [] Palliative/Comfort Care    DISPOSITION:  [X] ICU [] Stroke Unit [] Floor [] EMU [] RCU [] PCU    [x] Patient is at high risk of deterioration/death due to: cerebral edema 2/2 acute liver failure, septic shock, multi-organ failure.  Poor prognosis.     Time spent: _45__ [X] critical care minutes O: Remains on phenylephrine, levophed and vasopressin     T(C): 34.2 (05-14-20 @ 16:00), Max: 43 (05-14-20 @ 12:00)  HR: 124 (05-14-20 @ 19:00) (112 - 144)  BP: --  RR: 35 (05-14-20 @ 19:00) (35 - 35)  SpO2: 98% (05-14-20 @ 19:00) (94% - 100%)  05-13-20 @ 07:01  -  05-14-20 @ 07:00  --------------------------------------------------------  IN: 7436.3 mL / OUT: 521 mL / NET: 6915.3 mL    05-14-20 @ 07:01  -  05-14-20 @ 20:07  --------------------------------------------------------  IN: 2460.8 mL / OUT: 3041 mL / NET: -580.2 mL    chlorhexidine 0.12% Liquid 15 milliLiter(s) Oral Mucosa every 12 hours  chlorhexidine 4% Liquid 1 Application(s) Topical <User Schedule>  CRRT Treatment    <Continuous>  famotidine Injectable 20 milliGRAM(s) IV Push daily  fentaNYL   Infusion... 5.006 MICROgram(s)/kG/Hr IV Continuous <Continuous>  fluconAZOLE IVPB 400 milliGRAM(s) IV Intermittent daily  heparin  Infusion 1400 Unit(s)/Hr IV Continuous <Continuous>  meropenem  IVPB 1000 milliGRAM(s) IV Intermittent every 12 hours  meropenem  IVPB      metoclopramide Injectable 5 milliGRAM(s) IV Push every 8 hours  midazolam Infusion 0.02 mG/kG/Hr IV Continuous <Continuous>  norepinephrine Infusion 0.05 MICROgram(s)/kG/Min IV Continuous <Continuous>  ondansetron Injectable 4 milliGRAM(s) IV Push every 8 hours PRN  petrolatum Ophthalmic Ointment 1 Application(s) Both EYES two times a day  phenylephrine    Infusion 0.1 MICROgram(s)/kG/Min IV Continuous <Continuous>  Phoxillum Filtration BK 4 / 2.5 5000 milliLiter(s) CRRT <Continuous>  PrismaSOL Filtration BGK 0 / 2.5 5000 milliLiter(s) CRRT <Continuous>  PrismaSOL Filtration BGK 0 / 2.5 5000 milliLiter(s) CRRT <Continuous>  rocuronium Infusion 8 MICROgram(s)/kG/Min IV Continuous <Continuous>  sodium chloride 0.9% lock flush 10 milliLiter(s) IV Push every 1 hour PRN  vancomycin  IVPB 750 milliGRAM(s) IV Intermittent <User Schedule>  vasopressin Infusion 0.02 Unit(s)/Min IV Continuous <Continuous>  Mode: AC/ CMV (Assist Control/ Continuous Mandatory Ventilation), RR (machine): 35, FiO2: 70, PEEP: 5, ITime: 0.7, MAP: 17, PC: 30, PIP: 36    PHYSICAL EXAM: LIMITED TO AVOID COVID-19 EXPOSURE   Neurological: sedated, paralyzed  Cardiovascular: Regular rhtyhm. Tachycardic.   Respiratory: No respiratory distress.   Gastrointestinal: soft, non-distended  Genitourinary: +cruz    TUBES/LINES:  [x] right IJ TLC  [x] right axillary a-line  [x] cruz      LABS:  Na: 142 (05-14 @ 01:32), 142 (05-13 @ 17:49), 141 (05-13 @ 08:22), 142 (05-13 @ 00:34), 142 (05-12 @ 20:00), 143 (05-12 @ 04:13)  K: 5.4 (05-14 @ 01:32), 6.4 (05-13 @ 17:49), 6.6 (05-13 @ 08:22), 6.0 (05-13 @ 00:34), 5.6 (05-12 @ 20:00), 5.3 (05-12 @ 04:13)  Cl: 104 (05-14 @ 01:32), 103 (05-13 @ 17:49), 104 (05-13 @ 08:22), 105 (05-13 @ 00:34), 105 (05-12 @ 20:00), 106 (05-12 @ 04:13)  CO2: 13 (05-14 @ 01:32), 12 (05-13 @ 17:49), 14 (05-13 @ 08:22), 19 (05-13 @ 00:34), 17 (05-12 @ 20:00), 26 (05-12 @ 04:13)  BUN: 18 (05-14 @ 01:32), 22 (05-13 @ 17:49), 18 (05-13 @ 08:22), 16 (05-13 @ 00:34), 15 (05-12 @ 20:00), 10 (05-12 @ 04:13)  Cr: 1.75 (05-14 @ 01:32), 2.09 (05-13 @ 17:49), 1.88 (05-13 @ 08:22), 1.47 (05-13 @ 00:34), 1.22 (05-12 @ 20:00), 0.65 (05-12 @ 04:13)  Glu: 56(05-14 @ 01:32), 109(05-13 @ 17:49), 72(05-13 @ 08:22), 149(05-13 @ 00:34), 173(05-12 @ 20:00), 147(05-12 @ 04:13)    Hgb: 7.5 (05-14 @ 01:32), 7.5 (05-13 @ 17:49), 7.6 (05-13 @ 00:34), 7.7 (05-12 @ 04:14)  Hct: 25.3 (05-14 @ 01:32), 26.2 (05-13 @ 17:49), 27.5 (05-13 @ 00:34), 27.5 (05-12 @ 04:14)  WBC: 50.87 (05-14 @ 01:32), 54.45 (05-13 @ 17:49), 32.73 (05-13 @ 00:34), 21.54 (05-12 @ 04:14)  Plt: 542 (05-14 @ 01:32), 582 (05-13 @ 17:49), 689 (05-13 @ 00:34), 613 (05-12 @ 04:14)    INR: 2.06 05-14-20 @ 01:32, 1.41 05-13-20 @ 00:34  PTT: 52.6 05-14-20 @ 17:47, 44.9 05-14-20 @ 09:54, 38.8 05-14-20 @ 01:32, 35.7 05-13-20 @ 17:49, 50.9 05-13-20 @ 13:16, 53.6 05-13-20 @ 08:22, 33.4 05-13-20 @ 00:34          Assessment and Plan:   · Assessment	  ASSESSMENT: 44 year old male with no significant past medical history who presented with cough and fever, found to be COVID +ve. Now in multi-organ failure.  Started on CVVHD on 5/13 given anuria  Poor prognosis     PLAN:  Neuro:   - Q shift neurochecks  - Fentanyl and versed for sedation, would decreased versed dose by half, consider d/c dwayne tomorrow  - Rocuronium for vent synchrony    Respiratory:   ARDS  - RR 35 | Pip 40| PEEP 5| FiO2 75%  Blood Gas Profile - Arterial (05.14.20 @ 16:01)    pH, Arterial: 7.20    pCO2, Arterial: 56 mmHg    pO2, Arterial: 78 mmHg    HCO3, Arterial: 21 mmoL/L    Base Excess, Arterial: -6.2 mmol/L    Oxygen Saturation, Arterial: 92 %    Total CO2, Arterial: 23 mmoL/L    FIO2, Arterial: 70    Blood Gas Source Arterial: Arterial  -decerase FiO2 to 60%  - lactic acidosis improving , continue to trend lactic acid   respiratory acidosis     CV:  septic shock   - MAP > 65  - d/c vasopressin   -on levophed and phenyleprhine    GI:   - Did not tolerate trickle feeds 5/14  - NPO  -transaminitis, likely shock liver   - Reglan 5TID  - GI ppx: Pepcid  - Rectal tube; off bowel regimen    Renal:   - CVVHD started 5/13 for anuria  trying to keep him negative balance   - Recommendations appreciated    Endocrine:   -maintain euglycemia    Heme/Onc:  -Hep gtt started 5/13 for increasing d-dimer    ID:   - Empiric coverage with Vanc/Elin/Flucanazole for septic shock   - ID following      CODE STATUS:  [X] Full Code [] DNR [] DNI [] Palliative/Comfort Care    DISPOSITION:  [X] ICU [] Stroke Unit [] Floor [] EMU [] RCU [] PCU    [x] Patient is at high risk of deterioration/death due to: cerebral edema 2/2 acute liver failure, septic shock, multi-organ failure.  Poor prognosis.     Time spent: _45__ [X] critical care minutes

## 2020-05-14 NOTE — PROGRESS NOTE ADULT - ASSESSMENT
44 M with no significant PMH presenting with nonproductive cough and fevers for 3-4 days. DX w/ covid19 pneumonitis--s/p initial anakinra protocol on tapering rx. Also status post Plaquenil and solumedrol.  Currently receiving colchicine, lovenox 40 BID, famotidine and albuterol as needed. Desaturated overnight and CTPA ordered to rule out PE and also to evaluate for hospital acquired pneumonia.    Patient required intubation 4/14- received zosyn  Received course cefepime 4/15 -->4/19 for HAP  then 4/28- MSSA bacteremia   line changed 4/28  repeat blood cx negative   TTE negative     1) COVID-19  - s/p plaquenil, solumedrol, anakinra  - supportive care  - vent support    2) MSSA bacteremia-   - changed from Cefazolin to Vancomycin in the setting of fevers and increasing leukocytosis    3) fevers increasing leukocytosis  blood cultures sent  lines to be changed if feasible  receiving CVVHD  antibiotics broadened to Vancomycin/Meropenem/fluconazole  patient is dying-   WBC in 5ok range likely reflective of ischemic bowel with firm abdomen on exam    consider permitting family to visit to say good bye- prognosis grim      Tono Olmos MD  [phone]  After 5pm/weekends [phone]

## 2020-05-14 NOTE — PROGRESS NOTE ADULT - ASSESSMENT
44 year old male with no significant past medical history who presented with cough and fever for 3-4 days.  Found to be covid positive. s/p initial anakinra protocol on tapering rx. Also status post Plaquenil and solumedrol.  Currently receiving colchicine, lovenox 40 BID, famotidine and albuterol as needed. Patient required intubation 4/14- received zosyn. Received course cefepime 4/15 -->4/19 for HAP. then 4/28- MSSA bacteremia . line changed 4/28  repeat blood cx negative.  now w/ FATOU acute liver injury, lactic acidosis, hypotension and shock       1- fatou  2- lactic acidosis  3- hypotension  4- hyperkalemia  5- shock    cvvhdf bfr 160 dfr 2000 fluid removal 20 cc/hr   see new  orders

## 2020-05-14 NOTE — PROGRESS NOTE ADULT - SUBJECTIVE AND OBJECTIVE BOX
O: CVVHD started, hypotensive on vasopressin, NE and phenylephrine    T(C): 36.2 (05-13-20 @ 20:00), Max: 38.7 (05-13-20 @ 04:00)  HR: 142 (05-13-20 @ 23:00) (126 - 143)  BP: --  RR: 35 (05-13-20 @ 23:00) (0 - 35)  SpO2: 94% (05-13-20 @ 22:53) (90% - 100%)  05-12-20 @ 07:01  -  05-13-20 @ 07:00  --------------------------------------------------------  IN: 5411.2 mL / OUT: 1965 mL / NET: 3446.2 mL    05-13-20 @ 07:01  -  05-14-20 @ 00:11  --------------------------------------------------------  IN: 5471.3 mL / OUT: 240 mL / NET: 5231.3 mL    acetaminophen   Tablet .. 650 milliGRAM(s) Oral every 6 hours PRN  chlorhexidine 0.12% Liquid 15 milliLiter(s) Oral Mucosa every 12 hours  chlorhexidine 4% Liquid 1 Application(s) Topical <User Schedule>  CRRT Treatment    <Continuous>  famotidine Injectable 20 milliGRAM(s) IV Push daily  fentaNYL    Injectable 100 MICROGram(s) IV Push every 4 hours PRN  fentaNYL   Infusion... 5.006 MICROgram(s)/kG/Hr IV Continuous <Continuous>  heparin  Infusion 1400 Unit(s)/Hr IV Continuous <Continuous>  meropenem  IVPB 1000 milliGRAM(s) IV Intermittent every 12 hours  meropenem  IVPB      midazolam Infusion 0.02 mG/kG/Hr IV Continuous <Continuous>  norepinephrine Infusion 0.05 MICROgram(s)/kG/Min IV Continuous <Continuous>  ondansetron Injectable 4 milliGRAM(s) IV Push every 8 hours PRN  petrolatum Ophthalmic Ointment 1 Application(s) Both EYES two times a day  phenylephrine    Infusion 0.1 MICROgram(s)/kG/Min IV Continuous <Continuous>  Phoxillum Filtration BK 4 / 2.5 5000 milliLiter(s) CRRT <Continuous>  PrismaSOL Filtration BGK 0 / 2.5 5000 milliLiter(s) CRRT <Continuous>  PrismaSOL Filtration BGK 0 / 2.5 5000 milliLiter(s) CRRT <Continuous>  rocuronium Infusion 8 MICROgram(s)/kG/Min IV Continuous <Continuous>  sodium chloride 0.9% lock flush 10 milliLiter(s) IV Push every 1 hour PRN  vancomycin  IVPB 1000 milliGRAM(s) IV Intermittent once  vasopressin Infusion 0.02 Unit(s)/Min IV Continuous <Continuous>  Mode: AC/ CMV (Assist Control/ Continuous Mandatory Ventilation), RR (machine): 35, FiO2: 75, PEEP: 5, ITime: 0.7, MAP: 21, PC: 40, PIP: 47     PHYSICAL EXAM:  Neurological: sedated,   Cardiovascular: +s1, s2,  on levo / yovanny / vasopressin  Respiratory: intubated, limited exam COVID +   Gastrointestinal: soft, non-distended, non-tender  Genitourinary: +cruz      LABS:  Na: 142 (05-13 @ 17:49), 141 (05-13 @ 08:22), 142 (05-13 @ 00:34), 142 (05-12 @ 20:00), 143 (05-12 @ 04:13), 139 (05-11 @ 06:19), 138 (05-11 @ 04:35), 137 (05-11 @ 01:03)  K: 6.4 (05-13 @ 17:49), 6.6 (05-13 @ 08:22), 6.0 (05-13 @ 00:34), 5.6 (05-12 @ 20:00), 5.3 (05-12 @ 04:13), 3.8 (05-11 @ 06:19), 3.9 (05-11 @ 04:35), 3.8 (05-11 @ 01:03)  Cl: 103 (05-13 @ 17:49), 104 (05-13 @ 08:22), 105 (05-13 @ 00:34), 105 (05-12 @ 20:00), 106 (05-12 @ 04:13), 102 (05-11 @ 06:19), 102 (05-11 @ 04:35), 100 (05-11 @ 01:03)  CO2: 12 (05-13 @ 17:49), 14 (05-13 @ 08:22), 19 (05-13 @ 00:34), 17 (05-12 @ 20:00), 26 (05-12 @ 04:13), 28 (05-11 @ 06:19), 28 (05-11 @ 04:35), 28 (05-11 @ 01:03)  BUN: 22 (05-13 @ 17:49), 18 (05-13 @ 08:22), 16 (05-13 @ 00:34), 15 (05-12 @ 20:00), 10 (05-12 @ 04:13), 5 (05-11 @ 06:19), 5 (05-11 @ 04:35), 5 (05-11 @ 01:03)  Cr: 2.09 (05-13 @ 17:49), 1.88 (05-13 @ 08:22), 1.47 (05-13 @ 00:34), 1.22 (05-12 @ 20:00), 0.65 (05-12 @ 04:13), 0.33 (05-11 @ 06:19), 0.32 (05-11 @ 04:35), 0.31 (05-11 @ 01:03)  Glu: 109(05-13 @ 17:49), 72(05-13 @ 08:22), 149(05-13 @ 00:34), 173(05-12 @ 20:00), 147(05-12 @ 04:13), 179(05-11 @ 06:19), 153(05-11 @ 04:35), 153(05-11 @ 01:03)    Hgb: 7.5 (05-13 @ 17:49), 7.6 (05-13 @ 00:34), 7.7 (05-12 @ 04:14), 7.6 (05-11 @ 04:35)  Hct: 26.2 (05-13 @ 17:49), 27.5 (05-13 @ 00:34), 27.5 (05-12 @ 04:14), 24.6 (05-11 @ 04:35)  WBC: 54.45 (05-13 @ 17:49), 32.73 (05-13 @ 00:34), 21.54 (05-12 @ 04:14), 16.01 (05-11 @ 04:35)  Plt: 582 (05-13 @ 17:49), 689 (05-13 @ 00:34), 613 (05-12 @ 04:14), 466 (05-11 @ 04:35)    INR: 1.41 05-13-20 @ 00:34  PTT: 35.7 05-13-20 @ 17:49, 50.9 05-13-20 @ 13:16, 53.6 05-13-20 @ 08:22, 33.4 05-13-20 @ 00:34        Assessment and Plan:   · Assessment	  HPI:  Patient is a 44 year old male COVID ARDS now in multiorgan failure     PLAN:  Neuro:   -fentanyl, rocuronium, versed for synchrony with the vent       Respiratory:   ARDS, respiratory failure   Mode: AC/ CMV (Assist Control/ Continuous Mandatory Ventilation), RR (machine): 35, FiO2: 75, PEEP: 5, ITime: 0.7, MAP: 21, PC: 40, PIP: 47   Blood Gas Profile - Arterial (05.13.20 @ 21:20)    pH, Arterial: 7.19    pCO2, Arterial: 41 mmHg    pO2, Arterial: 77 mmHg    HCO3, Arterial: 15 mmoL/L    Base Excess, Arterial: -12.2 mmol/L    Oxygen Saturation, Arterial: 92 %    Total CO2, Arterial: 16 mmoL/L    FIO2, Arterial: 75    metabolic acidosis, lactic acidosis and from renal failure     CV:  septic shock on levo, yovnany, and vasopressin   - keep MAP > 65    Endocrine:   -maintain euglycemia    Heme/Onc:   -lovenox and SCDs for DVT prophylaxis    Renal:   -negative 200cc, monitor ins and outs  -creatinine stable    ID:   - meropenem, vancomycin   - fluconazole started last night for abn u/a  MSSA bacteremia- on Vancomycin in the setting of fevers and increasing leukocytosis  antibiotics broadened to Vancomycin/Meropenem/fluconazole given shock   pending blood culture results      GI:   -tube feeds, Vital 300 q6 hours, via OG tube  -pepcid for gi prophylaxis  -miralax for bowel regimen    hem: heparin drip for DVT prophylaxis       CODE STATUS:  [X] Full Code [] DNR [] DNI [] Palliative/Comfort Care    DISPOSITION:  [X] ICU [] Stroke Unit [] Floor [] EMU [] RCU [] PCU    [x] Patient is at high risk of deterioration/death due to: acute respiratory failure     40 critical care time O: CVVHD started, hypotensive on vasopressin, NE and phenylephrine    T(C): 36.2 (05-13-20 @ 20:00), Max: 38.7 (05-13-20 @ 04:00)  HR: 142 (05-13-20 @ 23:00) (126 - 143)  BP: --  RR: 35 (05-13-20 @ 23:00) (0 - 35)  SpO2: 94% (05-13-20 @ 22:53) (90% - 100%)  05-12-20 @ 07:01  -  05-13-20 @ 07:00  --------------------------------------------------------  IN: 5411.2 mL / OUT: 1965 mL / NET: 3446.2 mL    05-13-20 @ 07:01  -  05-14-20 @ 00:11  --------------------------------------------------------  IN: 5471.3 mL / OUT: 240 mL / NET: 5231.3 mL    acetaminophen   Tablet .. 650 milliGRAM(s) Oral every 6 hours PRN  chlorhexidine 0.12% Liquid 15 milliLiter(s) Oral Mucosa every 12 hours  chlorhexidine 4% Liquid 1 Application(s) Topical <User Schedule>  CRRT Treatment    <Continuous>  famotidine Injectable 20 milliGRAM(s) IV Push daily  fentaNYL    Injectable 100 MICROGram(s) IV Push every 4 hours PRN  fentaNYL   Infusion... 5.006 MICROgram(s)/kG/Hr IV Continuous <Continuous>  heparin  Infusion 1400 Unit(s)/Hr IV Continuous <Continuous>  meropenem  IVPB 1000 milliGRAM(s) IV Intermittent every 12 hours  meropenem  IVPB      midazolam Infusion 0.02 mG/kG/Hr IV Continuous <Continuous>  norepinephrine Infusion 0.05 MICROgram(s)/kG/Min IV Continuous <Continuous>  ondansetron Injectable 4 milliGRAM(s) IV Push every 8 hours PRN  petrolatum Ophthalmic Ointment 1 Application(s) Both EYES two times a day  phenylephrine    Infusion 0.1 MICROgram(s)/kG/Min IV Continuous <Continuous>  Phoxillum Filtration BK 4 / 2.5 5000 milliLiter(s) CRRT <Continuous>  PrismaSOL Filtration BGK 0 / 2.5 5000 milliLiter(s) CRRT <Continuous>  PrismaSOL Filtration BGK 0 / 2.5 5000 milliLiter(s) CRRT <Continuous>  rocuronium Infusion 8 MICROgram(s)/kG/Min IV Continuous <Continuous>  sodium chloride 0.9% lock flush 10 milliLiter(s) IV Push every 1 hour PRN  vancomycin  IVPB 1000 milliGRAM(s) IV Intermittent once  vasopressin Infusion 0.02 Unit(s)/Min IV Continuous <Continuous>  Mode: AC/ CMV (Assist Control/ Continuous Mandatory Ventilation), RR (machine): 35, FiO2: 75, PEEP: 5, ITime: 0.7, MAP: 21, PC: 40, PIP: 47     PHYSICAL EXAM:  Neurological: sedated,   Cardiovascular: +s1, s2,  on levo / yovanny / vasopressin  Respiratory: intubated, limited exam COVID +   Gastrointestinal: soft, non-distended, non-tender  Genitourinary: +cruz      LABS:  Na: 142 (05-13 @ 17:49), 141 (05-13 @ 08:22), 142 (05-13 @ 00:34), 142 (05-12 @ 20:00), 143 (05-12 @ 04:13), 139 (05-11 @ 06:19), 138 (05-11 @ 04:35), 137 (05-11 @ 01:03)  K: 6.4 (05-13 @ 17:49), 6.6 (05-13 @ 08:22), 6.0 (05-13 @ 00:34), 5.6 (05-12 @ 20:00), 5.3 (05-12 @ 04:13), 3.8 (05-11 @ 06:19), 3.9 (05-11 @ 04:35), 3.8 (05-11 @ 01:03)  Cl: 103 (05-13 @ 17:49), 104 (05-13 @ 08:22), 105 (05-13 @ 00:34), 105 (05-12 @ 20:00), 106 (05-12 @ 04:13), 102 (05-11 @ 06:19), 102 (05-11 @ 04:35), 100 (05-11 @ 01:03)  CO2: 12 (05-13 @ 17:49), 14 (05-13 @ 08:22), 19 (05-13 @ 00:34), 17 (05-12 @ 20:00), 26 (05-12 @ 04:13), 28 (05-11 @ 06:19), 28 (05-11 @ 04:35), 28 (05-11 @ 01:03)  BUN: 22 (05-13 @ 17:49), 18 (05-13 @ 08:22), 16 (05-13 @ 00:34), 15 (05-12 @ 20:00), 10 (05-12 @ 04:13), 5 (05-11 @ 06:19), 5 (05-11 @ 04:35), 5 (05-11 @ 01:03)  Cr: 2.09 (05-13 @ 17:49), 1.88 (05-13 @ 08:22), 1.47 (05-13 @ 00:34), 1.22 (05-12 @ 20:00), 0.65 (05-12 @ 04:13), 0.33 (05-11 @ 06:19), 0.32 (05-11 @ 04:35), 0.31 (05-11 @ 01:03)  Glu: 109(05-13 @ 17:49), 72(05-13 @ 08:22), 149(05-13 @ 00:34), 173(05-12 @ 20:00), 147(05-12 @ 04:13), 179(05-11 @ 06:19), 153(05-11 @ 04:35), 153(05-11 @ 01:03)    Hgb: 7.5 (05-13 @ 17:49), 7.6 (05-13 @ 00:34), 7.7 (05-12 @ 04:14), 7.6 (05-11 @ 04:35)  Hct: 26.2 (05-13 @ 17:49), 27.5 (05-13 @ 00:34), 27.5 (05-12 @ 04:14), 24.6 (05-11 @ 04:35)  WBC: 54.45 (05-13 @ 17:49), 32.73 (05-13 @ 00:34), 21.54 (05-12 @ 04:14), 16.01 (05-11 @ 04:35)  Plt: 582 (05-13 @ 17:49), 689 (05-13 @ 00:34), 613 (05-12 @ 04:14), 466 (05-11 @ 04:35)    INR: 1.41 05-13-20 @ 00:34  PTT: 35.7 05-13-20 @ 17:49, 50.9 05-13-20 @ 13:16, 53.6 05-13-20 @ 08:22, 33.4 05-13-20 @ 00:34        Assessment and Plan:   · Assessment	  HPI:  Patient is a 44 year old male COVID ARDS now in multiorgan failure     PLAN:  Neuro:   -fentanyl, rocuronium, versed for synchrony with the vent       Respiratory:   ARDS, respiratory failure   Mode: AC/ CMV (Assist Control/ Continuous Mandatory Ventilation), RR (machine): 35, FiO2: 75, PEEP: 5, ITime: 0.7, MAP: 21, PC: 40, PIP: 47   Blood Gas Profile - Arterial (05.13.20 @ 21:20)    pH, Arterial: 7.19    pCO2, Arterial: 41 mmHg    pO2, Arterial: 77 mmHg    HCO3, Arterial: 15 mmoL/L    Base Excess, Arterial: -12.2 mmol/L    Oxygen Saturation, Arterial: 92 %    Total CO2, Arterial: 16 mmoL/L    FIO2, Arterial: 75    metabolic acidosis, lactic acidosis and from renal failure     CV:  septic shock on levo, yovanny, and vasopressin   - keep MAP > 65    Endocrine:   -maintain euglycemia      Renal:   -FATOU started on CVVHD metabolic acidosis, hyperkalemia  repeat BMP and ABG every 6 hrs    ID:   - meropenem, vancomycin   - fluconazole started last night for abn u/a  MSSA bacteremia- on Vancomycin in the setting of fevers and increasing leukocytosis  antibiotics broadened to Vancomycin/Meropenem/fluconazole given shock   pending blood culture results      GI:   -transaminitis could be shock liver from hypotension   -pepcid for gi prophylaxis  -miralax for bowel regimen    hem: heparin drip for DVT prophylaxis       CODE STATUS:  [X] Full Code [] DNR [] DNI [] Palliative/Comfort Care    DISPOSITION:  [X] ICU [] Stroke Unit [] Floor [] EMU [] RCU [] PCU    [x] Patient is at high risk of deterioration/death due to: acute respiratory failure     40 critical care time

## 2020-05-14 NOTE — PROGRESS NOTE ADULT - ASSESSMENT
ASSESSMENT: 44 year old male with no significant past medical history who presented with cough and fever, found to be COVID +ve. Now in multi-organ failure.       PLAN:  Neuro:   -fentanyl and versed for sedation  -rocuronium for vent synchrony    Respiratory:   -continue current vent settings    CV:  -continue phenylephrine, levophed, and vasopressin, titrate to keep MAP > 65    Endocrine:   -maintain euglycemia    Heme/Onc:  -heparin gtt started overnight for rising d-dimer, goal aptt 50-90    Renal:   -worsening kidney function with increased lactate  -renal consulted for CRRT, wife now agreeable  -consent for jaron obtained via [place] phone  #[phone]  -hyperkalemia, insulin/d50/bicarb given, follow up repeat bmp  -continue bicarb gtt for now    ID:   -Tmax 101.7  -vancomycin and meropenem for empiric coverage  -trend WBC count  -follow up ID recs    GI:   -npo (high residuals overnight)  -pepcid for gi prophylaxis  -miralax for bowel regimen    Social/Family:   -family updated by attending.      CODE STATUS:  [X] Full Code [] DNR [] DNI [] Palliative/Comfort Care    DISPOSITION:  [X] ICU [] Stroke Unit [] Floor [] EMU [] RCU [] PCU    [x] Patient is at high risk of deterioration/death due to: acute respiratory failure     Time spent: _45__ [X] critical care minutes ASSESSMENT: 44 year old male with no significant past medical history who presented with cough and fever, found to be COVID +ve. Now in multi-organ failure.  Poor prognosis given worsening liver enzymes     PLAN:  Neuro:   -fentanyl and versed for sedation  -rocuronium for vent synchrony    Respiratory:   -continue current vent settings    CV:  -continue phenylephrine, levophed, and vasopressin, titrate to keep MAP > 65    Endocrine:   -maintain euglycemia    Heme/Onc:  -heparin gtt started overnight for rising d-dimer, goal aptt 50-90    Renal:   -worsening kidney function with increased lactate  -renal consulted for CRRT, wife now agreeable  -consent for jaron obtained via [place] phone  #[phone]  -hyperkalemia, insulin/d50/bicarb given, follow up repeat bmp  -continue bicarb gtt for now    ID:   -Tmax 101.7  -vancomycin and meropenem for empiric coverage  -trend WBC count  -follow up ID recs    GI:   -npo (high residuals overnight)  -pepcid for gi prophylaxis  -miralax for bowel regimen    Social/Family:   -family updated by attending.      CODE STATUS:  [X] Full Code [] DNR [] DNI [] Palliative/Comfort Care    DISPOSITION:  [X] ICU [] Stroke Unit [] Floor [] EMU [] RCU [] PCU    [x] Patient is at high risk of deterioration/death due to: acute respiratory failure     Time spent: _45__ [X] critical care minutes ASSESSMENT: 44 year old male with no significant past medical history who presented with cough and fever, found to be COVID +ve. Now in multi-organ failure.  Poor prognosis given worsening liver enzymes     PLAN:  Neuro:   -fentanyl and versed for sedation  -rocuronium for vent synchrony    Respiratory:   - RR 35 | Pip 40| PEEP 5| FiO2 75%  - Will attempt weaning down FiO2 to 70%  - If tolerated, will attempt weaning down to 65%    CV:  - MAP > 65  - Attempting to wean off levo given tachycardia    GI:   - On Tric  -pepcid for gi prophylaxis  -miralax for bowel regimen    Endocrine:   -maintain euglycemia    Heme/Onc:  -heparin gtt started overnight for rising d-dimer, goal aptt 50-90    Renal:   -worsening kidney function with increased lactate  -renal consulted for CRRT, wife now agreeable  -consent for jaron obtained via [place] phone  #[phone]  -hyperkalemia, insulin/d50/bicarb given, follow up repeat bmp  -continue bicarb gtt for now    ID:   -Tmax 101.7  -vancomycin and meropenem for empiric coverage  -trend WBC count  -follow up ID recs        Social/Family:   -family updated by attending.      CODE STATUS:  [X] Full Code [] DNR [] DNI [] Palliative/Comfort Care    DISPOSITION:  [X] ICU [] Stroke Unit [] Floor [] EMU [] RCU [] PCU    [x] Patient is at high risk of deterioration/death due to: acute respiratory failure     Time spent: _45__ [X] critical care minutes ASSESSMENT: 44 year old male with no significant past medical history who presented with cough and fever, found to be COVID +ve. Now in multi-organ failure.  Started on CVVHD on 5/13 given anuria  Poor prognosis given worsening liver enzymes     PLAN:  Neuro:   - Q shift neurochecks  - Fentanyl and versed for sedation  - Rocuronium for vent synchrony    Respiratory:   - RR 35 | Pip 40| PEEP 5| FiO2 75%  - Will attempt weaning down FiO2 to 70%  - If tolerated, will attempt weaning down to 65%    CV:  - MAP > 65  - Attempting to wean off levo given tachycardia    GI:   - Did not tolerate trickle feeds 5/14  - NPO  - Will start Reglan 5TID  - GI ppx: Pepcid  - Rectal tube; off bowel regimen    Renal:   - CVVHD started 5/13 for anuria  - Nephrology following; will attempt to take off 20cc/hr per Nephrology  - Recommendations appreciated    Endocrine:   -maintain euglycemia    Heme/Onc:  -Hep gtt started 5/13 for increasing d-dimer    ID:   - Empiric coverage with Vanc/Elin/Flucanazole  - ID following    Social/Family:   -family updated     CODE STATUS:  [X] Full Code [] DNR [] DNI [] Palliative/Comfort Care    DISPOSITION:  [X] ICU [] Stroke Unit [] Floor [] EMU [] RCU [] PCU    [x] Patient is at high risk of deterioration/death due to: cerebral edema 2/2 acute liver failure, septic shock, multi-organ failure.  Poor prognosis.     Time spent: _45__ [X] critical care minutes ASSESSMENT: 44 year old male with no significant past medical history who presented with cough and fever, found to be COVID +ve. Now in multi-organ failure.  Started on CVVHD on 5/13 given anuria  Poor prognosis given worsening liver enzymes     PLAN:  Neuro:   - Q shift neurochecks  - Fentanyl and versed for sedation  - Rocuronium for vent synchrony    Respiratory:   - RR 35 | Pip 40| PEEP 5| FiO2 75%  - Will attempt weaning down FiO2 to 70%  - If tolerated, will attempt weaning down to 65%    CV:  - MAP > 65  - Attempting to wean off levo given tachycardia    GI:   - Did not tolerate trickle feeds 5/14  - NPO  - Will start Reglan 5TID  - GI ppx: Pepcid  - Rectal tube; off bowel regimen    Renal:   - CVVHD started 5/13 for anuria  - Nephrology following; will attempt to take off 20cc/hr per Nephrology  - Recommendations appreciated    Endocrine:   -maintain euglycemia    Heme/Onc:  -Hep gtt started 5/13 for increasing d-dimer    ID:   - Empiric coverage with Vanc/Elin/Flucanazole  - ID following    Social/Family:   - Updated brother earlier via phone. Brother of patient asked to call and update wife.   # [phone] used to update patient's wife about patient condition. Stated patient is critically ill, and has poor prognosis given worsening liver function. Wife provided emotional support.     CODE STATUS:  [X] Full Code [] DNR [] DNI [] Palliative/Comfort Care    DISPOSITION:  [X] ICU [] Stroke Unit [] Floor [] EMU [] RCU [] PCU    [x] Patient is at high risk of deterioration/death due to: cerebral edema 2/2 acute liver failure, septic shock, multi-organ failure.  Poor prognosis.     Time spent: _45__ [X] critical care minutes

## 2020-05-14 NOTE — PROGRESS NOTE ADULT - SUBJECTIVE AND OBJECTIVE BOX
SUMMARY: 44 year old male with no significant past medical history who presented with cough and fever for 3-4 days.  Found to be covid positive.  Intubated and admitted to ICU. Hospital course complicated by worsening renal failure, requiring CVVHD and liver failure.     OVERNIGHT EVENTS:  CVVHD ongoing. Still requiring three pressors. Liver enzymes worsening.      ICU Vital Signs Last 24 Hrs  T(C): 36 (14 May 2020 04:00), Max: 37.5 (13 May 2020 08:00)  T(F): 96.8 (14 May 2020 04:00), Max: 99.5 (13 May 2020 08:00)  HR: 136 (14 May 2020 04:00) (126 - 144)  BP: --  BP(mean): --  ABP: 98/58 (14 May 2020 04:00) (83/49 - 108/69)  ABP(mean): 73 (14 May 2020 04:00) (61 - 82)  RR: 35 (14 May 2020 04:00) (12 - 35)  SpO2: 96% (14 May 2020 04:00) (91% - 100%)      I&O's Detail    13 May 2020 07:01  -  14 May 2020 07:00  --------------------------------------------------------  IN:    fentaNYL   Infusion: 468 mL    heparin Infusion: 243 mL    IV PiggyBack: 850 mL    midazolam Infusion: 280.8 mL    norepinephrine Infusion: 1611 mL    ns in tub fed vital15: 40 mL    phenylephrine   Infusion: 2588.4 mL    rocuronium Infusion: 88.5 mL    sodium bicarbonate  Infusion: 1200 mL    vasopressin Infusion: 66.6 mL  Total IN: 7436.3 mL    OUT:    Gastric Aspirate - Discarded: 250 mL    Indwelling Catheter - Urethral: 40 mL    Rectal Tube: 50 mL  Total OUT: 340 mL    Total NET: 7096.3 mL      PHYSICAL EXAM:  Neurological: sedated  Cardiovascular: +s1, s2  Respiratory: clear to auscultation b/l  Gastrointestinal: soft, non-distended, non-tender  Genitourinary: +cruz    TUBES/LINES:  [x] right IJ TLC  [x] right axillary a-line  [x] cruz    DIET:  [x] npo/ivf (multiple drips)    LABS:    ABG - ( 14 May 2020 01:19 )  pH, Arterial: 7.18  pH, Blood: x     /  pCO2: 42    /  pO2: 77    / HCO3: 15    / Base Excess: -12.1 /  SaO2: 91                            7.5    50.87 )-----------( 542      ( 14 May 2020 01:32 )             25.3     05-14    142  |  104  |  18  ----------------------------<  56<L>  5.4<H>   |  13<L>  |  1.75<H>    Ca    7.8<L>      14 May 2020 01:32  Phos  6.5     05-14  Mg     2.1     05-14    TPro  5.8<L>  /  Alb  2.2<L>  /  TBili  0.7  /  DBili  x   /  AST  77825<H>  /  ALT  772<H>  /  AlkPhos  457<H>  05-14    PT/INR - ( 14 May 2020 01:32 )   PT: 24.0 sec;   INR: 2.06 ratio         PTT - ( 14 May 2020 01:32 )  PTT:38.8 sec    MEDS:  MEDICATIONS  (STANDING):  chlorhexidine 0.12% Liquid 15 milliLiter(s) Oral Mucosa every 12 hours  chlorhexidine 4% Liquid 1 Application(s) Topical <User Schedule>  CRRT Treatment    <Continuous>  famotidine Injectable 20 milliGRAM(s) IV Push daily  fentaNYL   Infusion... 5.006 MICROgram(s)/kG/Hr (19.5 mL/Hr) IV Continuous <Continuous>  fluconAZOLE IVPB 200 milliGRAM(s) IV Intermittent every 24 hours  heparin  Infusion 1400 Unit(s)/Hr (15 mL/Hr) IV Continuous <Continuous>  meropenem  IVPB 1000 milliGRAM(s) IV Intermittent every 12 hours  meropenem  IVPB      midazolam Infusion 0.02 mG/kG/Hr (1.56 mL/Hr) IV Continuous <Continuous>  norepinephrine Infusion 0.05 MICROgram(s)/kG/Min (3.65 mL/Hr) IV Continuous <Continuous>  petrolatum Ophthalmic Ointment 1 Application(s) Both EYES two times a day  phenylephrine    Infusion 0.1 MICROgram(s)/kG/Min (1.46 mL/Hr) IV Continuous <Continuous>  Phoxillum Filtration BK 4 / 2.5 5000 milliLiter(s) (2000 mL/Hr) CRRT <Continuous>  PrismaSOL Filtration BGK 0 / 2.5 5000 milliLiter(s) (500 mL/Hr) CRRT <Continuous>  PrismaSOL Filtration BGK 0 / 2.5 5000 milliLiter(s) (200 mL/Hr) CRRT <Continuous>  rocuronium Infusion 8 MICROgram(s)/kG/Min (7.48 mL/Hr) IV Continuous <Continuous>  vasopressin Infusion 0.02 Unit(s)/Min (1.2 mL/Hr) IV Continuous <Continuous>    MEDICATIONS  (PRN):  acetaminophen   Tablet .. 650 milliGRAM(s) Oral every 6 hours PRN Temp greater or equal to 38C (100.4F), Mild Pain (1 - 3), Moderate Pain (4 - 6)  ondansetron Injectable 4 milliGRAM(s) IV Push every 8 hours PRN Nausea and/or Vomiting  sodium chloride 0.9% lock flush 10 milliLiter(s) IV Push every 1 hour PRN Pre/post blood products, medications, blood draw, and to maintain line patency SUMMARY: 44 year old male with no significant past medical history who presented with cough and fever for 3-4 days.  Found to be covid positive.  Intubated and admitted to ICU. Hospital course complicated by worsening renal failure, requiring CVVHD and liver failure.     OVERNIGHT EVENTS:  CVVHD ongoing. Still requiring three pressors. Liver enzymes worsening.      ICU Vital Signs Last 24 Hrs  T(C): 36 (14 May 2020 04:00), Max: 37.5 (13 May 2020 08:00)  T(F): 96.8 (14 May 2020 04:00), Max: 99.5 (13 May 2020 08:00)  HR: 136 (14 May 2020 04:00) (126 - 144)  BP: --  BP(mean): --  ABP: 98/58 (14 May 2020 04:00) (83/49 - 108/69)  ABP(mean): 73 (14 May 2020 04:00) (61 - 82)  RR: 35 (14 May 2020 04:00) (12 - 35)  SpO2: 96% (14 May 2020 04:00) (91% - 100%)      I&O's Detail    13 May 2020 07:01  -  14 May 2020 07:00  --------------------------------------------------------  IN:    fentaNYL   Infusion: 468 mL    heparin Infusion: 243 mL    IV PiggyBack: 850 mL    midazolam Infusion: 280.8 mL    norepinephrine Infusion: 1611 mL    ns in tub fed vital15: 40 mL    phenylephrine   Infusion: 2588.4 mL    rocuronium Infusion: 88.5 mL    sodium bicarbonate  Infusion: 1200 mL    vasopressin Infusion: 66.6 mL  Total IN: 7436.3 mL    OUT:    Gastric Aspirate - Discarded: 250 mL    Indwelling Catheter - Urethral: 40 mL    Rectal Tube: 50 mL  Total OUT: 340 mL    Total NET: 7096.3 mL    DIET:  [x] npo/ivf (multiple drips)    LABS:    ABG - ( 14 May 2020 01:19 )  pH, Arterial: 7.18  pH, Blood: x     /  pCO2: 42    /  pO2: 77    / HCO3: 15    / Base Excess: -12.1 /  SaO2: 91                            7.5    50.87 )-----------( 542      ( 14 May 2020 01:32 )             25.3     05-14    142  |  104  |  18  ----------------------------<  56<L>  5.4<H>   |  13<L>  |  1.75<H>    Ca    7.8<L>      14 May 2020 01:32  Phos  6.5     05-14  Mg     2.1     05-14    TPro  5.8<L>  /  Alb  2.2<L>  /  TBili  0.7  /  DBili  x   /  AST  15029<H>  /  ALT  772<H>  /  AlkPhos  457<H>  05-14    PT/INR - ( 14 May 2020 01:32 )   PT: 24.0 sec;   INR: 2.06 ratio         PTT - ( 14 May 2020 01:32 )  PTT:38.8 sec    MEDS:  MEDICATIONS  (STANDING):  chlorhexidine 0.12% Liquid 15 milliLiter(s) Oral Mucosa every 12 hours  chlorhexidine 4% Liquid 1 Application(s) Topical <User Schedule>  CRRT Treatment    <Continuous>  famotidine Injectable 20 milliGRAM(s) IV Push daily  fentaNYL   Infusion... 5.006 MICROgram(s)/kG/Hr (19.5 mL/Hr) IV Continuous <Continuous>  fluconAZOLE IVPB 200 milliGRAM(s) IV Intermittent every 24 hours  heparin  Infusion 1400 Unit(s)/Hr (15 mL/Hr) IV Continuous <Continuous>  meropenem  IVPB 1000 milliGRAM(s) IV Intermittent every 12 hours  meropenem  IVPB      midazolam Infusion 0.02 mG/kG/Hr (1.56 mL/Hr) IV Continuous <Continuous>  norepinephrine Infusion 0.05 MICROgram(s)/kG/Min (3.65 mL/Hr) IV Continuous <Continuous>  petrolatum Ophthalmic Ointment 1 Application(s) Both EYES two times a day  phenylephrine    Infusion 0.1 MICROgram(s)/kG/Min (1.46 mL/Hr) IV Continuous <Continuous>  Phoxillum Filtration BK 4 / 2.5 5000 milliLiter(s) (2000 mL/Hr) CRRT <Continuous>  PrismaSOL Filtration BGK 0 / 2.5 5000 milliLiter(s) (500 mL/Hr) CRRT <Continuous>  PrismaSOL Filtration BGK 0 / 2.5 5000 milliLiter(s) (200 mL/Hr) CRRT <Continuous>  rocuronium Infusion 8 MICROgram(s)/kG/Min (7.48 mL/Hr) IV Continuous <Continuous>  vasopressin Infusion 0.02 Unit(s)/Min (1.2 mL/Hr) IV Continuous <Continuous>    MEDICATIONS  (PRN):  acetaminophen   Tablet .. 650 milliGRAM(s) Oral every 6 hours PRN Temp greater or equal to 38C (100.4F), Mild Pain (1 - 3), Moderate Pain (4 - 6)  ondansetron Injectable 4 milliGRAM(s) IV Push every 8 hours PRN Nausea and/or Vomiting  sodium chloride 0.9% lock flush 10 milliLiter(s) IV Push every 1 hour PRN Pre/post blood products, medications, blood draw, and to maintain line patency    PHYSICAL EXAM:  Neurological: sedated  Cardiovascular: +s1, s2  Respiratory: clear to auscultation b/l  Gastrointestinal: soft, non-distended, non-tender  Genitourinary: +cruz    TUBES/LINES:  [x] right IJ TLC  [x] right axillary a-line  [x] cruz SUMMARY: 44 year old male with no significant past medical history who presented with cough and fever for 3-4 days.  Found to be covid positive.  Intubated and admitted to ICU. Hospital course complicated by worsening renal failure, requiring CVVHD and liver failure.     OVERNIGHT EVENTS:  CVVHD ongoing. Still requiring three pressors. Liver enzymes worsening.      ICU Vital Signs Last 24 Hrs  T(C): 36 (14 May 2020 04:00), Max: 37.5 (13 May 2020 08:00)  T(F): 96.8 (14 May 2020 04:00), Max: 99.5 (13 May 2020 08:00)  HR: 136 (14 May 2020 04:00) (126 - 144)  BP: --  BP(mean): --  ABP: 98/58 (14 May 2020 04:00) (83/49 - 108/69)  ABP(mean): 73 (14 May 2020 04:00) (61 - 82)  RR: 35 (14 May 2020 04:00) (12 - 35)  SpO2: 96% (14 May 2020 04:00) (91% - 100%)      I&O's Detail    13 May 2020 07:01  -  14 May 2020 07:00  --------------------------------------------------------  IN:    fentaNYL   Infusion: 468 mL    heparin Infusion: 243 mL    IV PiggyBack: 850 mL    midazolam Infusion: 280.8 mL    norepinephrine Infusion: 1611 mL    ns in tub fed vital15: 40 mL    phenylephrine   Infusion: 2588.4 mL    rocuronium Infusion: 88.5 mL    sodium bicarbonate  Infusion: 1200 mL    vasopressin Infusion: 66.6 mL  Total IN: 7436.3 mL    OUT:    Gastric Aspirate - Discarded: 250 mL    Indwelling Catheter - Urethral: 40 mL    Rectal Tube: 50 mL  Total OUT: 340 mL    Total NET: 7096.3 mL    DIET:  [x] npo/ivf (multiple drips)    LABS:    ABG - ( 14 May 2020 01:19 )  pH, Arterial: 7.18  pH, Blood: x     /  pCO2: 42    /  pO2: 77    / HCO3: 15    / Base Excess: -12.1 /  SaO2: 91                            7.5    50.87 )-----------( 542      ( 14 May 2020 01:32 )             25.3     05-14    142  |  104  |  18  ----------------------------<  56<L>  5.4<H>   |  13<L>  |  1.75<H>    Ca    7.8<L>      14 May 2020 01:32  Phos  6.5     05-14  Mg     2.1     05-14    TPro  5.8<L>  /  Alb  2.2<L>  /  TBili  0.7  /  DBili  x   /  AST  02079<H>  /  ALT  772<H>  /  AlkPhos  457<H>  05-14    PT/INR - ( 14 May 2020 01:32 )   PT: 24.0 sec;   INR: 2.06 ratio         PTT - ( 14 May 2020 01:32 )  PTT:38.8 sec    MEDS:  MEDICATIONS  (STANDING):  chlorhexidine 0.12% Liquid 15 milliLiter(s) Oral Mucosa every 12 hours  chlorhexidine 4% Liquid 1 Application(s) Topical <User Schedule>  CRRT Treatment    <Continuous>  famotidine Injectable 20 milliGRAM(s) IV Push daily  fentaNYL   Infusion... 5.006 MICROgram(s)/kG/Hr (19.5 mL/Hr) IV Continuous <Continuous>  fluconAZOLE IVPB 200 milliGRAM(s) IV Intermittent every 24 hours  heparin  Infusion 1400 Unit(s)/Hr (15 mL/Hr) IV Continuous <Continuous>  meropenem  IVPB 1000 milliGRAM(s) IV Intermittent every 12 hours  meropenem  IVPB      midazolam Infusion 0.02 mG/kG/Hr (1.56 mL/Hr) IV Continuous <Continuous>  norepinephrine Infusion 0.05 MICROgram(s)/kG/Min (3.65 mL/Hr) IV Continuous <Continuous>  petrolatum Ophthalmic Ointment 1 Application(s) Both EYES two times a day  phenylephrine    Infusion 0.1 MICROgram(s)/kG/Min (1.46 mL/Hr) IV Continuous <Continuous>  Phoxillum Filtration BK 4 / 2.5 5000 milliLiter(s) (2000 mL/Hr) CRRT <Continuous>  PrismaSOL Filtration BGK 0 / 2.5 5000 milliLiter(s) (500 mL/Hr) CRRT <Continuous>  PrismaSOL Filtration BGK 0 / 2.5 5000 milliLiter(s) (200 mL/Hr) CRRT <Continuous>  rocuronium Infusion 8 MICROgram(s)/kG/Min (7.48 mL/Hr) IV Continuous <Continuous>  vasopressin Infusion 0.02 Unit(s)/Min (1.2 mL/Hr) IV Continuous <Continuous>    MEDICATIONS  (PRN):  acetaminophen   Tablet .. 650 milliGRAM(s) Oral every 6 hours PRN Temp greater or equal to 38C (100.4F), Mild Pain (1 - 3), Moderate Pain (4 - 6)  ondansetron Injectable 4 milliGRAM(s) IV Push every 8 hours PRN Nausea and/or Vomiting  sodium chloride 0.9% lock flush 10 milliLiter(s) IV Push every 1 hour PRN Pre/post blood products, medications, blood draw, and to maintain line patency    PHYSICAL EXAM: LIMITED TO AVOID COVID-19 EXPOSURE   Neurological: sedated, paralyzed  Cardiovascular: Regular rhtyhm. Tachycardic.   Respiratory: No respiratory distress.   Gastrointestinal: soft, non-distended  Genitourinary: +cruz    TUBES/LINES:  [x] right IJ TLC  [x] right axillary a-line  [x] cruz

## 2020-05-14 NOTE — PROGRESS NOTE ADULT - SUBJECTIVE AND OBJECTIVE BOX
INFECTIOUS DISEASES FOLLOW UP-- Ingrid Olmos  [phone]    This is a follow up note for this  44yMale with  Other general symptom or sign  Doing poorly- wbc 50k high residual on feeds      ROS:  CONSTITUTIONAL:  Non responsive on vent    Allergies    No Known Allergies    Intolerances        ANTIBIOTICS/RELEVANT:  antimicrobials  fluconAZOLE IVPB 400 milliGRAM(s) IV Intermittent daily  meropenem  IVPB 1000 milliGRAM(s) IV Intermittent every 12 hours  meropenem  IVPB      vancomycin  IVPB 750 milliGRAM(s) IV Intermittent <User Schedule>    immunologic:    OTHER:  chlorhexidine 0.12% Liquid 15 milliLiter(s) Oral Mucosa every 12 hours  chlorhexidine 4% Liquid 1 Application(s) Topical <User Schedule>  CRRT Treatment    <Continuous>  CRRT Treatment    <Continuous>  famotidine Injectable 20 milliGRAM(s) IV Push daily  fentaNYL   Infusion... 5.006 MICROgram(s)/kG/Hr IV Continuous <Continuous>  heparin  Infusion 1400 Unit(s)/Hr IV Continuous <Continuous>  metoclopramide Injectable 5 milliGRAM(s) IV Push every 8 hours  midazolam Infusion 0.02 mG/kG/Hr IV Continuous <Continuous>  norepinephrine Infusion 0.05 MICROgram(s)/kG/Min IV Continuous <Continuous>  ondansetron Injectable 4 milliGRAM(s) IV Push every 8 hours PRN  petrolatum Ophthalmic Ointment 1 Application(s) Both EYES two times a day  phenylephrine    Infusion 0.1 MICROgram(s)/kG/Min IV Continuous <Continuous>  Phoxillum Filtration BK 4 / 2.5 5000 milliLiter(s) CRRT <Continuous>  Phoxillum Filtration BK 4 / 2.5 5000 milliLiter(s) CRRT <Continuous>  PrismaSOL Filtration BGK 0 / 2.5 5000 milliLiter(s) CRRT <Continuous>  PrismaSOL Filtration BGK 0 / 2.5 5000 milliLiter(s) CRRT <Continuous>  PrismaSOL Filtration BGK 0 / 2.5 5000 milliLiter(s) CRRT <Continuous>  PrismaSOL Filtration BGK 0 / 2.5 5000 milliLiter(s) CRRT <Continuous>  rocuronium Infusion 8 MICROgram(s)/kG/Min IV Continuous <Continuous>  sodium chloride 0.9% lock flush 10 milliLiter(s) IV Push every 1 hour PRN  vasopressin Infusion 0.02 Unit(s)/Min IV Continuous <Continuous>      Objective:  Vital Signs Last 24 Hrs  T(C): 34.2 (14 May 2020 16:00), Max: 43 (14 May 2020 12:00)  T(F): 109.4 (14 May 2020 16:00), Max: 109.4 (14 May 2020 12:00)  HR: 123 (14 May 2020 18:00) (112 - 144)  BP: --  BP(mean): --  RR: 35 (14 May 2020 18:00) (12 - 35)  SpO2: 100% (14 May 2020 18:00) (94% - 100%)    PHYSICAL EXAM:  Constitutional: labile temperature  Eyes: muddy sclera  Ear/Nose/Throat: no oral lesions, 	  Respiratory: coarse Bs hi fi02  Cardiovascular: S1S2  Gastrointestinal: firm, no BMs  Extremities:no e/e/c  No Lymphadenopathy  IV sites not inflammed.    LABS:                        7.5    50.87 )-----------( 542      ( 14 May 2020 01:32 )             25.3     05-14    142  |  104  |  18  ----------------------------<  56<L>  5.4<H>   |  13<L>  |  1.75<H>    Ca    7.8<L>      14 May 2020 01:32  Phos  6.5     05-14  Mg     2.1     05-14    TPro  5.8<L>  /  Alb  2.2<L>  /  TBili  0.7  /  DBili  x   /  AST  35908<H>  /  ALT  772<H>  /  AlkPhos  457<H>  05-14    PT/INR - ( 14 May 2020 01:32 )   PT: 24.0 sec;   INR: 2.06 ratio         PTT - ( 14 May 2020 17:47 )  PTT:52.6 sec      MICROBIOLOGY:            RECENT CULTURES:  05-11 @ 22:25  .Urine Catheterized  --  --  --    50,000 - 99,000 CFU/mL Presumptive Candida albicans "Susceptibilities not  performed"  --  05-11 @ 22:20  .Blood Blood-Peripheral  --  --  --    No growth to date.  --  05-10 @ 18:14  .Urine Catheterized  --  --  --    50,000 - 99,000 CFU/mL Presumptive Candida albicans "Susceptibilities not  performed"  --  05-10 @ 04:23  .Blood Blood  --  --  --    No growth to date.  --  05-10 @ 04:12  .Sputum Sputum  Staphylococcus aureus  Staphylococcus aureus  EVER    Moderate Staphylococcus aureus  Normal Respiratory Natalia present  --      RADIOLOGY & ADDITIONAL STUDIES:    < from: Xray Chest 1 View- PORTABLE-Routine (05.12.20 @ 04:53) >  The heart is slightly enlarged.Diffuse airspace opacities are seen throughout both lungs which remain unchanged when compared to previous study done May 11, 2020. At 2:39 PM. Endotracheal tube and NG tube are in good position. A central line seen on the right and the tip is in superior vena cava. No pneumothorax. No pleural effusion.    < end of copied text >

## 2020-05-14 NOTE — GOALS OF CARE CONVERSATION - ADVANCED CARE PLANNING - CONVERSATION DETAILS
Conversation held with brother Angie. This provider and family share common language, Syriac.  Angie able to explain how dialysis was initiated and that family is hopeful but understanding of poor prognosis.  This provider discussed ongoing and worsening liver function, as well as expectation that patient will likely  over coming hours to days despite maximum medical efforts and ongoing life support.     Outreach also made to wife Catherine to discuss the same. Informed of likelihood of expiration over coming hours to days despite life support, dialysis and medical management. Catherine tearful, pleading for ongoing assistance of medical team to save her . Emotional support provided.

## 2020-05-15 NOTE — PROGRESS NOTE ADULT - SUBJECTIVE AND OBJECTIVE BOX
O: elevated PIP, remains on CVVHD     T(C): 36.4 (05-15-20 @ 16:00), Max: 36.4 (05-15-20 @ 16:00)  HR: 124 (05-15-20 @ 18:00) (122 - 137)  BP: --  RR: 35 (05-15-20 @ 18:00) (0 - 35)  SpO2: 92% (05-15-20 @ 18:00) (88% - 98%)  05-14-20 @ 07:01  -  05-15-20 @ 07:00  --------------------------------------------------------  IN: 5071.4 mL / OUT: 5397 mL / NET: -325.6 mL    05-15-20 @ 07:01  -  05-15-20 @ 18:57  --------------------------------------------------------  IN: 2143.1 mL / OUT: 2098 mL / NET: 45.1 mL    chlorhexidine 0.12% Liquid 15 milliLiter(s) Oral Mucosa every 12 hours  chlorhexidine 4% Liquid 1 Application(s) Topical <User Schedule>  CRRT Treatment    <Continuous>  famotidine Injectable 20 milliGRAM(s) IV Push daily  fentaNYL   Infusion... 5.006 MICROgram(s)/kG/Hr IV Continuous <Continuous>  fluconAZOLE IVPB 400 milliGRAM(s) IV Intermittent daily  heparin  Infusion 1400 Unit(s)/Hr IV Continuous <Continuous>  meropenem  IVPB 1000 milliGRAM(s) IV Intermittent every 12 hours  meropenem  IVPB      metoclopramide Injectable 5 milliGRAM(s) IV Push every 8 hours  midazolam Infusion 0.02 mG/kG/Hr IV Continuous <Continuous>  norepinephrine Infusion 0.05 MICROgram(s)/kG/Min IV Continuous <Continuous>  ondansetron Injectable 4 milliGRAM(s) IV Push every 8 hours PRN  petrolatum Ophthalmic Ointment 1 Application(s) Both EYES two times a day  phenylephrine    Infusion 0.1 MICROgram(s)/kG/Min IV Continuous <Continuous>  Phoxillum Filtration BK 4 / 2.5 5000 milliLiter(s) CRRT <Continuous>  PrismaSOL Filtration BGK 0 / 2.5 5000 milliLiter(s) CRRT <Continuous>  PrismaSOL Filtration BGK 0 / 2.5 5000 milliLiter(s) CRRT <Continuous>  rocuronium Infusion 8 MICROgram(s)/kG/Min IV Continuous <Continuous>  sodium chloride 0.9% lock flush 10 milliLiter(s) IV Push every 1 hour PRN  vancomycin  IVPB 750 milliGRAM(s) IV Intermittent <User Schedule>  vasopressin Infusion 0.02 Unit(s)/Min IV Continuous <Continuous>  Mode: AC/ CMV (Assist Control/ Continuous Mandatory Ventilation), RR (machine): 35, FiO2: 70, PEEP: 5, ITime: 0.7, MAP: 18, PC: 30, PIP: 36    ssessment and Plan:   · Assessment	  ASSESSMENT/PLAN:   44 M with no significant PMH presenting with nonproductive cough and fevers for 3-4 days. DX w/ covid19 pneumonitis--s/p initial anakinra protocol on tapering rx. Also status post Plaquenil and solumedrol.  Now w/ FATOU acute liver injury, lactic acidosis, hypotension and shock     NEURO:   -cont Fentanyl, Versed and Daniel- wean as tolerated      CARDS:  -MAP > 65mmHg,  on phenylephrine, Levophed and Vasopressin    PULM:    -continue mechanical ventilation, wean as tolerated;   Mode: AC/ CMV (Assist Control/ Continuous Mandatory Ventilation)  RR (machine): 35  FiO2: 80  PEEP: 5  ITime: 0.7  MAP: 18  PC: 30  PIP: 36        RENAL:   -hyperkalemia improved  -anuric, on CVVHD  -Nephrology       GASTRO:    - Did not tolerate trickle feeds 5/14  - NPO  - Cont Reglan 5TID  - GI ppx: Pepcid  - Rectal tube; off bowel regimen      HEME:    -continue SQL 40 daily for DVT ppx  -monitor H/H    ENDO:    -euglycemia      ID:   -COVID +, s/p plaquenil, solumedrol and anakinra  -rpt COVID from 5/7 +  -ID follow up appreciated  - Empiric coverage with Vanc/Elin/Flucanazole      SOCIAL/FAMILY:  [X] wife updated via phone call [] family meeting    CODE STATUS:  [X] Full Code [] DNR [] DNI [] Palliative/Comfort Care    DISPOSITION:  [X] ICU [] Stroke Unit [] Floor [] EMU [] RCU [] PCU    [x] Patient is at high risk of deterioration/death due to: acute respiratory failure     Time spent: _45__ [X] critical care minutes O: elevated PIP, remains on CVVHD     T(C): 36.4 (05-15-20 @ 16:00), Max: 36.4 (05-15-20 @ 16:00)  HR: 124 (05-15-20 @ 18:00) (122 - 137)  BP: --  RR: 35 (05-15-20 @ 18:00) (0 - 35)  SpO2: 92% (05-15-20 @ 18:00) (88% - 98%)  05-14-20 @ 07:01  -  05-15-20 @ 07:00  --------------------------------------------------------  IN: 5071.4 mL / OUT: 5397 mL / NET: -325.6 mL    05-15-20 @ 07:01  -  05-15-20 @ 18:57  --------------------------------------------------------  IN: 2143.1 mL / OUT: 2098 mL / NET: 45.1 mL    chlorhexidine 0.12% Liquid 15 milliLiter(s) Oral Mucosa every 12 hours  chlorhexidine 4% Liquid 1 Application(s) Topical <User Schedule>  CRRT Treatment    <Continuous>  famotidine Injectable 20 milliGRAM(s) IV Push daily  fentaNYL   Infusion... 5.006 MICROgram(s)/kG/Hr IV Continuous <Continuous>  fluconAZOLE IVPB 400 milliGRAM(s) IV Intermittent daily  heparin  Infusion 1400 Unit(s)/Hr IV Continuous <Continuous>  meropenem  IVPB 1000 milliGRAM(s) IV Intermittent every 12 hours  meropenem  IVPB      metoclopramide Injectable 5 milliGRAM(s) IV Push every 8 hours  midazolam Infusion 0.02 mG/kG/Hr IV Continuous <Continuous>  norepinephrine Infusion 0.05 MICROgram(s)/kG/Min IV Continuous <Continuous>  ondansetron Injectable 4 milliGRAM(s) IV Push every 8 hours PRN  petrolatum Ophthalmic Ointment 1 Application(s) Both EYES two times a day  phenylephrine    Infusion 0.1 MICROgram(s)/kG/Min IV Continuous <Continuous>  Phoxillum Filtration BK 4 / 2.5 5000 milliLiter(s) CRRT <Continuous>  PrismaSOL Filtration BGK 0 / 2.5 5000 milliLiter(s) CRRT <Continuous>  PrismaSOL Filtration BGK 0 / 2.5 5000 milliLiter(s) CRRT <Continuous>  rocuronium Infusion 8 MICROgram(s)/kG/Min IV Continuous <Continuous>  sodium chloride 0.9% lock flush 10 milliLiter(s) IV Push every 1 hour PRN  vancomycin  IVPB 750 milliGRAM(s) IV Intermittent <User Schedule>  vasopressin Infusion 0.02 Unit(s)/Min IV Continuous <Continuous>  Mode: AC/ CMV (Assist Control/ Continuous Mandatory Ventilation), RR (machine): 35, FiO2: 70, PEEP: 5, ITime: 0.7, MAP: 18, PC: 30, PIP: 36    exam limited due NM blockade and sedation, covid +       ASSESSMENT/PLAN:   44 M with no significant PMH presenting with nonproductive cough and fevers for 3-4 days. DX w/ covid19 pneumonitis--s/p initial anakinra protocol on tapering rx. Also status post Plaquenil and solumedrol.  Now w/ FATOU acute liver injury, lactic acidosis, hypotension and shock     NEURO:   -cont Fentanyl, Versed and Daniel- wean as tolerated    CARDS:  -MAP > 65mmHg,  on phenylephrine, Levophed and Vasopressin, try to wean off vasopressin     PULM:    -continue mechanical ventilation, wean as tolerated;   AC/ CMV (Assist Control/ Continuous Mandatory Ventilation), RR (machine): 35, FiO2: 70, PEEP: 5, ITime: 0.7, MAP: 18, PC: 30, PIP: 36  Blood Gas Profile - Arterial (05.15.20 @ 11:49)    pH, Arterial: 7.20: TYPE:(C=Critical, N=Notification, A=Abnormal) C  TESTS: PH  DATE/TIME CALLED: 05/15/20 11:54  CALLED TO: ASH BARRON  READ BACK (2 Patient Identifiers)(Y/N): Y  READ BACK VALUES (Y/N): Y  CALLED BY: ALLA    pCO2, Arterial: 55 mmHg    pO2, Arterial: 68 mmHg    HCO3, Arterial: 21 mmol/L    Base Excess, Arterial: -6.5 mmol/L    Oxygen Saturation, Arterial: 91 %    Total CO2, Arterial: 22 mmoL/L    FIO2, Arterial: 70    Blood Gas Source Arterial: Arterial    RENAL:   creat improved, but remains oliguric   -anuric, on CVVHD  -Nephrology       GASTRO:    - Did not tolerate trickle feeds 5/14, will attempt again   - Cont Reglan 5TID  - GI ppx: Pepcid  - Rectal tube; off bowel regimen      HEME:    -continue SQL 40 daily for DVT ppx  -monitor H/H    ENDO:    -euglycemia      ID:   -COVID +, s/p plaquenil, solumedrol and anakinra  -rpt COVID from 5/7 +  -ID follow up appreciated  - Empiric coverage with Vanc/Elin/Flucanazole      SOCIAL/FAMILY:  [X] wife updated via phone call [] family meeting    CODE STATUS:  [X] Full Code [] DNR [] DNI [] Palliative/Comfort Care    DISPOSITION:  [X] ICU [] Stroke Unit [] Floor [] EMU [] RCU [] PCU    [x] Patient is at high risk of deterioration/death due to: acute respiratory failure     Time spent: _45__ [X] critical care minutes O: elevated PIP, remains on CVVHD   desaturating     T(C): 36.4 (05-15-20 @ 16:00), Max: 36.4 (05-15-20 @ 16:00)  HR: 124 (05-15-20 @ 18:00) (122 - 137)  BP: --  RR: 35 (05-15-20 @ 18:00) (0 - 35)  SpO2: 92% (05-15-20 @ 18:00) (88% - 98%)  05-14-20 @ 07:01  -  05-15-20 @ 07:00  --------------------------------------------------------  IN: 5071.4 mL / OUT: 5397 mL / NET: -325.6 mL    05-15-20 @ 07:01  -  05-15-20 @ 18:57  --------------------------------------------------------  IN: 2143.1 mL / OUT: 2098 mL / NET: 45.1 mL    chlorhexidine 0.12% Liquid 15 milliLiter(s) Oral Mucosa every 12 hours  chlorhexidine 4% Liquid 1 Application(s) Topical <User Schedule>  CRRT Treatment    <Continuous>  famotidine Injectable 20 milliGRAM(s) IV Push daily  fentaNYL   Infusion... 5.006 MICROgram(s)/kG/Hr IV Continuous <Continuous>  fluconAZOLE IVPB 400 milliGRAM(s) IV Intermittent daily  heparin  Infusion 1400 Unit(s)/Hr IV Continuous <Continuous>  meropenem  IVPB 1000 milliGRAM(s) IV Intermittent every 12 hours  meropenem  IVPB      metoclopramide Injectable 5 milliGRAM(s) IV Push every 8 hours  midazolam Infusion 0.02 mG/kG/Hr IV Continuous <Continuous>  norepinephrine Infusion 0.05 MICROgram(s)/kG/Min IV Continuous <Continuous>  ondansetron Injectable 4 milliGRAM(s) IV Push every 8 hours PRN  petrolatum Ophthalmic Ointment 1 Application(s) Both EYES two times a day  phenylephrine    Infusion 0.1 MICROgram(s)/kG/Min IV Continuous <Continuous>  Phoxillum Filtration BK 4 / 2.5 5000 milliLiter(s) CRRT <Continuous>  PrismaSOL Filtration BGK 0 / 2.5 5000 milliLiter(s) CRRT <Continuous>  PrismaSOL Filtration BGK 0 / 2.5 5000 milliLiter(s) CRRT <Continuous>  rocuronium Infusion 8 MICROgram(s)/kG/Min IV Continuous <Continuous>  sodium chloride 0.9% lock flush 10 milliLiter(s) IV Push every 1 hour PRN  vancomycin  IVPB 750 milliGRAM(s) IV Intermittent <User Schedule>  vasopressin Infusion 0.02 Unit(s)/Min IV Continuous <Continuous>  Mode: AC/ CMV (Assist Control/ Continuous Mandatory Ventilation), RR (machine): 35, FiO2: 70, PEEP: 5, ITime: 0.7, MAP: 18, PC: 30, PIP: 36    exam limited due NM blockade and sedation, covid +       ASSESSMENT/PLAN:   44 M with no significant PMH presenting with nonproductive cough and fevers for 3-4 days. DX w/ covid19 pneumonitis--s/p initial anakinra protocol on tapering rx. Also status post Plaquenil and solumedrol.  Now w/ FATOU acute liver injury, lactic acidosis, hypotension and shock     NEURO:   -cont Fentanyl, Versed   -d/c dwayne     CARDS:  -MAP > 65mmHg,  on phenylephrine, Levophed and Vasopressin    PULM:    -continue mechanical ventilation, wean as tolerated;   AC/ CMV (Assist Control/ Continuous Mandatory Ventilation), RR (machine): 35, FiO2: 70, PEEP: 5, ITime: 0.7, MAP: 18, PC: 30, PIP: 36  Blood Gas Profile - Arterial (05.15.20 @ 11:49)    pH, Arterial: 7.20: TYPE:(C=Critical, N=Notification, A=Abnormal) C  TESTS: PH  DATE/TIME CALLED: 05/15/20 11:54  CALLED TO: ASH BARRON  READ BACK (2 Patient Identifiers)(Y/N): Y  READ BACK VALUES (Y/N): Y  CALLED BY: ALLA    pCO2, Arterial: 55 mmHg    pO2, Arterial: 68 mmHg    HCO3, Arterial: 21 mmol/L    Base Excess, Arterial: -6.5 mmol/L    Oxygen Saturation, Arterial: 91 %    Total CO2, Arterial: 22 mmoL/L    FIO2, Arterial: 70    Blood Gas Source Arterial: Arterial  metabolic acidosis likely from renal failure and lactic acidosis   desaturating, will increase FiO2 to 80, will get ABG     RENAL:   creat improved, but remains oliguric   -anuric, on CVVHD, keeping him even   -Nephrology       GASTRO:    - Did not tolerate trickle feeds 5/14, will attempt again to start trickles feeds   - Cont Reglan 5TID  - GI ppx: Pepcid  - Rectal tube; off bowel regimen      HEME:    -continue SQL 40 daily for DVT ppx  -monitor H/H  s/p 1 packed RBC, due to drop in hgb, likely from CVVHD     ENDO:    -hypoglycemia, will give dextrose  finger sticks  he is not tolerating feeds, will try to restart at 10 cc/hr       ID:   -COVID +, s/p plaquenil, solumedrol and anakinra  -rpt COVID from 5/7 +  -ID follow up appreciated  - Empiric coverage with Vanc/Elin/Flucanazole      SOCIAL/FAMILY:  [X] wife updated via phone call [] family meeting    CODE STATUS:  [X] Full Code [] DNR [] DNI [] Palliative/Comfort Care    DISPOSITION:  [X] ICU [] Stroke Unit [] Floor [] EMU [] RCU [] PCU    [x] Patient is at high risk of deterioration/death due to: acute respiratory failure     Time spent: _30_ [X] critical care minutes  very poor prognosis, multiorgan failure

## 2020-05-15 NOTE — PROGRESS NOTE ADULT - SUBJECTIVE AND OBJECTIVE BOX
[facility]   [phone]  RENAL FOLLOW UP NOTE  --------------------------------------------------------------------------------  Chief Complaint:    24 hour events/subjective:    on cvvhdf seen earlier     PAST HISTORY  --------------------------------------------------------------------------------  No significant changes to PMH, PSH, FHx, SHx, unless otherwise noted    ALLERGIES & MEDICATIONS  --------------------------------------------------------------------------------  Allergies    No Known Allergies    Intolerances      Standing Inpatient Medications  chlorhexidine 0.12% Liquid 15 milliLiter(s) Oral Mucosa every 12 hours  chlorhexidine 4% Liquid 1 Application(s) Topical <User Schedule>  CRRT Treatment    <Continuous>  famotidine Injectable 20 milliGRAM(s) IV Push daily  fentaNYL   Infusion... 5.006 MICROgram(s)/kG/Hr IV Continuous <Continuous>  fluconAZOLE IVPB 400 milliGRAM(s) IV Intermittent daily  heparin  Infusion 1400 Unit(s)/Hr IV Continuous <Continuous>  meropenem  IVPB 1000 milliGRAM(s) IV Intermittent every 12 hours  meropenem  IVPB      metoclopramide Injectable 5 milliGRAM(s) IV Push every 8 hours  midazolam Infusion 0.02 mG/kG/Hr IV Continuous <Continuous>  norepinephrine Infusion 0.05 MICROgram(s)/kG/Min IV Continuous <Continuous>  petrolatum Ophthalmic Ointment 1 Application(s) Both EYES two times a day  phenylephrine    Infusion 0.1 MICROgram(s)/kG/Min IV Continuous <Continuous>  Phoxillum Filtration BK 4 / 2.5 5000 milliLiter(s) CRRT <Continuous>  PrismaSOL Filtration BGK 0 / 2.5 5000 milliLiter(s) CRRT <Continuous>  PrismaSOL Filtration BGK 0 / 2.5 5000 milliLiter(s) CRRT <Continuous>  rocuronium Infusion 8 MICROgram(s)/kG/Min IV Continuous <Continuous>  vancomycin  IVPB 750 milliGRAM(s) IV Intermittent <User Schedule>  vasopressin Infusion 0.02 Unit(s)/Min IV Continuous <Continuous>    PRN Inpatient Medications  ondansetron Injectable 4 milliGRAM(s) IV Push every 8 hours PRN  sodium chloride 0.9% lock flush 10 milliLiter(s) IV Push every 1 hour PRN      REVIEW OF SYSTEMS  --------------------------------------------------------------------------------        VITALS/PHYSICAL EXAM  --------------------------------------------------------------------------------  T(C): 36.4 (05-15-20 @ 16:00), Max: 36.4 (05-15-20 @ 16:00)  HR: 127 (05-15-20 @ 16:47) (119 - 137)  BP: --  RR: 0 (05-15-20 @ 16:00) (0 - 35)  SpO2: 93% (05-15-20 @ 16:47) (88% - 100%)  Wt(kg): --        05-14-20 @ 07:01  -  05-15-20 @ 07:00  --------------------------------------------------------  IN: 5071.4 mL / OUT: 5397 mL / NET: -325.6 mL    05-15-20 @ 07:01  -  05-15-20 @ 16:49  --------------------------------------------------------  IN: 1628.3 mL / OUT: 1007 mL / NET: 621.3 mL      Physical Exam:  	    intubated       LABS/STUDIES  --------------------------------------------------------------------------------              8.0    42.82 >-----------<  471      [05-15-20 @ 09:15]              26.9     137  |  103  |  14  ----------------------------<  89      [05-15-20 @ 01:53]  4.6   |  20  |  1.06        Ca     7.1     [05-15-20 @ 01:53]      Mg     2.2     [05-15-20 @ 01:53]      Phos  4.7     [05-15-20 @ 01:53]    TPro  5.6  /  Alb  2.1  /  TBili  1.1  /  DBili  x   /  AST  6532  /  ALT  321  /  AlkPhos  477  [05-15-20 @ 01:53]    PT/INR: PT 29.3 , INR 2.48       [05-15-20 @ 01:53]  PTT: 71.2       [05-15-20 @ 09:15]      Creatinine Trend:  SCr 1.06 [05-15 @ 01:53]  SCr 1.75 [05-14 @ 01:32]  SCr 2.09 [05-13 @ 17:49]  SCr 1.88 [05-13 @ 08:22]  SCr 1.47 [05-13 @ 00:34]              Urinalysis - [05-15-20 @ 07:39]      Color Yellow / Appearance Turbid / SG 1.024 / pH 6.5      Gluc Negative / Ketone Negative  / Bili Negative / Urobili Negative       Blood Moderate / Protein 100 mg/dL / Leuk Est Moderate / Nitrite Negative      RBC 3 / WBC >50 / Hyaline 0 / Gran  / Sq Epi  / Non Sq Epi 0 / Bacteria Negative      Ferritin 69366      [05-15-20 @ 05:02]  Lipid: chol --, , HDL --, LDL --      [05-15-20 @ 01:53]

## 2020-05-15 NOTE — PROGRESS NOTE ADULT - ASSESSMENT
44 year old male with no significant past medical history who presented with cough and fever for 3-4 days.  Found to be covid positive. s/p initial anakinra protocol on tapering rx. Also status post Plaquenil and solumedrol.  Currently receiving colchicine, lovenox 40 BID, famotidine and albuterol as needed. Patient required intubation 4/14- received zosyn. Received course cefepime 4/15 -->4/19 for HAP. then 4/28- MSSA bacteremia . line changed 4/28  repeat blood cx negative.  now w/ FATOU acute liver injury, lactic acidosis, hypotension and shock       1- fatou  2- lactic acidosis  3- hypotension  4-     cvvhdf bfr 160 dfr 2000 fluid removal no fluid removal   given pressor requirements higher   see new  orders

## 2020-05-15 NOTE — CHART NOTE - NSCHARTNOTEFT_GEN_A_CORE
Nutrition Follow Up Note   Patient seen for: nutrition follow up on COVID ICU     Unable to speak to pt due to current airborne isolation contact precautions related to COVID-19. Pt remains intubated. Plan to continue with antibiotics as ordered in context of MSSA bacteremia. Per MD note 5/14, pt now in multi-organ failure; started on CVVHD on 5/13 given anuria; noted with worsening liver enzymes. Pt now NPO with high gastric residuals noted 5/13. Poor prognosis noted. Goals of care planning in progress.     Diet Order: Diet, NPO (05-14-20 @ 11:18)    CURRENT EN ORDER PROVIDES: Pt NPO  Nutrition Events: Pt was previously prescribed Bolus feeds of Vital 1.5 at 20ml/hr q 6 hrs (5/14); now NPO for high residuals (see below). Diagnosed with protein-calorie malnutrition. Off propofol at this time.     EN provision (per nursing flow sheet):   (5/14): 40ml (EN held for high residuals)   (5/13): EN held for high residuals  (5/12): 1200ml (EN prescribed bolus feeds of 300ml q 6 hrs) (1800kcal)   (5/11): 1000ml (EN feeds were changed from bolus feeds of 200ml q 6 hrs to 300ml q 6 hrs midday) (1500kcal)  (5/10): 1000ml (EN volume increased from 200ml q 6 hrs to 300ml q 6 hrs)  (5/9): 600ml (75% of goal; 200ml q 6 hrs)  (5/8): 600ml (75% of goal; 200ml q 6 hrs)    GI: Last BM (5/12) x 1     Stool Count (per nursing flow sheet):   (5/14): 50ml  (5/13): 300ml   (5/12): 100ml     Gastric Aspirate (per nursing flow sheet):   (5/14): 650ml   (5/13): 1500ml ; pt noted to be acutely hypotensive, suction held    Anthropometric Measurements:   Height (cm): 167.6 (04-14-20 @ 16:20)  Weight (kg): 77.9 (04-14-20 @ 16:20)  BMI (kg/m2): 27.7 (04-14-20 @ 16:20)    Medications: MEDICATIONS  (STANDING):  chlorhexidine 0.12% Liquid 15 milliLiter(s) Oral Mucosa every 12 hours  chlorhexidine 4% Liquid 1 Application(s) Topical <User Schedule>  CRRT Treatment    <Continuous>  famotidine Injectable 20 milliGRAM(s) IV Push daily  fentaNYL   Infusion... 5.006 MICROgram(s)/kG/Hr (19.5 mL/Hr) IV Continuous <Continuous>  fluconAZOLE IVPB 400 milliGRAM(s) IV Intermittent daily  heparin  Infusion 1400 Unit(s)/Hr (18 mL/Hr) IV Continuous <Continuous>  meropenem  IVPB 1000 milliGRAM(s) IV Intermittent every 12 hours  meropenem  IVPB      metoclopramide Injectable 5 milliGRAM(s) IV Push every 8 hours  midazolam Infusion 0.02 mG/kG/Hr (1.56 mL/Hr) IV Continuous <Continuous>  norepinephrine Infusion 0.05 MICROgram(s)/kG/Min (3.65 mL/Hr) IV Continuous <Continuous>  petrolatum Ophthalmic Ointment 1 Application(s) Both EYES two times a day  phenylephrine    Infusion 0.1 MICROgram(s)/kG/Min (1.46 mL/Hr) IV Continuous <Continuous>  Phoxillum Filtration BK 4 / 2.5 5000 milliLiter(s) (2000 mL/Hr) CRRT <Continuous>  PrismaSOL Filtration BGK 0 / 2.5 5000 milliLiter(s) (500 mL/Hr) CRRT <Continuous>  PrismaSOL Filtration BGK 0 / 2.5 5000 milliLiter(s) (200 mL/Hr) CRRT <Continuous>  rocuronium Infusion 8 MICROgram(s)/kG/Min (7.48 mL/Hr) IV Continuous <Continuous>  vancomycin  IVPB 750 milliGRAM(s) IV Intermittent <User Schedule>  vasopressin Infusion 0.02 Unit(s)/Min (1.2 mL/Hr) IV Continuous <Continuous>    MEDICATIONS  (PRN):  ondansetron Injectable 4 milliGRAM(s) IV Push every 8 hours PRN Nausea and/or Vomiting  sodium chloride 0.9% lock flush 10 milliLiter(s) IV Push every 1 hour PRN Pre/post blood products, medications, blood draw, and to maintain line patency    Labs: 05-15 @ 05:02: Sodium --, Potassium --, Calcium --, Magnesium --, Phosphorus --, BUN --, Creatinine --, Glucose --, Alk Phos --, ALT/SGPT --, AST/SGOT --, Albumin --, Prealbumin --, Total Bilirubin --, Hemoglobin --, Hematocrit --, Ferritin 99364<H>, C-Reactive Protein --, Creatine Kinase <<27>  05-15 @ 01:53: Sodium 137, Potassium 4.6, Calcium 7.1<L>, Magnesium 2.2, Phosphorus 4.7<H>, BUN 14, Creatinine 1.06, Glucose 89, Alk Phos 477<H>, ALT/SGPT 321<H>, AST/SGOT 6532<H>, Albumin 2.1<L>, Prealbumin --, Total Bilirubin 1.1, Hemoglobin 6.9<LL>, Hematocrit 22.9<L>, Ferritin --, C-Reactive Protein 12.66<H>, Creatine Kinase <<27>      Triglycerides, Serum: 214 mg/dL (05-15-20 @ 01:53)  Triglycerides, Serum: 286 mg/dL (05-12-20 @ 04:13)  Triglycerides, Serum: 434 mg/dL (05-05-20 @ 04:45)  Triglycerides, Serum: 584 mg/dL (05-04-20 @ 03:59)  Triglycerides, Serum: 467 mg/dL (05-03-20 @ 05:01)  Triglycerides, Serum: 470 mg/dL (05-02-20 @ 14:22)  Triglycerides, Serum: 329 mg/dL (05-01-20 @ 03:41)    POCT Blood Glucose.: 84 mg/dL (05-15-20 @ 05:31)  POCT Blood Glucose.: 89 mg/dL (05-14-20 @ 22:59)  POCT Blood Glucose.: 99 mg/dL (05-14-20 @ 13:47)  POCT Blood Glucose.: 102 mg/dL (05-14-20 @ 11:08)    Skin: no pressure injuries noted   Edema: 2+ generalized     Estimated Needs: (based on dosing wt 77.9kg)  Energy: (20-25kcal/kg): 1558-1948kcal  Protein: (1.2-1.4g protein/kg): 93-109g protein    Previous Nutrition Diagnosis: swallowing difficulty; acute moderate protein-calorie malnutrition   Nutrition Diagnosis is: ongoing, with provision of enteral nutrition     New Nutrition Diagnosis:  n/a    Recommended Interventions:   1. If EN to resumed, recommend initiate trickle feeds of Vital AF (1.2) at 10ml/hr x 24 hrs. As tolerated, consider increase by 10ml q 4-6 hrs to goal rate 60ml/hr x 24 hrs. To provide (based on dosing wt 77.9kg): 1440ml, 1728kcal (22kcal/kg) and 108g protein (1.4g protein/kg).  2. Monitor GI tolerance. RD to remain available to adjust EN formulary, volume/rate PRN.   3. Determine nutritional goals of care.   4. Trend BG levels, renal indices, LFT's, electrolytes and triglycerides;     Monitoring and Evaluation:   Continue to monitor nutrition provision and tolerance, weights, labs, skin integrity.   RD remains available upon request and will follow up per protocol.    Alison Kleiner, RD [facility]; pager number [phone]

## 2020-05-15 NOTE — PROGRESS NOTE ADULT - NS MD NEURO CONDITIONS_SHOCK
Septic Shock

## 2020-05-15 NOTE — PROGRESS NOTE ADULT - SUBJECTIVE AND OBJECTIVE BOX
SUBJECTIVE:   44 M with no significant PMH presenting with nonproductive cough and fevers for 3-4 days. DX w/ covid19 pneumonitis--s/p initial anakinra protocol on tapering rx. Also status post Plaquenil and solumedrol.      FLOOR COURSE:  Treated with plaquenil, methylprednisolone and ceftriaxone/azithromycin. Anakinra started on 4/3. Had an RRT on 4/14 for hypoxia.     ICU COURSE:   Hypoxia treated with increased sedation and paralysis, proning. Intermittent fevers, treated with antibiotics.   Patient required intubation 4/14- received zosyn  Received course cefepime 4/15 -->4/19 for HAP    OVERNIGHT EVENTS: decreased H/H, received 1 unit PRBC      ICU VITALS SIGNS:  T(C): 36.2 (15 May 2020 12:00), Max: 43 (14 May 2020 16:00)  T(F): 97.2 (15 May 2020 12:00), Max: 109.4 (14 May 2020 16:00)  HR: 131 (15 May 2020 13:15) (114 - 137)  BP: --  BP(mean): --  ABP: 88/44 (15 May 2020 13:00) (81/41 - 100/61)  ABP(mean): 60 (15 May 2020 13:00) (56 - 76)  RR: 35 (15 May 2020 13:00) (21 - 35)  SpO2: 90% (15 May 2020 13:15) (88% - 100%)          ALLERGIES:  No Known Allergies      DEVICES:   [] Restraints [] ET tube [X] Rt IJ TLC central line [X] Rt Axillary arterial line [X] cruz [X] NGT/OGT [] EVD [] [] LD [] JAC/HMV [] Trach [] PEG [] Chest Tube [X] other: rectal tube      VENT SETTINGS:  Mode: AC/ CMV (Assist Control/ Continuous Mandatory Ventilation)  RR (machine): 30  FiO2: 50  PEEP: 5  ITime: 1  MAP: 18  PC: 35  PIP: 43          LABS:                                                 7.4    12.21 )-----------( 501      ( 07 May 2020 22:37 )             23.9       05-07    05-07    133<L>  |  99  |  6<L>  ----------------------------<  147<H>  3.8   |  23  |  0.37<L>    Ca    8.3<L>      07 May 2020 22:37  Phos  2.3     05-07  Mg     1.6     05-07    TPro  6.2  /  Alb  2.3<L>  /  TBili  0.2  /  DBili  x   /  AST  25  /  ALT  8<L>  /  AlkPhos  135<H>  05-07        ABG - ( 07 May 2020 22:32 )  pH, Arterial: 7.43  pH, Blood: x     /  pCO2: 44    /  pO2: 81    / HCO3: 28    / Base Excess: 4.2   /  SaO2: 97          LIVER FUNCTIONS - ( 07 May 2020 22:37 )  Alb: 2.3 g/dL / Pro: 6.2 g/dL / ALK PHOS: 135 U/L / ALT: 8 U/L / AST: 25 U/L / GGT: x                 Culture - Blood (05.05.20 @ 00:37)    Specimen Source: .Blood Blood    Culture Results:   No growth to date.    Culture - Sputum . (05.01.20 @ 19:24)    Gram Stain:   No polymorphonuclear leukocytes per low power field  No Squamous epithelial cells per low power field  No organisms seen per oil power field    Specimen Source: .Sputum Sputum    Culture Results:   Normal Respiratory Natalia present    Culture - Blood (05.01.20 @ 08:42)    Specimen Source: .Blood Blood-Catheter    Culture Results:   No Growth Final      Culture - Blood (04.29.20 @ 15:06)    Gram Stain:   Growth in anaerobic bottle: Gram Positive Cocci in Clusters    Specimen Source: .Blood Blood-Peripheral    Culture Results:   Growth in anaerobic bottle: Staphylococcus aureus  See previous culture [id number]      Culture - Blood (04.22.20 @ 19:05)    Gram Stain:   Growth in aerobic and anaerobic bottles: Gram Positive Cocci in Clusters    -  Coagulase negative Staphylococcus: Detec    Specimen Source: .Blood Blood    Organism: Blood Culture PCR    Culture Results:   Growth in aerobic and anaerobic bottles: Staphylococcus epidermidis  Coag Negative Staphylococcus    COVID-19 PCR (03.31.20 @ 14:56)    COVID-19 PCR: Detected: This test has been validated by [employer] to be accurate;  though it has not been FDA cleared/approved by the usual pathway.  As with all laboratory tests, results should be correlated with clinical  findings.    COVID-19 PCR . (05.07.20 @ 15:46)    COVID-19 PCR: Detected: This test has been validated by [employer] to be accurate;  though it has not been FDA cleared/approved by the usual pathway.  As with all laboratory tests, results should be correlated with clinical  findings.            I&O's:    I&O's Detail    07 May 2020 07:01  -  08 May 2020 07:00  --------------------------------------------------------  IN:    dexmedetomidine Infusion: 481.4 mL    fentaNYL   Infusion: 245.7 mL    IV PiggyBack: 600 mL    ns in tub fed vital15: 800 mL    phenylephrine   Infusion: 110 mL    Solution: 50 mL  Total IN: 2287.1 mL    OUT:    Indwelling Catheter - Urethral: 1680 mL    Rectal Tube: 800 mL  Total OUT: 2480 mL    Total NET: -192.9 mL      08 May 2020 07:01  -  08 May 2020 08:32  --------------------------------------------------------  IN:    dexmedetomidine Infusion: 25.3 mL    fentaNYL   Infusion: 7.8 mL    phenylephrine   Infusion: 5 mL  Total IN: 38.1 mL    OUT:  Total OUT: 0 mL    Total NET: 38.1 mL      MEDICATIONS:   MEDICATIONS  (STANDING):  ceFAZolin   IVPB 2000 milliGRAM(s) IV Intermittent every 8 hours  chlorhexidine 0.12% Liquid 15 milliLiter(s) Oral Mucosa every 12 hours  chlorhexidine 4% Liquid 1 Application(s) Topical <User Schedule>  clonazePAM  Tablet 0.5 milliGRAM(s) Oral every 12 hours  dexMEDEtomidine Infusion 1 MICROgram(s)/kG/Hr (19.5 mL/Hr) IV Continuous <Continuous>  enoxaparin Injectable 40 milliGRAM(s) SubCutaneous <User Schedule>  famotidine    Tablet 20 milliGRAM(s) Oral two times a day  fentaNYL   Infusion... 5 MICROgram(s)/kG/Hr (19.5 mL/Hr) IV Continuous <Continuous>  methadone    Tablet 10 milliGRAM(s) Oral every 6 hours  petrolatum Ophthalmic Ointment 1 Application(s) Both EYES two times a day  PHENobarbital IVPB 80 milliGRAM(s) IV Intermittent every 12 hours  phenylephrine    Infusion 0.1 MICROgram(s)/kG/Min (1.46 mL/Hr) IV Continuous <Continuous>  polyethylene glycol 3350 17 Gram(s) Oral every 12 hours    MEDICATIONS  (PRN):  acetaminophen   Tablet .. 650 milliGRAM(s) Oral every 6 hours PRN Temp greater or equal to 38C (100.4F), Mild Pain (1 - 3), Moderate Pain (4 - 6)  fentaNYL    Injectable 100 MICROGram(s) IV Push every 4 hours PRN agitation  ondansetron Injectable 4 milliGRAM(s) IV Push every 8 hours PRN Nausea and/or Vomiting  PHENobarbital Injectable 65 milliGRAM(s) IV Push every 1 hour PRN Agitation  sodium chloride 0.9% lock flush 10 milliLiter(s) IV Push every 1 hour PRN Pre/post blood products, medications, blood draw, and to maintain line patency          RADIOLOGY:   Xray Chest 1 View- PORTABLE-Routine (05.03.20 @ 03:26)   Percent of LEFT lung opacification: %  Percent of RIGHT lung opacification: %  Change in lung opacification from most recent x-ray (<=3 days): Stable  Change from prior dated 3 or more days (same admission): Stable      Xray Abdomen 1 View PORTABLE -Routine (05.01.20 @ 09:30)   No radiographic evidence of bowel obstruction.      Xray Chest 1 View- PORTABLE-Urgent (04.28.20 @ 14:44)   The heart is normal in size. Diffuse airspace opacities are seen throughout both lungs which remain unchanged when compared to previous study done April 23, 2020. NG tube is in the stomach. Endotracheal tube is in good position. A  Was placed in the right and the tip is in the superior vena cava. No pneumothorax. An additional line is seen on the left and the tip is in the superior vena cava as well. SUBJECTIVE:   44 M with no significant PMH presenting with nonproductive cough and fevers for 3-4 days. DX w/ covid19 pneumonitis--s/p initial anakinra protocol on tapering rx. Also status post Plaquenil and solumedrol.      FLOOR COURSE:  Treated with plaquenil, methylprednisolone and ceftriaxone/azithromycin. Anakinra started on 4/3. Had an RRT on 4/14 for hypoxia.     ICU COURSE:   Hypoxia treated with increased sedation and paralysis, proning. Intermittent fevers, treated with antibiotics.   Patient required intubation 4/14- received zosyn  Received course cefepime 4/15 -->4/19 for HAP    OVERNIGHT EVENTS: decreased H/H, received 1 unit PRBC      ICU VITALS SIGNS:  T(C): 36.2 (15 May 2020 12:00), Max: 43 (14 May 2020 16:00)  T(F): 97.2 (15 May 2020 12:00), Max: 109.4 (14 May 2020 16:00)  HR: 131 (15 May 2020 13:15) (114 - 137)  BP: --  BP(mean): --  ABP: 88/44 (15 May 2020 13:00) (81/41 - 100/61)  ABP(mean): 60 (15 May 2020 13:00) (56 - 76)  RR: 35 (15 May 2020 13:00) (21 - 35)  SpO2: 90% (15 May 2020 13:15) (88% - 100%)          ALLERGIES:  No Known Allergies      DEVICES:   [] Restraints [] ET tube [X] Rt IJ TLC central line [X] Rt Axillary arterial line [X] cruz [X] NGT/OGT [] EVD [] [] LD [] JAC/HMV [] Trach [] PEG [] Chest Tube [X] other: rectal tube      VENT SETTINGS:  Mode: AC/ CMV (Assist Control/ Continuous Mandatory Ventilation)  RR (machine): 30  FiO2: 50  PEEP: 5  ITime: 1  MAP: 18  PC: 35  PIP: 43          LABS:                                                 8.0    42.82 )-----------( 471      ( 15 May 2020 09:15 )             26.9     05-15    137  |  103  |  14  ----------------------------<  89  4.6   |  20<L>  |  1.06    Ca    7.1<L>      15 May 2020 01:53  Phos  4.7     05-15  Mg     2.2     05-15    TPro  5.6<L>  /  Alb  2.1<L>  /  TBili  1.1  /  DBili  x   /  AST  6532<H>  /  ALT  321<H>  /  AlkPhos  477<H>  05-15    ABG - ( 15 May 2020 11:49 )  pH, Arterial: 7.20  pH, Blood: x     /  pCO2: 55    /  pO2: 68    / HCO3: 21    / Base Excess: -6.5  /  SaO2: 91        LIVER FUNCTIONS - ( 15 May 2020 01:53 )  Alb: 2.1 g/dL / Pro: 5.6 g/dL / ALK PHOS: 477 U/L / ALT: 321 U/L / AST: 6532 U/L / GGT: x             Urine Microscopic-Add On (NC) (05.15.20 @ 07:39)    Red Blood Cell - Urine: 3 /hpf    White Blood Cell - Urine: >50    Bacteria: Negative    Hyaline Casts: 0 /lpf    Epithelial Cells: 0 /hpf    Comment - Urine: MANY YEAST PRESENT        Culture - Urine (05.11.20 @ 22:25)    Specimen Source: .Urine Catheterized    Culture Results:   50,000 - 99,000 CFU/mL Presumptive Candida albicans "Susceptibilities not  performed"    Culture - Blood (05.11.20 @ 22:20)    Specimen Source: .Blood Blood-Peripheral    Culture Results:   No growth to date.        Culture - Blood (05.05.20 @ 00:37)    Specimen Source: .Blood Blood    Culture Results:   No growth to date.    Culture - Sputum . (05.01.20 @ 19:24)    Gram Stain:   No polymorphonuclear leukocytes per low power field  No Squamous epithelial cells per low power field  No organisms seen per oil power field    Specimen Source: .Sputum Sputum    Culture Results:   Normal Respiratory Natalia present    Culture - Blood (05.01.20 @ 08:42)    Specimen Source: .Blood Blood-Catheter    Culture Results:   No Growth Final      Culture - Blood (04.29.20 @ 15:06)    Gram Stain:   Growth in anaerobic bottle: Gram Positive Cocci in Clusters    Specimen Source: .Blood Blood-Peripheral    Culture Results:   Growth in anaerobic bottle: Staphylococcus aureus  See previous culture [id number]      Culture - Blood (04.22.20 @ 19:05)    Gram Stain:   Growth in aerobic and anaerobic bottles: Gram Positive Cocci in Clusters    -  Coagulase negative Staphylococcus: Detec    Specimen Source: .Blood Blood    Organism: Blood Culture PCR    Culture Results:   Growth in aerobic and anaerobic bottles: Staphylococcus epidermidis  Coag Negative Staphylococcus    COVID-19 PCR (03.31.20 @ 14:56)    COVID-19 PCR: Detected: This test has been validated by [email or web address] to be accurate;  though it has not been FDA cleared/approved by the usual pathway.  As with all laboratory tests, results should be correlated with clinical  findings.    COVID-19 PCR . (05.07.20 @ 15:46)    COVID-19 PCR: Detected: This test has been validated by [email or web address] to be accurate;  though it has not been FDA cleared/approved by the usual pathway.  As with all laboratory tests, results should be correlated with clinical  findings.            I&O's:    I&O's Detail    14 May 2020 07:01  -  15 May 2020 07:00  --------------------------------------------------------  IN:    fentaNYL   Infusion: 426.8 mL    heparin Infusion: 413 mL    IV PiggyBack: 800 mL    midazolam Infusion: 218.7 mL    norepinephrine Infusion: 731.7 mL    Packed Red Blood Cells: 350 mL    phenylephrine   Infusion: 1927.2 mL    rocuronium Infusion: 88.8 mL    vasopressin Infusion: 115.2 mL  Total IN: 5071.4 mL    OUT:    Gastric Aspirate - Discarded: 600 mL    Other: 4797 mL  Total OUT: 5397 mL    Total NET: -325.6 mL      15 May 2020 07:01  -  15 May 2020 16:14  --------------------------------------------------------  IN:    fentaNYL   Infusion: 81.6 mL    heparin Infusion: 108 mL    IV PiggyBack: 100 mL    midazolam Infusion: 33 mL    norepinephrine Infusion: 251.2 mL    phenylephrine   Infusion: 490.6 mL    rocuronium Infusion: 13.1 mL    vasopressin Infusion: 36 mL  Total IN: 1113.5 mL    OUT:    Indwelling Catheter - Urethral: 10 mL    Other: 997 mL  Total OUT: 1007 mL    Total NET: 106.5 mL            MEDICATIONS:   MEDICATIONS  (STANDING):  chlorhexidine 0.12% Liquid 15 milliLiter(s) Oral Mucosa every 12 hours  chlorhexidine 4% Liquid 1 Application(s) Topical <User Schedule>  CRRT Treatment    <Continuous>  famotidine Injectable 20 milliGRAM(s) IV Push daily  fentaNYL   Infusion... 5.006 MICROgram(s)/kG/Hr (19.5 mL/Hr) IV Continuous <Continuous>  fluconAZOLE IVPB 400 milliGRAM(s) IV Intermittent daily  heparin  Infusion 1400 Unit(s)/Hr (18 mL/Hr) IV Continuous <Continuous>  meropenem  IVPB 1000 milliGRAM(s) IV Intermittent every 12 hours  meropenem  IVPB      metoclopramide Injectable 5 milliGRAM(s) IV Push every 8 hours  midazolam Infusion 0.02 mG/kG/Hr (1.56 mL/Hr) IV Continuous <Continuous>  norepinephrine Infusion 0.05 MICROgram(s)/kG/Min (3.65 mL/Hr) IV Continuous <Continuous>  petrolatum Ophthalmic Ointment 1 Application(s) Both EYES two times a day  phenylephrine    Infusion 0.1 MICROgram(s)/kG/Min (1.46 mL/Hr) IV Continuous <Continuous>  Phoxillum Filtration BK 4 / 2.5 5000 milliLiter(s) (2000 mL/Hr) CRRT <Continuous>  PrismaSOL Filtration BGK 0 / 2.5 5000 milliLiter(s) (500 mL/Hr) CRRT <Continuous>  PrismaSOL Filtration BGK 0 / 2.5 5000 milliLiter(s) (200 mL/Hr) CRRT <Continuous>  rocuronium Infusion 8 MICROgram(s)/kG/Min (7.48 mL/Hr) IV Continuous <Continuous>  vancomycin  IVPB 750 milliGRAM(s) IV Intermittent <User Schedule>  vasopressin Infusion 0.02 Unit(s)/Min (1.2 mL/Hr) IV Continuous <Continuous>    MEDICATIONS  (PRN):  ondansetron Injectable 4 milliGRAM(s) IV Push every 8 hours PRN Nausea and/or Vomiting  sodium chloride 0.9% lock flush 10 milliLiter(s) IV Push every 1 hour PRN Pre/post blood products, medications, blood draw, and to maintain line patency            RADIOLOGY:     Xray Chest 1 View- PORTABLE-Routine (05.12.20 @ 04:53)   The heart is slightly enlarged.Diffuse airspace opacities are seen throughout both lungs which remain unchanged when compared to previous study done May 11, 2020. At 2:39 PM. Endotracheal tube and NG tube are in good position. A central line seen on the right and the tip is in superior vena cava. No pneumothorax. No pleural effusion.      Xray Chest 1 View- PORTABLE-Routine (05.03.20 @ 03:26)   Percent of LEFT lung opacification: %  Percent of RIGHT lung opacification: %  Change in lung opacification from most recent x-ray (<=3 days): Stable  Change from prior dated 3 or more days (same admission): Stable      Xray Abdomen 1 View PORTABLE -Routine (05.01.20 @ 09:30)   No radiographic evidence of bowel obstruction.      Xray Chest 1 View- PORTABLE-Urgent (04.28.20 @ 14:44)   The heart is normal in size. Diffuse airspace opacities are seen throughout both lungs which remain unchanged when compared to previous study done April 23, 2020. NG tube is in the stomach. Endotracheal tube is in good position. A  Was placed in the right and the tip is in the superior vena cava. No pneumothorax. An additional line is seen on the left and the tip is in the superior vena cava as well. SUBJECTIVE:   44 M with no significant PMH presenting with nonproductive cough and fevers for 3-4 days. DX w/ covid19 pneumonitis--s/p initial anakinra protocol on tapering rx. Also status post Plaquenil and solumedrol.      FLOOR COURSE:  Treated with plaquenil, methylprednisolone and ceftriaxone/azithromycin. Anakinra started on 4/3. Had an RRT on 4/14 for hypoxia.     ICU COURSE:   Hypoxia treated with increased sedation and paralysis, proning. Intermittent fevers, treated with antibiotics.   Patient required intubation 4/14- received zosyn  Received course cefepime 4/15 -->4/19 for HAP  Now w/ FATOU acute liver injury, lactic acidosis, hypotension and shock       OVERNIGHT EVENTS: decreased H/H, received 1 unit PRBC      ICU VITALS SIGNS:  T(C): 36.2 (15 May 2020 12:00), Max: 43 (14 May 2020 16:00)  T(F): 97.2 (15 May 2020 12:00), Max: 109.4 (14 May 2020 16:00)  HR: 131 (15 May 2020 13:15) (114 - 137)  BP: --  BP(mean): --  ABP: 88/44 (15 May 2020 13:00) (81/41 - 100/61)  ABP(mean): 60 (15 May 2020 13:00) (56 - 76)  RR: 35 (15 May 2020 13:00) (21 - 35)  SpO2: 90% (15 May 2020 13:15) (88% - 100%)          ALLERGIES:  No Known Allergies      DEVICES:   [] Restraints [] ET tube [X] Rt IJ TLC central line [X] Rt Axillary arterial line [X] cruz [X] NGT/OGT [] EVD [] [] LD [] JAC/HMV [] Trach [] PEG [] Chest Tube [X] other: rectal tube      VENT SETTINGS:  Mode: AC/ CMV (Assist Control/ Continuous Mandatory Ventilation)  RR (machine): 30  FiO2: 50  PEEP: 5  ITime: 1  MAP: 18  PC: 35  PIP: 43          LABS:                                                 8.0    42.82 )-----------( 471      ( 15 May 2020 09:15 )             26.9     05-15    137  |  103  |  14  ----------------------------<  89  4.6   |  20<L>  |  1.06    Ca    7.1<L>      15 May 2020 01:53  Phos  4.7     05-15  Mg     2.2     05-15    TPro  5.6<L>  /  Alb  2.1<L>  /  TBili  1.1  /  DBili  x   /  AST  6532<H>  /  ALT  321<H>  /  AlkPhos  477<H>  05-15    ABG - ( 15 May 2020 11:49 )  pH, Arterial: 7.20  pH, Blood: x     /  pCO2: 55    /  pO2: 68    / HCO3: 21    / Base Excess: -6.5  /  SaO2: 91        LIVER FUNCTIONS - ( 15 May 2020 01:53 )  Alb: 2.1 g/dL / Pro: 5.6 g/dL / ALK PHOS: 477 U/L / ALT: 321 U/L / AST: 6532 U/L / GGT: x             Urine Microscopic-Add On (NC) (05.15.20 @ 07:39)    Red Blood Cell - Urine: 3 /hpf    White Blood Cell - Urine: >50    Bacteria: Negative    Hyaline Casts: 0 /lpf    Epithelial Cells: 0 /hpf    Comment - Urine: MANY YEAST PRESENT        Culture - Urine (05.11.20 @ 22:25)    Specimen Source: .Urine Catheterized    Culture Results:   50,000 - 99,000 CFU/mL Presumptive Candida albicans "Susceptibilities not  performed"    Culture - Blood (05.11.20 @ 22:20)    Specimen Source: .Blood Blood-Peripheral    Culture Results:   No growth to date.        Culture - Blood (05.05.20 @ 00:37)    Specimen Source: .Blood Blood    Culture Results:   No growth to date.    Culture - Sputum . (05.01.20 @ 19:24)    Gram Stain:   No polymorphonuclear leukocytes per low power field  No Squamous epithelial cells per low power field  No organisms seen per oil power field    Specimen Source: .Sputum Sputum    Culture Results:   Normal Respiratory Natalia present    Culture - Blood (05.01.20 @ 08:42)    Specimen Source: .Blood Blood-Catheter    Culture Results:   No Growth Final      Culture - Blood (04.29.20 @ 15:06)    Gram Stain:   Growth in anaerobic bottle: Gram Positive Cocci in Clusters    Specimen Source: .Blood Blood-Peripheral    Culture Results:   Growth in anaerobic bottle: Staphylococcus aureus  See previous culture [id number]      Culture - Blood (04.22.20 @ 19:05)    Gram Stain:   Growth in aerobic and anaerobic bottles: Gram Positive Cocci in Clusters    -  Coagulase negative Staphylococcus: Detec    Specimen Source: .Blood Blood    Organism: Blood Culture PCR    Culture Results:   Growth in aerobic and anaerobic bottles: Staphylococcus epidermidis  Coag Negative Staphylococcus    COVID-19 PCR (03.31.20 @ 14:56)    COVID-19 PCR: Detected: This test has been validated by [employer] to be accurate;  though it has not been FDA cleared/approved by the usual pathway.  As with all laboratory tests, results should be correlated with clinical  findings.    COVID-19 PCR . (05.07.20 @ 15:46)    COVID-19 PCR: Detected: This test has been validated by [employer] to be accurate;  though it has not been FDA cleared/approved by the usual pathway.  As with all laboratory tests, results should be correlated with clinical  findings.            I&O's:    I&O's Detail    14 May 2020 07:01  -  15 May 2020 07:00  --------------------------------------------------------  IN:    fentaNYL   Infusion: 426.8 mL    heparin Infusion: 413 mL    IV PiggyBack: 800 mL    midazolam Infusion: 218.7 mL    norepinephrine Infusion: 731.7 mL    Packed Red Blood Cells: 350 mL    phenylephrine   Infusion: 1927.2 mL    rocuronium Infusion: 88.8 mL    vasopressin Infusion: 115.2 mL  Total IN: 5071.4 mL    OUT:    Gastric Aspirate - Discarded: 600 mL    Other: 4797 mL  Total OUT: 5397 mL    Total NET: -325.6 mL      15 May 2020 07:01  -  15 May 2020 16:14  --------------------------------------------------------  IN:    fentaNYL   Infusion: 81.6 mL    heparin Infusion: 108 mL    IV PiggyBack: 100 mL    midazolam Infusion: 33 mL    norepinephrine Infusion: 251.2 mL    phenylephrine   Infusion: 490.6 mL    rocuronium Infusion: 13.1 mL    vasopressin Infusion: 36 mL  Total IN: 1113.5 mL    OUT:    Indwelling Catheter - Urethral: 10 mL    Other: 997 mL  Total OUT: 1007 mL    Total NET: 106.5 mL            MEDICATIONS:   MEDICATIONS  (STANDING):  chlorhexidine 0.12% Liquid 15 milliLiter(s) Oral Mucosa every 12 hours  chlorhexidine 4% Liquid 1 Application(s) Topical <User Schedule>  CRRT Treatment    <Continuous>  famotidine Injectable 20 milliGRAM(s) IV Push daily  fentaNYL   Infusion... 5.006 MICROgram(s)/kG/Hr (19.5 mL/Hr) IV Continuous <Continuous>  fluconAZOLE IVPB 400 milliGRAM(s) IV Intermittent daily  heparin  Infusion 1400 Unit(s)/Hr (18 mL/Hr) IV Continuous <Continuous>  meropenem  IVPB 1000 milliGRAM(s) IV Intermittent every 12 hours  meropenem  IVPB      metoclopramide Injectable 5 milliGRAM(s) IV Push every 8 hours  midazolam Infusion 0.02 mG/kG/Hr (1.56 mL/Hr) IV Continuous <Continuous>  norepinephrine Infusion 0.05 MICROgram(s)/kG/Min (3.65 mL/Hr) IV Continuous <Continuous>  petrolatum Ophthalmic Ointment 1 Application(s) Both EYES two times a day  phenylephrine    Infusion 0.1 MICROgram(s)/kG/Min (1.46 mL/Hr) IV Continuous <Continuous>  Phoxillum Filtration BK 4 / 2.5 5000 milliLiter(s) (2000 mL/Hr) CRRT <Continuous>  PrismaSOL Filtration BGK 0 / 2.5 5000 milliLiter(s) (500 mL/Hr) CRRT <Continuous>  PrismaSOL Filtration BGK 0 / 2.5 5000 milliLiter(s) (200 mL/Hr) CRRT <Continuous>  rocuronium Infusion 8 MICROgram(s)/kG/Min (7.48 mL/Hr) IV Continuous <Continuous>  vancomycin  IVPB 750 milliGRAM(s) IV Intermittent <User Schedule>  vasopressin Infusion 0.02 Unit(s)/Min (1.2 mL/Hr) IV Continuous <Continuous>    MEDICATIONS  (PRN):  ondansetron Injectable 4 milliGRAM(s) IV Push every 8 hours PRN Nausea and/or Vomiting  sodium chloride 0.9% lock flush 10 milliLiter(s) IV Push every 1 hour PRN Pre/post blood products, medications, blood draw, and to maintain line patency            RADIOLOGY:     Xray Chest 1 View- PORTABLE-Routine (05.12.20 @ 04:53)   The heart is slightly enlarged.Diffuse airspace opacities are seen throughout both lungs which remain unchanged when compared to previous study done May 11, 2020. At 2:39 PM. Endotracheal tube and NG tube are in good position. A central line seen on the right and the tip is in superior vena cava. No pneumothorax. No pleural effusion.      Xray Chest 1 View- PORTABLE-Routine (05.03.20 @ 03:26)   Percent of LEFT lung opacification: %  Percent of RIGHT lung opacification: %  Change in lung opacification from most recent x-ray (<=3 days): Stable  Change from prior dated 3 or more days (same admission): Stable      Xray Abdomen 1 View PORTABLE -Routine (05.01.20 @ 09:30)   No radiographic evidence of bowel obstruction.      Xray Chest 1 View- PORTABLE-Urgent (04.28.20 @ 14:44)   The heart is normal in size. Diffuse airspace opacities are seen throughout both lungs which remain unchanged when compared to previous study done April 23, 2020. NG tube is in the stomach. Endotracheal tube is in good position. A  Was placed in the right and the tip is in the superior vena cava. No pneumothorax. An additional line is seen on the left and the tip is in the superior vena cava as well.

## 2020-05-15 NOTE — PROGRESS NOTE ADULT - ASSESSMENT
ASSESSMENT/PLAN:   44 M with no significant PMH presenting with nonproductive cough and fevers for 3-4 days. DX w/ covid19 pneumonitis--s/p initial anakinra protocol on tapering rx. Also status post Plaquenil and solumedrol.      NEURO:   -cont Precedex, try to wean fentanyl off, can use fentanyl IVP prn  -continue phenobarbitol 80 q 12 (and prn) and clonazepam 0.5 q 8  -QTC wnl, on methadone 10 q 6     CARDS:  -MAP > 65mmHg,  on phenylephrine     PULM:    -continue mechanical ventilation, wean as tolerated; fio2 to 40%   Mode: AC/ CMV (Assist Control/ Continuous Mandatory Ventilation)  RR (machine): 30  FiO2: 50  PEEP: 5  ITime: 1  MAP: 18  PC: 35  PIP: 43      RENAL:   - I's/O's : negative ~ 200cc  -monitor hyponatremia      GASTRO:    -tolerating bolus tube feeds Vital AF   -continue rectal tube   -continue pepcid, miralax      HEME:    -continue SQL 40 daily for DVT ppx  -monitor H/H    ENDO:    -euglycemia      ID:   -COVID +, s/p plaquenil, solumedrol and anakinra  -rpt COVID from 5/7 +  -ID follow up appreciated  -MSSA bacteremia- likely line infection  -continue Cefazolin 2g IV q8h- end date 5/29  -rpt blood cx neg  -pancx if inc temp      SOCIAL/FAMILY:  [X] updated via phone call [] family meeting    CODE STATUS:  [X] Full Code [] DNR [] DNI [] Palliative/Comfort Care    DISPOSITION:  [X] ICU [] Stroke Unit [] Floor [] EMU [] RCU [] PCU    [x] Patient is at high risk of deterioration/death due to: acute respiratory failure     Time spent: _45__ [X] critical care minutes ASSESSMENT/PLAN:   44 M with no significant PMH presenting with nonproductive cough and fevers for 3-4 days. DX w/ covid19 pneumonitis--s/p initial anakinra protocol on tapering rx. Also status post Plaquenil and solumedrol.  Now w/ FATOU acute liver injury, lactic acidosis, hypotension and shock     NEURO:   -cont Fentanyl, Versed and Daniel- wean as tolerated      CARDS:  -MAP > 65mmHg,  on phenylephrine, Levophed and Vasopressin    PULM:    -continue mechanical ventilation, wean as tolerated;   Mode: AC/ CMV (Assist Control/ Continuous Mandatory Ventilation)  RR (machine): 35  FiO2: 80  PEEP: 5  ITime: 0.7  MAP: 18  PC: 30  PIP: 36        RENAL:   -hyperkalemia improved  -anuric, on CVVHD  -Nephrology       GASTRO:    - Did not tolerate trickle feeds 5/14  - NPO  - Will start Reglan 5TID  - GI ppx: Pepcid  - Rectal tube; off bowel regimen      HEME:    -continue SQL 40 daily for DVT ppx  -monitor H/H    ENDO:    -euglycemia      ID:   -COVID +, s/p plaquenil, solumedrol and anakinra  -rpt COVID from 5/7 +  -ID follow up appreciated  -MSSA bacteremia- likely line infection  -continue Cefazolin 2g IV q8h- end date 5/29  -rpt blood cx neg  -pancx if inc temp      SOCIAL/FAMILY:  [X] updated via phone call [] family meeting    CODE STATUS:  [X] Full Code [] DNR [] DNI [] Palliative/Comfort Care    DISPOSITION:  [X] ICU [] Stroke Unit [] Floor [] EMU [] RCU [] PCU    [x] Patient is at high risk of deterioration/death due to: acute respiratory failure     Time spent: _45__ [X] critical care minutes ASSESSMENT/PLAN:   44 M with no significant PMH presenting with nonproductive cough and fevers for 3-4 days. DX w/ covid19 pneumonitis--s/p initial anakinra protocol on tapering rx. Also status post Plaquenil and solumedrol.  Now w/ FATOU acute liver injury, lactic acidosis, hypotension and shock     NEURO:   -cont Fentanyl, Versed and Daniel- wean as tolerated      CARDS:  -MAP > 65mmHg,  on phenylephrine, Levophed and Vasopressin    PULM:    -continue mechanical ventilation, wean as tolerated;   Mode: AC/ CMV (Assist Control/ Continuous Mandatory Ventilation)  RR (machine): 35  FiO2: 80  PEEP: 5  ITime: 0.7  MAP: 18  PC: 30  PIP: 36        RENAL:   -hyperkalemia improved  -anuric, on CVVHD  -Nephrology       GASTRO:    - Did not tolerate trickle feeds 5/14  - NPO  - Cont Reglan 5TID  - GI ppx: Pepcid  - Rectal tube; off bowel regimen      HEME:    -continue SQL 40 daily for DVT ppx  -monitor H/H    ENDO:    -euglycemia      ID:   -COVID +, s/p plaquenil, solumedrol and anakinra  -rpt COVID from 5/7 +  -ID follow up appreciated  - Empiric coverage with Vanc/Elin/Flucanazole      SOCIAL/FAMILY:  [X] wife updated via phone call [] family meeting    CODE STATUS:  [X] Full Code [] DNR [] DNI [] Palliative/Comfort Care    DISPOSITION:  [X] ICU [] Stroke Unit [] Floor [] EMU [] RCU [] PCU    [x] Patient is at high risk of deterioration/death due to: acute respiratory failure     Time spent: _45__ [X] critical care minutes

## 2020-05-16 NOTE — PROVIDER CONTACT NOTE (OTHER) - SITUATION
Pt is a 45 y/o M, COVID+, on CVVHDF, yovanny, levo, vaso, fentanyl and versed gtt. Pt with L. forearm 20G in with large blister/redness above IV site.
Patient experiencing hypoxia.
Patients MAP <65 regardless of yovanny titration and decrease in sedation
Pt experiencing desaturation
Resistance upon flushing patients IJ. Sluggish blood return

## 2020-05-16 NOTE — PROGRESS NOTE ADULT - SUBJECTIVE AND OBJECTIVE BOX
[facility]   [phone]  RENAL FOLLOW UP NOTE  --------------------------------------------------------------------------------  Chief Complaint:    24 hour events/subjective:    vented   on pressors  on cvvhdf    PAST HISTORY  --------------------------------------------------------------------------------  No significant changes to PMH, PSH, FHx, SHx, unless otherwise noted    ALLERGIES & MEDICATIONS  --------------------------------------------------------------------------------  Allergies    No Known Allergies    Intolerances      Standing Inpatient Medications  chlorhexidine 0.12% Liquid 15 milliLiter(s) Oral Mucosa every 12 hours  chlorhexidine 4% Liquid 1 Application(s) Topical <User Schedule>  CRRT Treatment    <Continuous>  famotidine Injectable 20 milliGRAM(s) IV Push daily  fentaNYL   Infusion... 5.006 MICROgram(s)/kG/Hr IV Continuous <Continuous>  fluconAZOLE IVPB 400 milliGRAM(s) IV Intermittent daily  heparin  Infusion 1400 Unit(s)/Hr IV Continuous <Continuous>  insulin lispro (HumaLOG) corrective regimen sliding scale   SubCutaneous every 6 hours  meropenem  IVPB 1000 milliGRAM(s) IV Intermittent every 12 hours  meropenem  IVPB      midazolam Infusion 0.02 mG/kG/Hr IV Continuous <Continuous>  norepinephrine Infusion 0.05 MICROgram(s)/kG/Min IV Continuous <Continuous>  petrolatum Ophthalmic Ointment 1 Application(s) Both EYES two times a day  phenylephrine    Infusion 0.1 MICROgram(s)/kG/Min IV Continuous <Continuous>  Phoxillum Filtration BK 4 / 2.5 5000 milliLiter(s) CRRT <Continuous>  PrismaSOL Filtration BGK 0 / 2.5 5000 milliLiter(s) CRRT <Continuous>  PrismaSOL Filtration BGK 0 / 2.5 5000 milliLiter(s) CRRT <Continuous>  sodium bicarbonate  Infusion 0.193 mEq/kG/Hr IV Continuous <Continuous>  vancomycin  IVPB 750 milliGRAM(s) IV Intermittent <User Schedule>  vasopressin Infusion 0.02 Unit(s)/Min IV Continuous <Continuous>    PRN Inpatient Medications  ondansetron Injectable 4 milliGRAM(s) IV Push every 8 hours PRN  sodium chloride 0.9% lock flush 10 milliLiter(s) IV Push every 1 hour PRN      REVIEW OF SYSTEMS  --------------------------------------------------------------------------------        VITALS/PHYSICAL EXAM  --------------------------------------------------------------------------------  T(C): 35.3 (05-16-20 @ 16:00), Max: 36.6 (05-16-20 @ 08:00)  HR: 114 (05-16-20 @ 17:00) (113 - 128)  BP: --  RR: 35 (05-16-20 @ 17:00) (6 - 36)  SpO2: 98% (05-16-20 @ 17:00) (71% - 98%)  Wt(kg): --        05-15-20 @ 07:01  -  05-16-20 @ 07:00  --------------------------------------------------------  IN: 5506.9 mL / OUT: 3995 mL / NET: 1511.9 mL    05-16-20 @ 07:01  -  05-16-20 @ 17:10  --------------------------------------------------------  IN: 3321.4 mL / OUT: 2408 mL / NET: 913.4 mL      Physical Exam:  	    intubated   due to covid 19 isolation exam reliability per ICU team   pt with anasarca visible as well   sedated       LABS/STUDIES  --------------------------------------------------------------------------------              7.4    43.97 >-----------<  416      [05-16-20 @ 00:27]              25.2     134  |  101  |  14  ----------------------------<  108      [05-16-20 @ 00:27]  4.5   |  18  |  0.83        Ca     7.2     [05-16-20 @ 00:27]      Mg     2.3     [05-16-20 @ 00:27]      Phos  4.8     [05-16-20 @ 00:27]    TPro  5.5  /  Alb  2.0  /  TBili  1.5  /  DBili  x   /  AST  3128  /  ALT  171  /  AlkPhos  483  [05-16-20 @ 00:27]    PT/INR: PT 37.0 , INR 3.13       [05-16-20 @ 00:27]  PTT: 139.3      [05-16-20 @ 14:53]      Creatinine Trend:  SCr 0.83 [05-16 @ 00:27]  SCr 1.06 [05-15 @ 01:53]  SCr 1.75 [05-14 @ 01:32]  SCr 2.09 [05-13 @ 17:49]  SCr 1.88 [05-13 @ 08:22]              Urinalysis - [05-15-20 @ 07:39]      Color Yellow / Appearance Turbid / SG 1.024 / pH 6.5      Gluc Negative / Ketone Negative  / Bili Negative / Urobili Negative       Blood Moderate / Protein 100 mg/dL / Leuk Est Moderate / Nitrite Negative      RBC 3 / WBC >50 / Hyaline 0 / Gran  / Sq Epi  / Non Sq Epi 0 / Bacteria Negative      Ferritin 11217      [05-16-20 @ 10:04]  Lipid: chol --, , HDL --, LDL --      [05-15-20 @ 01:53]

## 2020-05-16 NOTE — PROGRESS NOTE ADULT - ASSESSMENT
covid 10+ resp failure  multiorgan failure    fentanyl gtt--wean as able  yovanny/levo/vaso; MAP>60  pressure AC 35/37/5/90%  cont tube feeding  +rectal tube with minimal output, add back senna/miralax  CVVHD, renal following  metabolic acidsis, elevated lactic acid, cont CVVHD, on bicarb drip  monitor electrolytes  maintain euglycemia, cont D10, FS Q6  heparin gtt goal PTT 60-80  vanc/kika/fluconazole for broad spectrum coverage, candida in urine; ID following  guarded prognosis

## 2020-05-16 NOTE — PROVIDER CONTACT NOTE (OTHER) - ACTION/TREATMENT ORDERED:
YAJAIRA Fabian at bedside assessing IV site. IV removed. Pulses dopplered. Will continue to monitor.

## 2020-05-16 NOTE — PROGRESS NOTE ADULT - ASSESSMENT
44 year old male with no significant past medical history who presented with cough and fever for 3-4 days.  Found to be covid positive. s/p initial anakinra protocol on tapering rx. Also status post Plaquenil and solumedrol.  Currently receiving colchicine, lovenox 40 BID, famotidine and albuterol as needed. Patient required intubation 4/14- received zosyn. Received course cefepime 4/15 -->4/19 for HAP. then 4/28- MSSA bacteremia . line changed 4/28  repeat blood cx negative.  now w/ FATOU acute liver injury, lactic acidosis, hypotension and shock       1- fatou  2- lactic acidosis  3- hypotension  4-     cvvhdf bfr 160 dfr 2000 fluid removal no fluid removal   given pressor requirements higher   see new  orders   lactic acidosis persists and concerning   cont pressor support

## 2020-05-16 NOTE — PROGRESS NOTE ADULT - NSREFPHYEXREFERTOOTHER_GEN_ALL_CORE
RN Flowsheet
Nurse flowsheet
Nursing flowsheet
RN Flowsheet
nursing flowsheet
RN flowsheet

## 2020-05-16 NOTE — PROVIDER CONTACT NOTE (OTHER) - RECOMMENDATIONS
Please advise.
Administer cath flow, provider to assess at bedside
Provider to assess at bedside, administer appropriate medications, start levo gtt
Turn patient prone or on right side. Nasal cannula added to assist with increasing oxygen saturation.

## 2020-05-16 NOTE — PROGRESS NOTE ADULT - SUBJECTIVE AND OBJECTIVE BOX
cont CVVHD, however metabolic acidosis worsening, started on bicarb drip  pressor requirement increasing  started on D10 for severe hypoglycemia    off versed  remains fentanyl gtt  yovanny/levo/vaso gtt  heparin gtt    vitals/labs/meds reviewed  pupils 4mm reactive b/l  mottled extremities

## 2020-05-16 NOTE — PROGRESS NOTE ADULT - SUBJECTIVE AND OBJECTIVE BOX
SUBJECTIVE:   44 M with no significant PMH presenting with nonproductive cough and fevers for 3-4 days. DX w/ covid19 pneumonitis--s/p initial anakinra protocol on tapering rx. Also status post Plaquenil and solumedrol.      FLOOR COURSE:  Treated with plaquenil, methylprednisolone and ceftriaxone/azithromycin. Anakinra started on 4/3. Had an RRT on 4/14 for hypoxia.     ICU COURSE:   Hypoxia treated with increased sedation and paralysis, proning. Intermittent fevers, treated with antibiotics.   Patient required intubation 4/14- received zosyn  Received course cefepime 4/15 -->4/19 for HAP  Now w/ FATOU acute liver injury, lactic acidosis, hypotension and shock       OVERNIGHT EVENTS: desaturated, FIO2 increased; Pressor requirement increased;  Hypoglycemic, received 1 1/2 amps dextrose and trickle feeds started      ICU VITALS SIGNS:  T(C): 36.6 (16 May 2020 08:00), Max: 36.6 (16 May 2020 08:00)  T(F): 97.9 (16 May 2020 08:00), Max: 97.9 (16 May 2020 08:00)  HR: 125 (16 May 2020 08:00) (121 - 132)  BP: --  BP(mean): --  ABP: 89/47 (16 May 2020 08:00) (81/41 - 97/50)  ABP(mean): 64 (16 May 2020 08:00) (56 - 66)  RR: 35 (16 May 2020 08:00) (0 - 35)  SpO2: 95% (16 May 2020 08:00) (87% - 97%)      ALLERGIES:  No Known Allergies      DEVICES:   [] Restraints [X ET tube [X] Rt IJ TLC central line [X] Rt Axillary arterial line [X] cruz [X] NGT/OGT [] EVD [] [] LD [] JAC/HMV [] Trach [] PEG [] Chest Tube [X] other: rectal tube, Lt femoral shiley      VENT SETTINGS:  Mode: AC/ CMV (Assist Control/ Continuous Mandatory Ventilation)  RR (machine): 35  FiO2: 90  PEEP: 5  ITime: 0.7  MAP: 18  PC: 32  PIP: 39      LABS:                                                         7.4    43.97 )-----------( 416      ( 16 May 2020 00:27 )             25.2       05-16    134<L>  |  101  |  14  ----------------------------<  108<H>  4.5   |  18<L>  |  0.83    Ca    7.2<L>      16 May 2020 00:27  Phos  4.8     05-16  Mg     2.3     05-16    TPro  5.5<L>  /  Alb  2.0<L>  /  TBili  1.5<H>  /  DBili  x   /  AST  3128<H>  /  ALT  171<H>  /  AlkPhos  483<H>  05-16    ABG - ( 16 May 2020 05:13 )  pH, Arterial: 7.21  pH, Blood: x     /  pCO2: 49    /  pO2: 138   / HCO3: 19    / Base Excess: -8.0  /  SaO2: 99        LIVER FUNCTIONS - ( 16 May 2020 00:27 )  Alb: 2.0 g/dL / Pro: 5.5 g/dL / ALK PHOS: 483 U/L / ALT: 171 U/L / AST: 3128 U/L / GGT: x              Urine Microscopic-Add On (NC) (05.15.20 @ 07:39)    Red Blood Cell - Urine: 3 /hpf    White Blood Cell - Urine: >50    Bacteria: Negative    Hyaline Casts: 0 /lpf    Epithelial Cells: 0 /hpf    Comment - Urine: MANY YEAST PRESENT    Culture - Blood (05.15.20 @ 03:08)    Specimen Source: .Blood Blood    Culture Results:   No growth to date.    Culture - Blood (05.15.20 @ 03:08)    Specimen Source: .Blood Blood    Culture Results:   No growth to date.       Culture - Urine (05.11.20 @ 22:25)    Specimen Source: .Urine Catheterized    Culture Results:   50,000 - 99,000 CFU/mL Presumptive Candida albicans "Susceptibilities not  performed"    Culture - Blood (05.11.20 @ 22:20)    Specimen Source: .Blood Blood-Peripheral    Culture Results:   No growth to date.        Culture - Blood (05.05.20 @ 00:37)    Specimen Source: .Blood Blood    Culture Results:   No growth to date.    Culture - Sputum . (05.01.20 @ 19:24)    Gram Stain:   No polymorphonuclear leukocytes per low power field  No Squamous epithelial cells per low power field  No organisms seen per oil power field    Specimen Source: .Sputum Sputum    Culture Results:   Normal Respiratory Natalia present    Culture - Blood (05.01.20 @ 08:42)    Specimen Source: .Blood Blood-Catheter    Culture Results:   No Growth Final      Culture - Blood (04.29.20 @ 15:06)    Gram Stain:   Growth in anaerobic bottle: Gram Positive Cocci in Clusters    Specimen Source: .Blood Blood-Peripheral    Culture Results:   Growth in anaerobic bottle: Staphylococcus aureus  See previous culture [id number]      Culture - Blood (04.22.20 @ 19:05)    Gram Stain:   Growth in aerobic and anaerobic bottles: Gram Positive Cocci in Clusters    -  Coagulase negative Staphylococcus: Detec    Specimen Source: .Blood Blood    Organism: Blood Culture PCR    Culture Results:   Growth in aerobic and anaerobic bottles: Staphylococcus epidermidis  Coag Negative Staphylococcus    COVID-19 PCR (03.31.20 @ 14:56)    COVID-19 PCR: Detected: This test has been validated by [employer] to be accurate;  though it has not been FDA cleared/approved by the usual pathway.  As with all laboratory tests, results should be correlated with clinical  findings.    COVID-19 PCR . (05.07.20 @ 15:46)    COVID-19 PCR: Detected: This test has been validated by [employer] to be accurate;  though it has not been FDA cleared/approved by the usual pathway.  As with all laboratory tests, results should be correlated with clinical  findings.      I&O's:    I&O's Detail    15 May 2020 07:01  -  16 May 2020 07:00  --------------------------------------------------------  IN:    fentaNYL   Infusion: 326.4 mL    heparin Infusion: 425 mL    IV PiggyBack: 450 mL    midazolam Infusion: 132 mL    norepinephrine Infusion: 1229.4 mL    ns in tub fed vital15: 90 mL    phenylephrine   Infusion: 2297 mL    rocuronium Infusion: 13.1 mL    Solution: 200 mL    Solution: 200 mL    vasopressin Infusion: 144 mL  Total IN: 5506.9 mL    OUT:    Indwelling Catheter - Urethral: 10 mL    Other: 3685 mL    Rectal Tube: 300 mL  Total OUT: 3995 mL    Total NET: 1511.9 mL      16 May 2020 07:01  -  16 May 2020 08:23  --------------------------------------------------------  IN:    fentaNYL   Infusion: 13.6 mL    heparin Infusion: 17 mL    midazolam Infusion: 5.5 mL    norepinephrine Infusion: 73 mL    ns in tub fed vital15: 10 mL    phenylephrine   Infusion: 146 mL    vasopressin Infusion: 6 mL  Total IN: 271.1 mL    OUT:  Total OUT: 0 mL    Total NET: 271.1 mL      MEDICATIONS:   MEDICATIONS  (STANDING):  chlorhexidine 0.12% Liquid 15 milliLiter(s) Oral Mucosa every 12 hours  chlorhexidine 4% Liquid 1 Application(s) Topical <User Schedule>  CRRT Treatment    <Continuous>  famotidine Injectable 20 milliGRAM(s) IV Push daily  fentaNYL   Infusion... 5.006 MICROgram(s)/kG/Hr (19.5 mL/Hr) IV Continuous <Continuous>  fluconAZOLE IVPB 400 milliGRAM(s) IV Intermittent daily  heparin  Infusion 1400 Unit(s)/Hr (17 mL/Hr) IV Continuous <Continuous>  insulin lispro (HumaLOG) corrective regimen sliding scale   SubCutaneous every 6 hours  meropenem  IVPB 1000 milliGRAM(s) IV Intermittent every 12 hours  meropenem  IVPB      midazolam Infusion 0.02 mG/kG/Hr (1.56 mL/Hr) IV Continuous <Continuous>  norepinephrine Infusion 0.05 MICROgram(s)/kG/Min (3.65 mL/Hr) IV Continuous <Continuous>  petrolatum Ophthalmic Ointment 1 Application(s) Both EYES two times a day  phenylephrine    Infusion 0.1 MICROgram(s)/kG/Min (1.46 mL/Hr) IV Continuous <Continuous>  Phoxillum Filtration BK 4 / 2.5 5000 milliLiter(s) (2000 mL/Hr) CRRT <Continuous>  PrismaSOL Filtration BGK 0 / 2.5 5000 milliLiter(s) (500 mL/Hr) CRRT <Continuous>  PrismaSOL Filtration BGK 0 / 2.5 5000 milliLiter(s) (200 mL/Hr) CRRT <Continuous>  vancomycin  IVPB 750 milliGRAM(s) IV Intermittent <User Schedule>  vasopressin Infusion 0.02 Unit(s)/Min (1.2 mL/Hr) IV Continuous <Continuous>    MEDICATIONS  (PRN):  ondansetron Injectable 4 milliGRAM(s) IV Push every 8 hours PRN Nausea and/or Vomiting  sodium chloride 0.9% lock flush 10 milliLiter(s) IV Push every 1 hour PRN Pre/post blood products, medications, blood draw, and to maintain line patency        RADIOLOGY:     Xray Chest 1 View- PORTABLE-Routine (05.12.20 @ 04:53)   The heart is slightly enlarged.Diffuse airspace opacities are seen throughout both lungs which remain unchanged when compared to previous study done May 11, 2020. At 2:39 PM. Endotracheal tube and NG tube are in good position. A central line seen on the right and the tip is in superior vena cava. No pneumothorax. No pleural effusion.      Xray Chest 1 View- PORTABLE-Routine (05.03.20 @ 03:26)   Percent of LEFT lung opacification: %  Percent of RIGHT lung opacification: %  Change in lung opacification from most recent x-ray (<=3 days): Stable  Change from prior dated 3 or more days (same admission): Stable      Xray Abdomen 1 View PORTABLE -Routine (05.01.20 @ 09:30)   No radiographic evidence of bowel obstruction.      Xray Chest 1 View- PORTABLE-Urgent (04.28.20 @ 14:44)   The heart is normal in size. Diffuse airspace opacities are seen throughout both lungs which remain unchanged when compared to previous study done April 23, 2020. NG tube is in the stomach. Endotracheal tube is in good position. A  Was placed in the right and the tip is in the superior vena cava. No pneumothorax. An additional line is seen on the left and the tip is in the superior vena cava as well.

## 2020-05-16 NOTE — PROVIDER CONTACT NOTE (OTHER) - ASSESSMENT
Pt with L. forearm blister, redness, bleedingb around and above IV site. Pulses present by doppler.
Patient O2 sustaining 65% when placed on right side and prone. Patient using accessory muscles to breathe, stating experiencing shortness of breath post bowel movement. HR sustaining 140s.
Patients MAP <65 regardless of yovanny titration and decrease in sedation
Pt oxygen decrease to 81% sustaining with nonrebreather at 15%. Pt asymptomatic.
Resistance upon flushing patients IJ. Sluggish blood return

## 2020-05-16 NOTE — PROGRESS NOTE ADULT - ASSESSMENT
ASSESSMENT/PLAN:   44 M with no significant PMH presenting with nonproductive cough and fevers for 3-4 days. DX w/ covid19 pneumonitis--s/p initial anakinra protocol on tapering rx. Also status post Plaquenil and solumedrol.  Now w/ FATOU acute liver injury, lactic acidosis, hypotension and shock     NEURO:   -cont Fentanyl, Versed - wean as tolerated  - JIL weaned off yesterday      CARDS:  -MAP > 60mmHg,  on phenylephrine, Levophed and Vasopressin    PULM:    -continue mechanical ventilation, wean as tolerated;   Mode: AC/ CMV (Assist Control/ Continuous Mandatory Ventilation)  RR (machine): 35  FiO2: 90  PEEP: 5  ITime: 0.7  MAP: 18  PC: 32  PIP: 39      RENAL:   -anuric, on CVVHD  -Nephrology follow up appreciated      GASTRO:    - Trickle feeds started  - Cont Reglan 5TID  - GI ppx: Pepcid  - Rectal tube; off bowel regimen      HEME:    -continue SQL 40 daily for DVT ppx  -monitor H/H    ENDO:    -euglycemia      ID:   -COVID +, s/p plaquenil, solumedrol and anakinra  -rpt COVID from 5/7 +  -ID follow up appreciated  - Empiric coverage with Vanc/Elin/Flucanazole  -Ddimers decreasing, leukocytosis improving      SOCIAL/FAMILY:  [X] wife updated via phone call [] family meeting    CODE STATUS:  [X] Full Code [] DNR [] DNI [] Palliative/Comfort Care    DISPOSITION:  [X] ICU [] Stroke Unit [] Floor [] EMU [] RCU [] PCU    [x] Patient is at high risk of deterioration/death due to: acute respiratory failure     Time spent: _45__ [X] critical care minutes ASSESSMENT/PLAN:   44 M with no significant PMH presenting with nonproductive cough and fevers for 3-4 days. DX w/ covid19 pneumonitis--s/p initial anakinra protocol on tapering rx. Also status post Plaquenil and solumedrol.  Now w/ FATOU acute liver injury, lactic acidosis, hypotension and shock     NEURO:   -cont Fentanyl, Versed - wean as tolerated  - JIL weaned off yesterday      CARDS:  -MAP > 60mmHg,  on phenylephrine, Levophed and Vasopressin    PULM:    -continue mechanical ventilation, wean as tolerated;   Mode: AC/ CMV (Assist Control/ Continuous Mandatory Ventilation)  RR (machine): 35  FiO2: 90  PEEP: 5  ITime: 0.7  MAP: 18  PC: 32  PIP: 39      RENAL:   -anuric, on CVVHD  -Nephrology follow up appreciated      GASTRO:    - Trickle feeds started (increase to 20cc)  - Cont Reglan 5TID  - GI ppx: Pepcid  - Rectal tube; off bowel regimen      HEME:    -continue SQL 40 daily for DVT ppx  -monitor H/H    ENDO:    - monitor hypoglycemia      ID:   -COVID +, s/p plaquenil, solumedrol and anakinra  -rpt COVID from 5/7 +  - will send C-diff   -ID follow up appreciated  - Empiric coverage with Vanc/Elin/Flucanazole  -Ddimers decreasing, leukocytosis improving      SOCIAL/FAMILY:  [X] wife updated via phone call [] family meeting    CODE STATUS:  [X] Full Code [] DNR [] DNI [] Palliative/Comfort Care    DISPOSITION:  [X] ICU [] Stroke Unit [] Floor [] EMU [] RCU [] PCU    [x] Patient is at high risk of deterioration/death due to: acute respiratory failure     Time spent: _45__ [X] critical care minutes ASSESSMENT/PLAN:   44 M with no significant PMH presenting with nonproductive cough and fevers for 3-4 days. DX w/ covid19 pneumonitis--s/p initial anakinra protocol on tapering rx. Also status post Plaquenil and solumedrol.  Now w/ FATOU acute liver injury, lactic acidosis, hypotension and shock     NEURO:   -cont Fentanyl, Versed - wean as tolerated  - JIL weaned off yesterday      CARDS:  -MAP > 60mmHg,  on phenylephrine, Levophed and Vasopressin    PULM:    -continue mechanical ventilation, wean as tolerated;   Mode: AC/ CMV (Assist Control/ Continuous Mandatory Ventilation)  RR (machine): 35  FiO2: 90  PEEP: 5  ITime: 0.7  MAP: 18  PC: 32  PIP: 39      RENAL:   -anuric, on CVVHD  -Nephrology follow up appreciated      GASTRO:    - Trickle feeds started (increase to 20cc)  - Cont Reglan 5TID  - GI ppx: Pepcid  - Rectal tube; off bowel regimen      HEME:    -continue SQL 40 daily for DVT ppx  -monitor H/H    ENDO:    - monitor hypoglycemia      ID:   -COVID +, s/p plaquenil, solumedrol and anakinra  -rpt COVID from 5/7 +  - will send C-diff   -ID follow up appreciated  - Empiric coverage with Vanc/Elin/Flucanazole  -Ddimers decreasing, leukocytosis improving      SOCIAL/FAMILY:  [X] wife updated via phone call using  # [phone].  Wife offered to come and visit - currently refusing.    [] family meeting    CODE STATUS:  [X] Full Code [] DNR [] DNI [] Palliative/Comfort Care    DISPOSITION:  [X] ICU [] Stroke Unit [] Floor [] EMU [] RCU [] PCU    [x] Patient is at high risk of deterioration/death due to: acute respiratory failure     Time spent: _45__ [X] critical care minutes ASSESSMENT/PLAN:   44 M with no significant PMH presenting with nonproductive cough and fevers for 3-4 days. DX w/ covid19 pneumonitis--s/p initial anakinra protocol on tapering rx. Also status post Plaquenil and solumedrol.  Now w/ FATOU acute liver injury, lactic acidosis, hypotension and shock     NEURO:   -cont Fentanyl, Versed - wean as tolerated  - JIL weaned off yesterday      CARDS:  -MAP > 60mmHg,  on phenylephrine, Levophed and Vasopressin    PULM:    -continue mechanical ventilation, wean as tolerated;   Mode: AC/ CMV (Assist Control/ Continuous Mandatory Ventilation)  RR (machine): 35  FiO2: 90  PEEP: 5  ITime: 0.7  MAP: 18  PC: 32  PIP: 39      RENAL:   -anuric, on CVVHD  -start bicarb GTT for increasing acidosis despite CVVHD  -Nephrology follow up appreciated      GASTRO:    - Trickle feeds started (increase to 20cc)  - Cont Reglan 5TID  - GI ppx: Pepcid  - Rectal tube; off bowel regimen      HEME:    -continue SQL 40 daily for DVT ppx  -monitor H/H    ENDO:    - monitor hypoglycemia      ID:   -COVID +, s/p plaquenil, solumedrol and anakinra  -rpt COVID from 5/7 +  - will send C-diff   -ID follow up appreciated  - Empiric coverage with Vanc/Elin/Flucanazole  -Ddimers decreasing, leukocytosis improving      SOCIAL/FAMILY:  [X] wife updated via phone call using  # [phone].  Wife offered to come and visit - currently refusing.    [] family meeting    CODE STATUS:  [X] Full Code [] DNR [] DNI [] Palliative/Comfort Care    DISPOSITION:  [X] ICU [] Stroke Unit [] Floor [] EMU [] RCU [] PCU    [x] Patient is at high risk of deterioration/death due to: acute respiratory failure

## 2020-05-16 NOTE — PROVIDER CONTACT NOTE (OTHER) - BACKGROUND
Pt is a 43 y/o M, COVID+, on CVVHDF, yovanny, levo, vaso, fentanyl and versed gtt. Pt currently intubated and sedated. Pt with +3 edema throughout.
Covid + intubated patient
Covid + patient
Patient experiencing hypoxic respiratory failure.
Pt admitted with hypoxic respiratory failure.

## 2020-05-17 NOTE — PROGRESS NOTE ADULT - SUBJECTIVE AND OBJECTIVE BOX
SUBJECTIVE:   44 M with no significant PMH presenting with nonproductive cough and fevers for 3-4 days. DX w/ covid19 pneumonitis--s/p initial anakinra protocol on tapering rx. Also status post Plaquenil and solumedrol.              ICU Vital Signs Last 24 Hrs  T(C): 35 (17 May 2020 04:00), Max: 36.6 (16 May 2020 08:00)  T(F): 95 (17 May 2020 04:00), Max: 97.9 (16 May 2020 08:00)  HR: 116 (17 May 2020 06:45) (113 - 125)  BP: --  BP(mean): --  ABP: 105/58 (17 May 2020 06:45) (87/46 - 113/71)  ABP(mean): 75 (17 May 2020 06:45) (62 - 89)  RR: 35 (17 May 2020 06:45) (26 - 36)  SpO2: 98% (17 May 2020 04:00) (71% - 98%)      05-16-20 @ 07:01  -  05-17-20 @ 07:00  --------------------------------------------------------  IN: 8868.3 mL / OUT: 7135 mL / NET: 1733.3 mL      Mode: AC/ CMV (Assist Control/ Continuous Mandatory Ventilation), RR (machine): 35, FiO2: 90, PEEP: 5, ITime: 0.7, MAP: 20, PC: 37, PIP: 44  chlorhexidine 0.12% Liquid 15 milliLiter(s) Oral Mucosa every 12 hours  chlorhexidine 4% Liquid 1 Application(s) Topical <User Schedule>  CRRT Treatment    <Continuous>  dextrose 10% 1000 milliLiter(s) (100 mL/Hr) IV Continuous <Continuous>  famotidine Injectable 20 milliGRAM(s) IV Push daily  fentaNYL   Infusion... 5.006 MICROgram(s)/kG/Hr (19.5 mL/Hr) IV Continuous <Continuous>  fluconAZOLE IVPB 400 milliGRAM(s) IV Intermittent daily  insulin lispro (HumaLOG) corrective regimen sliding scale   SubCutaneous every 6 hours  meropenem  IVPB 1000 milliGRAM(s) IV Intermittent every 12 hours  meropenem  IVPB      norepinephrine Infusion 0.05 MICROgram(s)/kG/Min (3.65 mL/Hr) IV Continuous <Continuous>  ondansetron Injectable 4 milliGRAM(s) IV Push every 8 hours PRN  petrolatum Ophthalmic Ointment 1 Application(s) Both EYES two times a day  phenylephrine    Infusion 0.1 MICROgram(s)/kG/Min (1.46 mL/Hr) IV Continuous <Continuous>  Phoxillum Filtration BK 4 / 2.5 5000 milliLiter(s) (2000 mL/Hr) CRRT <Continuous>  polyethylene glycol 3350 17 Gram(s) Oral every 12 hours  PrismaSOL Filtration BGK 0 / 2.5 5000 milliLiter(s) (500 mL/Hr) CRRT <Continuous>  PrismaSOL Filtration BGK 0 / 2.5 5000 milliLiter(s) (200 mL/Hr) CRRT <Continuous>  senna Syrup 10 milliLiter(s) Oral at bedtime  sodium chloride 0.9% lock flush 10 milliLiter(s) IV Push every 1 hour PRN  vancomycin  IVPB 750 milliGRAM(s) IV Intermittent <User Schedule>  vasopressin Infusion 0.02 Unit(s)/Min (1.2 mL/Hr) IV Continuous <Continuous>                          6.8    45.74 )-----------( 265      ( 17 May 2020 01:23 )             23.3     05-17    137  |  102  |  12  ----------------------------<  122<H>  4.2   |  16<L>  |  0.59    Ca    6.8<L>      17 May 2020 01:23  Phos  4.5     05-17  Mg     2.2     05-17    TPro  5.0<L>  /  Alb  1.9<L>  /  TBili  1.8<H>  /  DBili  x   /  AST  2072<H>  /  ALT  168<H>  /  AlkPhos  457<H>  05-17    LIVER FUNCTIONS - ( 17 May 2020 01:23 )  Alb: 1.9 g/dL / Pro: 5.0 g/dL / ALK PHOS: 457 U/L / ALT: 168 U/L / AST: 2072 U/L / GGT: x           ABG - ( 17 May 2020 05:42 )  pH, Arterial: 7.20  pH, Blood: x     /  pCO2: 42    /  pO2: 147   / HCO3: 16    / Base Excess: -11.4 /  SaO2: 99                FLOOR COURSE:  Treated with plaquenil, methylprednisolone and ceftriaxone/azithromycin. Anakinra started on 4/3. Had an RRT on 4/14 for hypoxia.     ICU COURSE:   Hypoxia treated with increased sedation and paralysis, proning. Intermittent fevers, treated with antibiotics.   Patient required intubation 4/14- received zosyn  Received course cefepime 4/15 -->4/19 for HAP  Now w/ FATOU acute liver injury, lactic acidosis, hypotension and shock       OVERNIGHT EVENTS: desaturated, FIO2 increased; Pressor requirement increased hydrocortisone 100 x1, Hypoglycemic, received 1 1/2 amps dextrose and trickle feeds started, ptt elevated drop h/h transfused 1 unit prbc therefore heparin drip held,          ALLERGIES:  No Known Allergies      DEVICES:   [] Restraints [X ET tube [X] Rt IJ TLC central line [X] Rt Axillary arterial line [X] cruz [X] NGT/OGT [] EVD [] [] LD [] JAC/HMV [] Trach [] PEG [] Chest Tube [X] other: rectal tube, Lt femoral shiley      VENT SETTINGS:  Mode: AC/ CMV (Assist Control/ Continuous Mandatory Ventilation)  RR (machine): 35  FiO2: 90  PEEP: 5  ITime: 0.7  MAP: 18  PC: 32  PIP: 39 SUBJECTIVE:   44 M with no significant PMH presenting with nonproductive cough and fevers for 3-4 days. DX w/ covid19 pneumonitis--s/p initial anakinra protocol on tapering rx. Also status post Plaquenil and solumedrol.      OVERNIGHT EVENTS: desaturated, FIO2 increased; Pressor requirement increased hydrocortisone 100 x1, Hypoglycemic, received 1 1/2 amps dextrose and trickle feeds started, ptt elevated drop h/h transfused 1 unit prbc therefore heparin drip held,          ALLERGIES:  No Known Allergies      DEVICES:   [] Restraints [X ET tube [X] Rt IJ TLC central line [X] Rt Axillary arterial line [X] cruz [X] NGT/OGT [] EVD [] [] LD [] JAC/HMV [] Trach [] PEG [] Chest Tube [X] other: rectal tube, Lt femoral shiley      VENT SETTINGS:  Mode: AC/ CMV (Assist Control/ Continuous Mandatory Ventilation)  RR (machine): 35  FiO2: 90  PEEP: 5  ITime: 0.7  MAP: 18  PC: 32  PIP: 39     ICU Vital Signs Last 24 Hrs  T(C): 35 (17 May 2020 04:00), Max: 36.6 (16 May 2020 08:00)  T(F): 95 (17 May 2020 04:00), Max: 97.9 (16 May 2020 08:00)  HR: 116 (17 May 2020 06:45) (113 - 125)  BP: --  BP(mean): --  ABP: 105/58 (17 May 2020 06:45) (87/46 - 113/71)  ABP(mean): 75 (17 May 2020 06:45) (62 - 89)  RR: 35 (17 May 2020 06:45) (26 - 36)  SpO2: 98% (17 May 2020 04:00) (71% - 98%)      05-16-20 @ 07:01  -  05-17-20 @ 07:00  --------------------------------------------------------  IN: 8868.3 mL / OUT: 7135 mL / NET: 1733.3 mL      Mode: AC/ CMV (Assist Control/ Continuous Mandatory Ventilation), RR (machine): 35, FiO2: 90, PEEP: 5, ITime: 0.7, MAP: 20, PC: 37, PIP: 44  chlorhexidine 0.12% Liquid 15 milliLiter(s) Oral Mucosa every 12 hours  chlorhexidine 4% Liquid 1 Application(s) Topical <User Schedule>  CRRT Treatment    <Continuous>  dextrose 10% 1000 milliLiter(s) (100 mL/Hr) IV Continuous <Continuous>  famotidine Injectable 20 milliGRAM(s) IV Push daily  fentaNYL   Infusion... 5.006 MICROgram(s)/kG/Hr (19.5 mL/Hr) IV Continuous <Continuous>  fluconAZOLE IVPB 400 milliGRAM(s) IV Intermittent daily  insulin lispro (HumaLOG) corrective regimen sliding scale   SubCutaneous every 6 hours  meropenem  IVPB 1000 milliGRAM(s) IV Intermittent every 12 hours  meropenem  IVPB      norepinephrine Infusion 0.05 MICROgram(s)/kG/Min (3.65 mL/Hr) IV Continuous <Continuous>  ondansetron Injectable 4 milliGRAM(s) IV Push every 8 hours PRN  petrolatum Ophthalmic Ointment 1 Application(s) Both EYES two times a day  phenylephrine    Infusion 0.1 MICROgram(s)/kG/Min (1.46 mL/Hr) IV Continuous <Continuous>  Phoxillum Filtration BK 4 / 2.5 5000 milliLiter(s) (2000 mL/Hr) CRRT <Continuous>  polyethylene glycol 3350 17 Gram(s) Oral every 12 hours  PrismaSOL Filtration BGK 0 / 2.5 5000 milliLiter(s) (500 mL/Hr) CRRT <Continuous>  PrismaSOL Filtration BGK 0 / 2.5 5000 milliLiter(s) (200 mL/Hr) CRRT <Continuous>  senna Syrup 10 milliLiter(s) Oral at bedtime  sodium chloride 0.9% lock flush 10 milliLiter(s) IV Push every 1 hour PRN  vancomycin  IVPB 750 milliGRAM(s) IV Intermittent <User Schedule>  vasopressin Infusion 0.02 Unit(s)/Min (1.2 mL/Hr) IV Continuous <Continuous>                          6.8    45.74 )-----------( 265      ( 17 May 2020 01:23 )             23.3     05-17    137  |  102  |  12  ----------------------------<  122<H>  4.2   |  16<L>  |  0.59    Ca    6.8<L>      17 May 2020 01:23  Phos  4.5     05-17  Mg     2.2     05-17    TPro  5.0<L>  /  Alb  1.9<L>  /  TBili  1.8<H>  /  DBili  x   /  AST  2072<H>  /  ALT  168<H>  /  AlkPhos  457<H>  05-17    LIVER FUNCTIONS - ( 17 May 2020 01:23 )  Alb: 1.9 g/dL / Pro: 5.0 g/dL / ALK PHOS: 457 U/L / ALT: 168 U/L / AST: 2072 U/L / GGT: x           ABG - ( 17 May 2020 05:42 )  pH, Arterial: 7.20  pH, Blood: x     /  pCO2: 42    /  pO2: 147   / HCO3: 16    / Base Excess: -11.4 /  SaO2: 99                FLOOR COURSE:  Treated with plaquenil, methylprednisolone and ceftriaxone/azithromycin. Anakinra started on 4/3. Had an RRT on 4/14 for hypoxia.     ICU COURSE:   Hypoxia treated with increased sedation and paralysis, proning. Intermittent fevers, treated with antibiotics.   Patient required intubation 4/14- received zosyn  Received course cefepime 4/15 -->4/19 for HAP  Now w/ FATOU acute liver injury, lactic acidosis, hypotension and shock

## 2020-05-17 NOTE — PROGRESS NOTE ADULT - ASSESSMENT
ASSESSMENT/PLAN:   44 M with no significant PMH presenting with nonproductive cough and fevers for 3-4 days. DX w/ covid19 pneumonitis--s/p initial anakinra protocol on tapering rx. Also status post Plaquenil and solumedrol.  Now w/ FATOU acute liver injury, lactic acidosis, hypotension and shock     NEURO:   -cont Fentanyl, Versed - wean as tolerated  - JIL weaned off yesterday      CARDS:  -MAP > 60mmHg,  on phenylephrine, Levophed and Vasopressin    PULM:    -continue mechanical ventilation, wean as tolerated;   Mode: AC/ CMV (Assist Control/ Continuous Mandatory Ventilation)  RR (machine): 35  FiO2: 90  PEEP: 5  ITime: 0.7  MAP: 18  PC: 32  PIP: 39      RENAL:   -anuric, on CVVHD  -start bicarb GTT for increasing acidosis despite CVVHD  -Nephrology follow up appreciated      GASTRO:    - Trickle feeds started (increase to 20cc)  - Cont Reglan 5TID  - GI ppx: Pepcid  - Rectal tube; off bowel regimen      HEME:    -continue SQL 40 daily for DVT ppx  -monitor H/H    ENDO:    - monitor hypoglycemia      ID:   -COVID +, s/p plaquenil, solumedrol and anakinra  -rpt COVID from 5/7 +  - will send C-diff   -ID follow up appreciated  - Empiric coverage with Vanc/Elin/Flucanazole  -Ddimers decreasing, leukocytosis improving      SOCIAL/FAMILY:  [X] wife updated via phone call using  # [phone].  Wife offered to come and visit - currently refusing.    [] family meeting    CODE STATUS:  [X] Full Code [] DNR [] DNI [] Palliative/Comfort Care    DISPOSITION:  [X] ICU [] Stroke Unit [] Floor [] EMU [] RCU [] PCU    [x] Patient is at high risk of deterioration/death due to: acute respiratory failure ASSESSMENT/PLAN:   44 M with no significant PMH presenting with nonproductive cough and fevers for 3-4 days. DX w/ covid19 pneumonitis--s/p initial anakinra protocol on tapering rx. Also status post Plaquenil and solumedrol.  Now w/ FATOU acute liver injury, lactic acidosis, hypotension and shock     NEURO:   -cont Fentanyl, Versed - wean as tolerated  - JIL weaned off yesterday      CARDS:  -MAP > 60mmHg,  on phenylephrine, Levophed and Vasopressin    PULM:    -continue mechanical ventilation, wean as tolerated;   Mode: AC/ CMV (Assist Control/ Continuous Mandatory Ventilation)  RR (machine): 35  FiO2: 90  PEEP: 5  ITime: 0.7  MAP: 18  PC: 32  PIP: 39      RENAL:   -anuric, on CVVHD  -start bicarb GTT for increasing acidosis despite CVVHD  -Nephrology follow up appreciated      GASTRO:    - Trickle feeds started (increase to 20cc)  - Cont Reglan 5TID  - GI ppx: Pepcid  - Rectal tube; off bowel regimen  - guiac stool      HEME:    -continue SQL 40 daily for DVT ppx  -monitor H/H  -hold heparin drip    ENDO:    - monitor hypoglycemia      ID:   -COVID +, s/p plaquenil, solumedrol and anakinra  -rpt COVID from 5/7 +  - will send C-diff   -ID follow up appreciated  - Empiric coverage with Vanc/Elin/Flucanazole  -Ddimers decreasing, leukocytosis improving      SOCIAL/FAMILY:  [X] wife updated via phone call using  # [phone].  Wife offered to come and visit - currently refusing.    [] family meeting    CODE STATUS:  [X] Full Code [] DNR [] DNI [] Palliative/Comfort Care    DISPOSITION:  [X] ICU [] Stroke Unit [] Floor [] EMU [] RCU [] PCU    [x] Patient is at high risk of deterioration/death due to: acute respiratory failure     d/w dr louis ASSESSMENT/PLAN:   44 M with no significant PMH presenting with nonproductive cough and fevers for 3-4 days. DX w/ covid19 pneumonitis--s/p initial anakinra protocol on tapering rx. Also status post Plaquenil and solumedrol.  Now w/ FATOU acute liver injury, lactic acidosis, hypotension and shock     NEURO:   -cont Fentanyl, Versed - wean as tolerated  - JIL weaned off yesterday      CARDS:  -MAP > 60mmHg,  on phenylephrine, Levophed and Vasopressin    PULM:    -continue mechanical ventilation, wean as tolerated;   Mode: AC/ CMV (Assist Control/ Continuous Mandatory Ventilation)  RR (machine): 35  FiO2: 90  PEEP: 5  ITime: 0.7  MAP: 18  PC: 32  PIP: 39      RENAL:   -anuric, on CVVHD  -start bicarb GTT for increasing acidosis despite CVVHD (d/w renal); cannot increase dialysate rate any furtther  -Nephrology follow up appreciated      GASTRO:    - Trickle feeds started (increase to 20cc)  - Cont Reglan 5TID  - GI ppx: Pepcid  - Rectal tube; off bowel regimen  - guiac stool      HEME:    -continue SQL 40 daily for DVT ppx  -monitor H/H  -hold heparin drip    ENDO:    - monitor hypoglycemia      ID:   -COVID +, s/p plaquenil, solumedrol and anakinra  -rpt COVID from 5/7 +  - will send C-diff   -ID follow up appreciated  - Empiric coverage with Vanc/Elin/Flucanazole  -Ddimers decreasing, leukocytosis improving      SOCIAL/FAMILY:  [X] wife updated via phone call using  # [phone].  Wife offered to come and visit - currently refusing.    [] family meeting    CODE STATUS:  [X] Full Code [] DNR [] DNI [] Palliative/Comfort Care    DISPOSITION:  [X] ICU [] Stroke Unit [] Floor [] EMU [] RCU [] PCU    [x] Patient is at high risk of deterioration/death due to: acute respiratory failure     d/w dr louis

## 2020-05-17 NOTE — CHART NOTE - NSCHARTNOTEFT_GEN_A_CORE
MICU Accept Note  ---------------------------  Transfer from: NSICU  Transfer to:  MICU/COVID ICU (8ICU)  Accepting Physician: Dr. Strauss     MICU COURSE:  44-year-old Male with no past medical history admitted to [facility] (3/31/20) with acute respiratory failure for COVID-19, requiring intubation/ventilation and admission to COVID ICU (4/14/20). ICU course complicated by severe ARDS/hypoxia, acute liver injury, metabolic/lactic acidosis, and acute renal failure requiring CVVHD, and sepsis.       OBJECTIVE --  Vital Signs Last 24 Hrs  T(C): 37.9 (17 May 2020 20:00), Max: 38.1 (17 May 2020 18:00)  T(F): 100.2 (17 May 2020 20:00), Max: 100.6 (17 May 2020 18:00)  HR: 134 (17 May 2020 20:00) (113 - 140)  BP: --  BP(mean): --  RR: 37 (17 May 2020 20:00) (26 - 37)  SpO2: 92% (17 May 2020 20:00) (89% - 100%)    I&O's Summary  16 May 2020 07:01  -  17 May 2020 07:00  --------------------------------------------------------  IN: 8868.3 mL / OUT: 7462 mL / NET: 1406.3 mL    17 May 2020 07:01  -  17 May 2020 21:55  --------------------------------------------------------  IN: 5616.4 mL / OUT: 2025 mL / NET: 3591.4 mL    MEDICATIONS  (STANDING):  chlorhexidine 0.12% Liquid 15 milliLiter(s) Oral Mucosa every 12 hours  chlorhexidine 4% Liquid 1 Application(s) Topical <User Schedule>  CRRT Treatment    <Continuous>  dextrose 10% 1000 milliLiter(s) (100 mL/Hr) IV Continuous <Continuous>  famotidine Injectable 20 milliGRAM(s) IV Push daily  fentaNYL   Infusion... 5.006 MICROgram(s)/kG/Hr (19.5 mL/Hr) IV Continuous <Continuous>  fluconAZOLE IVPB 400 milliGRAM(s) IV Intermittent daily  insulin lispro (HumaLOG) corrective regimen sliding scale   SubCutaneous every 6 hours  meropenem  IVPB 1000 milliGRAM(s) IV Intermittent every 12 hours  meropenem  IVPB      norepinephrine Infusion 0.05 MICROgram(s)/kG/Min (3.65 mL/Hr) IV Continuous <Continuous>  norepinephrine Infusion 0.05 MICROgram(s)/kG/Min (3.65 mL/Hr) IV Continuous <Continuous>  petrolatum Ophthalmic Ointment 1 Application(s) Both EYES two times a day  phenylephrine    Infusion 0.1 MICROgram(s)/kG/Min (1.46 mL/Hr) IV Continuous <Continuous>  Phoxillum Filtration BK 4 / 2.5 5000 milliLiter(s) (2000 mL/Hr) CRRT <Continuous>  polyethylene glycol 3350 17 Gram(s) Oral every 12 hours  PrismaSOL Filtration BGK 0 / 2.5 5000 milliLiter(s) (500 mL/Hr) CRRT <Continuous>  PrismaSOL Filtration BGK 0 / 2.5 5000 milliLiter(s) (200 mL/Hr) CRRT <Continuous>  senna Syrup 10 milliLiter(s) Oral at bedtime  vancomycin  IVPB 750 milliGRAM(s) IV Intermittent <User Schedule>  vasopressin Infusion 0.02 Unit(s)/Min (1.2 mL/Hr) IV Continuous <Continuous>    MEDICATIONS  (PRN):  ondansetron Injectable 4 milliGRAM(s) IV Push every 8 hours PRN Nausea and/or Vomiting  sodium chloride 0.9% lock flush 10 milliLiter(s) IV Push every 1 hour PRN Pre/post blood products, medications, blood draw, and to maintain line patency    LABS                                            8.3                   Neurophils% (auto):   x      (05-17 @ 10:18):    38.40)-----------(223          Lymphocytes% (auto):  x                                             27.3                   Eosinphils% (auto):   x        Manual%: Neutrophils x    ; Lymphocytes x    ; Eosinophils x    ; Bands%: x    ; Blasts x                                    137    |  102    |  12                  Calcium: 6.8   / iCa: x      (05-17 @ 01:23)    ----------------------------<  122       Magnesium: 2.2                              4.2     |  16     |  0.59             Phosphorous: 4.5      TPro  5.0    /  Alb  1.9    /  TBili  1.8    /  DBili  x      /  AST  2072   /  ALT  168    /  AlkPhos  457    17 May 2020 01:23    ( 05-17 @ 05:52 )   PT: x    ;   INR: x      aPTT: 56.0 sec    ASSESSMENT & PLAN:   44-year-old Male with no past medical history admitted to [facility] (3/31/20) with acute respiratory failure for COVID-19, requiring intubation/ventilation and admission to [facility] ICU (4/14/20). ICU course complicated by severe ARDS/hypoxia, acute liver injury, metabolic/lactic acidosis, and acute renal failure requiring CVVHD, and sepsis. Transferred from NSICU ([facility] ICU) to MICU (8ICU) for further critical care management. Patient in critical condition and actively dying.      NEURO:   -cont Fentanyl, Versed - wean as tolerated  - JIL weaned off yesterday      CARDS:  -MAP > 60mmHg,  on phenylephrine, Levophed and Vasopressin    PULM:    -continue mechanical ventilation, wean as tolerated;   Mode: AC/ CMV (Assist Control/ Continuous Mandatory Ventilation)  RR (machine): 35  FiO2: 90  PEEP: 5  ITime: 0.7  MAP: 18  PC: 32  PIP: 39      RENAL:   -anuric, on CVVHD  -start bicarb GTT for increasing acidosis despite CVVHD (d/w renal); cannot increase dialysate rate any furtther  -Nephrology follow up appreciated      GASTRO:    - Trickle feeds started (increase to 20cc)  - Cont Reglan 5TID  - GI ppx: Pepcid  - Rectal tube; off bowel regimen  - guiac stool      HEME:    -continue SQL 40 daily for DVT ppx  -monitor H/H  -hold heparin drip    ENDO:    - monitor hypoglycemia      ID:   -COVID +, s/p plaquenil, solumedrol and anakinra  -rpt COVID from 5/7 +  - will send C-diff   -ID follow up appreciated  - Empiric coverage with Vanc/Elin/Flucanazole  -Ddimers decreasing, leukocytosis improving      SOCIAL/FAMILY:  [X] wife updated via phone call using  # [phone].  Wife offered to come and visit - currently refusing.    [] family meeting MICU Accept Note  ---------------------------  Transfer from: NSICU  Transfer to:  MICU/COVID ICU (8ICU)  Accepting Physician: Dr. Strauss     MICU COURSE:  44-year-old Male with no past medical history admitted to [facility] (3/31/20) with acute respiratory failure for COVID-19, requiring intubation/ventilation and admission to COVID ICU (4/14/20). ICU course complicated by severe ARDS/hypoxia, acute liver injury, metabolic/lactic acidosis, and acute renal failure requiring CVVHD, and bacteremia with sepsis.   Status post COVID-19 treatment with Plaquenil, Solu-Medrol, and Anakinra, repeat COVID PCR on 5/7 remains positive. Anuric on CVVHD (5/13/20) and Sodium Bicarbonate infusion with worsening metabolic/lactic acidosis. Had MSSA in blood and sputum on 4/28/20, lines changed and given Ancef/Vancomycin with subsequent cultures clearing. WBCs are rising though cultures remain negative, on empiric antimicrobial coverage with Fluconazole, Vancomycin, and Meropenem. Refractory hypotension despite vasopressor support with Levophed, Phenylephrine, Vasopressin, and stress-dose Hydrocortisone. With severe hypoxia and hypercapnia, currently requiring 100% FiO2.   Patient transferred from NSICU to MICU/COVID ICU (8ICU) for continued critical care mangement. Overall, patient is critically ill and actively dying at time of transfer.     OBJECTIVE --  Vital Signs Last 24 Hrs  T(C): 37.9 (17 May 2020 20:00), Max: 38.1 (17 May 2020 18:00)  T(F): 100.2 (17 May 2020 20:00), Max: 100.6 (17 May 2020 18:00)  HR: 134 (17 May 2020 20:00) (113 - 140)  BP: --  BP(mean): --  RR: 37 (17 May 2020 20:00) (26 - 37)  SpO2: 92% (17 May 2020 20:00) (89% - 100%)    I&O's Summary  16 May 2020 07:01  -  17 May 2020 07:00  --------------------------------------------------------  IN: 8868.3 mL / OUT: 7462 mL / NET: 1406.3 mL    17 May 2020 07:01  -  17 May 2020 21:55  --------------------------------------------------------  IN: 5616.4 mL / OUT: 2025 mL / NET: 3591.4 mL    MEDICATIONS  (STANDING):  chlorhexidine 0.12% Liquid 15 milliLiter(s) Oral Mucosa every 12 hours  chlorhexidine 4% Liquid 1 Application(s) Topical <User Schedule>  CRRT Treatment    <Continuous>  dextrose 10% 1000 milliLiter(s) (100 mL/Hr) IV Continuous <Continuous>  famotidine Injectable 20 milliGRAM(s) IV Push daily  fentaNYL   Infusion... 5.006 MICROgram(s)/kG/Hr (19.5 mL/Hr) IV Continuous <Continuous>  fluconAZOLE IVPB 400 milliGRAM(s) IV Intermittent daily  insulin lispro (HumaLOG) corrective regimen sliding scale   SubCutaneous every 6 hours  meropenem  IVPB 1000 milliGRAM(s) IV Intermittent every 12 hours  meropenem  IVPB      norepinephrine Infusion 0.05 MICROgram(s)/kG/Min (3.65 mL/Hr) IV Continuous <Continuous>  norepinephrine Infusion 0.05 MICROgram(s)/kG/Min (3.65 mL/Hr) IV Continuous <Continuous>  petrolatum Ophthalmic Ointment 1 Application(s) Both EYES two times a day  phenylephrine    Infusion 0.1 MICROgram(s)/kG/Min (1.46 mL/Hr) IV Continuous <Continuous>  Phoxillum Filtration BK 4 / 2.5 5000 milliLiter(s) (2000 mL/Hr) CRRT <Continuous>  polyethylene glycol 3350 17 Gram(s) Oral every 12 hours  PrismaSOL Filtration BGK 0 / 2.5 5000 milliLiter(s) (500 mL/Hr) CRRT <Continuous>  PrismaSOL Filtration BGK 0 / 2.5 5000 milliLiter(s) (200 mL/Hr) CRRT <Continuous>  senna Syrup 10 milliLiter(s) Oral at bedtime  vancomycin  IVPB 750 milliGRAM(s) IV Intermittent <User Schedule>  vasopressin Infusion 0.02 Unit(s)/Min (1.2 mL/Hr) IV Continuous <Continuous>    MEDICATIONS  (PRN):  ondansetron Injectable 4 milliGRAM(s) IV Push every 8 hours PRN Nausea and/or Vomiting  sodium chloride 0.9% lock flush 10 milliLiter(s) IV Push every 1 hour PRN Pre/post blood products, medications, blood draw, and to maintain line patency    LABS                                            8.3                   Neurophils% (auto):   x      (05-17 @ 10:18):    38.40)-----------(223          Lymphocytes% (auto):  x                                             27.3                   Eosinphils% (auto):   x        Manual%: Neutrophils x    ; Lymphocytes x    ; Eosinophils x    ; Bands%: x    ; Blasts x                                    137    |  102    |  12                  Calcium: 6.8   / iCa: x      (05-17 @ 01:23)    ----------------------------<  122       Magnesium: 2.2                              4.2     |  16     |  0.59             Phosphorous: 4.5      TPro  5.0    /  Alb  1.9    /  TBili  1.8    /  DBili  x      /  AST  2072   /  ALT  168    /  AlkPhos  457    17 May 2020 01:23    ( 05-17 @ 05:52 )   PT: x    ;   INR: x      aPTT: 56.0 sec    ASSESSMENT & PLAN:   44-year-old Male with no past medical history admitted to [facility] (3/31/20) with acute respiratory failure for COVID-19, requiring intubation/ventilation and admission to [facility] ICU (4/14/20). ICU course complicated by severe ARDS/hypoxia, acute liver injury, metabolic/lactic acidosis, and acute renal failure requiring CVVHD, and sepsis. Transferred from NSICU (COVID ICU) to MICU (8ICU) for further critical care management. Patient in critical condition and actively dying.      NEURO:   -cont Fentanyl, Versed - wean as tolerated  - JIL weaned off yesterday      CARDS:  -MAP > 60mmHg,  on phenylephrine, Levophed and Vasopressin    PULM:    -continue mechanical ventilation, wean as tolerated;   Mode: AC/ CMV (Assist Control/ Continuous Mandatory Ventilation)  RR (machine): 35  FiO2: 90  PEEP: 5  ITime: 0.7  MAP: 18  PC: 32  PIP: 39      RENAL:   -anuric, on CVVHD  -start bicarb GTT for increasing acidosis despite CVVHD (d/w renal); cannot increase dialysate rate any furtther  -Nephrology follow up appreciated      GASTRO:    - Trickle feeds started (increase to 20cc)  - Cont Reglan 5TID  - GI ppx: Pepcid  - Rectal tube; off bowel regimen  - guiac stool      HEME:    -continue SQL 40 daily for DVT ppx  -monitor H/H  -hold heparin drip    ENDO:    - monitor hypoglycemia      ID:   -COVID +, s/p plaquenil, solumedrol and anakinra  -rpt COVID from 5/7 +  - will send C-diff   -ID follow up appreciated  - Empiric coverage with Vanc/Elin/Flucanazole  -Ddimers decreasing, leukocytosis improving      SOCIAL/FAMILY:  [X] wife updated via phone call using  # [phone].  Wife offered to come and visit - currently refusing.    [] family meeting MICU Accept Note  ---------------------------  Transfer from: NSICU  Transfer to:  MICU/COVID ICU (8ICU)  Accepting Physician: Dr. Strauss     MICU COURSE:  44-year-old Male with no past medical history admitted to [facility] (3/31/20) with acute respiratory failure for COVID-19, requiring intubation/ventilation and admission to COVID ICU (20). ICU course complicated by severe ARDS/hypoxia, acute liver injury, metabolic/lactic acidosis, and acute renal failure requiring CVVHD, and bacteremia with sepsis.   Status post COVID-19 treatment with Plaquenil, Solu-Medrol, and Anakinra, repeat COVID PCR on  remains positive. Anuric on CVVHD (20) and Sodium Bicarbonate infusion with worsening metabolic/lactic acidosis. Had MSSA in blood and sputum on 20, lines changed and given Ancef/Vancomycin with subsequent cultures clearing. WBCs are rising though cultures remain negative, on empiric antimicrobial coverage with Fluconazole, Vancomycin, and Meropenem. Refractory hypotension despite vasopressor support with Levophed, Phenylephrine, Vasopressin, and stress-dose Hydrocortisone. With severe hypoxia and hypercapnia, currently requiring 100% FiO2.   Patient transferred from NSICU to MICU/COVID ICU (8ICU) for continued critical care management. Overall, patient is critically ill and actively dying at time of transfer.     OBJECTIVE --  Vital Signs Last 24 Hrs  T(C): 37.9 (17 May 2020 20:00), Max: 38.1 (17 May 2020 18:00)  T(F): 100.2 (17 May 2020 20:00), Max: 100.6 (17 May 2020 18:00)  HR: 134 (17 May 2020 20:00) (113 - 140)  BP: --  BP(mean): --  RR: 37 (17 May 2020 20:00) (26 - 37)  SpO2: 92% (17 May 2020 20:00) (89% - 100%)    I&O's Summary  16 May 2020 07:01  -  17 May 2020 07:00  --------------------------------------------------------  IN: 8868.3 mL / OUT: 7462 mL / NET: 1406.3 mL    17 May 2020 07:01  -  17 May 2020 21:55  --------------------------------------------------------  IN: 5616.4 mL / OUT:  mL / NET: 3591.4 mL    MEDICATIONS  (STANDING):  chlorhexidine 0.12% Liquid 15 milliLiter(s) Oral Mucosa every 12 hours  chlorhexidine 4% Liquid 1 Application(s) Topical <User Schedule>  CRRT Treatment    <Continuous>  dextrose 10% 1000 milliLiter(s) (100 mL/Hr) IV Continuous <Continuous>  famotidine Injectable 20 milliGRAM(s) IV Push daily  fentaNYL   Infusion... 5.006 MICROgram(s)/kG/Hr (19.5 mL/Hr) IV Continuous <Continuous>  fluconAZOLE IVPB 400 milliGRAM(s) IV Intermittent daily  insulin lispro (HumaLOG) corrective regimen sliding scale   SubCutaneous every 6 hours  meropenem  IVPB 1000 milliGRAM(s) IV Intermittent every 12 hours  meropenem  IVPB      norepinephrine Infusion 0.05 MICROgram(s)/kG/Min (3.65 mL/Hr) IV Continuous <Continuous>  norepinephrine Infusion 0.05 MICROgram(s)/kG/Min (3.65 mL/Hr) IV Continuous <Continuous>  petrolatum Ophthalmic Ointment 1 Application(s) Both EYES two times a day  phenylephrine    Infusion 0.1 MICROgram(s)/kG/Min (1.46 mL/Hr) IV Continuous <Continuous>  Phoxillum Filtration BK 4 / 2.5 5000 milliLiter(s) (2000 mL/Hr) CRRT <Continuous>  polyethylene glycol 3350 17 Gram(s) Oral every 12 hours  PrismaSOL Filtration BGK 0 / 2.5 5000 milliLiter(s) (500 mL/Hr) CRRT <Continuous>  PrismaSOL Filtration BGK 0 / 2.5 5000 milliLiter(s) (200 mL/Hr) CRRT <Continuous>  senna Syrup 10 milliLiter(s) Oral at bedtime  vancomycin  IVPB 750 milliGRAM(s) IV Intermittent <User Schedule>  vasopressin Infusion 0.02 Unit(s)/Min (1.2 mL/Hr) IV Continuous <Continuous>    MEDICATIONS  (PRN):  ondansetron Injectable 4 milliGRAM(s) IV Push every 8 hours PRN Nausea and/or Vomiting  sodium chloride 0.9% lock flush 10 milliLiter(s) IV Push every 1 hour PRN Pre/post blood products, medications, blood draw, and to maintain line patency    LABS                                            8.3                   Neurophils% (auto):   x      ( @ 10:18):    38.40)-----------(223          Lymphocytes% (auto):  x                                             27.3                   Eosinphils% (auto):   x        Manual%: Neutrophils x    ; Lymphocytes x    ; Eosinophils x    ; Bands%: x    ; Blasts x                                    137    |  102    |  12                  Calcium: 6.8   / iCa: x      ( @ 01:23)    ----------------------------<  122       Magnesium: 2.2                              4.2     |  16     |  0.59             Phosphorous: 4.5      TPro  5.0    /  Alb  1.9    /  TBili  1.8    /  DBili  x      /  AST  2072   /  ALT  168    /  AlkPhos  457    17 May 2020 01:23    (  @ 05:52 )   PT: x    ;   INR: x      aPTT: 56.0 sec    ASSESSMENT & PLAN:   44-year-old Male with no past medical history admitted to [facility] (3/31/20) with acute respiratory failure for COVID-19, requiring intubation/ventilation and admission to [facility] ICU (20). ICU course complicated by severe ARDS/hypoxia, acute liver injury, metabolic/lactic acidosis, and acute renal failure requiring CVVHD, and sepsis. Transferred from NSICU (COVID ICU) to MICU (8ICU) for further critical care management. Patient in critical condition and actively dying despite maximal medical therapy.     NEUROLOGIC:   - On Fentanyl infusion for analgesia. No response to verbal or tactile/noxious stimuli. Positive pupillary response to light. No objective findings of pain/discomfort.   - Continue with analgesia and sedatives as needed to ensure comfort during dying process.     CARDIAC:  - Severe refractory hypotension despite vasopressors. Currently on max dose of Phenylephrine, Vasopressin, and Levophed. Status post stress dose steroids. Average MAP 40-50 despite these interventions.    PULMONARY:  - Full support with ventilator. Currently on 100% FiO2 with continuous SPO2 reading 70-80%. PaO2 on ABG ~100 on current settings.     RENAL:   - CVVHD was held temporarily during transport from NSICU to ICU, attempted to re-start when patient arrived on unit but unable to maintain MAP on CRRT. CVVHD held as patient unable to tolerate it despite three vasopressors.   - On Sodium Bicarbonate infusion for metabolic acidosis, repeat ABGs showing worsening pH despite this.   - Nephrologist Dr. Pulido following. Recommendations appreciated.     GI  - On trickle feeds with Vital 1.5.   - On Reglan and Pepcid.   - Rectal tube in place, on bowel regimen.     HEMATOLOGIC:  - AC held for worsening anemia requiring PRBC transfusion on 20.     ENDOCRINE  - Episodes of hypoglycemia, most likely related to critical illness, currently on D10 infusion.     ID:   - Status post COVID-19 treatment with Plaquenil, Solu-Medrol, and Anakinra. Most recent COVID PCR on 20 was positive.   - Currently pan-culture negative, though with rising WBC and worsening clinical condition. On Empiric coverage with Vancomycin, Meropenem, and Fluconazole.  - Followed by ID physician Dr. Olmos. Recommendations appreciated.     ETHICS:  - Spoke with wife Catherine (surrogate decision maker) to provide update on patient's condition. Informed her that the patient was actively dying despite all of the above therapies and he was highly likely to  overnight. Wife requested all current medical interventions be continued. Offered wife opportunity to visit which she refused out of concern for [place] COVID-19 (was offered PPE and MICU staff offered her assistance with transportation to hospital). Held a 90 minute "Facetime" call for wife and family to say goodbye. Additionally, at wife's request, had hospital  perform last rites via "Facetime" and pray with the family.     All of the above was discussed and reviewed in real-time with covering MICU attending physician.  Mandeep Jaimes NP MICU Accept Note  ---------------------------  Transfer from: NSICU  Transfer to:  MICU/COVID ICU (8ICU)  Accepting Physician: Dr. Strauss     MICU COURSE:  44-year-old Male with no past medical history admitted to [facility] (3/31/20) with acute respiratory failure for COVID-19, requiring intubation/ventilation and admission to COVID ICU (20). ICU course complicated by severe ARDS/hypoxia, acute liver injury, metabolic/lactic acidosis, and acute renal failure requiring CVVHD, and bacteremia with sepsis.   Status post COVID-19 treatment with Plaquenil, Solu-Medrol, and Anakinra, repeat COVID PCR on  remains positive. Anuric on CVVHD (20) and Sodium Bicarbonate infusion with worsening metabolic/lactic acidosis. Had MSSA in blood and sputum on 20, lines changed and given Ancef/Vancomycin with subsequent cultures clearing. WBCs are rising though cultures remain negative, on empiric antimicrobial coverage with Fluconazole, Vancomycin, and Meropenem. Refractory hypotension despite vasopressor support with Levophed, Phenylephrine, Vasopressin, and stress-dose Hydrocortisone. With severe hypoxia and hypercapnia, currently requiring 100% FiO2.   Patient transferred from NSICU to MICU/COVID ICU (8ICU) for continued critical care management. Overall, patient is critically ill and actively dying at time of transfer.     OBJECTIVE --  Vital Signs Last 24 Hrs  T(C): 37.9 (17 May 2020 20:00), Max: 38.1 (17 May 2020 18:00)  T(F): 100.2 (17 May 2020 20:00), Max: 100.6 (17 May 2020 18:00)  HR: 134 (17 May 2020 20:00) (113 - 140)  BP: --  BP(mean): --  RR: 37 (17 May 2020 20:00) (26 - 37)  SpO2: 92% (17 May 2020 20:00) (89% - 100%)    I&O's Summary  16 May 2020 07:01  -  17 May 2020 07:00  --------------------------------------------------------  IN: 8868.3 mL / OUT: 7462 mL / NET: 1406.3 mL    17 May 2020 07:01  -  17 May 2020 21:55  --------------------------------------------------------  IN: 5616.4 mL / OUT:  mL / NET: 3591.4 mL    MEDICATIONS  (STANDING):  chlorhexidine 0.12% Liquid 15 milliLiter(s) Oral Mucosa every 12 hours  chlorhexidine 4% Liquid 1 Application(s) Topical <User Schedule>  CRRT Treatment    <Continuous>  dextrose 10% 1000 milliLiter(s) (100 mL/Hr) IV Continuous <Continuous>  famotidine Injectable 20 milliGRAM(s) IV Push daily  fentaNYL   Infusion... 5.006 MICROgram(s)/kG/Hr (19.5 mL/Hr) IV Continuous <Continuous>  fluconAZOLE IVPB 400 milliGRAM(s) IV Intermittent daily  insulin lispro (HumaLOG) corrective regimen sliding scale   SubCutaneous every 6 hours  meropenem  IVPB 1000 milliGRAM(s) IV Intermittent every 12 hours  meropenem  IVPB      norepinephrine Infusion 0.05 MICROgram(s)/kG/Min (3.65 mL/Hr) IV Continuous <Continuous>  norepinephrine Infusion 0.05 MICROgram(s)/kG/Min (3.65 mL/Hr) IV Continuous <Continuous>  petrolatum Ophthalmic Ointment 1 Application(s) Both EYES two times a day  phenylephrine    Infusion 0.1 MICROgram(s)/kG/Min (1.46 mL/Hr) IV Continuous <Continuous>  Phoxillum Filtration BK 4 / 2.5 5000 milliLiter(s) (2000 mL/Hr) CRRT <Continuous>  polyethylene glycol 3350 17 Gram(s) Oral every 12 hours  PrismaSOL Filtration BGK 0 / 2.5 5000 milliLiter(s) (500 mL/Hr) CRRT <Continuous>  PrismaSOL Filtration BGK 0 / 2.5 5000 milliLiter(s) (200 mL/Hr) CRRT <Continuous>  senna Syrup 10 milliLiter(s) Oral at bedtime  vancomycin  IVPB 750 milliGRAM(s) IV Intermittent <User Schedule>  vasopressin Infusion 0.02 Unit(s)/Min (1.2 mL/Hr) IV Continuous <Continuous>    MEDICATIONS  (PRN):  ondansetron Injectable 4 milliGRAM(s) IV Push every 8 hours PRN Nausea and/or Vomiting  sodium chloride 0.9% lock flush 10 milliLiter(s) IV Push every 1 hour PRN Pre/post blood products, medications, blood draw, and to maintain line patency    LABS                                            8.3                   Neurophils% (auto):   x      ( @ 10:18):    38.40)-----------(223          Lymphocytes% (auto):  x                                             27.3                   Eosinphils% (auto):   x        Manual%: Neutrophils x    ; Lymphocytes x    ; Eosinophils x    ; Bands%: x    ; Blasts x                                    137    |  102    |  12                  Calcium: 6.8   / iCa: x      ( @ 01:23)    ----------------------------<  122       Magnesium: 2.2                              4.2     |  16     |  0.59             Phosphorous: 4.5      TPro  5.0    /  Alb  1.9    /  TBili  1.8    /  DBili  x      /  AST  2072   /  ALT  168    /  AlkPhos  457    17 May 2020 01:23    (  @ 05:52 )   PT: x    ;   INR: x      aPTT: 56.0 sec    ASSESSMENT & PLAN:   44-year-old Male with no past medical history admitted to [facility] (3/31/20) with acute respiratory failure for COVID-19, requiring intubation/ventilation and admission to [facility] ICU (20). ICU course complicated by severe ARDS/hypoxia, acute liver injury, metabolic/lactic acidosis, and acute renal failure requiring CVVHD, and sepsis. Transferred from NSICU (COVID ICU) to MICU (8ICU) for further critical care management. Patient in critical condition and actively dying despite maximal medical therapy.     NEUROLOGIC:   - On Fentanyl infusion for analgesia. No response to verbal or tactile/noxious stimuli. Positive pupillary response to light. No objective findings of pain/discomfort.   - Continue with analgesia and sedatives as needed to ensure comfort during dying process.     CARDIAC:  - Severe refractory hypotension despite vasopressors. Currently on max dose of Phenylephrine, Vasopressin, and Levophed. Status post stress dose steroids. Average MAP 40-50 despite these interventions.    PULMONARY:  - Full support with ventilator. Currently on 100% FiO2 with continuous SPO2 reading 70-80%. PaO2 on ABG ~100 on current settings.     RENAL:   - CVVHD was held temporarily during transport from NSICU to ICU, attempted to re-start when patient arrived on unit but unable to maintain MAP on CRRT. CVVHD held as patient unable to tolerate it despite three vasopressors.   - On Sodium Bicarbonate infusion for metabolic acidosis, repeat ABGs showing worsening pH despite this.   - Nephrologist Dr. Pulido following. Recommendations appreciated.     GI  - On trickle feeds with Vital 1.5.   - On Reglan and Pepcid.   - Rectal tube in place, on bowel regimen.     HEMATOLOGIC:  - AC held for worsening anemia requiring PRBC transfusion on 20.     ENDOCRINE  - Episodes of hypoglycemia, most likely related to critical illness, currently on D10 infusion.     ID:   - Status post COVID-19 treatment with Plaquenil, Solu-Medrol, and Anakinra. Most recent COVID PCR on 20 was positive.   - Currently pan-culture negative, though with rising WBC and worsening clinical condition. On Empiric coverage with Vancomycin, Meropenem, and Fluconazole.  - Followed by ID physician Dr. Olmos. Recommendations appreciated.     ETHICS:  - Spoke with wife Catherine (surrogate decision maker) to provide update on patient's condition. Informed her that the patient was actively dying despite all of the above therapies and he was highly likely to  overnight. Wife requested all current medical interventions be continued. Offered wife opportunity to visit which she refused out of concern for chapincito COVID-19 (was offered PPE and MICU staff offered her assistance with transportation to hospital). Held a 90 minute "Facetime" call for wife and family to say goodbye. Additionally, at wife's request, had hospital  perform last rites via "Facetime" and pray with the family.     All of the above was discussed and reviewed in real-time with covering MICU attending physician.  Mandeep Jaimes, NP      PULM/CCM Attending. I have examined pt and agree with above exam and plan above.  46 y/o male with Acute hypoxemic respiratory failure from SARS 2, corona virus pneumonia in ARDS and septic shock. Pt seems to have  had a catastrophic event May 11 requiring abx and pressors. Pt in shock with shock liver  with decreasing lfts , acute renal failure from ATN requiring CVVHD on 3 pressors. Pt on bicarb drip for severe metabolic acidosis.  Continue PC ventilation. Pt is actively dying. Keep comfortable.    I have spent 35 min cc time not including procedures.    Aris Strauss MD

## 2020-05-17 NOTE — PROGRESS NOTE ADULT - SUBJECTIVE AND OBJECTIVE BOX
[facility]   [phone]  RENAL FOLLOW UP NOTE  --------------------------------------------------------------------------------  Chief Complaint:    24 hour events/subjective:    seen earlier. intubated     PAST HISTORY  --------------------------------------------------------------------------------  No significant changes to PMH, PSH, FHx, SHx, unless otherwise noted    ALLERGIES & MEDICATIONS  --------------------------------------------------------------------------------  Allergies    No Known Allergies    Intolerances      Standing Inpatient Medications  chlorhexidine 0.12% Liquid 15 milliLiter(s) Oral Mucosa every 12 hours  chlorhexidine 4% Liquid 1 Application(s) Topical <User Schedule>  CRRT Treatment    <Continuous>  dextrose 10% 1000 milliLiter(s) IV Continuous <Continuous>  famotidine Injectable 20 milliGRAM(s) IV Push daily  fentaNYL   Infusion... 5.006 MICROgram(s)/kG/Hr IV Continuous <Continuous>  fluconAZOLE IVPB 400 milliGRAM(s) IV Intermittent daily  insulin lispro (HumaLOG) corrective regimen sliding scale   SubCutaneous every 6 hours  meropenem  IVPB 1000 milliGRAM(s) IV Intermittent every 12 hours  meropenem  IVPB      norepinephrine Infusion 0.05 MICROgram(s)/kG/Min IV Continuous <Continuous>  petrolatum Ophthalmic Ointment 1 Application(s) Both EYES two times a day  phenylephrine    Infusion 0.1 MICROgram(s)/kG/Min IV Continuous <Continuous>  Phoxillum Filtration BK 4 / 2.5 5000 milliLiter(s) CRRT <Continuous>  polyethylene glycol 3350 17 Gram(s) Oral every 12 hours  PrismaSOL Filtration BGK 0 / 2.5 5000 milliLiter(s) CRRT <Continuous>  PrismaSOL Filtration BGK 0 / 2.5 5000 milliLiter(s) CRRT <Continuous>  senna Syrup 10 milliLiter(s) Oral at bedtime  vancomycin  IVPB 750 milliGRAM(s) IV Intermittent <User Schedule>  vasopressin Infusion 0.02 Unit(s)/Min IV Continuous <Continuous>    PRN Inpatient Medications  ondansetron Injectable 4 milliGRAM(s) IV Push every 8 hours PRN  sodium chloride 0.9% lock flush 10 milliLiter(s) IV Push every 1 hour PRN      REVIEW OF SYSTEMS  --------------------------------------------------------------------------------      VITALS/PHYSICAL EXAM  --------------------------------------------------------------------------------  T(C): 38.1 (05-17-20 @ 18:00), Max: 38.1 (05-17-20 @ 18:00)  HR: 134 (05-17-20 @ 18:00) (113 - 140)  BP: --  RR: 35 (05-17-20 @ 18:00) (26 - 35)  SpO2: 94% (05-17-20 @ 18:00) (89% - 100%)  Wt(kg): --        05-16-20 @ 07:01  -  05-17-20 @ 07:00  --------------------------------------------------------  IN: 8868.3 mL / OUT: 7462 mL / NET: 1406.3 mL    05-17-20 @ 07:01  -  05-17-20 @ 18:56  --------------------------------------------------------  IN: 4434 mL / OUT: 2005 mL / NET: 2429 mL      Physical Exam:  	    intubated   due to covid 19 isolation exam reliability per ICU team   pt with anasarca visible as well   sedated   	    LABS/STUDIES  --------------------------------------------------------------------------------              8.3    38.40 >-----------<  223      [05-17-20 @ 10:18]              27.3     137  |  102  |  12  ----------------------------<  122      [05-17-20 @ 01:23]  4.2   |  16  |  0.59        Ca     6.8     [05-17-20 @ 01:23]      Mg     2.2     [05-17-20 @ 01:23]      Phos  4.5     [05-17-20 @ 01:23]    TPro  5.0  /  Alb  1.9  /  TBili  1.8  /  DBili  x   /  AST  2072  /  ALT  168  /  AlkPhos  457  [05-17-20 @ 01:23]    PT/INR: PT 47.8 , INR 3.98       [05-16-20 @ 23:10]  PTT: 56.0       [05-17-20 @ 05:52]      Creatinine Trend:  SCr 0.59 [05-17 @ 01:23]  SCr 0.83 [05-16 @ 00:27]  SCr 1.06 [05-15 @ 01:53]  SCr 1.75 [05-14 @ 01:32]  SCr 2.09 [05-13 @ 17:49]              Urinalysis - [05-15-20 @ 07:39]      Color Yellow / Appearance Turbid / SG 1.024 / pH 6.5      Gluc Negative / Ketone Negative  / Bili Negative / Urobili Negative       Blood Moderate / Protein 100 mg/dL / Leuk Est Moderate / Nitrite Negative      RBC 3 / WBC >50 / Hyaline 0 / Gran  / Sq Epi  / Non Sq Epi 0 / Bacteria Negative      Ferritin 04834      [05-16-20 @ 10:04]  Lipid: chol --, , HDL --, LDL --      [05-15-20 @ 01:53]

## 2020-05-17 NOTE — PROGRESS NOTE ADULT - REASON FOR ADMISSION
Cough
covid
covid
Cough

## 2020-05-17 NOTE — PROGRESS NOTE ADULT - ASSESSMENT
44 year old male with no significant past medical history who presented with cough and fever for 3-4 days.  Found to be covid positive. s/p initial anakinra protocol on tapering rx. Also status post Plaquenil and solumedrol.  Currently receiving colchicine, lovenox 40 BID, famotidine and albuterol as needed. Patient required intubation 4/14- received zosyn. Received course cefepime 4/15 -->4/19 for HAP. then 4/28- MSSA bacteremia . line changed 4/28  repeat blood cx negative.  now w/ FATOU acute liver injury, lactic acidosis, hypotension and shock       1- fatou  2- lactic acidosis  3- hypotension  4-     cvvhdf bfr 160 dfr 2000 fluid removal no fluid removal   given so high pressor requirements  and had worsened with attempt to remove fluid   see new  orders   cont pressor support

## 2020-05-18 NOTE — DISCHARGE NOTE FOR THE EXPIRED PATIENT - HOSPITAL COURSE
44-year-old Male with no past medical history admitted to [facility] (3/31/20) with acute respiratory failure for COVID-19, requiring intubation/ventilation and admission to COVID ICU (20). ICU course complicated by severe ARDS/hypoxia, acute liver injury, metabolic/lactic acidosis, and acute renal failure requiring CVVHD, and sepsis. Transferred from NSICU (COVID ICU) to MICU (8ICU) for further critical care management. Patient in critical condition and actively dying despite maximal medical therapy. The pt has been on maximum doses of 3 Vasopressors however remains refractory to treatment and remains hypotensive (phenylephrine, vasopressin and norepinephrine), along with full ventilator support c/w difficulty oxygenating, remains unable to re-start CVVHD therapy in the setting of continued hypotension. Mr Aleman remains on emperic antibiotics- - Status post COVID-19 treatment with Plaquenil, Solu-Medrol, and Anakinra. Most recent COVID PCR on 20 was positive. Currently pan-culture negative, though with rising WBC and worsening clinical condition. On Empiric coverage with Vancomycin, Meropenem, and Fluconazole.  The patients wife Catherine (surrogate decision maker) to provide update on patient's condition. Informed her that the patient was actively dying despite all of the above therapies and he was highly likely to  overnight. Wife requested all current medical interventions be continued. Offered wife opportunity to visit which she refused out of concern for [place]ito COVID-19 (was offered PPE and MICU staff offered her assistance with transportation to hospital). Held a 90 minute "Facetime" call for wife and family to say goodbye. Additionally, at wife's request, had hospital  perform last rites via "Facetime" and pray with the family.   I was called to the pt bedside by Mr Aleman nurse- as the pt was actively dying and despite all resuscitation efforts and full cardiovascular support the pt  this morning at 0720. Pupils fixed and dilated, negative corneal reflex, no palpable pulses, heart sounds absent, negative spontaneous respirations. The  service was utilized ID # [id number], the pt wife Catherine was called twice and a message was left twice to call the hospital. The pt brother Adina Aleman was called and was notified of his brother Solo death. Continued efforts will be made today to personally notify the patients wife and to express our condolences.

## 2020-05-18 NOTE — DISCHARGE NOTE FOR THE EXPIRED PATIENT - NAME OF PERSON WHO MADE CONTACTED FAMILY
Left Message for Wife Catherine Love- will continue to call pt wife, Notified pt brother Eningrid Aleman- [phone]

## 2020-05-20 LAB
CULTURE RESULTS: SIGNIFICANT CHANGE UP
CULTURE RESULTS: SIGNIFICANT CHANGE UP
SPECIMEN SOURCE: SIGNIFICANT CHANGE UP
SPECIMEN SOURCE: SIGNIFICANT CHANGE UP

## 2020-11-11 NOTE — PROGRESS NOTE ADULT - NSREFPHYEXREFTO_GEN_ALL_CORE
ED Physical Exam Protopic Pregnancy And Lactation Text: This medication is Pregnancy Category C. It is unknown if this medication is excreted in breast milk when applied topically.

## 2021-10-28 NOTE — PROGRESS NOTE ADULT - PROBLEM/PLAN-3
DISPLAY PLAN FREE TEXT
Home

## 2022-04-07 NOTE — PROGRESS NOTE ADULT - PROVIDER SPECIALTY LIST ADULT
Nephrology Post op healing well. Continue to follow post op instructions.  AM: Clobetasol (steroid) cream EXTERNALLY ONLY every AM for 7 days  PM:  Use 1 gram of vaginal estrogen cream INTERNALLY AND EXTERNALLY two nights a week  AS NEEDED: Aquaphor externally  Reduce pad use  Patient will follow up with us as scheduled 4/14  Irregular bowels:    -- Controlling bowels may help bladder urgency/leakage and fiber may better control cholesterol and blood glucose.    -- Continue daily fiber. Start taking 1 tsp of fiber powder (psyllium or other sugar-free powder, ex. Benefiber or Metamucil) or fiber gummies or fiber pills.  Mix in 8 oz of water. Take x 3-5 days. Then, increase fiber by 1 tsp every 3-5 days until stool is easy to pass.  Stop and continue at that dose.   **Do not exceed 6 tsps/day.

## 2025-04-09 NOTE — PROGRESS NOTE ADULT - THIS PATIENT HAS THE FOLLOWING CONDITION(S)/DIAGNOSES ON THIS ADMISSION:
04/09/25 1308   Medicare Message   Important Message from Medicare regarding Discharge Appeal Rights Given to patient/caregiver;Explained to patient/caregiver;Signed/date by patient/caregiver   Date IMM was signed 04/09/25   Time IMM was signed 1109        Acute Respiratory Failure

## 2025-07-23 NOTE — PROGRESS NOTE ADULT - NSHPATTENDINGPLANDISCUSS_GEN_ALL_CORE
FS,  Please see below [employer] message and advise.  Thanks,  Bertha CORNELIUS RN   NINI Powell NINI Powell and Dr Olmos( ID )